# Patient Record
Sex: MALE | Race: BLACK OR AFRICAN AMERICAN | Employment: OTHER
[De-identification: names, ages, dates, MRNs, and addresses within clinical notes are randomized per-mention and may not be internally consistent; named-entity substitution may affect disease eponyms.]

---

## 2017-01-24 ENCOUNTER — TELEPHONE (OUTPATIENT)
Dept: BURN CARE | Facility: CLINIC | Age: 62
End: 2017-01-24

## 2017-01-31 ENCOUNTER — TELEPHONE (OUTPATIENT)
Dept: BURN CARE | Facility: CLINIC | Age: 62
End: 2017-01-31

## 2017-02-14 ENCOUNTER — HOSPITAL ENCOUNTER (OUTPATIENT)
Dept: CARDIAC CATH/INVASIVE PROCEDURES | Age: 62
Setting detail: OUTPATIENT SURGERY
Discharge: HOME | End: 2017-02-14
Payer: COMMERCIAL

## 2017-02-14 VITALS
SYSTOLIC BLOOD PRESSURE: 135 MMHG | TEMPERATURE: 98 F | RESPIRATION RATE: 14 BRPM | DIASTOLIC BLOOD PRESSURE: 68 MMHG | OXYGEN SATURATION: 96 % | WEIGHT: 172 LBS | BODY MASS INDEX: 25.48 KG/M2 | HEIGHT: 69 IN | HEART RATE: 54 BPM

## 2017-02-14 LAB
POC BUN: 7 MG/DL (ref 6–20)
POC CREATININE: 0.8 MG/DL (ref 0.6–1.4)
POC POTASSIUM: 4 MMOL/L (ref 3.5–5.1)

## 2017-02-14 PROCEDURE — 84520 ASSAY OF UREA NITROGEN: CPT

## 2017-02-14 PROCEDURE — 7100000010 HC PHASE II RECOVERY - FIRST 15 MIN

## 2017-02-14 PROCEDURE — C1769 GUIDE WIRE: HCPCS

## 2017-02-14 PROCEDURE — 6360000002 HC RX W HCPCS

## 2017-02-14 PROCEDURE — C1725 CATH, TRANSLUMIN NON-LASER: HCPCS

## 2017-02-14 PROCEDURE — 7100000011 HC PHASE II RECOVERY - ADDTL 15 MIN

## 2017-02-14 PROCEDURE — 75625 CONTRAST EXAM ABDOMINL AORTA: CPT | Performed by: SURGERY

## 2017-02-14 PROCEDURE — 82565 ASSAY OF CREATININE: CPT

## 2017-02-14 PROCEDURE — 75710 ARTERY X-RAYS ARM/LEG: CPT | Performed by: SURGERY

## 2017-02-14 PROCEDURE — 84132 ASSAY OF SERUM POTASSIUM: CPT

## 2017-02-14 PROCEDURE — 36247 INS CATH ABD/L-EXT ART 3RD: CPT | Performed by: SURGERY

## 2017-02-14 PROCEDURE — C1894 INTRO/SHEATH, NON-LASER: HCPCS

## 2017-02-14 PROCEDURE — C1887 CATHETER, GUIDING: HCPCS

## 2017-02-14 RX ORDER — SODIUM CHLORIDE 9 MG/ML
INJECTION, SOLUTION INTRAVENOUS CONTINUOUS
Status: DISCONTINUED | OUTPATIENT
Start: 2017-02-14 | End: 2017-02-15 | Stop reason: HOSPADM

## 2017-02-14 RX ADMIN — SODIUM CHLORIDE: 9 INJECTION, SOLUTION INTRAVENOUS at 07:46

## 2017-02-14 ASSESSMENT — PAIN SCALES - GENERAL: PAINLEVEL_OUTOF10: 2

## 2017-02-14 ASSESSMENT — PAIN DESCRIPTION - PAIN TYPE: TYPE: CHRONIC PAIN

## 2017-02-14 ASSESSMENT — PAIN DESCRIPTION - LOCATION: LOCATION: LEG;OTHER (COMMENT)

## 2017-02-14 ASSESSMENT — PAIN DESCRIPTION - FREQUENCY: FREQUENCY: INTERMITTENT

## 2017-02-14 ASSESSMENT — PAIN DESCRIPTION - DESCRIPTORS: DESCRIPTORS: BURNING

## 2017-05-24 ENCOUNTER — OFFICE VISIT (OUTPATIENT)
Dept: INTERNAL MEDICINE | Age: 62
End: 2017-05-24
Payer: COMMERCIAL

## 2017-05-24 VITALS
OXYGEN SATURATION: 100 % | DIASTOLIC BLOOD PRESSURE: 70 MMHG | HEART RATE: 53 BPM | HEIGHT: 68 IN | BODY MASS INDEX: 25.7 KG/M2 | SYSTOLIC BLOOD PRESSURE: 117 MMHG | WEIGHT: 169.6 LBS

## 2017-05-24 DIAGNOSIS — I10 ESSENTIAL HYPERTENSION: Chronic | ICD-10-CM

## 2017-05-24 DIAGNOSIS — I73.9 CLAUDICATION OF LEFT LOWER EXTREMITY (HCC): ICD-10-CM

## 2017-05-24 PROCEDURE — 99214 OFFICE O/P EST MOD 30 MIN: CPT | Performed by: INTERNAL MEDICINE

## 2017-05-24 RX ORDER — AMLODIPINE BESYLATE 10 MG/1
10 TABLET ORAL DAILY
Qty: 30 TABLET | Refills: 5 | Status: SHIPPED | OUTPATIENT
Start: 2017-05-24 | End: 2017-12-15 | Stop reason: SDUPTHER

## 2017-05-24 RX ORDER — ATORVASTATIN CALCIUM 20 MG/1
20 TABLET, FILM COATED ORAL DAILY
Qty: 30 TABLET | Refills: 5 | Status: SHIPPED | OUTPATIENT
Start: 2017-05-24 | End: 2017-10-05

## 2017-05-24 ASSESSMENT — PATIENT HEALTH QUESTIONNAIRE - PHQ9
1. LITTLE INTEREST OR PLEASURE IN DOING THINGS: 0
2. FEELING DOWN, DEPRESSED OR HOPELESS: 0
SUM OF ALL RESPONSES TO PHQ9 QUESTIONS 1 & 2: 0
SUM OF ALL RESPONSES TO PHQ QUESTIONS 1-9: 0

## 2017-10-04 DIAGNOSIS — E78.1 HYPERTRIGLYCERIDEMIA: ICD-10-CM

## 2017-10-04 NOTE — TELEPHONE ENCOUNTER
Health Maintenance   Topic Date Due    HIV screen  03/25/1971    Flu vaccine (1) 09/01/2017    Hepatitis C screen  11/09/2017 (Originally 1955)    DTaP/Tdap/Td vaccine (1 - Tdap) 01/18/2018 (Originally 3/25/1975)    Lipid screen  06/12/2020    Colon cancer screen colonoscopy  04/22/2025    Zostavax vaccine  Addressed    Pneumococcal med risk  Completed             (applicable per patient's age: Cancer Screenings, Depression Screening, Fall Risk Screening, Immunizations)    LDL Cholesterol (mg/dL)   Date Value   06/12/2015 57     AST (U/L)   Date Value   11/10/2016 91 (H)     ALT (U/L)   Date Value   11/10/2016 63 (H)     BUN (mg/dL)   Date Value   11/10/2016 8      (goal A1C is < 7)   (goal LDL is <100) need 30-50% reduction from baseline     BP Readings from Last 3 Encounters:   05/24/17 117/70   02/14/17 135/68   12/20/16 123/68    (goal /80)      All Future Testing planned in [facility]:  Lab Frequency Next Occurrence   Vasc Lower Ext Bilat Exercise Present Once 10/15/2017   Comprehensive Metabolic Panel Once 7/39/3951   Vasc Lower Ext Bilat Exercise Present Once 12/15/2016   VL Arterial PVR Lower w Exercise Once 12/19/2016       Next Visit Date:  No future appointments.          Patient Active Problem List:     Hx of tuberculosis     Alcohol induced acute pancreatitis     HTN (hypertension)     Alcohol abuse     Tobacco abuse     Hypertriglyceridemia     Macrocytic anemia     Cirrhosis, alcoholic (HCC)     PAD (peripheral artery disease) ([address]     Diverticulosis     Internal hemorrhoids     Hypercholesteremia     Amputation finger     Claudication of left lower extremity ([address]

## 2017-10-05 RX ORDER — ATORVASTATIN CALCIUM 20 MG/1
TABLET, FILM COATED ORAL
Qty: 30 TABLET | Refills: 0 | Status: SHIPPED | OUTPATIENT
Start: 2017-10-05 | End: 2018-01-30

## 2017-12-08 ENCOUNTER — OFFICE VISIT (OUTPATIENT)
Dept: VASCULAR SURGERY | Age: 62
End: 2017-12-08
Payer: COMMERCIAL

## 2017-12-08 VITALS
HEIGHT: 68 IN | BODY MASS INDEX: 25.69 KG/M2 | RESPIRATION RATE: 18 BRPM | HEART RATE: 65 BPM | WEIGHT: 169.53 LBS | DIASTOLIC BLOOD PRESSURE: 60 MMHG | SYSTOLIC BLOOD PRESSURE: 120 MMHG | OXYGEN SATURATION: 98 %

## 2017-12-08 DIAGNOSIS — I73.9 PAD (PERIPHERAL ARTERY DISEASE) (HCC): Primary | ICD-10-CM

## 2017-12-08 DIAGNOSIS — I73.9 CLAUDICATION OF LEFT LOWER EXTREMITY (HCC): ICD-10-CM

## 2017-12-08 PROCEDURE — G8427 DOCREV CUR MEDS BY ELIG CLIN: HCPCS | Performed by: SURGERY

## 2017-12-08 PROCEDURE — G8419 CALC BMI OUT NRM PARAM NOF/U: HCPCS | Performed by: SURGERY

## 2017-12-08 PROCEDURE — 99213 OFFICE O/P EST LOW 20 MIN: CPT | Performed by: SURGERY

## 2017-12-08 PROCEDURE — 3017F COLORECTAL CA SCREEN DOC REV: CPT | Performed by: SURGERY

## 2017-12-08 PROCEDURE — G8484 FLU IMMUNIZE NO ADMIN: HCPCS | Performed by: SURGERY

## 2017-12-08 PROCEDURE — 1036F TOBACCO NON-USER: CPT | Performed by: SURGERY

## 2017-12-08 RX ORDER — CILOSTAZOL 100 MG/1
100 TABLET ORAL 2 TIMES DAILY
Qty: 60 TABLET | Refills: 3 | Status: SHIPPED | OUTPATIENT
Start: 2017-12-08

## 2017-12-08 NOTE — PROGRESS NOTES
Cancer in his father and sister; Diabetes in his brother and father; Heart Disease in his father and sister; High Blood Pressure in his mother and sister. Review of Systems:   Constitutional:  negative for  fevers, chills, sweats, fatigue, and weight loss  HEENT:  negative for vision or hearing changes,   Respiratory:  negative for shortness of breath, cough, or congestion  Cardiovascular:  negative for  chest pain, palpitations  Gastrointestinal:  negative for nausea, vomiting, diarrhea, constipation, abdominal pain  Genitourinary:  negative for frequency, dysuria  Integument/Breast:  negative for rash, skin lesions  Musculoskeletal:  negative for muscle aches or joint pain  Neurological:  negative for headaches, dizziness, lightheadedness, numbness, pain and tingling extremities  Behavior/Psych:  negative for depression and anxiety    Allergies: Review of patient's allergies indicates no known allergies.     Current Meds:  Current Outpatient Prescriptions:     cilostazol (PLETAL) 100 MG tablet, Take 1 tablet by mouth 2 times daily, Disp: 60 tablet, Rfl: 3    atorvastatin (LIPITOR) 20 MG tablet, TAKE 1 TABLET BY MOUTH DAILY, Disp: 30 tablet, Rfl: 0    amLODIPine (NORVASC) 10 MG tablet, Take 1 tablet by mouth daily, Disp: 30 tablet, Rfl: 5    folic acid (FOLVITE) 1 MG tablet, TAKE 1 TABLET DAILY, Disp: 30 tablet, Rfl: 11    Multiple Vitamin (MULTIVITAMIN) tablet, TAKE 1 TABLET BY MOUTH DAILY, Disp: 30 tablet, Rfl: 11    aspirin 81 MG tablet, Take 81 mg by mouth daily LAST TAKEN 11/10/2016, Disp: , Rfl:     Vital Signs:  Vitals:    12/08/17 1411   BP: 120/60   Pulse: 65   Resp: 18   SpO2: 98%       Physical Exam:  General appearance - alert, well appearing and in no acute distress  Mental status - oriented to person, place and time with normal affect  Head - normocephalic and atraumatic  Eyes - pupils equal and reactive, extraocular eye movements intact, conjunctiva clear  Ears - hearing appears to be intact  Nose - no drainage noted  Mouth - mucous membranes moist  Neck - supple, no carotid bruits, thyroid not palpable, no JVD  Chest - clear to auscultation, normal effort  Heart - normal rate, regular rhythm, no murmurs  Abdomen - soft, non-tender, non-distended, bowel sounds present all four quadrants, no masses, hepatomegaly, splenomegaly or aortic enlargement  Neurological - normal speech, no focal findings or movement disorder noted, cranial nerves II through XII grossly intact  Extremities - peripheral pulses palpable, no pedal edema or calf pain with palpation  Skin - no gross lesions, rashes, or induration noted      R brachial 2+ L brachial 2+   R radial 2+ L radial 2+   R femoral 2+ L femoral 2+   R popliteal 2+ L popliteal doppler+   R posterior tibial 2+ L posterior tibial doppler+   R dorsalis pedis 2+ L dorsalis pedis doppler+     Labs:   Lab Results   Component Value Date    WBC 7.4 12/06/2014    HGB 12.9 12/06/2014    HCT 38.6 12/06/2014    .9 12/06/2014     12/06/2014     Lab Results   Component Value Date     11/10/2016    K 4.8 11/10/2016     11/10/2016    CO2 17 11/10/2016    BUN 8 11/10/2016    CREATININE 0.8 02/14/2017    CREATININE 0.75 11/10/2016    GLUCOSE 87 11/10/2016    CALCIUM 9.1 11/10/2016     Lab Results   Component Value Date    ALKPHOS 121 11/10/2016    ALT 63 11/10/2016    AST 91 11/10/2016    PROT 7.6 11/10/2016    BILITOT 1.36 11/10/2016    BILIDIR 0.42 06/12/2015    LABALBU 3.8 11/10/2016     No results found for: LACTA  Lab Results   Component Value Date    AMYLASE 177 06/03/2013     Lab Results   Component Value Date    LIPASE 39 06/12/2015     Lab Results   Component Value Date    INR 0.9 11/28/2016         Assessment:  1. 65 yr old male with claudication of the left leg    1. PAD (peripheral artery disease) (HCC)    2. Claudication of left lower extremity ([address]        Plan:   1. Continue an exercise regimen with Pletal 100mg PO BID  2.  Follow up

## 2017-12-15 NOTE — TELEPHONE ENCOUNTER
Next Visit Date:  Future Appointments  Date Time Provider Isac Ree   2/9/2018 1:10 PM Ramona Aguillon MD [address] Maintenance   Topic Date Due    Hepatitis C screen  1955    HIV screen  03/25/1971    Flu vaccine (1) 09/01/2017    DTaP/Tdap/Td vaccine (1 - Tdap) 01/18/2018 (Originally 3/25/1975)    Lipid screen  06/12/2020    Colon cancer screen colonoscopy  04/22/2025    Zostavax vaccine  Addressed    Pneumococcal med risk  Completed       No results found for: LABA1C          ( goal A1C is < 7)   No results found for: LABMICR  LDL Cholesterol (mg/dL)   Date Value   06/12/2015 57       (goal LDL is <100)   AST (U/L)   Date Value   11/10/2016 91 (H)     ALT (U/L)   Date Value   11/10/2016 63 (H)     BUN (mg/dL)   Date Value   11/10/2016 8     BP Readings from Last 3 Encounters:   12/08/17 120/60   05/24/17 117/70   02/14/17 135/68          (goal 120/80)    All Future Testing planned in [facility]  Lab Frequency Next Occurrence   Vasc Lower Ext Bilat Exercise Present Once 12/15/2016   VL Arterial PVR Lower w Exercise Once 12/19/2016               Patient Active Problem List:     Hx of tuberculosis     Alcohol induced acute pancreatitis     HTN (hypertension)     Alcohol abuse     Tobacco abuse     Hypertriglyceridemia     Macrocytic anemia     Cirrhosis, alcoholic (HCC)     PAD (peripheral artery disease) ([address]     Diverticulosis     Internal hemorrhoids     Hypercholesteremia     Amputation finger     Claudication of left lower extremity ([address]

## 2017-12-16 RX ORDER — AMLODIPINE BESYLATE 10 MG/1
TABLET ORAL
Qty: 30 TABLET | Refills: 3 | Status: SHIPPED | OUTPATIENT
Start: 2017-12-16 | End: 2018-07-03 | Stop reason: SDUPTHER

## 2017-12-26 DIAGNOSIS — F10.10 ALCOHOL ABUSE: Chronic | ICD-10-CM

## 2017-12-28 RX ORDER — MULTIVITAMIN WITH FOLIC ACID 400 MCG
TABLET ORAL
Qty: 30 TABLET | Refills: 0 | Status: SHIPPED | OUTPATIENT
Start: 2017-12-28 | End: 2018-01-30 | Stop reason: SDUPTHER

## 2017-12-29 ENCOUNTER — TELEPHONE (OUTPATIENT)
Dept: VASCULAR SURGERY | Age: 62
End: 2017-12-29

## 2017-12-29 NOTE — TELEPHONE ENCOUNTER
Patients daughter called stating that the Pletal that was sent over electronically did not reach the pharmacy. I verbally called the prescription in to [facility] today.

## 2018-01-30 DIAGNOSIS — F10.10 ALCOHOL ABUSE: Chronic | ICD-10-CM

## 2018-01-30 RX ORDER — MULTIVITAMIN WITH FOLIC ACID 400 MCG
TABLET ORAL
Qty: 30 TABLET | Refills: 0 | Status: SHIPPED | OUTPATIENT
Start: 2018-01-30 | End: 2018-03-12 | Stop reason: SDUPTHER

## 2018-01-30 RX ORDER — ATORVASTATIN CALCIUM 20 MG/1
20 TABLET, FILM COATED ORAL DAILY
Qty: 30 TABLET | Refills: 0 | Status: SHIPPED | OUTPATIENT
Start: 2018-01-30 | End: 2018-03-12

## 2018-03-12 DIAGNOSIS — F10.10 ALCOHOL ABUSE: Chronic | ICD-10-CM

## 2018-03-12 DIAGNOSIS — E78.1 HYPERTRIGLYCERIDEMIA: ICD-10-CM

## 2018-03-12 RX ORDER — ATORVASTATIN CALCIUM 20 MG/1
TABLET, FILM COATED ORAL
Qty: 30 TABLET | Refills: 0 | Status: SHIPPED | OUTPATIENT
Start: 2018-03-12 | End: 2018-07-03 | Stop reason: SDUPTHER

## 2018-03-12 RX ORDER — FOLIC ACID 1 MG/1
TABLET ORAL
Qty: 30 TABLET | Refills: 0 | Status: SHIPPED | OUTPATIENT
Start: 2018-03-12 | End: 2018-04-24 | Stop reason: SDUPTHER

## 2018-03-12 RX ORDER — MULTIVITAMIN WITH FOLIC ACID 400 MCG
TABLET ORAL
Qty: 30 TABLET | Refills: 0 | Status: SHIPPED | OUTPATIENT
Start: 2018-03-12 | End: 2018-04-24 | Stop reason: SDUPTHER

## 2018-03-12 NOTE — TELEPHONE ENCOUNTER
Health Maintenance   Topic Date Due    Hepatitis C screen  1955    HIV screen  03/25/1971    DTaP/Tdap/Td vaccine (1 - Tdap) 03/25/1975    Shingles Vaccine (1 of 2 - 2 Dose Series) 03/25/2006    Flu vaccine (1) 09/01/2017    Lipid screen  06/12/2020    Colon cancer screen colonoscopy  04/22/2025    Pneumococcal med risk  Completed             (applicable per patient's age: Cancer Screenings, Depression Screening, Fall Risk Screening, Immunizations)    LDL Cholesterol (mg/dL)   Date Value   06/12/2015 57     AST (U/L)   Date Value   11/10/2016 91 (H)     ALT (U/L)   Date Value   11/10/2016 63 (H)     BUN (mg/dL)   Date Value   11/10/2016 8      (goal A1C is < 7)   (goal LDL is <100) need 30-50% reduction from baseline     BP Readings from Last 3 Encounters:   12/08/17 120/60   05/24/17 117/70   02/14/17 135/68    (goal /80)      All Future Testing planned in [facility]:  Lab Frequency Next Occurrence       Next Visit Date:  No future appointments.          Patient Active Problem List:     Hx of tuberculosis     Alcohol induced acute pancreatitis     HTN (hypertension)     Alcohol abuse     Tobacco abuse     Hypertriglyceridemia     Macrocytic anemia     Cirrhosis, alcoholic (HCC)     PAD (peripheral artery disease) ([address]     Diverticulosis     Internal hemorrhoids     Hypercholesteremia     Amputation finger     Claudication of left lower extremity ([address]

## 2018-04-24 DIAGNOSIS — F10.10 ALCOHOL ABUSE: Chronic | ICD-10-CM

## 2018-04-24 RX ORDER — MULTIVITAMIN WITH FOLIC ACID 400 MCG
TABLET ORAL
Qty: 30 TABLET | Refills: 3 | Status: SHIPPED | OUTPATIENT
Start: 2018-04-24 | End: 2019-01-14 | Stop reason: SDUPTHER

## 2018-04-24 RX ORDER — FOLIC ACID 1 MG/1
TABLET ORAL
Qty: 30 TABLET | Refills: 3 | Status: SHIPPED | OUTPATIENT
Start: 2018-04-24 | End: 2018-09-18 | Stop reason: SDUPTHER

## 2018-07-03 DIAGNOSIS — E78.1 HYPERTRIGLYCERIDEMIA: ICD-10-CM

## 2018-07-05 RX ORDER — AMLODIPINE BESYLATE 10 MG/1
TABLET ORAL
Qty: 30 TABLET | Refills: 3 | Status: SHIPPED | OUTPATIENT
Start: 2018-07-05 | End: 2019-05-08 | Stop reason: SDUPTHER

## 2018-07-05 RX ORDER — ATORVASTATIN CALCIUM 20 MG/1
TABLET, FILM COATED ORAL
Qty: 30 TABLET | Refills: 3 | Status: SHIPPED | OUTPATIENT
Start: 2018-07-05 | End: 2018-10-11 | Stop reason: SDUPTHER

## 2018-09-18 DIAGNOSIS — F10.10 ALCOHOL ABUSE: Chronic | ICD-10-CM

## 2018-09-18 RX ORDER — FOLIC ACID 1 MG/1
TABLET ORAL
Qty: 30 TABLET | Refills: 0 | Status: SHIPPED | OUTPATIENT
Start: 2018-09-18 | End: 2019-03-18 | Stop reason: SDUPTHER

## 2018-10-01 ENCOUNTER — APPOINTMENT (OUTPATIENT)
Dept: CT IMAGING | Age: 63
End: 2018-10-01
Payer: COMMERCIAL

## 2018-10-01 ENCOUNTER — APPOINTMENT (OUTPATIENT)
Dept: GENERAL RADIOLOGY | Age: 63
End: 2018-10-01
Payer: COMMERCIAL

## 2018-10-01 ENCOUNTER — HOSPITAL ENCOUNTER (EMERGENCY)
Age: 63
Discharge: HOME | End: 2018-10-01
Attending: EMERGENCY MEDICINE
Payer: COMMERCIAL

## 2018-10-01 VITALS
WEIGHT: 172 LBS | HEART RATE: 71 BPM | BODY MASS INDEX: 25.48 KG/M2 | RESPIRATION RATE: 14 BRPM | OXYGEN SATURATION: 100 % | HEIGHT: 69 IN | DIASTOLIC BLOOD PRESSURE: 80 MMHG | SYSTOLIC BLOOD PRESSURE: 159 MMHG | TEMPERATURE: 98.2 F
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
## 2018-10-01 PROCEDURE — 72125 CT NECK SPINE W/O DYE: CPT

## 2018-10-01 PROCEDURE — 70450 CT HEAD/BRAIN W/O DYE: CPT

## 2018-10-01 PROCEDURE — G0383 LEV 4 HOSP TYPE B ED VISIT: HCPCS

## 2018-10-01 PROCEDURE — 6370000000 HC RX 637 (ALT 250 FOR IP): Performed by: STUDENT IN AN ORGANIZED HEALTH CARE EDUCATION/TRAINING PROGRAM

## 2018-10-01 PROCEDURE — 71045 X-RAY EXAM CHEST 1 VIEW: CPT

## 2018-10-01 RX ORDER — ACETAMINOPHEN 325 MG/1
325 TABLET ORAL EVERY 6 HOURS PRN
Qty: 120 TABLET | Refills: 3 | Status: SHIPPED | OUTPATIENT
Start: 2018-10-01 | End: 2019-06-24

## 2018-10-01 RX ORDER — IBUPROFEN 800 MG/1
800 TABLET ORAL ONCE
Status: COMPLETED | OUTPATIENT
Start: 2018-10-01 | End: 2018-10-01

## 2018-10-01 RX ORDER — IBUPROFEN 800 MG/1
800 TABLET ORAL EVERY 8 HOURS PRN
Qty: 30 TABLET | Refills: 0 | Status: SHIPPED | OUTPATIENT
Start: 2018-10-01 | End: 2019-04-11 | Stop reason: SDUPTHER

## 2018-10-01 RX ORDER — ACETAMINOPHEN 325 MG/1
650 TABLET ORAL ONCE
Status: COMPLETED | OUTPATIENT
Start: 2018-10-01 | End: 2018-10-01

## 2018-10-01 RX ADMIN — ACETAMINOPHEN 650 MG: 325 TABLET ORAL at 18:07

## 2018-10-01 RX ADMIN — IBUPROFEN 800 MG: 800 TABLET ORAL at 18:07

## 2018-10-01 ASSESSMENT — PAIN DESCRIPTION - PAIN TYPE: TYPE: ACUTE PAIN

## 2018-10-01 ASSESSMENT — PAIN SCALES - GENERAL
PAINLEVEL_OUTOF10: 9
PAINLEVEL_OUTOF10: 5
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
## 2018-10-01 NOTE — ED NOTES
Bed: 38  Expected date:   Expected time:   Means of arrival:   Comments:  Eliu Acosta RN  10/01/18 0915

## 2018-10-07 ASSESSMENT — ENCOUNTER SYMPTOMS
VOMITING: 0
SORE THROAT: 0
ABDOMINAL PAIN: 0
PHOTOPHOBIA: 0
COUGH: 0
CHEST TIGHTNESS: 0
SHORTNESS OF BREATH: 0
BACK PAIN: 0
NAUSEA: 0
TROUBLE SWALLOWING: 0
WHEEZING: 0

## 2018-10-07 NOTE — ED PROVIDER NOTES
History   Problem Relation Age of Onset    High Blood Pressure Mother     Diabetes Father     Cancer Father     Heart Disease Father     Cancer Sister     High Blood Pressure Sister     Heart Disease Sister     Diabetes Brother        Allergies:  Patient has no known allergies. Home Medications:  Prior to Admission medications    Medication Sig Start Date End Date Taking? Authorizing Provider   ibuprofen (ADVIL;MOTRIN) 800 MG tablet Take 1 tablet by mouth every 8 hours as needed for Pain 10/1/18  Yes Rob Glover MD   acetaminophen (TYLENOL) 325 MG tablet Take 1 tablet by mouth every 6 hours as needed for Pain 10/1/18  Yes Rob Glover MD   folic acid (FOLVITE) 1 MG tablet TAKE 1 TABLET DAILY 9/18/18   Bia Martinez MD   amLODIPine (NORVASC) 10 MG tablet TAKE 1 TABLET DAILY -MAY CAUSE DIZZINESS 7/5/18   Bia Martinez MD   atorvastatin (LIPITOR) 20 MG tablet TAKE 1 TABLET BY MOUTH DAILY 7/5/18   Bia Martinez MD   Multiple Vitamin (MULTIVITAMIN) tablet TAKE 1 TABLET DAILY 4/24/18   Bia Martinez MD   cilostazol (PLETAL) 100 MG tablet Take 1 tablet by mouth 2 times daily 12/8/17   Fara Torres MD   aspirin 81 MG tablet Take 81 mg by mouth daily LAST TAKEN 11/10/2016    Historical Provider, MD       REVIEW OF SYSTEMS    (2-9 systems for level 4, 10 or more for level 5)      Review of Systems   Constitutional: Negative for chills and fever. HENT: Negative for sore throat and trouble swallowing. Eyes: Negative for photophobia. Respiratory: Negative for cough, chest tightness, shortness of breath and wheezing. Cardiovascular: Negative for chest pain and palpitations. Gastrointestinal: Negative for abdominal pain, nausea and vomiting. Endocrine: Negative for polyuria. Genitourinary: Negative for dysuria and flank pain. Musculoskeletal: Negative for back pain and neck pain. Skin: Negative for rash and wound.    Neurological: Negative for syncope, weakness, light-headedness and headaches. Psychiatric/Behavioral: Negative for agitation and confusion. PHYSICAL EXAM   (up to 7 for level 4, 8 or more for level 5)      INITIAL VITALS:   BP (!) 159/80   Pulse 71   Temp 98.2 °F (36.8 °C) (Oral)   Resp 14   Ht 5' 8.5\" (1.74 m)   Wt 172 lb (78 kg)   SpO2 100%   BMI 25.77 kg/m²     Physical Exam   Constitutional: He is oriented to person, place, and time. He appears well-developed and well-nourished. HENT:   Head: Normocephalic and atraumatic. Nose: Nose normal.   Mouth/Throat: Oropharynx is clear and moist.   Eyes: Pupils are equal, round, and reactive to light. EOM are normal.   Neck: Normal range of motion. Neck supple. Cardiovascular: Normal rate, regular rhythm, normal heart sounds and intact distal pulses. Pulmonary/Chest: Breath sounds normal. No respiratory distress. He has no wheezes. He has no rales. Abdominal: Soft. Bowel sounds are normal. He exhibits no distension. There is no tenderness. Musculoskeletal: Normal range of motion. He exhibits no edema or deformity. Midline and paraspinal neck tenderness. Limited range of motion due to tenderness. Cervical collar in place. Neurological: He is alert and oriented to person, place, and time. He has normal reflexes. Skin: Skin is warm and dry. No rash noted. No erythema. Psychiatric: He has a normal mood and affect.  His behavior is normal.       DIFFERENTIAL  DIAGNOSIS     PLAN (LABS / IMAGING / EKG):  Orders Placed This Encounter   Procedures    CT Head WO Contrast    CT CERVICAL SPINE WO CONTRAST    XR CHEST PORTABLE       MEDICATIONS ORDERED:  Orders Placed This Encounter   Medications    ibuprofen (ADVIL;MOTRIN) tablet 800 mg    acetaminophen (TYLENOL) tablet 650 mg    ibuprofen (ADVIL;MOTRIN) 800 MG tablet     Sig: Take 1 tablet by mouth every 8 hours as needed for Pain     Dispense:  30 tablet     Refill:  0    acetaminophen (TYLENOL) 325 MG tablet     Sig: Take 1 tablet by mouth every 6 hours

## 2018-10-11 ENCOUNTER — OFFICE VISIT (OUTPATIENT)
Dept: INTERNAL MEDICINE | Age: 63
End: 2018-10-11
Payer: COMMERCIAL

## 2018-10-11 VITALS
SYSTOLIC BLOOD PRESSURE: 133 MMHG | DIASTOLIC BLOOD PRESSURE: 77 MMHG | WEIGHT: 166.4 LBS | HEIGHT: 69 IN | BODY MASS INDEX: 24.65 KG/M2 | HEART RATE: 52 BPM

## 2018-10-11 DIAGNOSIS — I10 ESSENTIAL HYPERTENSION: Chronic | ICD-10-CM

## 2018-10-11 DIAGNOSIS — Z23 NEED FOR INFLUENZA VACCINATION: Primary | ICD-10-CM

## 2018-10-11 DIAGNOSIS — E78.1 HYPERTRIGLYCERIDEMIA: ICD-10-CM

## 2018-10-11 DIAGNOSIS — S68.119S TRAUMATIC AMPUTATION OF FINGER WITHOUT COMPLICATION, SEQUELA (HCC): ICD-10-CM

## 2018-10-11 DIAGNOSIS — I73.9 PAD (PERIPHERAL ARTERY DISEASE) (HCC): ICD-10-CM

## 2018-10-11 DIAGNOSIS — Z13.31 POSITIVE DEPRESSION SCREENING: ICD-10-CM

## 2018-10-11 DIAGNOSIS — D53.9 MACROCYTIC ANEMIA: ICD-10-CM

## 2018-10-11 DIAGNOSIS — K70.9 ALCOHOLIC LIVER DAMAGE (HCC): ICD-10-CM

## 2018-10-11 PROCEDURE — 3017F COLORECTAL CA SCREEN DOC REV: CPT | Performed by: INTERNAL MEDICINE

## 2018-10-11 PROCEDURE — 90686 IIV4 VACC NO PRSV 0.5 ML IM: CPT | Performed by: INTERNAL MEDICINE

## 2018-10-11 PROCEDURE — G8431 POS CLIN DEPRES SCRN F/U DOC: HCPCS | Performed by: INTERNAL MEDICINE

## 2018-10-11 PROCEDURE — 1036F TOBACCO NON-USER: CPT | Performed by: INTERNAL MEDICINE

## 2018-10-11 PROCEDURE — 99214 OFFICE O/P EST MOD 30 MIN: CPT | Performed by: INTERNAL MEDICINE

## 2018-10-11 PROCEDURE — G8427 DOCREV CUR MEDS BY ELIG CLIN: HCPCS | Performed by: INTERNAL MEDICINE

## 2018-10-11 PROCEDURE — 99211 OFF/OP EST MAY X REQ PHY/QHP: CPT | Performed by: INTERNAL MEDICINE

## 2018-10-11 PROCEDURE — G8482 FLU IMMUNIZE ORDER/ADMIN: HCPCS | Performed by: INTERNAL MEDICINE

## 2018-10-11 PROCEDURE — G8420 CALC BMI NORM PARAMETERS: HCPCS | Performed by: INTERNAL MEDICINE

## 2018-10-11 PROCEDURE — 96160 PT-FOCUSED HLTH RISK ASSMT: CPT | Performed by: INTERNAL MEDICINE

## 2018-10-11 RX ORDER — ATORVASTATIN CALCIUM 20 MG/1
TABLET, FILM COATED ORAL
Qty: 30 TABLET | Refills: 3 | Status: SHIPPED | OUTPATIENT
Start: 2018-10-11 | End: 2019-07-18 | Stop reason: SDUPTHER

## 2018-10-11 ASSESSMENT — ENCOUNTER SYMPTOMS
RESPIRATORY NEGATIVE: 1
GASTROINTESTINAL NEGATIVE: 1
ALLERGIC/IMMUNOLOGIC NEGATIVE: 1
EYE DISCHARGE: 1

## 2018-10-11 ASSESSMENT — PATIENT HEALTH QUESTIONNAIRE - PHQ9
4. FEELING TIRED OR HAVING LITTLE ENERGY: 1
3. TROUBLE FALLING OR STAYING ASLEEP: 3
SUM OF ALL RESPONSES TO PHQ QUESTIONS 1-9: 12
SUM OF ALL RESPONSES TO PHQ9 QUESTIONS 1 & 2: 3
8. MOVING OR SPEAKING SO SLOWLY THAT OTHER PEOPLE COULD HAVE NOTICED. OR THE OPPOSITE, BEING SO FIGETY OR RESTLESS THAT YOU HAVE BEEN MOVING AROUND A LOT MORE THAN USUAL: 2
10. IF YOU CHECKED OFF ANY PROBLEMS, HOW DIFFICULT HAVE THESE PROBLEMS MADE IT FOR YOU TO DO YOUR WORK, TAKE CARE OF THINGS AT HOME, OR GET ALONG WITH OTHER PEOPLE: 1
5. POOR APPETITE OR OVEREATING: 1
2. FEELING DOWN, DEPRESSED OR HOPELESS: 2
6. FEELING BAD ABOUT YOURSELF - OR THAT YOU ARE A FAILURE OR HAVE LET YOURSELF OR YOUR FAMILY DOWN: 0
SUM OF ALL RESPONSES TO PHQ QUESTIONS 1-9: 12
9. THOUGHTS THAT YOU WOULD BE BETTER OFF DEAD, OR OF HURTING YOURSELF: 0
7. TROUBLE CONCENTRATING ON THINGS, SUCH AS READING THE NEWSPAPER OR WATCHING TELEVISION: 2
1. LITTLE INTEREST OR PLEASURE IN DOING THINGS: 1

## 2018-10-11 NOTE — PROGRESS NOTES
[facility]   Progress Note      Visit Information    Have you changed or started any medications since your last visit including any over-the-counter medicines, vitamins, or herbal medicines? yes - ASA   Are you having any side effects from any of your medications? -  no  Have you stopped taking any of your medications? Is so, why? -  no    Have you seen any other physician or provider since your last visit? Yes - Records Obtained  Have you had any other diagnostic tests since your last visit? No  Have you been seen in the emergency room and/or had an admission to a hospital since we last saw you? Yes - Records Obtained  Have you had your routine dental cleaning in the past 6 months? no    Have you activated your [employer] account? If not, what are your barriers? Yes     Patient Care Team:  Brijesh Linda MD as PCP - General (Internal Medicine)  Brijesh Linda MD as PCP - [facility] Attributed Provider    Medical History Review  Past Medical, Family, and Social History reviewed and does contribute to the patient presenting condition    Health Maintenance   Topic Date Due    Hepatitis C screen  1955    HIV screen  03/25/1971    DTaP/Tdap/Td vaccine (1 - Tdap) 03/25/1975    Shingles Vaccine (1 of 2 - 2 Dose Series) 03/25/2006    Flu vaccine (1) 09/01/2018    Lipid screen  06/12/2020    Colon cancer screen colonoscopy  04/22/2025    Pneumococcal med risk  Completed       Date of patient's visit: 10/11/2018  Patient's Name:  Dennise Rawls                   YOB: 1955        PCP:  Brijesh Linda MD    Dennise Rawls is a 59 y.o. male who presents for   Chief Complaint   Patient presents with    Hypertension    and follow up of chronic medical problems.   Patient Active Problem List   Diagnosis    Hx of tuberculosis    Alcohol induced acute pancreatitis    HTN (hypertension)    Alcohol abuse    Tobacco abuse    medications for this visit. Patient Care Team:  Dori Pate MD as PCP - General (Internal Medicine)  Dori Pate MD as PCP - [facility] Attributed Provider    PAST MEDICAL AND SURGICAL HISTORY:      Past Medical History:   Diagnosis Date    Alcoholism ([address]     Cirrhosis of liver ([address]     Claudication ([address]     left leg    Finger fracture, left     LONG FINGER    Hx of blood clots     LEFT LOWER LEG UNSURE OF THIS AT THIS TIME, IS TO BE SEEING VASCULAR DR FOR THIS.  Hyperlipidemia     Hypertension     Liver disease     PAD (peripheral artery disease) (HCC)     PVD (peripheral vascular disease) ([address]     Wears dentures     FULL UPPER AND LOWER    Wears glasses      Past Surgical History:   Procedure Laterality Date    FINGER AMPUTATION Left 11/28/2016    revision long finger    LIVER BIOPSY      OTHER SURGICAL HISTORY  02/14/2017    Abdomen with run off with Dr. Kwan Martinez - could not pass left left pop; # 7 FR manual pull right groin    UPPER GASTROINTESTINAL ENDOSCOPY      VASCULAR SURGERY      abdominal aortogram       SOCIAL HISTORY      Social History   Substance Use Topics    Smoking status: Former Smoker     Packs/day: 0.50     Types: Cigarettes     Quit date: 11/2015    Smokeless tobacco: Never Used    Alcohol use No      Comment: QUIT 03/2016     Onofre Smart  reports that he quit smoking about 2 years ago. His smoking use included Cigarettes. He smoked 0.50 packs per day. He has never used smokeless tobacco.    FAMILY HISTORY:      Family History   Problem Relation Age of Onset    High Blood Pressure Mother     Diabetes Father     Cancer Father     Heart Disease Father     Cancer Sister     High Blood Pressure Sister     Heart Disease Sister     Diabetes Brother        REVIEW OF SYSTEMS:    Review of Systems   Constitutional: Negative. HENT: Negative. Eyes: Positive for discharge. Respiratory: Negative. Cardiovascular: Negative. Gastrointestinal: Negative. Endocrine: Negative. Genitourinary: Negative. Musculoskeletal: Positive for neck pain. Skin: Negative. Allergic/Immunologic: Negative. Neurological: Positive for dizziness and headaches. Hematological: Negative. Psychiatric/Behavioral: Negative.         PHYSICAL EXAM:      Vitals:    10/11/18 1449   BP: 133/77   Pulse: 52     BP Readings from Last 3 Encounters:   10/11/18 133/77   10/01/18 (!) 159/80   12/08/17 120/60       Physical Examination: General appearance - alert, well appearing, and in no distress  Mental status - alert, oriented to person, place, and time  Eyes - pupils equal and reactive, extraocular eye movements intact  Chest - clear to auscultation, no wheezes, rales or rhonchi, symmetric air entry  Heart - normal rate, regular rhythm, normal S1, S2, no murmurs, rubs, clicks or gallops  Abdomen - soft, nontender, nondistended, no masses or organomegaly  Back exam - full range of motion, no tenderness, palpable spasm or pain on motion  Neurological - alert, oriented, normal speech, no focal findings or movement disorder noted  Musculoskeletal - no muscular tenderness noted  Extremities - peripheral pulses normal, no pedal edema, no clubbing or cyanosis    LABORATORY FINDINGS:    CBC:   Lab Results   Component Value Date    WBC 7.4 12/06/2014    HGB 12.9 12/06/2014     12/06/2014     BMP:    Lab Results   Component Value Date     11/10/2016    K 4.8 11/10/2016     11/10/2016    CO2 17 11/10/2016    BUN 8 11/10/2016    CREATININE 0.8 02/14/2017    CREATININE 0.75 11/10/2016    GLUCOSE 87 11/10/2016     Hemoglobin A1C: No results found for: LABA1C  Microalbumin Urine: No results found for: MICROALBUR  Lipid profile:   Lab Results   Component Value Date    CHOL 123 06/12/2015    TRIG 112 06/12/2015    HDL 44 06/12/2015     Thyroid functions:   Lab Results   Component Value Date    TSH 1.40 11/10/2016      Hepatic functions:   Lab Results   Component Value Date    ALT

## 2018-10-22 ENCOUNTER — APPOINTMENT (OUTPATIENT)
Dept: GENERAL RADIOLOGY | Age: 63
End: 2018-10-22
Payer: COMMERCIAL

## 2018-10-22 ENCOUNTER — APPOINTMENT (OUTPATIENT)
Dept: CT IMAGING | Age: 63
End: 2018-10-22
Payer: COMMERCIAL

## 2018-10-22 ENCOUNTER — HOSPITAL ENCOUNTER (EMERGENCY)
Age: 63
Discharge: HOME | End: 2018-10-22
Attending: EMERGENCY MEDICINE
Payer: COMMERCIAL

## 2018-10-22 VITALS
OXYGEN SATURATION: 96 % | DIASTOLIC BLOOD PRESSURE: 74 MMHG | HEART RATE: 80 BPM | TEMPERATURE: 99.3 F | SYSTOLIC BLOOD PRESSURE: 161 MMHG | WEIGHT: 168 LBS | RESPIRATION RATE: 22 BRPM | BODY MASS INDEX: 24.81 KG/M2

## 2018-10-22 DIAGNOSIS — J18.9 PNEUMONIA DUE TO INFECTIOUS ORGANISM, UNSPECIFIED LATERALITY, UNSPECIFIED PART OF LUNG: ICD-10-CM

## 2018-10-22 DIAGNOSIS — R51.9 ACUTE NONINTRACTABLE HEADACHE, UNSPECIFIED HEADACHE TYPE: Primary | ICD-10-CM

## 2018-10-22 LAB
ABSOLUTE EOS #: <0.03 K/UL (ref 0–0.44)
ABSOLUTE IMMATURE GRANULOCYTE: 0.09 K/UL (ref 0–0.3)
ABSOLUTE LYMPH #: 2.16 K/UL (ref 1.1–3.7)
ABSOLUTE MONO #: 0.66 K/UL (ref 0.1–1.2)
ANION GAP SERPL CALCULATED.3IONS-SCNC: 14 MMOL/L (ref 9–17)
BASOPHILS # BLD: 0 % (ref 0–2)
BASOPHILS ABSOLUTE: 0.03 K/UL (ref 0–0.2)
BUN BLDV-MCNC: 7 MG/DL (ref 8–23)
BUN/CREAT BLD: ABNORMAL (ref 9–20)
CALCIUM SERPL-MCNC: 8.8 MG/DL (ref 8.6–10.4)
CHLORIDE BLD-SCNC: 105 MMOL/L (ref 98–107)
CO2: 20 MMOL/L (ref 20–31)
CREAT SERPL-MCNC: 0.86 MG/DL (ref 0.7–1.2)
DIFFERENTIAL TYPE: ABNORMAL
EKG ATRIAL RATE: 89 BPM
EKG P AXIS: 72 DEGREES
EKG P-R INTERVAL: 128 MS
EKG Q-T INTERVAL: 462 MS
EKG QRS DURATION: 80 MS
EKG QTC CALCULATION (BAZETT): 562 MS
EKG R AXIS: -44 DEGREES
EKG T AXIS: 34 DEGREES
EKG VENTRICULAR RATE: 89 BPM
EOSINOPHILS RELATIVE PERCENT: 0 % (ref 1–4)
GFR AFRICAN AMERICAN: >60 ML/MIN
GFR NON-AFRICAN AMERICAN: >60 ML/MIN
GFR SERPL CREATININE-BSD FRML MDRD: ABNORMAL ML/MIN/{1.73_M2}
GFR SERPL CREATININE-BSD FRML MDRD: ABNORMAL ML/MIN/{1.73_M2}
GLUCOSE BLD-MCNC: 141 MG/DL (ref 70–99)
HCT VFR BLD CALC: 37.7 % (ref 40.7–50.3)
HEMOGLOBIN: 12.7 G/DL (ref 13–17)
IMMATURE GRANULOCYTES: 1 %
LYMPHOCYTES # BLD: 18 % (ref 24–43)
MCH RBC QN AUTO: 36.5 PG (ref 25.2–33.5)
MCHC RBC AUTO-ENTMCNC: 33.7 G/DL (ref 28.4–34.8)
MCV RBC AUTO: 108.3 FL (ref 82.6–102.9)
MONOCYTES # BLD: 6 % (ref 3–12)
NRBC AUTOMATED: 0 PER 100 WBC
PDW BLD-RTO: 13.4 % (ref 11.8–14.4)
PLATELET # BLD: 170 K/UL (ref 138–453)
PLATELET ESTIMATE: ABNORMAL
PMV BLD AUTO: 10.5 FL (ref 8.1–13.5)
POC TROPONIN I: 0.03 NG/ML (ref 0–0.1)
POC TROPONIN INTERP: NORMAL
POTASSIUM SERPL-SCNC: 3.4 MMOL/L (ref 3.7–5.3)
RBC # BLD: 3.48 M/UL (ref 4.21–5.77)
RBC # BLD: ABNORMAL 10*6/UL
SEG NEUTROPHILS: 75 % (ref 36–65)
SEGMENTED NEUTROPHILS ABSOLUTE COUNT: 8.91 K/UL (ref 1.5–8.1)
SODIUM BLD-SCNC: 139 MMOL/L (ref 135–144)
WBC # BLD: 11.9 K/UL (ref 3.5–11.3)
WBC # BLD: ABNORMAL 10*3/UL

## 2018-10-22 PROCEDURE — 84484 ASSAY OF TROPONIN QUANT: CPT

## 2018-10-22 PROCEDURE — 70450 CT HEAD/BRAIN W/O DYE: CPT

## 2018-10-22 PROCEDURE — 2580000003 HC RX 258: Performed by: EMERGENCY MEDICINE

## 2018-10-22 PROCEDURE — 96374 THER/PROPH/DIAG INJ IV PUSH: CPT

## 2018-10-22 PROCEDURE — 6360000002 HC RX W HCPCS: Performed by: EMERGENCY MEDICINE

## 2018-10-22 PROCEDURE — 6370000000 HC RX 637 (ALT 250 FOR IP): Performed by: EMERGENCY MEDICINE

## 2018-10-22 PROCEDURE — 80048 BASIC METABOLIC PNL TOTAL CA: CPT

## 2018-10-22 PROCEDURE — 71046 X-RAY EXAM CHEST 2 VIEWS: CPT

## 2018-10-22 PROCEDURE — 93005 ELECTROCARDIOGRAM TRACING: CPT

## 2018-10-22 PROCEDURE — 99284 EMERGENCY DEPT VISIT MOD MDM: CPT

## 2018-10-22 PROCEDURE — 85025 COMPLETE CBC W/AUTO DIFF WBC: CPT

## 2018-10-22 RX ORDER — AZITHROMYCIN 250 MG/1
500 TABLET, FILM COATED ORAL ONCE
Status: COMPLETED | OUTPATIENT
Start: 2018-10-22 | End: 2018-10-22

## 2018-10-22 RX ORDER — IBUPROFEN 800 MG/1
800 TABLET ORAL EVERY 8 HOURS PRN
Qty: 30 TABLET | Refills: 0 | Status: SHIPPED | OUTPATIENT
Start: 2018-10-22 | End: 2020-04-02

## 2018-10-22 RX ORDER — 0.9 % SODIUM CHLORIDE 0.9 %
1000 INTRAVENOUS SOLUTION INTRAVENOUS ONCE
Status: COMPLETED | OUTPATIENT
Start: 2018-10-22 | End: 2018-10-22

## 2018-10-22 RX ORDER — AZITHROMYCIN 250 MG/1
250 TABLET, FILM COATED ORAL DAILY
Qty: 4 TABLET | Refills: 0 | Status: SHIPPED | OUTPATIENT
Start: 2018-10-22 | End: 2018-11-01

## 2018-10-22 RX ORDER — DIPHENHYDRAMINE HCL 25 MG
25 TABLET ORAL ONCE
Status: COMPLETED | OUTPATIENT
Start: 2018-10-22 | End: 2018-10-22

## 2018-10-22 RX ADMIN — DIPHENHYDRAMINE HCL 25 MG: 25 TABLET ORAL at 13:29

## 2018-10-22 RX ADMIN — PROCHLORPERAZINE EDISYLATE 10 MG: 5 INJECTION INTRAMUSCULAR; INTRAVENOUS at 13:29

## 2018-10-22 RX ADMIN — AZITHROMYCIN 500 MG: 250 TABLET, FILM COATED ORAL at 14:56

## 2018-10-22 RX ADMIN — SODIUM CHLORIDE 1000 ML: 9 INJECTION, SOLUTION INTRAVENOUS at 13:29

## 2018-10-22 NOTE — ED PROVIDER NOTES
[facility] ED  Emergency Department Encounter  Emergency Medicine Resident     Pt Name: Henry Farah  MRN: [id number]  [place] 1955  Date of evaluation: 10/22/18  PCP:  Karlie Peter MD    [address] [place] [address]       Chief Complaint   Patient presents with    Headache     onset 5 days ago without relief after tylenol or motrin     Shortness of Breath     complaining of shortness of breath onset 3 days ago with history of DVT        HISTORY OF PRESENT ILLNESS  (Location/Symptom, Timing/Onset, Context/Setting, Quality, Duration, Modifying Factors, Severity.)      Henry Farah is a 59 y.o. male who presents with Multiple complaints. Patient states she's had a headache for the past 5 days. Patient states he's attempting Tylenol Motrin with no improvement. Patient states pain starts at the top of his head and extends posteriorly. Patient states does not have a history of headaches. Patient states gradual onset of symptoms. Patient states symptoms associated with photophobia. Patient also complains of productive cough and shortness of breath over the past 3 days. Patient is smoker. PAST MEDICAL / SURGICAL / SOCIAL / FAMILY HISTORY      has a past medical history of Alcoholism ([facility] Cirrhosis of liver ([facility] Claudication [facility]); Finger fracture, left; Hx of blood clots; Hyperlipidemia; Hypertension; Liver disease; PAD (peripheral artery disease) ([facility] PVD (peripheral vascular disease) ([facility] Wears dentures; and Wears glasses. has a past surgical history that includes Upper gastrointestinal endoscopy; liver biopsy; vascular surgery; Finger amputation (Left, 11/28/2016); and other surgical history (02/14/2017). Social History     Social History    Marital status:      Spouse name: N/A    Number of children: N/A    Years of education: N/A     Occupational History    Not on file.      Social History Main Topics    Smoking status: Former Smoker     Packs/day: 0.50 tablet 500 mg    azithromycin (ZITHROMAX) 250 MG tablet     Sig: Take 1 tablet by mouth daily for 10 days     Dispense:  4 tablet     Refill:  0    ibuprofen (ADVIL;MOTRIN) 800 MG tablet     Sig: Take 1 tablet by mouth every 8 hours as needed for Pain     Dispense:  30 tablet     Refill:  0       PROCEDURES:  None    CONSULTS:  None    CRITICAL CARE:  None    FINAL IMPRESSION      1. Acute nonintractable headache, unspecified headache type    2. Pneumonia due to infectious organism, unspecified laterality, unspecified part of lung          DISPOSITION / PLAN     DISPOSITION Decision To Discharge 10/22/2018 02:37:00 PM      PATIENT REFERRED TO:  Abel Villafana MD  [address] [place] [address] [address]  [phone]    Schedule an appointment as soon as possible for a visit         DISCHARGE MEDICATIONS:  Discharge Medication List as of 10/22/2018  2:40 PM      START taking these medications    Details   azithromycin (ZITHROMAX) 250 MG tablet Take 1 tablet by mouth daily for 10 days, Disp-4 tablet, R-0Print      !! ibuprofen (ADVIL;MOTRIN) 800 MG tablet Take 1 tablet by mouth every 8 hours as needed for Pain, Disp-30 tablet, R-0Print       !! - Potential duplicate medications found. Please discuss with provider. Reji Rae, DO  Emergency Medicine Resident    Pre-hypertension/Hypertension: You have been informed that you may have pre-hypertension or Hypertension based on a blood pressure reading in the Emergency Department. I recommend you call the primary care provider listed on your discharge instructions or a physician of your choice this week to arrange follow up for further evaluation of possible pre-hypertension or Hypertension. (Please note that portions of this note were completed with a voice recognition program.  Efforts were made to edit the dictations but occasionally words are mis-transcribed.  Whenever words are used in this note in any gender, they shall be construed as though they were

## 2019-01-14 DIAGNOSIS — F10.10 ALCOHOL ABUSE: Chronic | ICD-10-CM

## 2019-01-14 RX ORDER — DIPHENOXYLATE HYDROCHLORIDE AND ATROPINE SULFATE 2.5; .025 MG/1; MG/1
TABLET ORAL
Qty: 30 TABLET | Refills: 0 | Status: SHIPPED | OUTPATIENT
Start: 2019-01-14 | End: 2019-03-18 | Stop reason: SDUPTHER

## 2019-01-28 ENCOUNTER — TELEPHONE (OUTPATIENT)
Dept: INTERNAL MEDICINE | Age: 64
End: 2019-01-28

## 2019-03-18 DIAGNOSIS — F10.10 ALCOHOL ABUSE: Chronic | ICD-10-CM

## 2019-03-19 RX ORDER — DIPHENOXYLATE HYDROCHLORIDE AND ATROPINE SULFATE 2.5; .025 MG/1; MG/1
TABLET ORAL
Qty: 30 TABLET | Refills: 0 | Status: SHIPPED | OUTPATIENT
Start: 2019-03-19 | End: 2019-05-13 | Stop reason: SDUPTHER

## 2019-03-19 RX ORDER — FOLIC ACID 1 MG/1
TABLET ORAL
Qty: 30 TABLET | Refills: 0 | Status: SHIPPED | OUTPATIENT
Start: 2019-03-19 | End: 2019-05-08 | Stop reason: SDUPTHER

## 2019-04-03 ENCOUNTER — HOSPITAL ENCOUNTER (OUTPATIENT)
Age: 64
Discharge: HOME | End: 2019-04-03
Payer: COMMERCIAL

## 2019-04-03 DIAGNOSIS — E78.1 HYPERTRIGLYCERIDEMIA: ICD-10-CM

## 2019-04-03 DIAGNOSIS — I10 ESSENTIAL HYPERTENSION: Chronic | ICD-10-CM

## 2019-04-03 DIAGNOSIS — D53.9 MACROCYTIC ANEMIA: ICD-10-CM

## 2019-04-03 DIAGNOSIS — I73.9 PAD (PERIPHERAL ARTERY DISEASE) (HCC): ICD-10-CM

## 2019-04-03 LAB
ABSOLUTE EOS #: 0.23 K/UL (ref 0–0.44)
ABSOLUTE IMMATURE GRANULOCYTE: <0.03 K/UL (ref 0–0.3)
ABSOLUTE LYMPH #: 2.67 K/UL (ref 1.1–3.7)
ABSOLUTE MONO #: 0.45 K/UL (ref 0.1–1.2)
ALBUMIN SERPL-MCNC: 4 G/DL (ref 3.5–5.2)
ALBUMIN/GLOBULIN RATIO: 1.1 (ref 1–2.5)
ALP BLD-CCNC: 126 U/L (ref 40–129)
ALT SERPL-CCNC: 41 U/L (ref 5–41)
ANION GAP SERPL CALCULATED.3IONS-SCNC: 11 MMOL/L (ref 9–17)
AST SERPL-CCNC: 58 U/L
BASOPHILS # BLD: 0 % (ref 0–2)
BASOPHILS ABSOLUTE: <0.03 K/UL (ref 0–0.2)
BILIRUB SERPL-MCNC: 1.11 MG/DL (ref 0.3–1.2)
BUN BLDV-MCNC: 10 MG/DL (ref 8–23)
BUN/CREAT BLD: ABNORMAL (ref 9–20)
CALCIUM SERPL-MCNC: 9.3 MG/DL (ref 8.6–10.4)
CHLORIDE BLD-SCNC: 106 MMOL/L (ref 98–107)
CHOLESTEROL/HDL RATIO: 2.8
CHOLESTEROL: 123 MG/DL
CO2: 25 MMOL/L (ref 20–31)
CREAT SERPL-MCNC: 0.84 MG/DL (ref 0.7–1.2)
DIFFERENTIAL TYPE: ABNORMAL
EOSINOPHILS RELATIVE PERCENT: 4 % (ref 1–4)
GFR AFRICAN AMERICAN: >60 ML/MIN
GFR NON-AFRICAN AMERICAN: >60 ML/MIN
GFR SERPL CREATININE-BSD FRML MDRD: ABNORMAL ML/MIN/{1.73_M2}
GFR SERPL CREATININE-BSD FRML MDRD: ABNORMAL ML/MIN/{1.73_M2}
GLUCOSE BLD-MCNC: 98 MG/DL (ref 70–99)
HCT VFR BLD CALC: 40.4 % (ref 40.7–50.3)
HDLC SERPL-MCNC: 44 MG/DL
HEMOGLOBIN: 13.6 G/DL (ref 13–17)
HEPATITIS C ANTIBODY: REACTIVE
HIV AG/AB: NONREACTIVE
IMMATURE GRANULOCYTES: 0 %
LDL CHOLESTEROL: 52 MG/DL (ref 0–130)
LYMPHOCYTES # BLD: 42 % (ref 24–43)
MCH RBC QN AUTO: 37 PG (ref 25.2–33.5)
MCHC RBC AUTO-ENTMCNC: 33.7 G/DL (ref 28.4–34.8)
MCV RBC AUTO: 109.8 FL (ref 82.6–102.9)
MONOCYTES # BLD: 7 % (ref 3–12)
NRBC AUTOMATED: 0 PER 100 WBC
PDW BLD-RTO: 12.6 % (ref 11.8–14.4)
PLATELET # BLD: 162 K/UL (ref 138–453)
PLATELET ESTIMATE: ABNORMAL
PMV BLD AUTO: 10.7 FL (ref 8.1–13.5)
POTASSIUM SERPL-SCNC: 4.6 MMOL/L (ref 3.7–5.3)
RBC # BLD: 3.68 M/UL (ref 4.21–5.77)
RBC # BLD: ABNORMAL 10*6/UL
SEG NEUTROPHILS: 47 % (ref 36–65)
SEGMENTED NEUTROPHILS ABSOLUTE COUNT: 3.01 K/UL (ref 1.5–8.1)
SODIUM BLD-SCNC: 142 MMOL/L (ref 135–144)
TOTAL PROTEIN: 7.8 G/DL (ref 6.4–8.3)
TRIGL SERPL-MCNC: 136 MG/DL
VLDLC SERPL CALC-MCNC: NORMAL MG/DL (ref 1–30)
WBC # BLD: 6.4 K/UL (ref 3.5–11.3)
WBC # BLD: ABNORMAL 10*3/UL

## 2019-04-03 PROCEDURE — 36415 COLL VENOUS BLD VENIPUNCTURE: CPT

## 2019-04-03 PROCEDURE — 80061 LIPID PANEL: CPT

## 2019-04-03 PROCEDURE — 86803 HEPATITIS C AB TEST: CPT

## 2019-04-03 PROCEDURE — 87389 HIV-1 AG W/HIV-1&-2 AB AG IA: CPT

## 2019-04-03 PROCEDURE — 80053 COMPREHEN METABOLIC PANEL: CPT

## 2019-04-03 PROCEDURE — 85025 COMPLETE CBC W/AUTO DIFF WBC: CPT

## 2019-04-11 ENCOUNTER — OFFICE VISIT (OUTPATIENT)
Dept: INTERNAL MEDICINE | Age: 64
End: 2019-04-11
Payer: COMMERCIAL

## 2019-04-11 VITALS
SYSTOLIC BLOOD PRESSURE: 122 MMHG | WEIGHT: 167.2 LBS | DIASTOLIC BLOOD PRESSURE: 68 MMHG | HEART RATE: 73 BPM | HEIGHT: 68 IN | BODY MASS INDEX: 25.34 KG/M2

## 2019-04-11 DIAGNOSIS — B19.20 HEPATITIS C VIRUS INFECTION WITHOUT HEPATIC COMA, UNSPECIFIED CHRONICITY: ICD-10-CM

## 2019-04-11 DIAGNOSIS — S68.119S AMPUTATION OF FINGER, SEQUELA (HCC): ICD-10-CM

## 2019-04-11 DIAGNOSIS — F10.10 ALCOHOL ABUSE: ICD-10-CM

## 2019-04-11 DIAGNOSIS — D53.9 MACROCYTIC ANEMIA: ICD-10-CM

## 2019-04-11 DIAGNOSIS — Z23 NEED FOR TDAP VACCINATION: Primary | ICD-10-CM

## 2019-04-11 DIAGNOSIS — K74.60 CIRRHOSIS OF LIVER WITHOUT ASCITES, UNSPECIFIED HEPATIC CIRRHOSIS TYPE (HCC): ICD-10-CM

## 2019-04-11 DIAGNOSIS — R21 RASH: ICD-10-CM

## 2019-04-11 PROCEDURE — 3017F COLORECTAL CA SCREEN DOC REV: CPT | Performed by: INTERNAL MEDICINE

## 2019-04-11 PROCEDURE — 99211 OFF/OP EST MAY X REQ PHY/QHP: CPT | Performed by: INTERNAL MEDICINE

## 2019-04-11 PROCEDURE — 99214 OFFICE O/P EST MOD 30 MIN: CPT | Performed by: INTERNAL MEDICINE

## 2019-04-11 PROCEDURE — 1036F TOBACCO NON-USER: CPT | Performed by: INTERNAL MEDICINE

## 2019-04-11 PROCEDURE — 90715 TDAP VACCINE 7 YRS/> IM: CPT | Performed by: INTERNAL MEDICINE

## 2019-04-11 PROCEDURE — G8419 CALC BMI OUT NRM PARAM NOF/U: HCPCS | Performed by: INTERNAL MEDICINE

## 2019-04-11 PROCEDURE — G8427 DOCREV CUR MEDS BY ELIG CLIN: HCPCS | Performed by: INTERNAL MEDICINE

## 2019-04-11 ASSESSMENT — PATIENT HEALTH QUESTIONNAIRE - PHQ9
1. LITTLE INTEREST OR PLEASURE IN DOING THINGS: 1
SUM OF ALL RESPONSES TO PHQ QUESTIONS 1-9: 2
2. FEELING DOWN, DEPRESSED OR HOPELESS: 1
SUM OF ALL RESPONSES TO PHQ QUESTIONS 1-9: 2
SUM OF ALL RESPONSES TO PHQ9 QUESTIONS 1 & 2: 2

## 2019-04-11 ASSESSMENT — ENCOUNTER SYMPTOMS
ALLERGIC/IMMUNOLOGIC NEGATIVE: 1
BACK PAIN: 1
SHORTNESS OF BREATH: 1
EYES NEGATIVE: 1
GASTROINTESTINAL NEGATIVE: 1

## 2019-04-11 NOTE — PATIENT INSTRUCTIONS
Printed and signed script for Shingrix given to pt. Script for lab given to pt, no fasting required. Pt will get labs done as soon as possible. Order for Liver Ultrasound faxed to [address] [place] [address] they will call pt for appt. Please call [phone] in not heard within 2 weeks. Order for Functional Capacity Eval given to pt with phone number if he has not heard from them in 2 weeks. Referral for Dermatology sent to Dr Makenna Landon  they will call pt for appt, copy of referral with number and address given to pt. Pt should call referral number if not heard from within a couple of weeks. Referral for Gastroenterology sent to [address] [place] [address]  they will call pt for appt, copy of referral with number and address given to pt. Pt should call referral number if not heard from within a couple of weeks. Patient to return to clinic 6 weeks (5/20/19). AVS reviewed and given to pt. It is very important for your care that you keep your appointment. If for some reason you are unable to keep your appointment it is equally important that you call our office at [phone] to cancel your appointment and reschedule. Failure to do so may result in your termination from our practice.   MB

## 2019-04-11 NOTE — PROGRESS NOTES
[address] [facility]   Progress Note      Visit Information    Have you changed or started any medications since your last visit including any over-the-counter medicines, vitamins, or herbal medicines? yes - ASA   Are you having any side effects from any of your medications? -  no  Have you stopped taking any of your medications? Is so, why? -  no    Have you seen any other physician or provider since your last visit? No  Have you had any other diagnostic tests since your last visit? Yes - Records Obtained  Have you been seen in the emergency room and/or had an admission to a hospital since we last saw you? Yes - Records Obtained  Have you had your routine dental cleaning in the past 6 months? no    Have you activated your [employer] account? If not, what are your barriers?  Yes     Patient Care Team:  Carlos Palmer MD as PCP - General (Internal Medicine)  Carlos Palmer MD as PCP - [facility] Attributed Provider    Medical History Review  Past Medical, Family, and Social History reviewed and does contribute to the patient presenting condition    Health Maintenance   Topic Date Due    DTaP/Tdap/Td vaccine (1 - Tdap) 03/25/1975    Shingles Vaccine (1 of 2) 03/25/2006    Hepatitis A vaccine (1 of 2 - Risk 2-dose series) 04/10/2020 (Originally 3/25/1956)    Hepatitis B Vaccine (1 of 3 - Risk 3-dose series) 04/10/2020 (Originally 3/25/1975)    Lipid screen  04/03/2024    Colon cancer screen colonoscopy  04/22/2025    Flu vaccine  Completed    Pneumococcal 0-64 years Vaccine  Completed    Hepatitis C screen  Completed    HIV screen  Completed       Date of patient's visit: 4/11/2019  Patient's Name:  Cruzito Ralph                   YOB: 1955        PCP:  Carlos Palmer MD    Cruzito Ralph is a 62 y.o. male who presents for   Chief Complaint   Patient presents with    6 Month Follow-Up    Hypertension    Results     Pt here to discuss labs    and follow up of chronic medical problems. Patient Active Problem List   Diagnosis    Hx of tuberculosis    Alcohol induced acute pancreatitis    HTN (hypertension)    Alcohol abuse    Tobacco abuse    Hypertriglyceridemia    Macrocytic anemia    [facility], [facility]    PAD (peripheral artery disease) ([facility]    Diverticulosis    Internal hemorrhoids    Hypercholesteremia    Amputation finger    Claudication of left lower extremity ([facility]       HISTORY OF PRESENT ILLNESS:    History was obtained from the patient. Here to follow up. Patient wants to review testing. Labs revealed slight elevation of AST. He is still actively drinking. Reports last drink was 3 days ago. He is also c/o rash that began about 5 months ago on arms and legs, extremely pruritic. Did not know he had Hep C. Patient's allergies, medications, past medical, surgical, social and family histories were reviewed and updated as appropriate. ALLERGIES    No Known Allergies      MEDICATIONS:      Current Outpatient Medications   Medication Sig Dispense Refill    Multiple Vitamin (MULTI-VITAMINS) TABS TAKE 1 TABLET DAILY 30 tablet 0    folic acid (FOLVITE) 1 MG tablet TAKE 1 TABLET DAILY 30 tablet 0    ibuprofen (ADVIL;MOTRIN) 800 MG tablet Take 1 tablet by mouth every 8 hours as needed for Pain 30 tablet 0    atorvastatin (LIPITOR) 20 MG tablet TAKE 1 TABLET BY MOUTH DAILY 30 tablet 3    ibuprofen (ADVIL;MOTRIN) 800 MG tablet Take 1 tablet by mouth every 8 hours as needed for Pain 30 tablet 0    acetaminophen (TYLENOL) 325 MG tablet Take 1 tablet by mouth every 6 hours as needed for Pain 120 tablet 3    amLODIPine (NORVASC) 10 MG tablet TAKE 1 TABLET DAILY -MAY CAUSE DIZZINESS 30 tablet 3    cilostazol (PLETAL) 100 MG tablet Take 1 tablet by mouth 2 times daily 60 tablet 3    aspirin 81 MG tablet Take 81 mg by mouth daily LAST TAKEN 11/10/2016       No current facility-administered medications for this visit.         Patient Care Team:  Nithin Miller Axel Wyatt MD as PCP - General (Internal Medicine)  Cole Grady MD as PCP - S Attributed Provider    PAST MEDICAL AND SURGICAL HISTORY:      Past Medical History:   Diagnosis Date    Alcoholism ([facility]     Cirrhosis of liver ([facility]     Claudication ([facility]     left leg    Finger fracture, left     LONG FINGER    Hx of blood clots     LEFT LOWER LEG UNSURE OF THIS AT THIS TIME, IS TO BE SEEING VASCULAR DR FOR THIS.  Hyperlipidemia     Hypertension     Liver disease     PAD (peripheral artery disease) (HCC)     PVD (peripheral vascular disease) ([facility]     Wears dentures     FULL UPPER AND LOWER    Wears glasses      Past Surgical History:   Procedure Laterality Date    FINGER AMPUTATION Left 11/28/2016    revision long finger    LIVER BIOPSY      OTHER SURGICAL HISTORY  02/14/2017    Abdomen with run off with Dr. Tri Mckeon - could not pass left left pop; # 7 FR manual pull right groin    UPPER GASTROINTESTINAL ENDOSCOPY      VASCULAR SURGERY      abdominal aortogram       SOCIAL HISTORY      Social History     Tobacco Use    Smoking status: Former Smoker     Packs/day: 0.50     Types: Cigarettes     Last attempt to quit: 11/2015     Years since quitting: 3.4    Smokeless tobacco: Never Used   Substance Use Topics    Alcohol use: No     Alcohol/week: 0.0 oz     Comment: QUIT 03/2016     Rebecca Griffin  reports that he quit smoking about 3 years ago. His smoking use included cigarettes. He smoked 0.50 packs per day. He has never used smokeless tobacco.    FAMILY HISTORY:      Family History   Problem Relation Age of Onset    High Blood Pressure Mother     Diabetes Father     Cancer Father     Heart Disease Father     Cancer Sister     High Blood Pressure Sister     Heart Disease Sister     Diabetes Brother        REVIEW OF SYSTEMS:    Review of Systems   Constitutional: Positive for appetite change. HENT: Negative. Eyes: Negative. Respiratory: Positive for shortness of breath.     Cardiovascular: Negative. Gastrointestinal: Negative. Endocrine: Negative. Genitourinary: Negative. Musculoskeletal: Positive for arthralgias, back pain and neck pain. Skin: Positive for rash. Allergic/Immunologic: Negative. Neurological: Positive for headaches. Hematological: Negative. Psychiatric/Behavioral: Negative.         PHYSICAL EXAM:      Vitals:    04/11/19 1357   BP: 122/68   Pulse: 73     BP Readings from Last 3 Encounters:   04/11/19 122/68   10/22/18 (!) 161/74   10/11/18 133/77       Physical Examination: General appearance - alert, well appearing, and in no distress  Mental status - normal mood, behavior, speech, dress, motor activity, and thought processes  Eyes - pupils equal and reactive, extraocular eye movements intact  Chest - clear to auscultation, no wheezes, rales or rhonchi, symmetric air entry  Heart - normal rate and regular rhythm  Abdomen - large abdomen, soft and non tender  Back exam - full range of motion, no tenderness, palpable spasm or pain on motion  Neurological - alert, oriented, normal speech, no focal findings or movement disorder noted  Musculoskeletal - no joint tenderness, deformity or swelling  Extremities - no pedal edema noted  Skin - excoriations with blistering noted    LABORATORY FINDINGS:    CBC:   Lab Results   Component Value Date    WBC 6.4 04/03/2019    HGB 13.6 04/03/2019     04/03/2019     BMP:    Lab Results   Component Value Date     04/03/2019    K 4.6 04/03/2019     04/03/2019    CO2 25 04/03/2019    BUN 10 04/03/2019    CREATININE 0.84 04/03/2019    GLUCOSE 98 04/03/2019     Hemoglobin A1C: No results found for: LABA1C  Microalbumin Urine: No results found for: MICROALBUR  Lipid profile:   Lab Results   Component Value Date    CHOL 123 04/03/2019    TRIG 136 04/03/2019    HDL 44 04/03/2019     Thyroid functions:   Lab Results   Component Value Date    TSH 1.40 11/10/2016      Hepatic functions:   Lab Results   Component Value plan.     This note is created with the assistance of a speech-recognition program. While intending to generate a document that actually reflects the content of the visit, the document can still have some mistakes which may not have been identified and corrected by editing.     Dr Jamia Shah MD, Soledad Lopes  Associate , Department of Internal Medicine  [facility]  Attending [address], [facility] [address]                   4/11/2019, 3:18 PM

## 2019-04-12 ENCOUNTER — HOSPITAL ENCOUNTER (OUTPATIENT)
Age: 64
Discharge: HOME | End: 2019-04-12
Payer: COMMERCIAL

## 2019-04-12 DIAGNOSIS — B19.20 HEPATITIS C VIRUS INFECTION WITHOUT HEPATIC COMA, UNSPECIFIED CHRONICITY: ICD-10-CM

## 2019-04-12 PROCEDURE — 36415 COLL VENOUS BLD VENIPUNCTURE: CPT

## 2019-04-12 PROCEDURE — 87902 NFCT AGT GNTYP ALYS HEP C: CPT

## 2019-04-12 PROCEDURE — 87522 HEPATITIS C REVRS TRNSCRPJ: CPT

## 2019-04-15 LAB
DIRECT EXAM: ABNORMAL
DIRECT EXAM: ABNORMAL
Lab: ABNORMAL
SPECIMEN DESCRIPTION: ABNORMAL

## 2019-04-18 LAB
HCV QUANTITATIVE: NORMAL
HEPATITIS C GENOTYPE: NORMAL

## 2019-04-26 ENCOUNTER — HOSPITAL ENCOUNTER (OUTPATIENT)
Dept: ULTRASOUND IMAGING | Age: 64
Discharge: HOME | End: 2019-04-28
Payer: COMMERCIAL

## 2019-04-26 DIAGNOSIS — B19.20 HEPATITIS C VIRUS INFECTION WITHOUT HEPATIC COMA, UNSPECIFIED CHRONICITY: ICD-10-CM

## 2019-04-26 DIAGNOSIS — K74.60 CIRRHOSIS OF LIVER WITHOUT ASCITES, UNSPECIFIED HEPATIC CIRRHOSIS TYPE (HCC): ICD-10-CM

## 2019-04-26 PROCEDURE — 76705 ECHO EXAM OF ABDOMEN: CPT

## 2019-05-08 DIAGNOSIS — F10.10 ALCOHOL ABUSE: Chronic | ICD-10-CM

## 2019-05-09 RX ORDER — FOLIC ACID 1 MG/1
TABLET ORAL
Qty: 30 TABLET | Refills: 0 | Status: SHIPPED | OUTPATIENT
Start: 2019-05-09 | End: 2019-07-22 | Stop reason: SDUPTHER

## 2019-05-09 RX ORDER — AMLODIPINE BESYLATE 10 MG/1
TABLET ORAL
Qty: 30 TABLET | Refills: 3 | Status: SHIPPED | OUTPATIENT
Start: 2019-05-09 | End: 2019-12-30

## 2019-05-13 DIAGNOSIS — F10.10 ALCOHOL ABUSE: Chronic | ICD-10-CM

## 2019-05-14 NOTE — TELEPHONE ENCOUNTER
Multi vitamin pending for refill     Health Maintenance   Topic Date Due    Shingles Vaccine (1 of 2) 03/25/2006    Hepatitis A vaccine (1 of 2 - Risk 2-dose series) 04/10/2020 (Originally 3/25/1956)    Hepatitis B Vaccine (1 of 3 - Risk 3-dose series) 04/10/2020 (Originally 3/25/1975)    Lipid screen  04/03/2024    Colon cancer screen colonoscopy  04/22/2025    DTaP/Tdap/Td vaccine (2 - Td) 04/11/2029    Flu vaccine  Completed    Pneumococcal 0-64 years Vaccine  Completed    Hepatitis C screen  Completed    HIV screen  Completed             (applicable per patient's age: Cancer Screenings, Depression Screening, Fall Risk Screening, Immunizations)    LDL Cholesterol (mg/dL)   Date Value   04/03/2019 52     AST (U/L)   Date Value   04/03/2019 58 (H)     ALT (U/L)   Date Value   04/03/2019 41     BUN (mg/dL)   Date Value   04/03/2019 10      (goal A1C is < 7)   (goal LDL is <100) need 30-50% reduction from baseline     BP Readings from Last 3 Encounters:   04/11/19 122/68   10/22/18 (!) 161/74   10/11/18 133/77    (goal /80)      All Future Testing planned in [facility]:  Lab Frequency Next Occurrence       Next Visit Date:  Future Appointments   Date Time Provider Isac Keenan   5/20/2019 12:40 PM Marcelo Fenton MD [facility] IM [facility]   6/4/2019  8:30 AM Renetta Chávez, PT [facility]            Patient Active Problem List:     Hx of tuberculosis     Alcohol induced acute pancreatitis     HTN (hypertension)     Alcohol abuse     Tobacco abuse     Hypertriglyceridemia     Macrocytic anemia     Cirrhosis, alcoholic (HCC)     PAD (peripheral artery disease) ([address]     Diverticulosis     Internal hemorrhoids     Hypercholesteremia     Amputation finger     Claudication of left lower extremity ([address]

## 2019-05-15 RX ORDER — DIPHENOXYLATE HYDROCHLORIDE AND ATROPINE SULFATE 2.5; .025 MG/1; MG/1
TABLET ORAL
Qty: 30 TABLET | Refills: 0 | Status: SHIPPED | OUTPATIENT
Start: 2019-05-15 | End: 2019-07-22 | Stop reason: SDUPTHER

## 2019-05-28 ENCOUNTER — TELEPHONE (OUTPATIENT)
Dept: INTERNAL MEDICINE | Age: 64
End: 2019-05-28

## 2019-05-28 DIAGNOSIS — B19.20 HEPATITIS C VIRUS INFECTION WITHOUT HEPATIC COMA, UNSPECIFIED CHRONICITY: Primary | ICD-10-CM

## 2019-06-07 ENCOUNTER — TELEPHONE (OUTPATIENT)
Dept: GASTROENTEROLOGY | Age: 64
End: 2019-06-07

## 2019-06-07 NOTE — TELEPHONE ENCOUNTER
Appointment is 9/4/19 at 10:30. [facility]'s location. Mailing NP packet to fill out and bring to appointment.

## 2019-06-24 ENCOUNTER — OFFICE VISIT (OUTPATIENT)
Dept: INTERNAL MEDICINE | Age: 64
End: 2019-06-24
Payer: COMMERCIAL

## 2019-06-24 VITALS
SYSTOLIC BLOOD PRESSURE: 120 MMHG | BODY MASS INDEX: 24.52 KG/M2 | WEIGHT: 161.8 LBS | HEIGHT: 68 IN | DIASTOLIC BLOOD PRESSURE: 70 MMHG | HEART RATE: 57 BPM

## 2019-06-24 DIAGNOSIS — I73.9 PAD (PERIPHERAL ARTERY DISEASE) (HCC): ICD-10-CM

## 2019-06-24 DIAGNOSIS — I10 ESSENTIAL HYPERTENSION: ICD-10-CM

## 2019-06-24 DIAGNOSIS — B19.20 HEPATITIS C VIRUS INFECTION WITHOUT HEPATIC COMA, UNSPECIFIED CHRONICITY: Primary | ICD-10-CM

## 2019-06-24 PROCEDURE — 4004F PT TOBACCO SCREEN RCVD TLK: CPT | Performed by: INTERNAL MEDICINE

## 2019-06-24 PROCEDURE — 99213 OFFICE O/P EST LOW 20 MIN: CPT | Performed by: INTERNAL MEDICINE

## 2019-06-24 PROCEDURE — G8427 DOCREV CUR MEDS BY ELIG CLIN: HCPCS | Performed by: INTERNAL MEDICINE

## 2019-06-24 PROCEDURE — 99211 OFF/OP EST MAY X REQ PHY/QHP: CPT | Performed by: INTERNAL MEDICINE

## 2019-06-24 PROCEDURE — 3017F COLORECTAL CA SCREEN DOC REV: CPT | Performed by: INTERNAL MEDICINE

## 2019-06-24 PROCEDURE — G8420 CALC BMI NORM PARAMETERS: HCPCS | Performed by: INTERNAL MEDICINE

## 2019-06-24 ASSESSMENT — ENCOUNTER SYMPTOMS
EYES NEGATIVE: 1
RESPIRATORY NEGATIVE: 1
ALLERGIC/IMMUNOLOGIC NEGATIVE: 1
GASTROINTESTINAL NEGATIVE: 1

## 2019-06-24 NOTE — PROGRESS NOTES
[address] [facility]   Progress Note      Visit Information    Have you changed or started any medications since your last visit including any over-the-counter medicines, vitamins, or herbal medicines? yes - ASA   Are you having any side effects from any of your medications? -  no  Have you stopped taking any of your medications? Is so, why? -  no    Have you seen any other physician or provider since your last visit? No  Have you had any other diagnostic tests since your last visit? Yes - Records Obtained  Have you been seen in the emergency room and/or had an admission to a hospital since we last saw you? No  Have you had your routine dental cleaning in the past 6 months? no    Have you activated your [employer] account? If not, what are your barriers? Yes     Patient Care Team:  Jordan Rodriguez MD as PCP - General (Internal Medicine)  Jordan Rodriguez MD as PCP - [facility] Provider    Medical History Review  Past Medical, Family, and Social History reviewed and does contribute to the patient presenting condition    Health Maintenance   Topic Date Due    Shingles Vaccine (1 of 2) 03/25/2006    Hepatitis A vaccine (1 of 2 - Risk 2-dose series) 04/10/2020 (Originally 3/25/1956)    Hepatitis B Vaccine (1 of 3 - Risk 3-dose series) 04/10/2020 (Originally 3/25/1975)    Lipid screen  04/03/2024    Colon cancer screen colonoscopy  04/22/2025    DTaP/Tdap/Td vaccine (2 - Td) 04/11/2029    Flu vaccine  Completed    Pneumococcal 0-64 years Vaccine  Completed    Hepatitis C screen  Completed    HIV screen  Completed       Date of patient's visit: 6/24/2019  Patient's Name:  Georgi Jiménez                   YOB: 1955        PCP:  Jordan Rodriguez MD    Georgi Jiménez is a 62 y.o. male who presents for   Chief Complaint   Patient presents with    Rash     2 month follow up    and follow up of chronic medical problems.   Patient Active Problem List   Diagnosis    Hx of tuberculosis    MEDICAL AND SURGICAL HISTORY:      Past Medical History:   Diagnosis Date    Alcoholism ([facility]     Cirrhosis of liver ([address]     Claudication ([address]     left leg    Finger fracture, left     LONG FINGER    Hx of blood clots     LEFT LOWER LEG UNSURE OF THIS AT THIS TIME, IS TO BE SEEING VASCULAR DR FOR THIS.  Hyperlipidemia     Hypertension     Liver disease     PAD (peripheral artery disease) (HCC)     PVD (peripheral vascular disease) ([address]     Wears dentures     FULL UPPER AND LOWER    Wears glasses      Past Surgical History:   Procedure Laterality Date    FINGER AMPUTATION Left 11/28/2016    revision long finger    LIVER BIOPSY      OTHER SURGICAL HISTORY  02/14/2017    Abdomen with run off with Dr. Katina Bermeo - could not pass left left pop; # 7 FR manual pull right groin    UPPER GASTROINTESTINAL ENDOSCOPY      VASCULAR SURGERY      abdominal aortogram       SOCIAL HISTORY      Social History     Tobacco Use    Smoking status: Former Smoker     Packs/day: 0.50     Types: Cigarettes     Last attempt to quit: 11/2015     Years since quitting: 3.6    Smokeless tobacco: Never Used   Substance Use Topics    Alcohol use: No     Alcohol/week: 0.0 oz     Comment: QUIT 03/2016     Kena Phoenix  reports that he quit smoking about 3 years ago. His smoking use included cigarettes. He smoked 0.50 packs per day. He has never used smokeless tobacco.    FAMILY HISTORY:      Family History   Problem Relation Age of Onset    High Blood Pressure Mother     Diabetes Father     Cancer Father     Heart Disease Father     Cancer Sister     High Blood Pressure Sister     Heart Disease Sister     Diabetes Brother        REVIEW OF SYSTEMS:    Review of Systems   Constitutional: Positive for appetite change. HENT: Negative. Eyes: Negative. Respiratory: Negative. Cardiovascular: Negative. Gastrointestinal: Negative. Endocrine: Negative. Musculoskeletal: Positive for arthralgias.    Skin: Positive for rash.   Allergic/Immunologic: Negative. Neurological: Negative. Hematological: Negative. Psychiatric/Behavioral: Negative.         PHYSICAL EXAM:      Vitals:    06/24/19 1449   BP: 120/70   Pulse: 57     BP Readings from Last 3 Encounters:   06/24/19 120/70   04/11/19 122/68   10/22/18 (!) 161/74       Physical Examination: General appearance - alert, well appearing, and in no distress  Mental status - alert, oriented to person, place, and time  Eyes - pupils equal and reactive, extraocular eye movements intact  Chest - clear to auscultation, no wheezes, rales or rhonchi, symmetric air entry  Heart - normal rate and regular rhythm  Abdomen - soft, nontender, nondistended, no masses or organomegaly  bowel sounds normal  Back exam - full range of motion, no tenderness, palpable spasm or pain on motion  Neurological - alert, oriented, normal speech, no focal findings or movement disorder noted  Musculoskeletal - no joint tenderness, deformity or swelling    LABORATORY FINDINGS:    CBC:   Lab Results   Component Value Date    WBC 6.4 04/03/2019    HGB 13.6 04/03/2019     04/03/2019     BMP:    Lab Results   Component Value Date     04/03/2019    K 4.6 04/03/2019     04/03/2019    CO2 25 04/03/2019    BUN 10 04/03/2019    CREATININE 0.84 04/03/2019    GLUCOSE 98 04/03/2019     Hemoglobin A1C: No results found for: LABA1C  Microalbumin Urine: No results found for: MICROALBUR  Lipid profile:   Lab Results   Component Value Date    CHOL 123 04/03/2019    TRIG 136 04/03/2019    HDL 44 04/03/2019     Thyroid functions:   Lab Results   Component Value Date    TSH 1.40 11/10/2016      Hepatic functions:   Lab Results   Component Value Date    ALT 41 04/03/2019    AST 58 04/03/2019    PROT 7.8 04/03/2019    BILITOT 1.11 04/03/2019    BILIDIR 0.42 06/12/2015    LABALBU 4.0 04/03/2019     Urine Analysis: No results found for: [id number] [address]:      Health Maintenance Due   Topic Date Due  Shingles Vaccine (1 of 2) 03/25/2006       ASSESSMENT AND PLAN:       Diagnosis Orders   1. Hepatitis C virus infection without hepatic coma, unspecified chronicity     2. PAD (peripheral artery disease) ([facility]     3. Essential hypertension           FOLLOW UP:   1. Mino Worthington received counseling on the following healthy behaviors: nutrition and exercise    2. Reviewed prior labs and health maintenance. 3.  Discussed use, benefit, and side effects of prescribed medications. Barriers to medication compliance addressed. All patient questions answered. Pt voiced understanding. 4.  Continue current medications, diet and exercise. No orders of the defined types were placed in this encounter. Completed Refills               Requested Prescriptions      No prescriptions requested or ordered in this encounter       5. Patient given educational materials - see patient instructions    6. Was a self-tracking handout given in paper form or via [employer]? Yes  If yes, see orders or list here. No orders of the defined types were placed in this encounter. Return in about 6 months (around 12/24/2019). Patient voiced understanding and agreed to treatment plan. This note is created with the assistance of a speech-recognition program. While intending to generate a document that actually reflects the content of the visit, the document can still have some mistakes which may not have been identified and corrected by editing.     Dr Sy Bray MD, Santos Brannon  Associate , Department of Internal Medicine  [address] [place] [address], [address] [place] [address]                   6/24/2019, 3:12 PM

## 2019-06-24 NOTE — PATIENT INSTRUCTIONS
Patient to return to clinic 6 months (12/26/19). AVS reviewed and given to pt. It is very important for your care that you keep your appointment. If for some reason you are unable to keep your appointment it is equally important that you call our office at [phone] to cancel your appointment and reschedule. Failure to do so may result in your termination from our practice.   MB

## 2019-07-18 DIAGNOSIS — E78.1 HYPERTRIGLYCERIDEMIA: ICD-10-CM

## 2019-07-18 RX ORDER — ATORVASTATIN CALCIUM 20 MG/1
TABLET, FILM COATED ORAL
Qty: 30 TABLET | Refills: 3 | Status: SHIPPED | OUTPATIENT
Start: 2019-07-18 | End: 2020-03-25

## 2019-07-18 NOTE — TELEPHONE ENCOUNTER
Pt request medi refill, atorvastatin 20mg 20 tab      Last visit: 6/24/19  Last Med refill: 5/13/19  Does patient have enough medication for 72 hours: yes  Next Visit Date:  Future Appointments   Date Time Provider Isac Ree   9/4/2019 10:30 AM Twan Florez MD [facility]   12/26/2019 12:40 PM Mitul Torres MD [facility] IM [address] Maintenance   Topic Date Due    Shingles Vaccine (1 of 2) 03/25/2006    Hepatitis A vaccine (1 of 2 - Risk 2-dose series) 04/10/2020 (Originally 3/25/1956)    Hepatitis B Vaccine (1 of 3 - Risk 3-dose series) 04/10/2020 (Originally 3/25/1975)    Flu vaccine (1) 09/01/2019    Lipid screen  04/03/2024    Colon cancer screen colonoscopy  04/22/2025    DTaP/Tdap/Td vaccine (2 - Td) 04/11/2029    Pneumococcal 0-64 years Vaccine  Completed    Hepatitis C screen  Completed    HIV screen  Completed       No results found for: LABA1C          ( goal A1C is < 7)   No results found for: LABMICR  LDL Cholesterol (mg/dL)   Date Value   04/03/2019 52   06/12/2015 57       (goal LDL is <100)   AST (U/L)   Date Value   04/03/2019 58 (H)     ALT (U/L)   Date Value   04/03/2019 41     BUN (mg/dL)   Date Value   04/03/2019 10     BP Readings from Last 3 Encounters:   06/24/19 120/70   04/11/19 122/68   10/22/18 (!) 161/74          (goal 120/80)    All Future Testing planned in [facility]  Lab Frequency Next Occurrence               Patient Active Problem List:     Hx of tuberculosis     Alcohol induced acute pancreatitis     HTN (hypertension)     Alcohol abuse     Tobacco abuse     Hypertriglyceridemia     Macrocytic anemia     Cirrhosis, alcoholic (HCC)     PAD (peripheral artery disease) ([facility]     Diverticulosis     Internal hemorrhoids     Hypercholesteremia     Amputation finger     Claudication of left lower extremity ([facility]

## 2019-07-22 DIAGNOSIS — F10.10 ALCOHOL ABUSE: Chronic | ICD-10-CM

## 2019-07-22 RX ORDER — FOLIC ACID 1 MG/1
TABLET ORAL
Qty: 30 TABLET | Refills: 0 | Status: SHIPPED | OUTPATIENT
Start: 2019-07-22 | End: 2019-08-27 | Stop reason: SDUPTHER

## 2019-07-22 RX ORDER — DIPHENOXYLATE HYDROCHLORIDE AND ATROPINE SULFATE 2.5; .025 MG/1; MG/1
TABLET ORAL
Qty: 30 TABLET | Refills: 0 | Status: SHIPPED | OUTPATIENT
Start: 2019-07-22 | End: 2019-08-27 | Stop reason: SDUPTHER

## 2019-08-27 DIAGNOSIS — F10.10 ALCOHOL ABUSE: Chronic | ICD-10-CM

## 2019-08-27 RX ORDER — DIPHENOXYLATE HYDROCHLORIDE AND ATROPINE SULFATE 2.5; .025 MG/1; MG/1
TABLET ORAL
Qty: 30 TABLET | Refills: 0 | Status: SHIPPED | OUTPATIENT
Start: 2019-08-27 | End: 2019-11-09 | Stop reason: SDUPTHER

## 2019-08-27 RX ORDER — FOLIC ACID 1 MG/1
TABLET ORAL
Qty: 30 TABLET | Refills: 5 | Status: SHIPPED | OUTPATIENT
Start: 2019-08-27 | End: 2020-08-27

## 2019-09-04 ENCOUNTER — OFFICE VISIT (OUTPATIENT)
Dept: GASTROENTEROLOGY | Age: 64
End: 2019-09-04
Payer: COMMERCIAL

## 2019-09-04 VITALS
HEART RATE: 65 BPM | SYSTOLIC BLOOD PRESSURE: 135 MMHG | WEIGHT: 157 LBS | BODY MASS INDEX: 23.87 KG/M2 | DIASTOLIC BLOOD PRESSURE: 85 MMHG

## 2019-09-04 DIAGNOSIS — K70.30 ALCOHOLIC CIRRHOSIS OF LIVER WITHOUT ASCITES (HCC): Primary | ICD-10-CM

## 2019-09-04 DIAGNOSIS — B17.10 ACUTE HEPATITIS C VIRUS INFECTION WITHOUT HEPATIC COMA: ICD-10-CM

## 2019-09-04 DIAGNOSIS — K27.9 H PYLORI ULCER: ICD-10-CM

## 2019-09-04 DIAGNOSIS — B96.81 H PYLORI ULCER: ICD-10-CM

## 2019-09-04 PROCEDURE — 99204 OFFICE O/P NEW MOD 45 MIN: CPT | Performed by: INTERNAL MEDICINE

## 2019-09-04 PROCEDURE — G8427 DOCREV CUR MEDS BY ELIG CLIN: HCPCS | Performed by: INTERNAL MEDICINE

## 2019-09-04 PROCEDURE — G8420 CALC BMI NORM PARAMETERS: HCPCS | Performed by: INTERNAL MEDICINE

## 2019-09-04 PROCEDURE — 4004F PT TOBACCO SCREEN RCVD TLK: CPT | Performed by: INTERNAL MEDICINE

## 2019-09-04 PROCEDURE — 3017F COLORECTAL CA SCREEN DOC REV: CPT | Performed by: INTERNAL MEDICINE

## 2019-09-04 NOTE — PROGRESS NOTES
(LIPITOR) 20 MG tablet, TAKE 1 TABLET BY MOUTH DAILY, Disp: 30 tablet, Rfl: 3    amLODIPine (NORVASC) 10 MG tablet, TAKE 1 TABLET DAILY -MAY CAUSE DIZZINESS, Disp: 30 tablet, Rfl: 3    ibuprofen (ADVIL;MOTRIN) 800 MG tablet, Take 1 tablet by mouth every 8 hours as needed for Pain, Disp: 30 tablet, Rfl: 0    cilostazol (PLETAL) 100 MG tablet, Take 1 tablet by mouth 2 times daily, Disp: 60 tablet, Rfl: 3    aspirin 81 MG tablet, Take 81 mg by mouth daily LAST TAKEN 11/10/2016, Disp: , Rfl:     ALLERGIES:   No Known Allergies    FAMILY HISTORY:       Problem Relation Age of Onset    High Blood Pressure Mother     Diabetes Father     Cancer Father     Heart Disease Father     Cancer Sister     High Blood Pressure Sister     Heart Disease Sister     Diabetes Brother      No GI pathology.     SOCIAL HISTORY:   Social History     Socioeconomic History    Marital status:      Spouse name: Not on file    Number of children: Not on file    Years of education: Not on file    Highest education level: Not on file   Occupational History    Not on file   Social Needs    Financial resource strain: Not on file    Food insecurity:     Worry: Not on file     Inability: Not on file    Transportation needs:     Medical: Not on file     Non-medical: Not on file   Tobacco Use    Smoking status: Current Some Day Smoker     Packs/day: 0.50     Types: Cigarettes     Last attempt to quit: 11/2015     Years since quitting: 3.8    Smokeless tobacco: Never Used   Substance and Sexual Activity    Alcohol use: No     Alcohol/week: 0.0 standard drinks     Comment: QUIT 03/2016    Drug use: Yes     Frequency: 7.0 times per week     Types: Marijuana    Sexual activity: Not on file   Lifestyle    Physical activity:     Days per week: Not on file     Minutes per session: Not on file    Stress: Not on file   Relationships    Social connections:     Talks on phone: Not on file     Gets together: Not on file     Attends

## 2019-10-24 ENCOUNTER — TELEPHONE (OUTPATIENT)
Dept: GASTROENTEROLOGY | Age: 64
End: 2019-10-24

## 2019-11-05 ENCOUNTER — TELEPHONE (OUTPATIENT)
Dept: GASTROENTEROLOGY | Age: 64
End: 2019-11-05

## 2019-11-05 RX ORDER — POLYETHYLENE GLYCOL 3350 17 G/17G
POWDER, FOR SOLUTION ORAL
Qty: 255 G | Refills: 0 | Status: SHIPPED | OUTPATIENT
Start: 2019-11-05 | End: 2019-12-05

## 2019-11-09 DIAGNOSIS — F10.10 ALCOHOL ABUSE: Chronic | ICD-10-CM

## 2019-11-11 RX ORDER — DIPHENOXYLATE HYDROCHLORIDE AND ATROPINE SULFATE 2.5; .025 MG/1; MG/1
TABLET ORAL
Qty: 30 TABLET | Refills: 0 | Status: SHIPPED | OUTPATIENT
Start: 2019-11-11 | End: 2020-01-15

## 2019-12-30 RX ORDER — AMLODIPINE BESYLATE 10 MG/1
TABLET ORAL
Qty: 30 TABLET | Refills: 3 | Status: SHIPPED | OUTPATIENT
Start: 2019-12-30 | End: 2020-01-15

## 2020-01-13 NOTE — TELEPHONE ENCOUNTER
12/05/2019   Protime-INR Once 12/05/2019   IgM Once 12/03/2019   IgG Once 12/03/2019   Iron Profile Once 12/03/2019   Ferritin Once 12/03/2019   Liver Fibrosis, Chronic Viral Hepatitis Once 09/04/2019   H.  Pylori Breath Test Once 09/04/2019         Patient Active Problem List:     Hx of tuberculosis     Alcohol induced acute pancreatitis     HTN (hypertension)     Alcohol abuse     Tobacco abuse     Hypertriglyceridemia     Macrocytic anemia     Cirrhosis, alcoholic (HCC)     PAD (peripheral artery disease) ([facility] [address]     Diverticulosis     Internal hemorrhoids     Hypercholesteremia     Amputation finger     Claudication of left lower extremity ([facility] [address]

## 2020-01-13 NOTE — TELEPHONE ENCOUNTER
Once 12/05/2019   Protime-INR Once 12/05/2019   IgM Once 12/03/2019   IgG Once 12/03/2019   Iron Profile Once 12/03/2019   Ferritin Once 12/03/2019   Liver Fibrosis, Chronic Viral Hepatitis Once 09/04/2019   H.  Pylori Breath Test Once 09/04/2019         Patient Active Problem List:     Hx of tuberculosis     Alcohol induced acute pancreatitis     HTN (hypertension)     Alcohol abuse     Tobacco abuse     Hypertriglyceridemia     Macrocytic anemia     Cirrhosis, alcoholic (HCC)     PAD (peripheral artery disease) ([facility] [address]     Diverticulosis     Internal hemorrhoids     Hypercholesteremia     Amputation finger     Claudication of left lower extremity ([facility] [address]

## 2020-01-15 RX ORDER — MULTIVITAMIN WITH FOLIC ACID 400 MCG
TABLET ORAL
Qty: 30 TABLET | Refills: 0 | Status: SHIPPED | OUTPATIENT
Start: 2020-01-15 | End: 2020-03-25

## 2020-01-15 RX ORDER — AMLODIPINE BESYLATE 10 MG/1
TABLET ORAL
Qty: 30 TABLET | Refills: 3 | Status: SHIPPED | OUTPATIENT
Start: 2020-01-15 | End: 2020-08-27

## 2020-01-20 ENCOUNTER — TELEPHONE (OUTPATIENT)
Dept: GASTROENTEROLOGY | Age: 65
End: 2020-01-20

## 2020-03-25 RX ORDER — ATORVASTATIN CALCIUM 20 MG/1
TABLET, FILM COATED ORAL
Qty: 30 TABLET | Refills: 3 | Status: SHIPPED | OUTPATIENT
Start: 2020-03-25 | End: 2020-08-27

## 2020-03-25 RX ORDER — MULTIVITAMIN WITH FOLIC ACID 400 MCG
TABLET ORAL
Qty: 30 TABLET | Refills: 0 | Status: SHIPPED | OUTPATIENT
Start: 2020-03-25 | End: 2020-05-28

## 2020-03-25 NOTE — TELEPHONE ENCOUNTER
Request for atorvastatin - medication pended. Please fill if appropriate.       Next Visit Date:  Future Appointments   Date Time Provider Isac Keenan   4/7/2020  2:00 PM Evi Ghosh MD  [address] Maintenance   Topic Date Due    Flu vaccine (1) 09/01/2019    Hepatitis A vaccine (2 of 2 - Risk 2-dose series) 11/14/2019    Lipid screen  04/03/2020    Hepatitis B vaccine (1 of 3 - Risk 3-dose series) 04/10/2020 (Originally 3/25/1974)    Colon cancer screen colonoscopy  04/22/2025    DTaP/Tdap/Td vaccine (2 - Td) 04/11/2029    Shingles Vaccine  Completed    Pneumococcal 0-64 years Vaccine  Completed    Hepatitis C screen  Completed    HIV screen  Completed    Hib vaccine  Aged Out    Meningococcal (ACWY) vaccine  Aged Out       No results found for: LABA1C          ( goal A1C is < 7)   No results found for: LABMICR  LDL Cholesterol (mg/dL)   Date Value   04/03/2019 52       (goal LDL is <100)   AST (U/L)   Date Value   04/03/2019 58 (H)     ALT (U/L)   Date Value   04/03/2019 41     BUN (mg/dL)   Date Value   04/03/2019 10     BP Readings from Last 3 Encounters:   09/04/19 135/85   06/24/19 120/70   04/11/19 122/68          (goal 120/80)    All Future Testing planned in [facility]  Lab Frequency Next Occurrence   CBC Auto Differential Once 09/04/2019   Comprehensive Metabolic Panel Once 75/18/3196   Ethanol Once 09/04/2019   Hepatitis A Antibody, Total Once 09/04/2019   Hepatitis B Surface Antibody Once 09/04/2019   Hepatitis C Antibody Once 09/04/2019   Hepatitis C RNA, quantitative, PCR Once 09/04/2019   Liver Fibrosis, Chronic Viral Hepatitis Once 09/04/2019   Urine Drug Screen Once 09/04/2019   HIV Screen Once 09/04/2019   Hepatitis A Antibody, Total Once 12/05/2019   Hepatitis B Core Antibody, Total Once 12/05/2019   Hepatitis B Surface Antibody Once 12/05/2019   Hepatitis B Surface Antigen Once 12/05/2019   LALITO Once 12/05/2019   Anti-smooth muscle antibody Once 12/05/2019

## 2020-03-26 ENCOUNTER — TELEPHONE (OUTPATIENT)
Dept: INTERNAL MEDICINE | Age: 65
End: 2020-03-26

## 2020-04-02 ENCOUNTER — OFFICE VISIT (OUTPATIENT)
Dept: INTERNAL MEDICINE | Age: 65
End: 2020-04-02
Payer: COMMERCIAL

## 2020-04-02 VITALS
HEIGHT: 68 IN | DIASTOLIC BLOOD PRESSURE: 83 MMHG | WEIGHT: 155.8 LBS | HEART RATE: 63 BPM | TEMPERATURE: 98.3 F | BODY MASS INDEX: 23.61 KG/M2 | SYSTOLIC BLOOD PRESSURE: 139 MMHG

## 2020-04-02 PROCEDURE — 99214 OFFICE O/P EST MOD 30 MIN: CPT | Performed by: INTERNAL MEDICINE

## 2020-04-02 PROCEDURE — G8427 DOCREV CUR MEDS BY ELIG CLIN: HCPCS | Performed by: INTERNAL MEDICINE

## 2020-04-02 PROCEDURE — 3017F COLORECTAL CA SCREEN DOC REV: CPT | Performed by: INTERNAL MEDICINE

## 2020-04-02 PROCEDURE — 99211 OFF/OP EST MAY X REQ PHY/QHP: CPT | Performed by: INTERNAL MEDICINE

## 2020-04-02 PROCEDURE — G8420 CALC BMI NORM PARAMETERS: HCPCS | Performed by: INTERNAL MEDICINE

## 2020-04-02 PROCEDURE — 4004F PT TOBACCO SCREEN RCVD TLK: CPT | Performed by: INTERNAL MEDICINE

## 2020-04-02 RX ORDER — BETAMETHASONE DIPROPIONATE 0.05 %
OINTMENT (GRAM) TOPICAL
Qty: 50 G | Refills: 5 | Status: SHIPPED | OUTPATIENT
Start: 2020-04-02 | End: 2020-04-14

## 2020-04-02 ASSESSMENT — PATIENT HEALTH QUESTIONNAIRE - PHQ9
SUM OF ALL RESPONSES TO PHQ QUESTIONS 1-9: 1
SUM OF ALL RESPONSES TO PHQ QUESTIONS 1-9: 1
2. FEELING DOWN, DEPRESSED OR HOPELESS: 1

## 2020-04-02 ASSESSMENT — ENCOUNTER SYMPTOMS
GASTROINTESTINAL NEGATIVE: 1
RESPIRATORY NEGATIVE: 1
EYES NEGATIVE: 1
ALLERGIC/IMMUNOLOGIC NEGATIVE: 1

## 2020-04-07 ENCOUNTER — VIRTUAL VISIT (OUTPATIENT)
Dept: INTERNAL MEDICINE | Age: 65
End: 2020-04-07
Payer: COMMERCIAL

## 2020-04-07 PROCEDURE — G2012 BRIEF CHECK IN BY MD/QHP: HCPCS | Performed by: INTERNAL MEDICINE

## 2020-04-07 RX ORDER — SERTRALINE HYDROCHLORIDE 25 MG/1
25 TABLET, FILM COATED ORAL DAILY
Qty: 30 TABLET | Refills: 3 | Status: SHIPPED | OUTPATIENT
Start: 2020-04-07 | End: 2020-08-27

## 2020-04-07 NOTE — PATIENT INSTRUCTIONS
Medications e-scribe to pharmacy of pt's choice. Patient to return to clinic 1 week (4/14/20) for video visit. AVS reviewed and given to pt. It is very important for your care that you keep your appointment. If for some reason you are unable to keep your appointment it is equally important that you call our office at [phone] to cancel your appointment and reschedule. Failure to do so may result in your termination from our practice.   MB

## 2020-04-08 ENCOUNTER — TELEPHONE (OUTPATIENT)
Dept: INTERNAL MEDICINE | Age: 65
End: 2020-04-08

## 2020-04-14 ENCOUNTER — VIRTUAL VISIT (OUTPATIENT)
Dept: INTERNAL MEDICINE | Age: 65
End: 2020-04-14
Payer: COMMERCIAL

## 2020-04-14 PROCEDURE — 99441 PR PHYS/QHP TELEPHONE EVALUATION 5-10 MIN: CPT | Performed by: INTERNAL MEDICINE

## 2020-04-14 RX ORDER — BETAMETHASONE DIPROPIONATE 0.05 %
0.05 OINTMENT (GRAM) TOPICAL DAILY
COMMUNITY
End: 2020-04-29

## 2020-04-29 ENCOUNTER — VIRTUAL VISIT (OUTPATIENT)
Dept: INTERNAL MEDICINE | Age: 65
End: 2020-04-29
Payer: COMMERCIAL

## 2020-04-29 VITALS — BODY MASS INDEX: 23.69 KG/M2 | HEIGHT: 68 IN

## 2020-04-29 PROCEDURE — 99442 PR PHYS/QHP TELEPHONE EVALUATION 11-20 MIN: CPT | Performed by: INTERNAL MEDICINE

## 2020-04-29 NOTE — PATIENT INSTRUCTIONS
We now have access to a LSW for any patients who in your opinion need some type of behavioral health counseling  The visits are done virtually. If you have a patient whom you feel has this need,  Please call Fidencio Jackson at [phone] directly. Patient to return to clinic 6 weeks (6/10/20) for a video visit. AVS reviewed and given to pt. It is very important for your care that you keep your appointment. If for some reason you are unable to keep your appointment it is equally important that you call our office at [phone] to cancel your appointment and reschedule. Failure to do so may result in your termination from our practice.   MB

## 2020-05-28 RX ORDER — MULTIVITAMIN WITH FOLIC ACID 400 MCG
TABLET ORAL
Qty: 30 TABLET | Refills: 0 | Status: SHIPPED | OUTPATIENT
Start: 2020-05-28 | End: 2020-06-29

## 2020-06-16 ENCOUNTER — OFFICE VISIT (OUTPATIENT)
Dept: DERMATOLOGY | Age: 65
End: 2020-06-16
Payer: COMMERCIAL

## 2020-06-16 VITALS
WEIGHT: 155.6 LBS | BODY MASS INDEX: 23.05 KG/M2 | HEART RATE: 79 BPM | HEIGHT: 69 IN | TEMPERATURE: 97.9 F | SYSTOLIC BLOOD PRESSURE: 130 MMHG | DIASTOLIC BLOOD PRESSURE: 76 MMHG | OXYGEN SATURATION: 96 %

## 2020-06-16 PROCEDURE — 99203 OFFICE O/P NEW LOW 30 MIN: CPT | Performed by: DERMATOLOGY

## 2020-06-16 PROCEDURE — 11104 PUNCH BX SKIN SINGLE LESION: CPT | Performed by: DERMATOLOGY

## 2020-06-16 PROCEDURE — G8420 CALC BMI NORM PARAMETERS: HCPCS | Performed by: DERMATOLOGY

## 2020-06-16 PROCEDURE — 4004F PT TOBACCO SCREEN RCVD TLK: CPT | Performed by: DERMATOLOGY

## 2020-06-16 PROCEDURE — G8427 DOCREV CUR MEDS BY ELIG CLIN: HCPCS | Performed by: DERMATOLOGY

## 2020-06-16 PROCEDURE — 3017F COLORECTAL CA SCREEN DOC REV: CPT | Performed by: DERMATOLOGY

## 2020-06-16 RX ORDER — CLOBETASOL PROPIONATE 0.5 MG/G
CREAM TOPICAL
Qty: 60 G | Refills: 1 | Status: SHIPPED | OUTPATIENT
Start: 2020-06-16 | End: 2022-08-23 | Stop reason: SDUPTHER

## 2020-06-16 RX ORDER — LIDOCAINE HYDROCHLORIDE AND EPINEPHRINE 10; 10 MG/ML; UG/ML
0.5 INJECTION, SOLUTION INFILTRATION; PERINEURAL ONCE
Status: COMPLETED | OUTPATIENT
Start: 2020-06-16 | End: 2020-06-16

## 2020-06-16 RX ADMIN — LIDOCAINE HYDROCHLORIDE AND EPINEPHRINE 0.5 ML: 10; 10 INJECTION, SOLUTION INFILTRATION; PERINEURAL at 15:59

## 2020-06-16 NOTE — PROGRESS NOTES
BIOPSY SKIN SINGLE LESION  - Clobetasol BID to active rash  - Follow with GI to treat liver problems  - Based on bx result either prednisone or nbUVB          Patient Instructions   1. Apply clobetasol to active rash twice daily    BIOPSY WOUND CARE    A biopsy is where a small piece of skin tissue is removed and examined by a pathologist.  When a biopsy is done, there is a small wound site that requires proper care to prevent infection and scarring. Some biopsies require sutures and their removal.    How to Care for Biopsy Wound    A.  Leave band-aid or dressing on for 24 hours. B. Wash two times a day with soap and water. C.  Let the wound air dry, then apply Vaseline ointment and cover with a Band-Aid       unless otherwise instructed by your provider. D. If there is slight discomfort, you may give acetaminophen or ibuprofen. Bleeding:  Every effort is made to stop bleeding prior to you leaving the dermatology clinic. Unfortunately, people will occasionally start to bleed after leaving the clinic. If you start to bleed hold firm pressure for 15 minutes to the area. If you are on blood thinners I recommend keeping a product called wound seal (can buy at any drug store) at home as this can be a useful adjunct to stop bleeding. If after holding pressure for 15 minutes and/or applying wound seal the bleeding does not stop please call the dermatology clinic. When To Call the Doctor    Call the Dermatology Clinic or your doctor if any of the following occur:    A. Redness and swelling  B. Tenderness and warm to touch  C.  Drainage from wound  D. Fever    Biopsy Results    Biopsy results are usually available in 1-2 weeks. We provide biopsy results in letters for begin results or we will call for any concerning results. If you have not heard from our staff please call the office within 2 weeks. Please call our office with any concerns at [phone].           Photo surveillance performed:

## 2020-06-17 ENCOUNTER — HOSPITAL ENCOUNTER (OUTPATIENT)
Age: 65
Setting detail: SPECIMEN
Discharge: HOME | End: 2020-06-17
Payer: COMMERCIAL

## 2020-06-19 LAB — DERMATOLOGY PATHOLOGY REPORT: NORMAL

## 2020-06-20 ENCOUNTER — TELEPHONE (OUTPATIENT)
Dept: DERMATOLOGY | Age: 65
End: 2020-06-20

## 2020-06-20 RX ORDER — PREDNISONE 10 MG/1
TABLET ORAL
Qty: 53 TABLET | Refills: 0
Start: 2020-06-20 | End: 2020-12-11 | Stop reason: HOSPADM

## 2020-06-23 NOTE — TELEPHONE ENCOUNTER
Refill request for Multivitamin. If appropriate please send medication(s) to patients pharmacy. Next appt: Patient is currently on wait list for provider.     Last appt: 4/29/2020    Health Maintenance   Topic Date Due    Hepatitis B vaccine (1 of 3 - Risk 3-dose series) 03/25/1974    Hepatitis A vaccine (2 of 2 - Risk 2-dose series) 11/14/2019    Lipid screen  04/03/2020    Flu vaccine (Season Ended) 09/01/2020    Colon cancer screen colonoscopy  04/22/2025    DTaP/Tdap/Td vaccine (2 - Td) 04/11/2029    Shingles Vaccine  Completed    Pneumococcal 0-64 years Vaccine  Completed    Hepatitis C screen  Completed    HIV screen  Completed    Hib vaccine  Aged Out    Meningococcal (ACWY) vaccine  Aged Out       No results found for: LABA1C          ( goal A1C is < 7)   No results found for: LABMICR  LDL Cholesterol (mg/dL)   Date Value   04/03/2019 52       (goal LDL is <100)   AST (U/L)   Date Value   04/03/2019 58 (H)     ALT (U/L)   Date Value   04/03/2019 41     BUN (mg/dL)   Date Value   04/03/2019 10     BP Readings from Last 3 Encounters:   06/16/20 130/76   04/02/20 139/83   09/04/19 135/85          (goal 120/80)          Patient Active Problem List:     Hx of tuberculosis     Alcohol induced acute pancreatitis     HTN (hypertension)     Alcohol abuse     Tobacco abuse     Hypertriglyceridemia     Macrocytic anemia     Cirrhosis, alcoholic (HCC)     PAD (peripheral artery disease) ([address]     Diverticulosis     Internal hemorrhoids     Hypercholesteremia     Amputation finger     Claudication of left lower extremity ([address]

## 2020-06-29 RX ORDER — DIPHENOXYLATE HYDROCHLORIDE AND ATROPINE SULFATE 2.5; .025 MG/1; MG/1
TABLET ORAL
Qty: 30 TABLET | Refills: 0 | Status: SHIPPED | OUTPATIENT
Start: 2020-06-29 | End: 2020-08-27

## 2020-08-26 NOTE — TELEPHONE ENCOUNTER
Request for folic acid, multi vit, sertraline, atorvastatin - meds pended. Please fill if appropriate. Next Visit Date:  No future appointments.     Health Maintenance   Topic Date Due    Hepatitis B vaccine (1 of 3 - Risk 3-dose series) [birth year]25/1975    Hepatitis A vaccine (2 of 2 - Risk 2-dose series) 11/14/2019    Lipid screen  04/03/2020    Flu vaccine (1) 09/01/2020    Colon cancer screen colonoscopy  04/22/2025    DTaP/Tdap/Td vaccine (2 - Td) 04/11/2029    Shingles Vaccine  Completed    Pneumococcal 0-64 years Vaccine  Completed    Hepatitis C screen  Completed    HIV screen  Completed    Hib vaccine  Aged Out    Meningococcal (ACWY) vaccine  Aged Out       No results found for: LABA1C          ( goal A1C is < 7)   No results found for: LABMICR  LDL Cholesterol (mg/dL)   Date Value   04/03/2019 52       (goal LDL is <100)   AST (U/L)   Date Value   04/03/2019 58 (H)     ALT (U/L)   Date Value   04/03/2019 41     BUN (mg/dL)   Date Value   04/03/2019 10     BP Readings from Last 3 Encounters:   06/16/20 130/76   04/02/20 139/83   09/04/19 135/85          (goal 120/80)    All Future Testing planned in [facility]  Lab Frequency Next Occurrence   CBC Auto Differential Once 09/04/2019   Comprehensive Metabolic Panel Once 91/45/8471   Ethanol Once 09/04/2019   Hepatitis A Antibody, Total Once 09/04/2019   Hepatitis B Surface Antibody Once 09/04/2019   Hepatitis C Antibody Once 09/04/2019   Hepatitis C RNA, quantitative, PCR Once 09/04/2019   Liver Fibrosis, Chronic Viral Hepatitis Once 09/04/2019   Urine Drug Screen Once 09/04/2019   HIV Screen Once 09/04/2019   Hepatitis A Antibody, Total Once 12/05/2019   Hepatitis B Core Antibody, Total Once 12/05/2019   Hepatitis B Surface Antibody Once 12/05/2019   Hepatitis B Surface Antigen Once 12/05/2019   LALITO Once 12/05/2019   Anti-smooth muscle antibody Once 12/05/2019   Antimitochondrial antibody Once 12/05/2019   Alpha-1-Antitrypsin w Phenotype Once 12/05/2019   Protime-INR Once 12/05/2019   IgM Once 12/03/2019   IgG Once 12/03/2019   Iron Profile Once 12/03/2019   Ferritin Once 12/03/2019   Liver Fibrosis, Chronic Viral Hepatitis Once 09/04/2019   H.  Pylori Breath Test Once 09/04/2019   Surgical Pathology Once 06/16/2020         Patient Active Problem List:     Hx of tuberculosis     Alcohol induced acute pancreatitis     HTN (hypertension)     Alcohol abuse     Tobacco abuse     Hypertriglyceridemia     Macrocytic anemia     Cirrhosis, alcoholic (HCC)     PAD (peripheral artery disease) ([address]     Diverticulosis     Internal hemorrhoids     Hypercholesteremia     Amputation finger     Claudication of left lower extremity ([address]

## 2020-08-27 RX ORDER — ATORVASTATIN CALCIUM 20 MG/1
TABLET, FILM COATED ORAL
Qty: 30 TABLET | Refills: 3 | Status: SHIPPED | OUTPATIENT
Start: 2020-08-27 | End: 2022-09-01

## 2020-08-27 RX ORDER — DIPHENOXYLATE HYDROCHLORIDE AND ATROPINE SULFATE 2.5; .025 MG/1; MG/1
TABLET ORAL
Qty: 30 TABLET | Refills: 0 | Status: SHIPPED | OUTPATIENT
Start: 2020-08-27 | End: 2020-10-14

## 2020-08-27 RX ORDER — AMLODIPINE BESYLATE 10 MG/1
TABLET ORAL
Qty: 30 TABLET | Refills: 3 | Status: SHIPPED | OUTPATIENT
Start: 2020-08-27 | End: 2022-08-23 | Stop reason: SDUPTHER

## 2020-08-27 RX ORDER — FOLIC ACID 1 MG/1
TABLET ORAL
Qty: 30 TABLET | Refills: 5 | Status: SHIPPED | OUTPATIENT
Start: 2020-08-27

## 2020-08-27 RX ORDER — SERTRALINE HYDROCHLORIDE 25 MG/1
25 TABLET, FILM COATED ORAL DAILY
Qty: 30 TABLET | Refills: 3 | Status: SHIPPED | OUTPATIENT
Start: 2020-08-27 | End: 2022-10-04

## 2020-10-14 RX ORDER — DIPHENOXYLATE HYDROCHLORIDE AND ATROPINE SULFATE 2.5; .025 MG/1; MG/1
TABLET ORAL
Qty: 30 TABLET | Refills: 0 | Status: SHIPPED | OUTPATIENT
Start: 2020-10-14 | End: 2020-11-04

## 2020-10-14 NOTE — TELEPHONE ENCOUNTER
Phone call to patient due for follow up in June - left voicemail. Please fill multi vit if appropriate.

## 2020-11-04 RX ORDER — DIPHENOXYLATE HYDROCHLORIDE AND ATROPINE SULFATE 2.5; .025 MG/1; MG/1
TABLET ORAL
Qty: 30 TABLET | Refills: 0 | Status: SHIPPED | OUTPATIENT
Start: 2020-11-04 | End: 2022-09-01

## 2020-11-04 NOTE — TELEPHONE ENCOUNTER
Request for multi vit - med pended. Please fill if appropriate. Next Visit Date:  No future appointments.     Health Maintenance   Topic Date Due    Hepatitis B vaccine (1 of 3 - Risk 3-dose series) [birth year]25/1975    Hepatitis A vaccine (2 of 2 - Risk 2-dose series) 11/14/2019    Lipid screen  04/03/2020    Flu vaccine (1) 09/01/2020    Colon cancer screen colonoscopy  04/22/2025    DTaP/Tdap/Td vaccine (2 - Td) 04/11/2029    Shingles Vaccine  Completed    Pneumococcal 0-64 years Vaccine  Completed    Hepatitis C screen  Completed    HIV screen  Completed    Hib vaccine  Aged Out    Meningococcal (ACWY) vaccine  Aged Out       No results found for: LABA1C          ( goal A1C is < 7)   No results found for: LABMICR  LDL Cholesterol (mg/dL)   Date Value   04/03/2019 52       (goal LDL is <100)   AST (U/L)   Date Value   04/03/2019 58 (H)     ALT (U/L)   Date Value   04/03/2019 41     BUN (mg/dL)   Date Value   04/03/2019 10     BP Readings from Last 3 Encounters:   06/16/20 130/76   04/02/20 139/83   09/04/19 135/85          (goal 120/80)    All Future Testing planned in [facility]  Lab Frequency Next Occurrence   Surgical Pathology Once 06/16/2020         Patient Active Problem List:     Hx of tuberculosis     Alcohol induced acute pancreatitis     HTN (hypertension)     Alcohol abuse     Tobacco abuse     Hypertriglyceridemia     Macrocytic anemia     Cirrhosis, alcoholic ([address]     Diverticulosis     Internal hemorrhoids     Hypercholesteremia     Amputation finger     Claudication of left lower extremity ([address]

## 2020-12-10 ENCOUNTER — HOSPITAL ENCOUNTER (EMERGENCY)
Age: 65
Discharge: HOME | End: 2020-12-11
Attending: EMERGENCY MEDICINE | Admitting: INTERNAL MEDICINE
Payer: COMMERCIAL

## 2020-12-10 ENCOUNTER — APPOINTMENT (OUTPATIENT)
Dept: CT IMAGING | Age: 65
End: 2020-12-10
Payer: COMMERCIAL

## 2020-12-10 ENCOUNTER — APPOINTMENT (OUTPATIENT)
Dept: GENERAL RADIOLOGY | Age: 65
End: 2020-12-10
Payer: COMMERCIAL

## 2020-12-10 PROBLEM — R09.02 HYPOXIA: Status: ACTIVE | Noted: 2020-12-10

## 2020-12-10 LAB
ABSOLUTE EOS #: 0.44 K/UL (ref 0–0.4)
ABSOLUTE IMMATURE GRANULOCYTE: 0 K/UL (ref 0–0.3)
ABSOLUTE LYMPH #: 5.72 K/UL (ref 1–4.8)
ABSOLUTE MONO #: 0.22 K/UL (ref 0.1–0.8)
ALBUMIN SERPL-MCNC: 3.8 G/DL (ref 3.5–5.2)
ALBUMIN/GLOBULIN RATIO: 1.1 (ref 1–2.5)
ALP BLD-CCNC: 109 U/L (ref 40–129)
ALT SERPL-CCNC: 46 U/L (ref 5–41)
AMMONIA: 39 UMOL/L (ref 16–60)
ANION GAP SERPL CALCULATED.3IONS-SCNC: 12 MMOL/L (ref 9–17)
AST SERPL-CCNC: 47 U/L
ATYPICAL LYMPHOCYTE ABSOLUTE COUNT: 0.33 K/UL
ATYPICAL LYMPHOCYTES: 3 %
BASOPHILS # BLD: 0 % (ref 0–2)
BASOPHILS ABSOLUTE: 0 K/UL (ref 0–0.2)
BILIRUB SERPL-MCNC: 0.59 MG/DL (ref 0.3–1.2)
BUN BLDV-MCNC: 10 MG/DL (ref 8–23)
BUN/CREAT BLD: ABNORMAL (ref 9–20)
C-REACTIVE PROTEIN: <0.3 MG/L (ref 0–5)
CALCIUM SERPL-MCNC: 9.2 MG/DL (ref 8.6–10.4)
CHLORIDE BLD-SCNC: 107 MMOL/L (ref 98–107)
CO2: 24 MMOL/L (ref 20–31)
CREAT SERPL-MCNC: 1.03 MG/DL (ref 0.7–1.2)
D-DIMER QUANTITATIVE: 0.44 MG/L FEU
DIFFERENTIAL TYPE: ABNORMAL
EOSINOPHILS RELATIVE PERCENT: 4 % (ref 1–4)
ETHANOL PERCENT: <0.01 %
ETHANOL: <10 MG/DL
FERRITIN: 498 UG/L (ref 30–400)
GFR AFRICAN AMERICAN: >60 ML/MIN
GFR NON-AFRICAN AMERICAN: >60 ML/MIN
GFR SERPL CREATININE-BSD FRML MDRD: ABNORMAL ML/MIN/{1.73_M2}
GFR SERPL CREATININE-BSD FRML MDRD: ABNORMAL ML/MIN/{1.73_M2}
GLUCOSE BLD-MCNC: 106 MG/DL (ref 70–99)
HCT VFR BLD CALC: 39.4 % (ref 40.7–50.3)
HEMOGLOBIN: 12.8 G/DL (ref 13–17)
IMMATURE GRANULOCYTES: 0 %
INR BLD: 1.2
LACTATE DEHYDROGENASE: 213 U/L (ref 135–225)
LACTIC ACID, SEPSIS WHOLE BLOOD: 1.1 MMOL/L (ref 0.5–1.9)
LACTIC ACID, SEPSIS: NORMAL MMOL/L (ref 0.5–1.9)
LYMPHOCYTES # BLD: 52 % (ref 24–44)
MCH RBC QN AUTO: 35.8 PG (ref 25.2–33.5)
MCHC RBC AUTO-ENTMCNC: 32.5 G/DL (ref 28.4–34.8)
MCV RBC AUTO: 110.1 FL (ref 82.6–102.9)
MONOCYTES # BLD: 2 % (ref 1–7)
MORPHOLOGY: ABNORMAL
NRBC AUTOMATED: 0 PER 100 WBC
PARTIAL THROMBOPLASTIN TIME: 27.7 SEC (ref 20.5–30.5)
PDW BLD-RTO: 12.6 % (ref 11.8–14.4)
PLATELET # BLD: 203 K/UL (ref 138–453)
PLATELET ESTIMATE: ABNORMAL
PMV BLD AUTO: 9.8 FL (ref 8.1–13.5)
POTASSIUM SERPL-SCNC: 3.8 MMOL/L (ref 3.7–5.3)
PROCALCITONIN: 0.08 NG/ML
PROTHROMBIN TIME: 12.1 SEC (ref 9–12)
RBC # BLD: 3.58 M/UL (ref 4.21–5.77)
RBC # BLD: ABNORMAL 10*6/UL
SARS-COV-2, RAPID: NOT DETECTED
SARS-COV-2: NORMAL
SARS-COV-2: NORMAL
SEG NEUTROPHILS: 39 % (ref 36–66)
SEGMENTED NEUTROPHILS ABSOLUTE COUNT: 4.29 K/UL (ref 1.8–7.7)
SODIUM BLD-SCNC: 143 MMOL/L (ref 135–144)
SOURCE: NORMAL
TOTAL PROTEIN: 7.4 G/DL (ref 6.4–8.3)
TROPONIN INTERP: NORMAL
TROPONIN INTERP: NORMAL
TROPONIN T: NORMAL NG/ML
TROPONIN T: NORMAL NG/ML
TROPONIN, HIGH SENSITIVITY: <6 NG/L (ref 0–22)
TROPONIN, HIGH SENSITIVITY: <6 NG/L (ref 0–22)
VITAMIN D 25-HYDROXY: 56.1 NG/ML (ref 30–100)
WBC # BLD: 11 K/UL (ref 3.5–11.3)
WBC # BLD: ABNORMAL 10*3/UL

## 2020-12-10 PROCEDURE — U0002 COVID-19 LAB TEST NON-CDC: HCPCS

## 2020-12-10 PROCEDURE — 93005 ELECTROCARDIOGRAM TRACING: CPT

## 2020-12-10 PROCEDURE — 86140 C-REACTIVE PROTEIN: CPT

## 2020-12-10 PROCEDURE — 84484 ASSAY OF TROPONIN QUANT: CPT

## 2020-12-10 PROCEDURE — 96361 HYDRATE IV INFUSION ADD-ON: CPT

## 2020-12-10 PROCEDURE — 85379 FIBRIN DEGRADATION QUANT: CPT

## 2020-12-10 PROCEDURE — 84145 PROCALCITONIN (PCT): CPT

## 2020-12-10 PROCEDURE — 6370000000 HC RX 637 (ALT 250 FOR IP): Performed by: STUDENT IN AN ORGANIZED HEALTH CARE EDUCATION/TRAINING PROGRAM

## 2020-12-10 PROCEDURE — 96372 THER/PROPH/DIAG INJ SC/IM: CPT

## 2020-12-10 PROCEDURE — 80053 COMPREHEN METABOLIC PANEL: CPT

## 2020-12-10 PROCEDURE — 99285 EMERGENCY DEPT VISIT HI MDM: CPT

## 2020-12-10 PROCEDURE — 83615 LACTATE (LD) (LDH) ENZYME: CPT

## 2020-12-10 PROCEDURE — 82728 ASSAY OF FERRITIN: CPT

## 2020-12-10 PROCEDURE — 71045 X-RAY EXAM CHEST 1 VIEW: CPT

## 2020-12-10 PROCEDURE — 82140 ASSAY OF AMMONIA: CPT

## 2020-12-10 PROCEDURE — 85610 PROTHROMBIN TIME: CPT

## 2020-12-10 PROCEDURE — 85730 THROMBOPLASTIN TIME PARTIAL: CPT

## 2020-12-10 PROCEDURE — 82306 VITAMIN D 25 HYDROXY: CPT

## 2020-12-10 PROCEDURE — G0480 DRUG TEST DEF 1-7 CLASSES: HCPCS

## 2020-12-10 PROCEDURE — 96360 HYDRATION IV INFUSION INIT: CPT

## 2020-12-10 PROCEDURE — 70450 CT HEAD/BRAIN W/O DYE: CPT

## 2020-12-10 PROCEDURE — 2580000003 HC RX 258: Performed by: STUDENT IN AN ORGANIZED HEALTH CARE EDUCATION/TRAINING PROGRAM

## 2020-12-10 PROCEDURE — 83605 ASSAY OF LACTIC ACID: CPT

## 2020-12-10 PROCEDURE — 85025 COMPLETE CBC W/AUTO DIFF WBC: CPT

## 2020-12-10 RX ORDER — ASPIRIN 325 MG
325 TABLET ORAL ONCE
Status: COMPLETED | OUTPATIENT
Start: 2020-12-10 | End: 2020-12-10

## 2020-12-10 RX ORDER — SODIUM CHLORIDE 0.9 % (FLUSH) 0.9 %
10 SYRINGE (ML) INJECTION EVERY 12 HOURS SCHEDULED
Status: DISCONTINUED | OUTPATIENT
Start: 2020-12-10 | End: 2020-12-11 | Stop reason: HOSPADM

## 2020-12-10 RX ORDER — ASPIRIN 81 MG/1
324 TABLET, CHEWABLE ORAL ONCE
Status: DISCONTINUED | OUTPATIENT
Start: 2020-12-10 | End: 2020-12-10

## 2020-12-10 RX ORDER — SODIUM CHLORIDE 0.9 % (FLUSH) 0.9 %
10 SYRINGE (ML) INJECTION PRN
Status: DISCONTINUED | OUTPATIENT
Start: 2020-12-10 | End: 2020-12-11 | Stop reason: HOSPADM

## 2020-12-10 RX ADMIN — ASPIRIN 325 MG: 325 TABLET ORAL at 19:20

## 2020-12-10 RX ADMIN — SODIUM CHLORIDE, PRESERVATIVE FREE 10 ML: 5 INJECTION INTRAVENOUS at 21:36

## 2020-12-10 NOTE — ED PROVIDER NOTES
[facility] ED  Emergency Department Encounter  EmergencyMedicine Resident     Pt Name:Silas Oliver  MRN: [id number]  [place] 1955  Date of evaluation: 12/10/20  PCP:  Deann Mosley MD    [address] [place] [address]       Chief Complaint   Patient presents with    Excessive Sweating    Fatigue     near syncopal       HISTORY OF PRESENT ILLNESS  (Location/Symptom, Timing/Onset, Context/Setting, Quality, Duration, Modifying Factors, Severity.)      Arsenio Herrera is a 60 y.o. male who presents with fatigue, diaphoresis and AMS. Approximately 1 hour PTA, pt was smoking weed with friends. He was then sitting at the table became quite hot and flushed and started sweating. Patient was just feeling weak and so noted \"not acting right\". Every nurse who smoked the lead did not have any similar symptoms. Patient notes that he feels fine but he keeps mumbling and has a hard time speaking for himself as he has to be oriented multiple times. He denies any specific complaints like chest pain, shortness of breath but is hard to have him comprehensively state what is going on. Patient does note he has a history of high blood pressure and cholesterol but is unable to elicit any other history. PAST MEDICAL / SURGICAL / SOCIAL / FAMILY HISTORY      has a past medical history of Alcoholism ([address] Cirrhosis of liver ([address] Claudication ([address] Finger fracture, left, Hx of blood clots, Hyperlipidemia, Hypertension, Liver disease, PAD (peripheral artery disease) ([address] PVD (peripheral vascular disease) ([address] Wears dentures, and Wears glasses. has a past surgical history that includes Upper gastrointestinal endoscopy; liver biopsy; vascular surgery; Finger amputation (Left, 11/28/2016); and other surgical history (02/14/2017). Social History     Socioeconomic History    Marital status:       Spouse name: Not on file    Number of children: Not on file    Years of education: Not on file regular rhythm. Pulses: Normal pulses. Heart sounds: Normal heart sounds, S1 normal and S2 normal. No murmur. No friction rub. No gallop. Pulmonary:      Effort: Pulmonary effort is normal. No respiratory distress. Breath sounds: Normal breath sounds. Abdominal:      General: Abdomen is flat. There is no distension. Palpations: Abdomen is soft. Tenderness: There is no abdominal tenderness. There is no guarding or rebound. Musculoskeletal: Normal range of motion. General: No swelling or tenderness (Bilateral calves nontender palpation. ). Skin:     General: Skin is warm and dry. Capillary Refill: Capillary refill takes less than 2 seconds. Neurological:      Mental Status: He is alert and oriented to person, place, and time. Motor: No abnormal muscle tone. Comments: Alert to person and intermittently to place and time but inconsistent. Following commands. Cranial nerves II through XII intact on examination. 5/5 strength in all tremors. Sensation is equal tactile light touch and pain all extremities. Dysmetria with finger-to-nose test but normal heel-to-shin.          DIFFERENTIAL  DIAGNOSIS     PLAN (LABS / IMAGING / EKG):  Orders Placed This Encounter   Procedures    Sputum gram stain    Respiratory Culture    XR CHEST PORTABLE    CT HEAD WO CONTRAST    XR CHEST PORTABLE    Troponin    CBC auto differential    Comprehensive Metabolic Panel w/ Reflex to MG    Lactate, Sepsis    APTT    Protime-INR    Urinalysis Reflex to Culture    AMMONIA    D-Dimer, Quantitative    COVID-19    C-Reactive Protein    Lactate Dehydrogenase    Ferritin    Procalcitonin    Fibrinogen    Troponin    Vitamin D 25 Hydroxy    Ethanol    Basic Metabolic Panel w/ Reflex to MG    CBC    DIET GENERAL;    Notify physician    Misc activity order (specify)    Place intermittent pneumatic compression device    Telemetry Monitoring    Inpatient consult to 0.5 - 1.9 mmol/L    Lactic Acid, Sepsis, Whole Blood 1.1 0.5 - 1.9 mmol/L   APTT   Result Value Ref Range    PTT 27.7 20.5 - 30.5 sec   Protime-INR   Result Value Ref Range    Protime 12.1 (H) 9.0 - 12.0 sec    INR 1.2    AMMONIA   Result Value Ref Range    Ammonia 39 16 - 60 umol/L   D-Dimer, Quantitative   Result Value Ref Range    D-Dimer, Quant 0.44 mg/L FEU   COVID-19    Specimen: Other   Result Value Ref Range    SARS-CoV-2          SARS-CoV-2, Rapid Not Detected Not Detected    Source . NASOPHARYNGEAL SWAB     SARS-CoV-2         C-Reactive Protein   Result Value Ref Range    CRP <0.3 0.0 - 5.0 mg/L   Lactate Dehydrogenase   Result Value Ref Range     135 - 225 U/L   Ferritin   Result Value Ref Range    Ferritin 498 (H) 30 - 400 ug/L   Troponin   Result Value Ref Range    Troponin, High Sensitivity <6 0 - 22 ng/L    Troponin T NOT REPORTED <0.03 ng/mL    Troponin Interp NOT REPORTED    Vitamin D 25 Hydroxy   Result Value Ref Range    Vit D, 25-Hydroxy 56.1 30.0 - 100.0 ng/mL   Ethanol   Result Value Ref Range    Ethanol <10 <10 mg/dL    Ethanol percent <0.010 <0.010 %   Basic Metabolic Panel w/ Reflex to MG   Result Value Ref Range    Glucose 86 70 - 99 mg/dL    BUN 9 8 - 23 mg/dL    CREATININE 0.57 (L) 0.70 - 1.20 mg/dL    Bun/Cre Ratio NOT REPORTED 9 - 20    Calcium 7.3 (L) 8.6 - 10.4 mg/dL    Sodium 140 135 - 144 mmol/L    Potassium 4.3 3.7 - 5.3 mmol/L    Chloride 114 (H) 98 - 107 mmol/L    CO2 20 20 - 31 mmol/L    Anion Gap 6 (L) 9 - 17 mmol/L    GFR Non-African American >60 >60 mL/min    GFR African American >60 >60 mL/min    GFR Comment          GFR Staging NOT REPORTED    CBC   Result Value Ref Range    WBC 7.5 3.5 - 11.3 k/uL    RBC 3.19 (L) 4.21 - 5.77 m/uL    Hemoglobin 11.3 (L) 13.0 - 17.0 g/dL    Hematocrit 36.0 (L) 40.7 - 50.3 %    .9 (H) 82.6 - 102.9 fL    MCH 35.4 (H) 25.2 - 33.5 pg    MCHC 31.4 28.4 - 34.8 g/dL    RDW 13.2 11.8 - 14.4 %    Platelets 268 691 - 500 k/uL    MPV 10.3 8.1 - 13.5 fL    NRBC Automated 0.0 0.0 per 100 WBC       IMPRESSION: 60year-old presents for fatigue, diaphoresis and also mental status. Patient appears to be in mild distress and ill-appearing but not toxic. Patient's initial vitals are concerning for hypoxemia of 86 on room air with respiration rate of 15 is otherwise stable nonconcerning. Patient has clear lung sounds bilaterally. RRR with normal S1-S2 heart sounds with no murmurs rubs or gallops. Abdomen soft nontender with no guarding/rigidity/rebound tenderness. Cap refill is in 2. Patient is waxing and waning consciousness with alert mostly to his son and intermittently place but never time. Patient is beyond to multiple times throughout examination to obtain any history and to follow commands. Patient has dysmetria bilaterally with right greater than left but is otherwise has equal 5/5 strength in all extremities and sensation intact light touch and pain. Concern for above differential diagnosis. Will order troponin, EKG, CBC, CMP, PT/INR, APTT, ammonia, ethanol, urinalysis, chest x-ray, CT head, procalcitonin and oxygen. RADIOLOGY:  Ct Head Wo Contrast    Result Date: 12/10/2020  EXAMINATION: CT OF THE HEAD WITHOUT CONTRAST  12/10/2020 10:25 pm TECHNIQUE: CT of the head was performed without the administration of intravenous contrast. Dose modulation, iterative reconstruction, and/or weight based adjustment of the mA/kV was utilized to reduce the radiation dose to as low as reasonably achievable. COMPARISON: 10/22/2018 HISTORY: ORDERING SYSTEM PROVIDED HISTORY: ataxia b/l R worse than left TECHNOLOGIST PROVIDED HISTORY: ataxia b/l R worse than left Reason for Exam: ataxia,bilat,rt worse than left Acuity: Unknown Type of Exam: Unknown FINDINGS: BRAIN/VENTRICLES: There is no acute intracranial hemorrhage, mass effect or midline shift. No abnormal extra-axial fluid collection.   The gray-white differentiation is maintained without evidence of an acute infarct. There is no evidence of hydrocephalus. Periventricular and deep subcortical white matter hypoattenuation, consistent with microangiopathic change. Remote lacunar infarct within the chika is unchanged. ORBITS: The visualized portion of the orbits demonstrate no acute abnormality. SINUSES: The visualized paranasal sinuses and mastoid air cells demonstrate no acute abnormality. SOFT TISSUES/SKULL:  No acute abnormality of the visualized skull or soft tissues. No acute intracranial abnormality. Xr Chest Portable    Result Date: 12/11/2020  EXAMINATION: ONE XRAY VIEW OF THE CHEST 12/11/2020 6:42 am COMPARISON: Chest two views October 22, 2018 HISTORY: ORDERING SYSTEM PROVIDED HISTORY: AECOPD TECHNOLOGIST PROVIDED HISTORY: AECOPD FINDINGS: The heart is normal in size and configuration. The mediastinal contours are within normal limits. The lungs are well aerated. The pleural surfaces are normal and no evidence of a pleural effusion is seen. Bones and soft tissues are unremarkable. Unremarkable single AP upright portable view of the chest.     Xr Chest Portable    Result Date: 12/10/2020  EXAMINATION: ONE XRAY VIEW OF THE CHEST 12/10/2020 7:12 pm COMPARISON: October 22, 2018 HISTORY: ORDERING SYSTEM PROVIDED HISTORY: shortness of breath TECHNOLOGIST PROVIDED HISTORY: shortness of breath FINDINGS: Cardiac silhouette is mildly enlarged. Lungs are clear. No acute bony abnormality.      No acute findings       EKG  EKG Interpretation    Interpreted by emergency department physician    Rhythm: normal sinus   Rate: normal  Axis: normal  Ectopy: none  Conduction: normal  ST Segments: normal  T Waves: no acute changes  Q Waves: none    Clinical Impression: no acute changes    Harris Felling      All EKG's are interpreted by the Emergency Department Physician who either signs or Co-signs this chart in the absence of a cardiologist.    EMERGENCY DEPARTMENT COURSE:  ED Course as of Dec 11 0852   [facility] Dec 10, 2020   2018 Patient reevaluated bedside. Patient is now alert no to person place, time, and knows the date of his son's birthday. Patient notes at this time he would not come to the ER if it was up to him. Patient denies any complaint including headache, chest pain, shortness breath, numbness or tingling, weakness, chest abdominal pain, nausea/vomiting, or palpitations. [CS]   2032 D-Dimer, Quant: 0.44 [CS]   2032 CMP reveals mild transaminitis, likely due to history of cirrhosis and hep C. It is otherwise non-concerning.    [CS]   2112 Care assumed from dr. Hina Martinez    [BG]   2114 Sating 86 % on room air new oxygen requirement will add on covid testing    [BG]   2220 Trop unchanged    [BG]   2325 Admitted to [facility]    [BG]      ED Course User Index  [BG] Nilda Turcios DO  [CS] Wil Oconnor DO         PROCEDURES:  none    CONSULTS:  IP CONSULT TO HOSPITALIST    CRITICAL CARE:  Please see attending note    FINAL IMPRESSION      1. Fatigue, unspecified type    2. General weakness    3. Hypoxia    4. Somnolence    5. Hypoxemia          DISPOSITION / PLAN     DISPOSITION Admitted 12/10/2020 11:27:09 PM      PATIENT REFERRED TO:  No follow-up provider specified.     DISCHARGE MEDICATIONS:  New Prescriptions    No medications on file       Wil Oconnor DO  Emergency Medicine Resident    (Please note that portions of thisnote were completed with a voice recognition program.  Efforts were made to edit the dictations but occasionally words are mis-transcribed.)       Wil Oconnor DO  Resident  12/11/20 5994

## 2020-12-11 ENCOUNTER — APPOINTMENT (OUTPATIENT)
Dept: GENERAL RADIOLOGY | Age: 65
End: 2020-12-11
Payer: COMMERCIAL

## 2020-12-11 VITALS
WEIGHT: 160 LBS | TEMPERATURE: 97.2 F | SYSTOLIC BLOOD PRESSURE: 129 MMHG | HEART RATE: 60 BPM | RESPIRATION RATE: 15 BRPM | BODY MASS INDEX: 23.97 KG/M2 | DIASTOLIC BLOOD PRESSURE: 73 MMHG | OXYGEN SATURATION: 100 %

## 2020-12-11 PROBLEM — R09.02 HYPOXIA: Status: RESOLVED | Noted: 2020-12-10 | Resolved: 2020-12-11

## 2020-12-11 LAB
ANION GAP SERPL CALCULATED.3IONS-SCNC: 6 MMOL/L (ref 9–17)
BUN BLDV-MCNC: 9 MG/DL (ref 8–23)
BUN/CREAT BLD: ABNORMAL (ref 9–20)
CALCIUM SERPL-MCNC: 7.3 MG/DL (ref 8.6–10.4)
CHLORIDE BLD-SCNC: 114 MMOL/L (ref 98–107)
CO2: 20 MMOL/L (ref 20–31)
CREAT SERPL-MCNC: 0.57 MG/DL (ref 0.7–1.2)
GFR AFRICAN AMERICAN: >60 ML/MIN
GFR NON-AFRICAN AMERICAN: >60 ML/MIN
GFR SERPL CREATININE-BSD FRML MDRD: ABNORMAL ML/MIN/{1.73_M2}
GFR SERPL CREATININE-BSD FRML MDRD: ABNORMAL ML/MIN/{1.73_M2}
GLUCOSE BLD-MCNC: 86 MG/DL (ref 70–99)
HCT VFR BLD CALC: 36 % (ref 40.7–50.3)
HEMOGLOBIN: 11.3 G/DL (ref 13–17)
MCH RBC QN AUTO: 35.4 PG (ref 25.2–33.5)
MCHC RBC AUTO-ENTMCNC: 31.4 G/DL (ref 28.4–34.8)
MCV RBC AUTO: 112.9 FL (ref 82.6–102.9)
NRBC AUTOMATED: 0 PER 100 WBC
PDW BLD-RTO: 13.2 % (ref 11.8–14.4)
PLATELET # BLD: 145 K/UL (ref 138–453)
PMV BLD AUTO: 10.3 FL (ref 8.1–13.5)
POTASSIUM SERPL-SCNC: 4.3 MMOL/L (ref 3.7–5.3)
RBC # BLD: 3.19 M/UL (ref 4.21–5.77)
SODIUM BLD-SCNC: 140 MMOL/L (ref 135–144)
WBC # BLD: 7.5 K/UL (ref 3.5–11.3)

## 2020-12-11 PROCEDURE — 6370000000 HC RX 637 (ALT 250 FOR IP): Performed by: NURSE PRACTITIONER

## 2020-12-11 PROCEDURE — 80048 BASIC METABOLIC PNL TOTAL CA: CPT

## 2020-12-11 PROCEDURE — 99223 1ST HOSP IP/OBS HIGH 75: CPT | Performed by: INTERNAL MEDICINE

## 2020-12-11 PROCEDURE — 85027 COMPLETE CBC AUTOMATED: CPT

## 2020-12-11 PROCEDURE — 6360000002 HC RX W HCPCS: Performed by: NURSE PRACTITIONER

## 2020-12-11 PROCEDURE — 94640 AIRWAY INHALATION TREATMENT: CPT

## 2020-12-11 PROCEDURE — 94664 DEMO&/EVAL PT USE INHALER: CPT

## 2020-12-11 PROCEDURE — 2580000003 HC RX 258: Performed by: NURSE PRACTITIONER

## 2020-12-11 PROCEDURE — 71045 X-RAY EXAM CHEST 1 VIEW: CPT

## 2020-12-11 PROCEDURE — 94761 N-INVAS EAR/PLS OXIMETRY MLT: CPT

## 2020-12-11 RX ORDER — ALBUTEROL SULFATE 2.5 MG/3ML
2.5 SOLUTION RESPIRATORY (INHALATION)
Status: DISCONTINUED | OUTPATIENT
Start: 2020-12-11 | End: 2020-12-11 | Stop reason: HOSPADM

## 2020-12-11 RX ORDER — SODIUM CHLORIDE 9 MG/ML
INJECTION, SOLUTION INTRAVENOUS CONTINUOUS
Status: DISCONTINUED | OUTPATIENT
Start: 2020-12-11 | End: 2020-12-11 | Stop reason: HOSPADM

## 2020-12-11 RX ORDER — ACETAMINOPHEN 325 MG/1
650 TABLET ORAL EVERY 6 HOURS PRN
Status: DISCONTINUED | OUTPATIENT
Start: 2020-12-11 | End: 2020-12-11 | Stop reason: HOSPADM

## 2020-12-11 RX ORDER — SERTRALINE HYDROCHLORIDE 25 MG/1
25 TABLET, FILM COATED ORAL DAILY
Status: DISCONTINUED | OUTPATIENT
Start: 2020-12-11 | End: 2020-12-11 | Stop reason: HOSPADM

## 2020-12-11 RX ORDER — FAMOTIDINE 20 MG/1
20 TABLET, FILM COATED ORAL 2 TIMES DAILY
Status: DISCONTINUED | OUTPATIENT
Start: 2020-12-11 | End: 2020-12-11 | Stop reason: HOSPADM

## 2020-12-11 RX ORDER — SODIUM CHLORIDE 0.9 % (FLUSH) 0.9 %
10 SYRINGE (ML) INJECTION PRN
Status: DISCONTINUED | OUTPATIENT
Start: 2020-12-11 | End: 2020-12-11 | Stop reason: HOSPADM

## 2020-12-11 RX ORDER — ACETAMINOPHEN 650 MG/1
650 SUPPOSITORY RECTAL EVERY 6 HOURS PRN
Status: DISCONTINUED | OUTPATIENT
Start: 2020-12-11 | End: 2020-12-11 | Stop reason: HOSPADM

## 2020-12-11 RX ORDER — CILOSTAZOL 100 MG/1
100 TABLET ORAL 2 TIMES DAILY
Status: DISCONTINUED | OUTPATIENT
Start: 2020-12-11 | End: 2020-12-11 | Stop reason: HOSPADM

## 2020-12-11 RX ORDER — IPRATROPIUM BROMIDE AND ALBUTEROL SULFATE 2.5; .5 MG/3ML; MG/3ML
1 SOLUTION RESPIRATORY (INHALATION)
Status: DISCONTINUED | OUTPATIENT
Start: 2020-12-11 | End: 2020-12-11 | Stop reason: HOSPADM

## 2020-12-11 RX ORDER — ONDANSETRON 2 MG/ML
4 INJECTION INTRAMUSCULAR; INTRAVENOUS EVERY 6 HOURS PRN
Status: DISCONTINUED | OUTPATIENT
Start: 2020-12-11 | End: 2020-12-11 | Stop reason: HOSPADM

## 2020-12-11 RX ORDER — FOLIC ACID 1 MG/1
1 TABLET ORAL DAILY
Status: DISCONTINUED | OUTPATIENT
Start: 2020-12-11 | End: 2020-12-11 | Stop reason: HOSPADM

## 2020-12-11 RX ORDER — NICOTINE 21 MG/24HR
1 PATCH, TRANSDERMAL 24 HOURS TRANSDERMAL DAILY PRN
Status: DISCONTINUED | OUTPATIENT
Start: 2020-12-11 | End: 2020-12-11 | Stop reason: HOSPADM

## 2020-12-11 RX ORDER — ALBUTEROL SULFATE 90 UG/1
2 AEROSOL, METERED RESPIRATORY (INHALATION) 3 TIMES DAILY PRN
Qty: 1 INHALER | Refills: 0 | Status: SHIPPED | OUTPATIENT
Start: 2020-12-11 | End: 2022-08-23 | Stop reason: SDUPTHER

## 2020-12-11 RX ORDER — ASPIRIN 81 MG/1
81 TABLET ORAL DAILY
Status: DISCONTINUED | OUTPATIENT
Start: 2020-12-11 | End: 2020-12-11 | Stop reason: HOSPADM

## 2020-12-11 RX ORDER — AMLODIPINE BESYLATE 10 MG/1
10 TABLET ORAL NIGHTLY
Status: DISCONTINUED | OUTPATIENT
Start: 2020-12-11 | End: 2020-12-11 | Stop reason: HOSPADM

## 2020-12-11 RX ORDER — SODIUM CHLORIDE 0.9 % (FLUSH) 0.9 %
10 SYRINGE (ML) INJECTION EVERY 12 HOURS SCHEDULED
Status: DISCONTINUED | OUTPATIENT
Start: 2020-12-11 | End: 2020-12-11 | Stop reason: HOSPADM

## 2020-12-11 RX ORDER — PREDNISONE 20 MG/1
40 TABLET ORAL DAILY
Status: DISCONTINUED | OUTPATIENT
Start: 2020-12-11 | End: 2020-12-11 | Stop reason: HOSPADM

## 2020-12-11 RX ORDER — PROMETHAZINE HYDROCHLORIDE 12.5 MG/1
12.5 TABLET ORAL EVERY 6 HOURS PRN
Status: DISCONTINUED | OUTPATIENT
Start: 2020-12-11 | End: 2020-12-11 | Stop reason: HOSPADM

## 2020-12-11 RX ORDER — ATORVASTATIN CALCIUM 20 MG/1
20 TABLET, FILM COATED ORAL DAILY
Status: DISCONTINUED | OUTPATIENT
Start: 2020-12-11 | End: 2020-12-11 | Stop reason: HOSPADM

## 2020-12-11 RX ORDER — POLYETHYLENE GLYCOL 3350 17 G/17G
17 POWDER, FOR SOLUTION ORAL DAILY PRN
Status: DISCONTINUED | OUTPATIENT
Start: 2020-12-11 | End: 2020-12-11 | Stop reason: HOSPADM

## 2020-12-11 RX ADMIN — CILOSTAZOL 100 MG: 100 TABLET ORAL at 09:43

## 2020-12-11 RX ADMIN — IPRATROPIUM BROMIDE AND ALBUTEROL SULFATE 1 AMPULE: .5; 3 SOLUTION RESPIRATORY (INHALATION) at 09:00

## 2020-12-11 RX ADMIN — ATORVASTATIN CALCIUM 20 MG: 20 TABLET, FILM COATED ORAL at 09:43

## 2020-12-11 RX ADMIN — IPRATROPIUM BROMIDE AND ALBUTEROL SULFATE 1 AMPULE: .5; 3 SOLUTION RESPIRATORY (INHALATION) at 11:54

## 2020-12-11 RX ADMIN — SERTRALINE 25 MG: 25 TABLET, FILM COATED ORAL at 09:43

## 2020-12-11 RX ADMIN — FAMOTIDINE 20 MG: 20 TABLET, FILM COATED ORAL at 09:43

## 2020-12-11 RX ADMIN — FOLIC ACID 1 MG: 1 TABLET ORAL at 09:45

## 2020-12-11 RX ADMIN — SODIUM CHLORIDE: 9 INJECTION, SOLUTION INTRAVENOUS at 03:49

## 2020-12-11 RX ADMIN — Medication 10 ML: at 09:43

## 2020-12-11 RX ADMIN — PREDNISONE 40 MG: 20 TABLET ORAL at 09:42

## 2020-12-11 RX ADMIN — Medication 81 MG: at 09:43

## 2020-12-11 RX ADMIN — ENOXAPARIN SODIUM 40 MG: 40 INJECTION SUBCUTANEOUS at 09:43

## 2020-12-11 NOTE — PROGRESS NOTES
CLINICAL PHARMACY NOTE: MEDS TO [address] Select Patient?: Yes  Total # of Prescriptions Filled: 1   The following medications were delivered to the patient:  · Albuterol  HFA inhaler  Total # of Interventions Completed: 1  Time Spent (min): 45    Additional Documentation:Medications delivered to the patient in  er room 45.

## 2020-12-11 NOTE — ED NOTES
Report taken from [facility]. Pt asleep on stretcher. NAD noted. RR even and nonlabored. Call light within reach. Will continue to monitor.        Mildred Cain RN  12/11/20 9953

## 2020-12-11 NOTE — ED NOTES
Pt arrived to ED with c/o excessive sweating & fatigue, pt reports he smoked marijuana approx 30 min prior to symptom onset, pt states he smoked this marijuana before and denies any similar symptoms, pt lethargic on arrival & drenched in sweat, pt is A&Ox4, RR even/unlabored, pt placed on 2L NC on arrival d/t O2 saturation 88%, NAD noted, EKG completed, IV initiated, pt attached to full cardiac monitor, will cont to monitor     Angelina Claire RN  12/10/20 1976

## 2020-12-11 NOTE — PROGRESS NOTES
Physical Therapy  DATE: 2020    NAME: Gutierrez Carlos  MRN: [id number]   : 1955    Patient not seen this date for Physical Therapy due to:  [] Blood transfusion in progress  [] Hemodialysis  [] Patient Declined  [] Spine Precautions   [] Strict Bedrest  [] Surgery/ Procedure  [] Testing      [x] Other: Pt reports baseline mobility, denies any safety mobility concerns and requests to defer PT. [x] PT is being discontinued at this time. Patient baseline. No further needs. [] PT is being discontinued at this time due to declining physical/ medical status. Therapy is not appropriate at this time. Physical therapy to sign off.   Carlos Chung, PT

## 2020-12-11 NOTE — ED NOTES
The following labs labeled with pt sticker and tubed:     [] Lavender   [] on ice   [x] Blue   [x] Green/yellow  [] Green/black [] on ice  [] Pink  [] Red  [] Yellow     [] COVID-19 swab    [] Rapid     [] Urine Sample  [] Pelvic Cultures    [] Blood Cultures          Angelina Claire RN  12/10/20 2187

## 2020-12-11 NOTE — DISCHARGE SUMMARY
[facility]  Office: [address], DO, Yasmeen Simple, DO, Leonel Hedge, DO, Brigette Mccurdy Blood, DO, Vanessa Taveras MD, Alicia Ardon MD, Keaton Fernandez MD, Angely Moffett MD, Jessica Paul MD, Debbie Dash MD, Eddie Alvarez MD, Judy Sadler MD, Anselmo Julio MD, Debora Su DO, Jesica Alejandra MD, Marcelino Rausch MD, Suri Castillo DO, Frank Zuniga MD,  Amina Aldrich, DO, Denise France MD, Joel Bradshaw MD, Cynthia Rosado, [facility], [facility], [place]), CNP, Celestino Rg, CNP, Jose Angel Hamm, CNS, Viviana Oseguera, CNP, Giorgio Claudio, CNP, Phil Bonilla, CNP, Tye Johnston, CNP, Didi Nowak, CNP, Can Lopez PA-C, [place], [facility], Carolina Walters, CNP, Tammy Garcia, CNP, Aristides Ramirez, CNP, Pastor Green, [facility], Clifton Christine, [facility]    Discharge Summary     Patient ID: Florinda Raymond  :  1955   MRN: [id number]     ACCOUNT:  [de-identified]   Patient's PCP: Nuvia Gradner MD  Admit Date: 12/10/2020   Discharge Date: 2020    Length of Stay: 1  Code Status:  Prior  Admitting Physician: Judy Sadler MD  Discharge Physician: Emelia Kanner, MD     Active Discharge Diagnoses:     Hospital Problem Lists:  Principal Problem (Resolved): Hypoxia  Active Problems:    HTN (hypertension)    PAD (peripheral artery disease) (HCC)    Cirrhosis, alcoholic ([facility]  Transient altered mental status resolved        Discharged Condition: stable    Hospital Stay:     Hospital Course: This is 60years old gentleman who came into the hospital because of diaphoresis and confusion. I took the history from patient and his son. He was at home and they had the stove on and also had the heat to 80 degrees. Patient said that he felt sweaty. He was also smoking marijuana at that time.   Patient was advised to go to the hospital.  In the emergency department he was initially found to be confused with mild hypoxia with oxygen saturations at 89%. He was placed on oxygen. Few hours later the patient was alert awake and oriented and he was saturating high 90s on room air. We even had him walk in the julian when his saturations remained 96%. Patient was monitored. He did not have any more confusion. His saturations were normal.  His D-dimer was negative. He did not have any chest pain. His ammonia was normal.  Procalcitonin was negative and Covid test was negative as well. Patient was back to his baseline and wanted to go home. He was discharged home in stable condition. Family was advised if symptoms return to come back to the hospital.    Significant therapeutic interventions: As above    Significant Diagnostic Studies:   Labs / Micro:  CBC:   Lab Results   Component Value Date    WBC 7.5 12/11/2020    RBC 3.19 12/11/2020    HGB 11.3 12/11/2020    HCT 36.0 12/11/2020    .9 12/11/2020    MCH 35.4 12/11/2020    MCHC 31.4 12/11/2020    RDW 13.2 12/11/2020     12/11/2020     BMP:    Lab Results   Component Value Date    GLUCOSE 86 12/11/2020     12/11/2020    K 4.3 12/11/2020     12/11/2020    CO2 20 12/11/2020    ANIONGAP 6 12/11/2020    BUN 9 12/11/2020    CREATININE 0.57 12/11/2020    BUNCRER NOT REPORTED 12/11/2020    CALCIUM 7.3 12/11/2020    LABGLOM >60 12/11/2020    GFRAA >60 12/11/2020    GFR      12/11/2020    GFR NOT REPORTED 12/11/2020        Radiology:  Ct Head Wo Contrast    Result Date: 12/10/2020  No acute intracranial abnormality. Xr Chest Portable    Result Date: 12/11/2020  Unremarkable single AP upright portable view of the chest.     Xr Chest Portable    Result Date: 12/10/2020  No acute findings       Consultations:    Consults:     Final Specialist Recommendations/Findings:   IP CONSULT TO HOSPITALIST      The patient was seen and examined on day of discharge and this discharge summary is in conjunction with any daily progress note from day of discharge.     Discharge plan:     Disposition: Home    Physician Follow Up:     Corrina Solorzano MD  [address] [facility] [address]  [phone]    In 1 week         Requiring Further Evaluation/Follow Up POST HOSPITALIZATION/Incidental Findings: PCP to follow-up for COPD    Diet: cardiac diet    Activity: As tolerated    Instructions to Patient: Advised to quit smoking and substance abuse    Discharge Medications:      Medication List      START taking these medications    albuterol sulfate  (90 Base) MCG/ACT inhaler  Commonly known as:  Ventolin HFA  Inhale 2 puffs into the lungs 3 times daily as needed for Wheezing or Shortness of Breath        CONTINUE taking these medications    amLODIPine 10 MG tablet  Commonly known as:  NORVASC  TAKE 1 TABLET DAILY -MAY CAUSE DIZZINESS     aspirin 81 MG tablet     atorvastatin 20 MG tablet  Commonly known as:  LIPITOR  TAKE 1 TABLET BY MOUTH DAILY     cilostazol 100 MG tablet  Commonly known as:  Pletal  Take 1 tablet by mouth 2 times daily     clobetasol 0.05 % cream  Commonly known as:  TEMOVATE  Apply topically 2 times daily to active rash     folic acid 1 MG tablet  Commonly known as:  FOLVITE  TAKE 1 TABLET BY MOUTH DAILY     Multi-Vitamins Tabs  TAKE 1 TABLET BY MOUTH DAILY     sertraline 25 MG tablet  Commonly known as:  ZOLOFT  TAKE 1 TABLET BY MOUTH DAILY        STOP taking these medications    predniSONE 10 MG tablet  Commonly known as:  Ida Munch           Where to Get Your Medications      These medications were sent to [facility] [address] [facility], [address] [place] [address]  [address] [facility], [address]  [address]    Phone:  [phone]   · albuterol sulfate  (90 Base) MCG/ACT inhaler         No discharge procedures on file. Time Spent on discharge is  20 mins in patient examination, evaluation, counseling as well as medication reconciliation, prescriptions for required medications, discharge plan and follow up.     Electronically signed by   Solange Lugo MD  12/11/2020  1:08 PM      Thank you Dr. Osvaldo Lyon MD for the opportunity to be involved in this patient's care.

## 2020-12-11 NOTE — ED PROVIDER NOTES
[address] [facility]     Emergency Department     Faculty Attestation        I performed a history and physical examination of the patient and discussed management with the resident. I reviewed the residents note and agree with the documented findings and plan of care. Any areas of disagreement are noted on the chart. I was personally present for the key portions of any procedures. I have documented in the chart those procedures where I was not present during the key portions. I have reviewed the emergency nurses triage note. I agree with the chief complaint, past medical history, past surgical history, allergies, medications, social and family history as documented unless otherwise noted below. For mid-level providers such as nurse practitioners as well as physicians assistants:    I have personally seen and evaluated the patient. I find the patient's history and physical exam are consistent with NP/PA documentation. I agree with the care provided, treatment rendered, disposition, & follow-up plan. Additional findings are as noted. Vital Signs: /69   Pulse 76   Temp 98.4 °F (36.9 °C) (Temporal)   Resp 15   SpO2 (!) 87%   PCP:  Janis Esposito MD    Pertinent Comments:     Presents with episode of confusion and excessive sweating that happened prior to arrival he smoked marijuana 3 minutes prior to arrival became confused and kept on trying to turn up thermostat. Arrival emergency department he is satting in the high 80s on room air. He denies fevers, chills, chest pain, cough or shortness of breath appears generally confused but has no focal deficits.   Obtain broad laboratory work-up Covid swab D-dimer chest x-ray, reassessment    Critical Care  None          Beverley Kong MD  Attending Emergency Medicine Physician              Eliana Mckeon MD  12/10/20 4820

## 2020-12-11 NOTE — ED NOTES
Pt asleep on stretcher. NAD noted. RR even and nonlabored. Call light within reach. Awaiting room assignment. Will continue to monitor.        Alyssa Hicks RN  12/11/20 0526

## 2020-12-11 NOTE — PLAN OF CARE
Problem: SAFETY  Goal: Free from accidental physical injury  Outcome: Ongoing     Problem: DAILY CARE  Goal: Daily care needs are met  Outcome: Ongoing     Problem: PAIN  Goal: Patient's pain/discomfort is manageable  Outcome: Ongoing     Problem: SKIN INTEGRITY  Goal: Skin integrity is maintained or improved  Outcome: Ongoing     Problem: KNOWLEDGE DEFICIT  Goal: Patient/S.O. demonstrates understanding of disease process, treatment plan, medications, and discharge instructions.   Outcome: Ongoing     Problem: DISCHARGE BARRIERS  Goal: Patient's continuum of care needs are met  Outcome: Ongoing     Problem: Cardiac:  Goal: Ability to maintain an adequate cardiac output will improve  Description: Ability to maintain an adequate cardiac output will improve  Outcome: Ongoing  Goal: Hemodynamic stability will improve  Description: Hemodynamic stability will improve  Outcome: Ongoing  Goal: Risk factors for ineffective tissue perfusion will decrease  Description: Risk factors for ineffective tissue perfusion will decrease  Outcome: Ongoing     Problem: Fluid Volume:  Goal: Will show no signs or symptoms of fluid imbalance  Description: Will show no signs or symptoms of fluid imbalance  Outcome: Ongoing

## 2020-12-11 NOTE — H&P
[facility]  Office: [address], DO, Erendira Jimenez, DO, Francoise Allred, DO, Anish Landry Blood, DO, Faiza Brumfield MD, Myah Romero MD, Queen Angelo MD, Mariangel Heredia MD, Bharathi Guillory MD, Augusta Ramos MD, Maura Belle MD, Edil Lira MD, Anselmo Yo MD, Becka Deal DO, Prudence Alva MD, Merritt Salgado MD, Christiano Bass DO, Mariano Felder MD,  Poli Montiel DO, Nicci Holt MD, Steffi Foster MD, Richmond Bolivar, [facility], [facility] Junior, CNP, Florinda Muir, CNP, Javi Morrow, CNS, Sharri Ocampo, CNP, Stephy Weiss, CNP, Sofi Echevarria, CNP, Savannah Lugo, CNP, Unruly Lau, CNP, Eileen Holland PA-C, Gadiel Meadows, [facility], Tomas Mo, CNP, Heidi Day, CNP, Brenda Mchugh, [facility], Lamont Krueger, CNP, Sol Arredondo, [address]    HISTORY AND PHYSICAL EXAMINATION            Date:   12/11/2020  Patient name:  Belinda Love  Date of admission:  12/10/2020  6:34 PM  MRN:   [id number]  Account:  [de-identified]  YOB: 1955  PCP:    Syd Valenzuela MD  Room:   /  Code Status:    Prior    Chief Complaint:     Chief Complaint   Patient presents with    Excessive Sweating    Fatigue     near syncopal       History Obtained From:     patient, family member - Son, electronic medical record    History of Present Illness: This is 60years old gentleman who came into the hospital because of diaphoresis and confusion. I took the history from patient and his son. He was at home and they had the stove on and also had the heat to 80 degrees. Patient said that he felt sweaty. He was also smoking marijuana at that time. Patient was advised to go to the hospital.  In the emergency department he was initially found to be confused with mild hypoxia with oxygen saturations at 89%. He was placed on oxygen.   Few hours later the patient was alert awake and oriented and he was saturating high 90s on room air. Currently the patient is alert awake oriented to place person and time. He denies any focal deficits. He denies any complaints. His son is with him and he says patient is back to his baseline. Patient denies any chest pain or any other complaints. Patient's procalcitonin and D-dimer have been negative as well. He is saturating well on room air in the upper 90s without any complaints. Past Medical History:     Past Medical History:   Diagnosis Date    [facility]ism [facility]     Cirrhosis of liver ([facility]     Claudication ([facility]     left leg    Finger fracture, left     LONG FINGER    Hx of blood clots     LEFT LOWER LEG UNSURE OF THIS AT THIS TIME, IS TO BE SEEING VASCULAR DR FOR THIS.  Hyperlipidemia     Hypertension     Liver disease     PAD (peripheral artery disease) ([facility]     PVD (peripheral vascular disease) ([facility]     Wears dentures     FULL UPPER AND LOWER    Wears glasses         Past Surgical History:     Past Surgical History:   Procedure Laterality Date    FINGER AMPUTATION Left 11/28/2016    revision long finger    LIVER BIOPSY      OTHER SURGICAL HISTORY  02/14/2017    Abdomen with run off with Dr. Zaid López - could not pass left left pop; # 7 FR manual pull right groin    UPPER GASTROINTESTINAL ENDOSCOPY      VASCULAR SURGERY      abdominal aortogram        Medications Prior to Admission:     Prior to Admission medications    Medication Sig Start Date End Date Taking?  Authorizing Provider   albuterol sulfate HFA (VENTOLIN HFA) 108 (90 Base) MCG/ACT inhaler Inhale 2 puffs into the lungs 3 times daily as needed for Wheezing or Shortness of Breath 12/11/20  Yes Adama Willett MD   Multiple Vitamin (MULTI-VITAMINS) TABS TAKE 1 TABLET BY MOUTH DAILY 11/4/20   Deann Mosley MD   atorvastatin (LIPITOR) 20 MG tablet TAKE 1 TABLET BY MOUTH DAILY 8/27/20   Deann Mosley MD   sertraline (ZOLOFT) 25 MG tablet TAKE 1 TABLET BY MOUTH DAILY 8/27/20   Deann Mosley MD   folic acid (Erendira Little) 1 MG tablet TAKE 1 TABLET BY MOUTH DAILY 8/27/20   Caprice Duarte MD   amLODIPine (NORVASC) 10 MG tablet TAKE 1 TABLET DAILY -MAY CAUSE DIZZINESS 8/27/20   Caprice Duarte MD   clobetasol (TEMOVATE) 0.05 % cream Apply topically 2 times daily to active rash 6/16/20   Khris Gutiérrez MD   cilostazol (PLETAL) 100 MG tablet Take 1 tablet by mouth 2 times daily 12/8/17   Terrell Cardenas MD   aspirin 81 MG tablet Take 81 mg by mouth daily LAST TAKEN 11/10/2016    Historical Provider, MD        Allergies:     Patient has no known allergies. Social History:     Tobacco:    reports that he has been smoking cigarettes. He has been smoking about 0.50 packs per day. He has never used smokeless tobacco.  Alcohol:      reports no history of alcohol use. Drug Use:  reports current drug use. Frequency: 7.00 times per week. Drug: Marijuana. Family History:     Family History   Problem Relation Age of Onset    High Blood Pressure Mother     Diabetes Father     Cancer Father     Heart Disease Father     Cancer Sister     High Blood Pressure Sister     Heart Disease Sister     Diabetes Brother        Review of Systems:     Positive and Negative as described in HPI.     CONSTITUTIONAL:  negative for fevers, chills, sweats, fatigue, weight loss  HEENT:  negative for vision, hearing changes, runny nose, throat pain  RESPIRATORY:  negative for shortness of breath, cough, congestion, wheezing  CARDIOVASCULAR:  negative for chest pain, palpitations  GASTROINTESTINAL:  negative for nausea, vomiting, diarrhea, constipation, change in bowel habits, abdominal pain   GENITOURINARY:  negative for difficulty of urination, burning with urination, frequency   INTEGUMENT:  negative for rash, skin lesions, easy bruising   HEMATOLOGIC/LYMPHATIC:  negative for swelling/edema   ALLERGIC/IMMUNOLOGIC:  negative for urticaria , itching  ENDOCRINE:  negative increase in drinking, increase in urination, hot or cold intolerance  MUSCULOSKELETAL: negative joint pains, muscle aches, swelling of joints  NEUROLOGICAL:  negative for headaches, dizziness, lightheadedness, numbness, pain, tingling extremities  BEHAVIOR/PSYCH:  negative for depression, anxiety    Physical Exam:   BP (!) 145/71   Pulse 58   Temp 97.2 °F (36.2 °C) (Axillary)   Resp 14   Wt 160 lb (72.6 kg)   SpO2 94%   BMI 23.97 kg/m²   Temp (24hrs), Av.8 °F (36.6 °C), Min:97.2 °F (36.2 °C), Max:98.4 °F (36.9 °C)    No results for input(s): POCGLU in the last 72 hours.     Intake/Output Summary (Last 24 hours) at 2020 1301  Last data filed at 2020 0945  Gross per 24 hour   Intake 240 ml   Output --   Net 240 ml       General Appearance: alert, well appearing, and in no acute distress  Mental status: oriented to person, place, and time  Head: normocephalic, atraumatic  Eye: no icterus, redness, pupils equal and reactive, extraocular eye movements intact, conjunctiva clear  Ear: normal external ear, no discharge, hearing intact  Nose: no drainage noted  Mouth: mucous membranes moist  Neck: supple, no carotid bruits, thyroid not palpable  Lungs: Bilateral equal air entry, clear to ausculation, no wheezing, rales or rhonchi, normal effort  Cardiovascular: normal rate, regular rhythm, no murmur, gallop, rub  Abdomen: Soft, nontender, nondistended, normal bowel sounds, no hepatomegaly or splenomegaly  Neurologic: There are no new focal motor or sensory deficits, normal muscle tone and bulk, no abnormal sensation, normal speech, cranial nerves II through XII grossly intact  Skin: No gross lesions, rashes, bruising or bleeding on exposed skin area  Extremities: peripheral pulses palpable, no pedal edema or calf pain with palpation  Psych: normal affect    Investigations:      Laboratory Testing:  Recent Results (from the past 24 hour(s))   Troponin    Collection Time: 12/10/20  7:09 PM   Result Value Ref Range    Troponin, High Sensitivity <6 0 - 22 ng/L    Troponin T NOT REPORTED <0.03 ng/mL    Troponin Interp NOT REPORTED    Procalcitonin    Collection Time: 12/10/20  7:09 PM   Result Value Ref Range    Procalcitonin 0.08 <0.09 ng/mL   CBC auto differential    Collection Time: 12/10/20  7:09 PM   Result Value Ref Range    WBC 11.0 3.5 - 11.3 k/uL    RBC 3.58 (L) 4.21 - 5.77 m/uL    Hemoglobin 12.8 (L) 13.0 - 17.0 g/dL    Hematocrit 39.4 (L) 40.7 - 50.3 %    .1 (H) 82.6 - 102.9 fL    MCH 35.8 (H) 25.2 - 33.5 pg    MCHC 32.5 28.4 - 34.8 g/dL    RDW 12.6 11.8 - 14.4 %    Platelets 420 347 - 507 k/uL    MPV 9.8 8.1 - 13.5 fL    NRBC Automated 0.0 0.0 per 100 WBC    Differential Type NOT REPORTED     WBC Morphology NOT REPORTED     RBC Morphology NOT REPORTED     Platelet Estimate NOT REPORTED     Immature Granulocytes 0 0 %    Seg Neutrophils 39 36 - 66 %    Lymphocytes 52 (H) 24 - 44 %    Atypical Lymphocytes 3 %    Monocytes 2 1 - 7 %    Eosinophils % 4 1 - 4 %    Basophils 0 0 - 2 %    Absolute Immature Granulocyte 0.00 0.00 - 0.30 k/uL    Segs Absolute 4.29 1.8 - 7.7 k/uL    Absolute Lymph # 5.72 (H) 1.0 - 4.8 k/uL    Atypical Lymphocytes Absolute 0.33 k/uL    Absolute Mono # 0.22 0.1 - 0.8 k/uL    Absolute Eos # 0.44 (H) 0.0 - 0.4 k/uL    Basophils Absolute 0.00 0.0 - 0.2 k/uL    Morphology MACROCYTOSIS PRESENT    Comprehensive Metabolic Panel w/ Reflex to MG    Collection Time: 12/10/20  7:09 PM   Result Value Ref Range    Glucose 106 (H) 70 - 99 mg/dL    BUN 10 8 - 23 mg/dL    CREATININE 1.03 0.70 - 1.20 mg/dL    Bun/Cre Ratio NOT REPORTED 9 - 20    Calcium 9.2 8.6 - 10.4 mg/dL    Sodium 143 135 - 144 mmol/L    Potassium 3.8 3.7 - 5.3 mmol/L    Chloride 107 98 - 107 mmol/L    CO2 24 20 - 31 mmol/L    Anion Gap 12 9 - 17 mmol/L    Alkaline Phosphatase 109 40 - 129 U/L    ALT 46 (H) 5 - 41 U/L    AST 47 (H) <40 U/L    Total Bilirubin 0.59 0.3 - 1.2 mg/dL    Total Protein 7.4 6.4 - 8.3 g/dL    Alb 3.8 3.5 - 5.2 g/dL    Albumin/Globulin Ratio 1.1 1.0 - 2.5    GFR Non- >60 >60 mL/min    GFR African American >60 >60 mL/min    GFR Comment          GFR Staging NOT REPORTED    Lactate, Sepsis    Collection Time: 12/10/20  7:09 PM   Result Value Ref Range    Lactic Acid, Sepsis NOT REPORTED 0.5 - 1.9 mmol/L    Lactic Acid, Sepsis, Whole Blood 1.1 0.5 - 1.9 mmol/L   APTT    Collection Time: 12/10/20  7:09 PM   Result Value Ref Range    PTT 27.7 20.5 - 30.5 sec   Protime-INR    Collection Time: 12/10/20  7:09 PM   Result Value Ref Range    Protime 12.1 (H) 9.0 - 12.0 sec    INR 1.2    AMMONIA    Collection Time: 12/10/20  7:09 PM   Result Value Ref Range    Ammonia 39 16 - 60 umol/L   D-Dimer, Quantitative    Collection Time: 12/10/20  7:09 PM   Result Value Ref Range    D-Dimer, Quant 0.44 mg/L FEU   C-Reactive Protein    Collection Time: 12/10/20  9:34 PM   Result Value Ref Range    CRP <0.3 0.0 - 5.0 mg/L   Lactate Dehydrogenase    Collection Time: 12/10/20  9:34 PM   Result Value Ref Range     135 - 225 U/L   Ferritin    Collection Time: 12/10/20  9:34 PM   Result Value Ref Range    Ferritin 498 (H) 30 - 400 ug/L   Troponin    Collection Time: 12/10/20  9:34 PM   Result Value Ref Range    Troponin, High Sensitivity <6 0 - 22 ng/L    Troponin T NOT REPORTED <0.03 ng/mL    Troponin Interp NOT REPORTED    Vitamin D 25 Hydroxy    Collection Time: 12/10/20  9:34 PM   Result Value Ref Range    Vit D, 25-Hydroxy 56.1 30.0 - 100.0 ng/mL   Ethanol    Collection Time: 12/10/20  9:34 PM   Result Value Ref Range    Ethanol <10 <10 mg/dL    Ethanol percent <0.010 <0.010 %   COVID-19    Collection Time: 12/10/20 10:22 PM    Specimen: Other   Result Value Ref Range    SARS-CoV-2          SARS-CoV-2, Rapid Not Detected Not Detected    Source . NASOPHARYNGEAL SWAB     SARS-CoV-2         Basic Metabolic Panel w/ Reflex to MG    Collection Time: 12/11/20  6:16 AM   Result Value Ref Range    Glucose 86 70 - 99 mg/dL    BUN 9 8 - 23 mg/dL    CREATININE 0.57 (L) 0.70 - 1.20 mg/dL    Bun/Cre Ratio NOT REPORTED 9 - 20    Calcium 7.3 (L) 8.6 - 10.4 mg/dL    Sodium 140 135 - 144 mmol/L    Potassium 4.3 3.7 - 5.3 mmol/L    Chloride 114 (H) 98 - 107 mmol/L    CO2 20 20 - 31 mmol/L    Anion Gap 6 (L) 9 - 17 mmol/L    GFR Non-African American >60 >60 mL/min    GFR African American >60 >60 mL/min    GFR Comment          GFR Staging NOT REPORTED    CBC    Collection Time: 12/11/20  6:16 AM   Result Value Ref Range    WBC 7.5 3.5 - 11.3 k/uL    RBC 3.19 (L) 4.21 - 5.77 m/uL    Hemoglobin 11.3 (L) 13.0 - 17.0 g/dL    Hematocrit 36.0 (L) 40.7 - 50.3 %    .9 (H) 82.6 - 102.9 fL    MCH 35.4 (H) 25.2 - 33.5 pg    MCHC 31.4 28.4 - 34.8 g/dL    RDW 13.2 11.8 - 14.4 %    Platelets 667 839 - 229 k/uL    MPV 10.3 8.1 - 13.5 fL    NRBC Automated 0.0 0.0 per 100 WBC       Imaging/Diagnostics:  Ct Head Wo Contrast    Result Date: 12/10/2020  No acute intracranial abnormality. Xr Chest Portable    Result Date: 12/11/2020  Unremarkable single AP upright portable view of the chest.     Xr Chest Portable    Result Date: 12/10/2020  No acute findings       Assessment :    1. Acute hypoxia most likely from confusion and marijuana currently stable  2. Transient altered mental status resolved  3. History of alcoholic cirrhosis  4. Essential hypertension  5. Peripheral arterial disease      Plan:     Patient status observation in the Med/Surge    1. Patient is currently hemodynamically stable. He is alert awake and oriented without any focal deficits. 2. Patient was strongly advised to quit smoking. 3. Discussed with the patient's son and his mentation is back to his baseline. He has been ambulating without any difficulty, discussed with RN  4. We will check his oxygen walking around. If his oxygen is stable patient will be discharged home. I discussed with his son and he said that the patient's children will be with him all the time to monitor him. I advised him to monitor him.   5. Advised to return if symptoms return or if there is any other concern  6. Continue blood pressure medications Norvasc for hypertension  7. Continue home medications for peripheral artery disease including aspirin and Pletal  8.  Strongly advised to quit smoking    Consultations:   Luis Ser HOSPITALIST        Kiran Angeles MD  12/11/2020  1:01 PM    Copy sent to Dr. Robert Dobbs MD

## 2020-12-11 NOTE — ED PROVIDER NOTES
[facility]  Emergency Department  Emergency Medicine Resident Sign-out     Care of Elvie Faith was assumed from Dr. Odell Lilly and is being seen for Excessive Sweating and Fatigue (near syncopal)   . The patient's initial evaluation and plan have been discussed with the prior provider who initially evaluated the patient. EMERGENCY DEPARTMENT COURSE / MEDICAL DECISION MAKING:       MEDICATIONS GIVEN:  Orders Placed This Encounter   Medications    DISCONTD: aspirin chewable tablet 324 mg    aspirin tablet 325 mg    sodium chloride flush 0.9 % injection 10 mL    sodium chloride flush 0.9 % injection 10 mL       LABS / RADIOLOGY:     Labs Reviewed   CBC WITH AUTO DIFFERENTIAL - Abnormal; Notable for the following components:       Result Value    RBC 3.58 (*)     Hemoglobin 12.8 (*)     Hematocrit 39.4 (*)     .1 (*)     MCH 35.8 (*)     Lymphocytes 52 (*)     Absolute Lymph # 5.72 (*)     Absolute Eos # 0.44 (*)     All other components within normal limits   COMPREHENSIVE METABOLIC PANEL W/ REFLEX TO MG FOR LOW K - Abnormal; Notable for the following components:    Glucose 106 (*)     ALT 46 (*)     AST 47 (*)     All other components within normal limits   PROTIME-INR - Abnormal; Notable for the following components:    Protime 12.1 (*)     All other components within normal limits   TROPONIN   PROCALCITONIN   LACTATE, SEPSIS   APTT   AMMONIA   D-DIMER, QUANTITATIVE   TROPONIN   URINE RT REFLEX TO CULTURE       XR CHEST PORTABLE   Final Result   No acute findings         CT HEAD WO CONTRAST    (Results Pending)       RECENT VITALS:     Temp: 98.4 °F (36.9 °C),  Pulse: 76, Resp: 15, BP: 127/69, SpO2: (!) 87 %    This patient is a 60 y.o. Male with altered mental status and hypoxia after smoking marijuana.   New oxygen requirement anticipate admission follow-up troponin CT UA Covid testing      OUTSTANDING TASKS / RECOMMENDATIONS:      1. followup labs as above  2. admission     FINAL IMPRESSION:     1. Fatigue, unspecified type    2. General weakness    3. Hypoxia    4. Somnolence    5. Hypoxemia        DISPOSITION:       DISPOSITION:  []  Discharge   []  Transfer -    [x]  Admission -  intermed   []  Against Medical Advice   []  Eloped   FOLLOW-UP: No follow-up provider specified.    DISCHARGE MEDICATIONS: New Prescriptions    No medications on file          Lavern Flores DO  Emergency Medicine Resident  [facility]       Lavern Flores, [address] [place] [address]  Resident  12/10/20 0351

## 2020-12-14 LAB
EKG ATRIAL RATE: 63 BPM
EKG P AXIS: -14 DEGREES
EKG P-R INTERVAL: 146 MS
EKG Q-T INTERVAL: 458 MS
EKG QRS DURATION: 84 MS
EKG QTC CALCULATION (BAZETT): 468 MS
EKG R AXIS: 87 DEGREES
EKG T AXIS: 13 DEGREES
EKG VENTRICULAR RATE: 63 BPM

## 2021-11-22 ENCOUNTER — HOSPITAL ENCOUNTER (EMERGENCY)
Age: 66
Discharge: HOME | End: 2021-11-22
Attending: EMERGENCY MEDICINE
Payer: COMMERCIAL

## 2021-11-22 VITALS
BODY MASS INDEX: 23.7 KG/M2 | TEMPERATURE: 97 F | HEIGHT: 69 IN | SYSTOLIC BLOOD PRESSURE: 150 MMHG | HEART RATE: 56 BPM | OXYGEN SATURATION: 99 % | RESPIRATION RATE: 18 BRPM | DIASTOLIC BLOOD PRESSURE: 72 MMHG | WEIGHT: 160 LBS

## 2021-11-22 DIAGNOSIS — R10.9 FLANK PAIN: Primary | ICD-10-CM

## 2021-11-22 PROCEDURE — 99284 EMERGENCY DEPT VISIT MOD MDM: CPT

## 2021-11-22 RX ORDER — IBUPROFEN 800 MG/1
800 TABLET ORAL EVERY 6 HOURS PRN
Qty: 21 TABLET | Refills: 0 | Status: SHIPPED | OUTPATIENT
Start: 2021-11-22 | End: 2022-10-04

## 2021-11-22 RX ORDER — CYCLOBENZAPRINE HCL 10 MG
10 TABLET ORAL NIGHTLY PRN
Qty: 10 TABLET | Refills: 0 | Status: SHIPPED | OUTPATIENT
Start: 2021-11-22 | End: 2021-12-02

## 2021-11-22 ASSESSMENT — PAIN DESCRIPTION - DESCRIPTORS: DESCRIPTORS: ACHING;STABBING

## 2021-11-22 ASSESSMENT — PAIN DESCRIPTION - PAIN TYPE: TYPE: CHRONIC PAIN

## 2021-11-22 ASSESSMENT — PAIN SCALES - GENERAL: PAINLEVEL_OUTOF10: 9

## 2021-11-22 ASSESSMENT — PAIN DESCRIPTION - ORIENTATION: ORIENTATION: LEFT

## 2021-11-22 ASSESSMENT — PAIN DESCRIPTION - LOCATION: LOCATION: FLANK

## 2021-11-22 ASSESSMENT — PAIN DESCRIPTION - DIRECTION: RADIATING_TOWARDS: RT FLANK

## 2021-11-22 ASSESSMENT — PAIN DESCRIPTION - FREQUENCY: FREQUENCY: CONTINUOUS

## 2021-11-22 NOTE — ED PROVIDER NOTES
[facility]  ED  Emergency Department Encounter  EmergencyMedicine Resident     Pt Name:Silas Soto  MRN: [id number]  [place] 1955  Date of evaluation: 21  PCP:  Hasmukh Ralph MD    [address] [place] [address]       Chief Complaint   Patient presents with    Flank Pain     Lt flank       HISTORY OF PRESENT ILLNESS  (Location/Symptom, Timing/Onset, Context/Setting, Quality, Duration, Modifying Factors, Severity.)      Maylin Lopez is a 73 y.o. male who presents with left flank pain that has been present for several days. Worse with movement pattern better with rest.  He is not taking any medications for this. He denies urinary symptoms, no fevers no chills, no nausea or vomiting. No recent illness he states the pain is 8 out of 10. There is no radiation to the pain    PAST MEDICAL / SURGICAL / SOCIAL / FAMILY HISTORY      has a past medical history of Alcoholism ([address] Cirrhosis of liver ([address] Claudication ([address] Finger fracture, left, Hx of blood clots, Hyperlipidemia, Hypertension, Liver disease, PAD (peripheral artery disease) ([address] PVD (peripheral vascular disease) ([address] Wears dentures, and Wears glasses. Denies further past medical hx     has a past surgical history that includes Upper gastrointestinal endoscopy; liver biopsy; vascular surgery; Finger amputation (Left, 2016); and other surgical history (2017). Denies further past surgical hx    Social History     Socioeconomic History    Marital status:       Spouse name: Not on file    Number of children: Not on file    Years of education: Not on file    Highest education level: Not on file   Occupational History    Not on file   Tobacco Use    Smoking status: Current Some Day Smoker     Packs/day: 0.50     Types: Cigarettes     Last attempt to quit: 2015     Years since quittin.0    Smokeless tobacco: Never Used   Substance and Sexual Activity    Alcohol use: No     Alcohol/week: 0.0 standard drinks     Comment: QUIT 03/2016    Drug use: Yes     Frequency: 7.0 times per week     Types: Marijuana Josefa Naoh)    Sexual activity: Not on file   Other Topics Concern    Not on file   Social History Narrative    Not on file     Social Determinants of Health     Financial Resource Strain:     Difficulty of Paying Living Expenses: Not on file   Food Insecurity:     Worried About Running Out of Food in the Last Year: Not on file    Amanda of Food in the Last Year: Not on file   Transportation Needs:     Lack of Transportation (Medical): Not on file    Lack of Transportation (Non-Medical): Not on file   Physical Activity:     Days of Exercise per Week: Not on file    Minutes of Exercise per Session: Not on file   Stress:     Feeling of Stress : Not on file   Social Connections:     Frequency of Communication with Friends and Family: Not on file    Frequency of Social Gatherings with Friends and Family: Not on file    Attends Buddhist Services: Not on file    Active Member of Clubs or Organizations: Not on file    Attends Club or Organization Meetings: Not on file    Marital Status: Not on file   Intimate Partner Violence:     Fear of Current or Ex-Partner: Not on file    Emotionally Abused: Not on file    Physically Abused: Not on file    Sexually Abused: Not on file   Housing Stability:     Unable to Pay for Housing in the Last Year: Not on file    Number of [place] in the Last Year: Not on file    Unstable Housing in the Last Year: Not on file       Family History   Problem Relation Age of Onset    High Blood Pressure Mother     Diabetes Father     Cancer Father     Heart Disease Father     Cancer Sister     High Blood Pressure Sister     Heart Disease Sister     Diabetes Brother        Allergies:  Patient has no known allergies. Home Medications:  Prior to Admission medications    Medication Sig Start Date End Date Taking?  Authorizing Provider   albuterol sulfate HFA (VENTOLIN HFA) 108 (90 Base) MCG/ACT inhaler Inhale 2 puffs into the lungs 3 times daily as needed for Wheezing or Shortness of Breath 12/11/20   Sydni Rodriguez MD   Multiple Vitamin (MULTI-VITAMINS) TABS TAKE 1 TABLET BY MOUTH DAILY 11/4/20   Young Nguyen MD   atorvastatin (LIPITOR) 20 MG tablet TAKE 1 TABLET BY MOUTH DAILY 8/27/20   Young Nguyen MD   sertraline (ZOLOFT) 25 MG tablet TAKE 1 TABLET BY MOUTH DAILY 8/27/20   Young Nguyen MD   folic acid (FOLVITE) 1 MG tablet TAKE 1 TABLET BY MOUTH DAILY 8/27/20   Young Nguyen MD   amLODIPine (NORVASC) 10 MG tablet TAKE 1 TABLET DAILY -MAY CAUSE DIZZINESS 8/27/20   Young Nguyen MD   clobetasol (TEMOVATE) 0.05 % cream Apply topically 2 times daily to active rash 6/16/20   Nitin Young MD   cilostazol (PLETAL) 100 MG tablet Take 1 tablet by mouth 2 times daily 12/8/17   Adolph Bonilla MD   aspirin 81 MG tablet Take 81 mg by mouth daily LAST TAKEN 11/10/2016    Historical Provider, MD       REVIEW OF SYSTEMS    (2-9 systems for level 4, 10 or more for level 5)      Review of Systems    Review of Systems   Constitutional: Negative for chills and fever. HENT: Negative for sore throat. Eyes: Negative for pain. Respiratory: Negative for cough. Cardiovascular: Negative for chest pain and palpitations. Gastrointestinal: Negative for abdominal pain, nausea and vomiting. Genitourinary: Negative for dysuria. Musculoskeletal: Positive for left-sided flank pain  Skin: Negative for wound. Neurological: Negative for headaches. PHYSICAL EXAM   (up to 7 for level 4, 8 or more for level 5)      INITIAL VITALS:   BP (!) 150/72   Pulse 56   Temp 97 °F (36.1 °C) (Oral)   Resp 18   Ht 5' 8.5\" (1.74 m)   Wt 160 lb (72.6 kg)   SpO2 99%   BMI 23.97 kg/m²     Physical Exam   Gen. Appearance: patient appears well, nondistressed. Head: head atraumatic, normocephalic. Eyes: Extraocular movements intact.   No scleral icterus  Mouth: Oropharynx clear and moist.  No oral lesions  Neck: Supple. No lymphadenopathy. Pulmonary: Lungs clear to auscultation bilaterally. No wheezing, rales or rhonchi   Cardiovascular: Regular rate and rhythm, no murmurs   Abdomen: Soft, nontender, no guarding or rebound, normal bowel sounds  Neurology: GCS 15. Oriented to person place and time. moving all extremities   Skin: Warm, dry, well perfused  MSK: There is no tenderness to palpation of the thoracic or lumbar midline. Mild tenderness at the level of T12 on the left paraspinal musculature. Straight leg raise negative. Strength 5/5 in bilateral upper and lower extremities. Sensation intact throughout. Good capillary refill throughout. Patient ambulates without discomfort. No CVA tenderness      DIFFERENTIAL  DIAGNOSIS     PLAN (LABS / IMAGING / EKG):  No orders of the defined types were placed in this encounter. MEDICATIONS ORDERED:  No orders of the defined types were placed in this encounter. DIAGNOSTIC RESULTS / EMERGENCY DEPARTMENT COURSE / MDM     LABS:  No results found for this visit on 11/22/21. RADIOLOGY:  None    EKG  None    All EKG's are interpreted by the Emergency Department Physician who either signs or Co-signs this chart in the absence of a cardiologist.    [address] [place] [address] MDM:  73 y.o. male who presents with left paraspinal musculature tenderness at the level of T12. Patient is neurologically intact with no acute distress. Ambulates without discomfort. Suspect musculoskeletal in nature. No urinary symptoms or CVA tenderness. Will attempt a trial of NSAIDs and Flexeril, with return precautions               PROCEDURES:  None    CONSULTS:  None    CRITICAL CARE:  None    FINAL IMPRESSION      1. Flank pain              DISPOSITION / PLAN     DISPOSITION Decision To Discharge 11/22/2021 12:03:38 PM      PATIENT REFERRED TO:  No follow-up provider specified.     DISCHARGE MEDICATIONS:  New Prescriptions    No medications on file       Jarek Shaffer DO  Emergency Medicine Resident    (Please note that portions of thisnote were completed with a voice recognition program.  Efforts were made to edit the dictations but occasionally words are mis-transcribed.)        Jarek Shaffer DO  Resident  11/22/21 [id number]

## 2021-11-22 NOTE — ED PROVIDER NOTES
[facility]     Emergency Department     Faculty Attestation    I performed a history and physical examination of the patient and discussed management with the resident. I reviewed the residents note and agree with the documented findings and plan of care. Any areas of disagreement are noted on the chart. I was personally present for the key portions of any procedures. I have documented in the chart those procedures where I was not present during the key portions. I have reviewed the emergency nurses triage note. I agree with the chief complaint, past medical history, past surgical history, allergies, medications, social and family history as documented unless otherwise noted below. For Physician Assistant/ Nurse Practitioner cases/documentation I have personally evaluated this patient and have completed at least one if not all key elements of the E/M (history, physical exam, and MDM). Additional findings are as noted. This patient was evaluated in the Emergency Department for symptoms described in the history of present illness. He/she was evaluated in the context of the global COVID-19 pandemic, which necessitated consideration that the patient might be at risk for infection with the SARS-CoV-2 virus that causes COVID-19. Institutional protocols and algorithms that pertain to the evaluation of patients at risk for COVID-19 are in a state of rapid change based on information released by regulatory bodies including the CDC and federal and state organizations. These policies and algorithms were followed during the patient's care in the ED. Intermittent back pain for last 2 months sometimes right-sided sometimes left-sided. No injury no trauma. Patient denies fevers, anticoagulant use, recent spinal injections or procedures, bowel or bladder changes, or IV drug abuse. Abdomen soft nontender no pulsatile mass, equal femoral pedal pulses.   Does have reproducible left lumbar tenderness with inducible spasm with palpation no midline tenderness step-offs deformities. Normal gait.   Will discharge home        Critical Care     none    Ludy Kenney MD, [facility]  Attending Emergency  Physician             Ludy Kenney MD  11/22/21 1361

## 2022-08-23 ENCOUNTER — OFFICE VISIT (OUTPATIENT)
Dept: INTERNAL MEDICINE | Age: 67
End: 2022-08-23
Payer: COMMERCIAL

## 2022-08-23 VITALS
HEIGHT: 69 IN | SYSTOLIC BLOOD PRESSURE: 145 MMHG | OXYGEN SATURATION: 97 % | DIASTOLIC BLOOD PRESSURE: 82 MMHG | BODY MASS INDEX: 23.96 KG/M2 | WEIGHT: 161.8 LBS | HEART RATE: 60 BPM

## 2022-08-23 DIAGNOSIS — I10 PRIMARY HYPERTENSION: Primary | ICD-10-CM

## 2022-08-23 DIAGNOSIS — J44.9 CHRONIC OBSTRUCTIVE PULMONARY DISEASE, UNSPECIFIED COPD TYPE (HCC): ICD-10-CM

## 2022-08-23 DIAGNOSIS — F10.10 ALCOHOL ABUSE: Chronic | ICD-10-CM

## 2022-08-23 DIAGNOSIS — K21.9 GASTROESOPHAGEAL REFLUX DISEASE WITHOUT ESOPHAGITIS: ICD-10-CM

## 2022-08-23 DIAGNOSIS — E78.1 HYPERTRIGLYCERIDEMIA: ICD-10-CM

## 2022-08-23 DIAGNOSIS — K70.30 ALCOHOLIC CIRRHOSIS OF LIVER WITHOUT ASCITES (HCC): ICD-10-CM

## 2022-08-23 DIAGNOSIS — D53.9 MACROCYTIC ANEMIA: ICD-10-CM

## 2022-08-23 PROCEDURE — G8427 DOCREV CUR MEDS BY ELIG CLIN: HCPCS | Performed by: INTERNAL MEDICINE

## 2022-08-23 PROCEDURE — 1123F ACP DISCUSS/DSCN MKR DOCD: CPT | Performed by: INTERNAL MEDICINE

## 2022-08-23 PROCEDURE — 99211 OFF/OP EST MAY X REQ PHY/QHP: CPT

## 2022-08-23 PROCEDURE — G8420 CALC BMI NORM PARAMETERS: HCPCS | Performed by: INTERNAL MEDICINE

## 2022-08-23 PROCEDURE — 3017F COLORECTAL CA SCREEN DOC REV: CPT | Performed by: INTERNAL MEDICINE

## 2022-08-23 PROCEDURE — 99214 OFFICE O/P EST MOD 30 MIN: CPT | Performed by: INTERNAL MEDICINE

## 2022-08-23 PROCEDURE — 1036F TOBACCO NON-USER: CPT | Performed by: INTERNAL MEDICINE

## 2022-08-23 PROCEDURE — 3023F SPIROM DOC REV: CPT | Performed by: INTERNAL MEDICINE

## 2022-08-23 RX ORDER — DIPHENOXYLATE HYDROCHLORIDE AND ATROPINE SULFATE 2.5; .025 MG/1; MG/1
TABLET ORAL
Qty: 30 TABLET | Refills: 0 | Status: CANCELLED | OUTPATIENT
Start: 2022-08-23

## 2022-08-23 RX ORDER — FOLIC ACID 1 MG/1
TABLET ORAL
Qty: 30 TABLET | Refills: 5 | Status: CANCELLED | OUTPATIENT
Start: 2022-08-23

## 2022-08-23 RX ORDER — IBUPROFEN 800 MG/1
800 TABLET ORAL EVERY 6 HOURS PRN
Qty: 21 TABLET | Refills: 0 | Status: CANCELLED | OUTPATIENT
Start: 2022-08-23

## 2022-08-23 RX ORDER — CILOSTAZOL 100 MG/1
100 TABLET ORAL 2 TIMES DAILY
Qty: 60 TABLET | Refills: 3 | Status: CANCELLED | OUTPATIENT
Start: 2022-08-23

## 2022-08-23 RX ORDER — PANTOPRAZOLE SODIUM 20 MG/1
20 TABLET, DELAYED RELEASE ORAL DAILY
Qty: 30 TABLET | Refills: 3 | Status: SHIPPED | OUTPATIENT
Start: 2022-08-23

## 2022-08-23 RX ORDER — CLOBETASOL PROPIONATE 0.5 MG/G
CREAM TOPICAL
Qty: 60 G | Refills: 1 | Status: CANCELLED | OUTPATIENT
Start: 2022-08-23

## 2022-08-23 RX ORDER — TIOTROPIUM BROMIDE 18 UG/1
18 CAPSULE ORAL; RESPIRATORY (INHALATION) DAILY
Qty: 90 CAPSULE | Refills: 1 | Status: SHIPPED | OUTPATIENT
Start: 2022-08-23

## 2022-08-23 RX ORDER — CLOBETASOL PROPIONATE 0.5 MG/G
CREAM TOPICAL
Qty: 60 G | Refills: 1 | Status: SHIPPED | OUTPATIENT
Start: 2022-08-23

## 2022-08-23 RX ORDER — AMLODIPINE BESYLATE 10 MG/1
TABLET ORAL
Qty: 30 TABLET | Refills: 3 | Status: SHIPPED | OUTPATIENT
Start: 2022-08-23

## 2022-08-23 RX ORDER — ATORVASTATIN CALCIUM 20 MG/1
TABLET, FILM COATED ORAL
Qty: 30 TABLET | Refills: 3 | Status: CANCELLED | OUTPATIENT
Start: 2022-08-23

## 2022-08-23 RX ORDER — ALBUTEROL SULFATE 90 UG/1
2 AEROSOL, METERED RESPIRATORY (INHALATION) 3 TIMES DAILY PRN
Qty: 1 EACH | Refills: 0 | Status: SHIPPED | OUTPATIENT
Start: 2022-08-23

## 2022-08-23 ASSESSMENT — ENCOUNTER SYMPTOMS
ALLERGIC/IMMUNOLOGIC NEGATIVE: 1
SHORTNESS OF BREATH: 1
EYES NEGATIVE: 1
GASTROINTESTINAL NEGATIVE: 1

## 2022-08-23 NOTE — PROGRESS NOTES
Days     Packs/day: 0.50     Types: Cigarettes     Last attempt to quit: 2015     Years since quittin.8    Smokeless tobacco: Never   Substance Use Topics    Alcohol use: No     Alcohol/week: 0.0 standard drinks     Comment: Helen Cortés 2016     Charisse Giordano  reports that he has been smoking cigarettes. He has been smoking an average of .5 packs per day. He has never used smokeless tobacco.    FAMILY HISTORY:      Family History   Problem Relation Age of Onset    High Blood Pressure Mother     Diabetes Father     Cancer Father     Heart Disease Father     Cancer Sister     High Blood Pressure Sister     Heart Disease Sister     Diabetes Brother        REVIEW OF SYSTEMS:    Review of Systems   Constitutional: Negative. HENT: Negative. Eyes: Negative. Respiratory:  Positive for shortness of breath. Cardiovascular:  Positive for chest pain. Gastrointestinal: Negative. Endocrine: Negative. Genitourinary: Negative. Musculoskeletal:  Positive for myalgias. Skin:  Positive for rash. Allergic/Immunologic: Negative. Neurological:  Positive for weakness and light-headedness. Hematological: Negative. Psychiatric/Behavioral: Negative.          PHYSICAL EXAM:      Vitals:    22 1421   BP: (!) 159/113   Pulse: 60   SpO2: 97%     BP Readings from Last 3 Encounters:   22 (!) 159/113   21 (!) 150/72   20 129/73       Physical Examination: General appearance - alert, well appearing, and in no distress  Mental status - alert, oriented to person, place, and time  Chest - clear to auscultation, no wheezes, rales or rhonchi, symmetric air entry  Heart - normal rate and regular rhythm  Abdomen - bowel sounds normal  Back exam - full range of motion, no tenderness, palpable spasm or pain on motion  Neurological - alert, oriented, normal speech, no focal findings or movement disorder noted  Musculoskeletal - no joint tenderness, deformity or swelling  Extremities - no pedal edema noted    LABORATORY FINDINGS:    CBC:   Lab Results   Component Value Date/Time    WBC 7.5 12/11/2020 06:16 AM    HGB 11.3 12/11/2020 06:16 AM     12/11/2020 06:16 AM     BMP:    Lab Results   Component Value Date/Time     12/11/2020 06:16 AM    K 4.3 12/11/2020 06:16 AM     12/11/2020 06:16 AM    CO2 20 12/11/2020 06:16 AM    BUN 9 12/11/2020 06:16 AM    CREATININE 0.57 12/11/2020 06:16 AM    GLUCOSE 86 12/11/2020 06:16 AM     Hemoglobin A1C: No results found for: LABA1C  Microalbumin Urine: No results found for: MICROALBUR  Lipid profile:   Lab Results   Component Value Date/Time    CHOL 123 04/03/2019 10:15 AM    TRIG 136 04/03/2019 10:15 AM    HDL 44 04/03/2019 10:15 AM     Thyroid functions:   Lab Results   Component Value Date/Time    TSH 1.40 11/10/2016 02:10 PM      Hepatic functions:   Lab Results   Component Value Date/Time    ALT 46 12/10/2020 07:09 PM    AST 47 12/10/2020 07:09 PM    PROT 7.4 12/10/2020 07:09 PM    BILITOT 0.59 12/10/2020 07:09 PM    BILIDIR 0.42 06/12/2015 10:45 AM    LABALBU 3.8 12/10/2020 07:09 PM     Urine Analysis: No results found for: [address]26[address] Counselor:      Health Maintenance Due   Topic Date Due    COVID-19 Vaccine (1) Never done    Depression Screen  Never done    Hepatitis B vaccine (1 of 3 - Risk 3-dose series) Never done    Pneumococcal 65+ years Vaccine (2 - PCV) 12/15/2017    Hepatitis A vaccine (2 of 2 - Risk 2-dose series) 11/14/2019    Lipids  04/03/2020    AAA screen  03/25/2021       ASSESSMENT AND PLAN:       Diagnosis Orders   1. Primary hypertension  amLODIPine (NORVASC) 10 MG tablet    Comprehensive Metabolic Panel      2. Alcohol abuse  CBC with Auto Differential    TSH with Reflex    Vitamin D 25 Hydroxy      3. Hypertriglyceridemia  Lipid, Fasting      4. Macrocytic anemia  CBC with Auto Differential    Comprehensive Metabolic Panel    Vitamin B12 & Folate      5.  Chronic obstructive pulmonary disease, unspecified COPD type ([facility] albuterol sulfate HFA (VENTOLIN HFA) 108 (90 Base) MCG/ACT inhaler    tiotropium (SPIRIVA HANDIHALER) 18 MCG inhalation capsule    Full PFT Study With Bronchodilator      6. Gastroesophageal reflux disease without esophagitis  pantoprazole (PROTONIX) 20 MG tablet      7. Alcoholic cirrhosis of liver without ascites ([facility]  US LIVER      Will work to update labs and resume inhalers and antihypertensive. Would add PPI  Rtc in 2 weeks        FOLLOW UP:   1. Charisse Giordano received counseling on the following healthy behaviors: nutrition and exercise    2. Reviewed prior labs and health maintenance. 3.  Discussed use, benefit, and side effects of prescribed medications. Barriers to medication compliance addressed. All patient questions answered. Pt voiced understanding. 4.  Continue current medications, diet and exercise.               Orders Placed This Encounter   Medications    amLODIPine (NORVASC) 10 MG tablet     Sig: TAKE 1 TABLET DAILY -MAY CAUSE DIZZINESS     Dispense:  30 tablet     Refill:  3    albuterol sulfate HFA (VENTOLIN HFA) 108 (90 Base) MCG/ACT inhaler     Sig: Inhale 2 puffs into the lungs 3 times daily as needed for Wheezing or Shortness of Breath     Dispense:  1 each     Refill:  0    tiotropium (Abdirahman Polka) 18 MCG inhalation capsule     Sig: Inhale 1 capsule into the lungs daily     Dispense:  90 capsule     Refill:  1    pantoprazole (PROTONIX) 20 MG tablet     Sig: Take 1 tablet by mouth daily     Dispense:  30 tablet     Refill:  3    clobetasol (TEMOVATE) 0.05 % cream     Sig: Apply topically 2 times daily to active rash     Dispense:  60 g     Refill:  1          Completed Refills               Requested Prescriptions     Signed Prescriptions Disp Refills    amLODIPine (NORVASC) 10 MG tablet 30 tablet 3     Sig: TAKE 1 TABLET DAILY -MAY CAUSE DIZZINESS    albuterol sulfate HFA (VENTOLIN HFA) 108 (90 Base) MCG/ACT inhaler 1 each 0     Sig: Inhale 2 puffs into the lungs 3 times daily as needed for Wheezing or Shortness of Breath    tiotropium (SPIRIVA HANDIHALER) 18 MCG inhalation capsule 90 capsule 1     Sig: Inhale 1 capsule into the lungs daily    pantoprazole (PROTONIX) 20 MG tablet 30 tablet 3     Sig: Take 1 tablet by mouth daily    clobetasol (TEMOVATE) 0.05 % cream 60 g 1     Sig: Apply topically 2 times daily to active rash       5. Patient given educational materials - see patient instructions    6. Was a self-tracking handout given in paper form or via [employer]? Yes  If yes, see orders or list here. Orders Placed This Encounter   Procedures    US LIVER     This procedure can be scheduled via [employer]. Access your [employer] account by visiting [email or web address]. Standing Status:   Future     Standing Expiration Date:   8/23/2023    Lipid, Fasting     Standing Status:   Future     Standing Expiration Date:   8/23/2023    CBC with Auto Differential     Please get this labwork done before your next visit. Standing Status:   Future     Standing Expiration Date:   8/23/2023    Comprehensive Metabolic Panel     Please get this labwork done before your next visit. Standing Status:   Future     Standing Expiration Date:   8/22/2023    TSH with Reflex     Please get this labwork done before your next visit. Standing Status:   Future     Standing Expiration Date:   8/23/2023    Vitamin B12 & Folate     Please get this labwork done before your next visit. Standing Status:   Future     Standing Expiration Date:   8/23/2023    Vitamin D 25 Hydroxy     Standing Status:   Future     Standing Expiration Date:   8/23/2023    Full PFT Study With Bronchodilator     If an ABG is needed along with this PFT procedure, please place the appropriate lab order     Standing Status:   Future     Standing Expiration Date:   8/23/2023       Return in about 2 weeks (around 9/6/2022). Patient voiced understanding and agreed to treatment plan.      This note is created with the assistance of a speech-recognition program. While intending to generate a document that actually reflects the content of the visit, the document can still have some mistakes which may not have been identified and corrected by editing.       Dr Braden Holt MD, [address]  Associate , Department of Internal Medicine  Resident Ambulatory Site Medical Director  [facility] Internal Medicine  [address] [place] [address],[address]  Internal Medicine Clerkship - Rachel Ayala                   8/23/2022, 3:11 PM

## 2022-08-31 DIAGNOSIS — E78.1 HYPERTRIGLYCERIDEMIA: ICD-10-CM

## 2022-08-31 DIAGNOSIS — F10.10 ALCOHOL ABUSE: Chronic | ICD-10-CM

## 2022-08-31 NOTE — TELEPHONE ENCOUNTER
Request for Multi Vitamins and Lipitor.     Next Visit Date:  Future Appointments   Date Time Provider Isac Ere   9/6/2022  1:20 PM Dorothea Walker MD [facility] IM [facility]   9/7/2022  8:30 AM STV US GE XD CLEAR STVZ US STV Radiolog   9/7/2022  9:00 AM STV PFT RM 1 STVZ PFT [place] Maintenance   Topic Date Due    COVID-19 Vaccine (1) Never done    Depression Screen  Never done    Hepatitis B vaccine (1 of 3 - Risk 3-dose series) Never done    Pneumococcal 65+ years Vaccine (2 - PCV) 12/15/2017    Hepatitis A vaccine (2 of 2 - Risk 2-dose series) 11/14/2019    Lipids  04/03/2020    AAA screen  03/25/2021    Flu vaccine (1) 09/01/2022    Colorectal Cancer Screen  04/22/2025    DTaP/Tdap/Td vaccine (2 - Td or Tdap) 04/11/2029    Shingles vaccine  Completed    Hepatitis C screen  Completed    Hib vaccine  Aged Out    Meningococcal (ACWY) vaccine  Aged Out       No results found for: LABA1C          ( goal A1C is < 7)   No results found for: LABMICR  LDL Cholesterol (mg/dL)   Date Value   04/03/2019 52       (goal LDL is <100)   AST (U/L)   Date Value   12/10/2020 47 (H)     ALT (U/L)   Date Value   12/10/2020 46 (H)     BUN (mg/dL)   Date Value   12/11/2020 9     BP Readings from Last 3 Encounters:   08/23/22 (!) 145/82   11/22/21 (!) 150/72   12/11/20 129/73          (goal 120/80)    All Future Testing planned in [facility]  Lab Frequency Next Occurrence   Lipid, Fasting Once 08/23/2022   CBC with Auto Differential Once 08/23/2022   Comprehensive Metabolic Panel Once 39/39/2384   TSH with Reflex Once 08/23/2022   Vitamin B12 & Folate Once 08/23/2022   Vitamin D 25 Hydroxy Once 08/23/2022   Full PFT Study With Bronchodilator Once 08/23/2022   US LIVER Once 08/23/2022         Patient Active Problem List:     Hx of tuberculosis     Alcohol induced acute pancreatitis     HTN (hypertension)     Alcohol abuse     Tobacco abuse     Hypertriglyceridemia     Macrocytic anemia     Cirrhosis, alcoholic (HCC)     PAD (peripheral artery disease) ([facility]     Diverticulosis     Internal hemorrhoids     Hypercholesteremia     Amputation finger     Claudication of left lower extremity ([facility]

## 2022-09-01 RX ORDER — DIPHENOXYLATE HYDROCHLORIDE AND ATROPINE SULFATE 2.5; .025 MG/1; MG/1
TABLET ORAL
Qty: 30 TABLET | Refills: 0 | Status: SHIPPED | OUTPATIENT
Start: 2022-09-01

## 2022-09-01 RX ORDER — ATORVASTATIN CALCIUM 20 MG/1
TABLET, FILM COATED ORAL
Qty: 30 TABLET | Refills: 3 | Status: SHIPPED | OUTPATIENT
Start: 2022-09-01

## 2022-09-07 ENCOUNTER — HOSPITAL ENCOUNTER (OUTPATIENT)
Dept: ULTRASOUND IMAGING | Age: 67
Discharge: HOME | End: 2022-09-09
Payer: COMMERCIAL

## 2022-09-07 ENCOUNTER — HOSPITAL ENCOUNTER (OUTPATIENT)
Dept: PULMONOLOGY | Age: 67
Discharge: HOME | End: 2022-09-07

## 2022-09-07 ENCOUNTER — HOSPITAL ENCOUNTER (OUTPATIENT)
Age: 67
Discharge: HOME | End: 2022-09-07
Payer: COMMERCIAL

## 2022-09-07 DIAGNOSIS — D53.9 MACROCYTIC ANEMIA: ICD-10-CM

## 2022-09-07 DIAGNOSIS — E78.1 HYPERTRIGLYCERIDEMIA: ICD-10-CM

## 2022-09-07 DIAGNOSIS — J44.9 CHRONIC OBSTRUCTIVE PULMONARY DISEASE, UNSPECIFIED COPD TYPE (HCC): ICD-10-CM

## 2022-09-07 DIAGNOSIS — K70.30 ALCOHOLIC CIRRHOSIS OF LIVER WITHOUT ASCITES (HCC): ICD-10-CM

## 2022-09-07 DIAGNOSIS — I10 PRIMARY HYPERTENSION: ICD-10-CM

## 2022-09-07 DIAGNOSIS — F10.10 ALCOHOL ABUSE: Chronic | ICD-10-CM

## 2022-09-07 LAB
ABSOLUTE EOS #: 0.22 K/UL (ref 0–0.44)
ABSOLUTE IMMATURE GRANULOCYTE: <0.03 K/UL (ref 0–0.3)
ABSOLUTE LYMPH #: 2.32 K/UL (ref 1.1–3.7)
ABSOLUTE MONO #: 0.41 K/UL (ref 0.1–1.2)
ALBUMIN SERPL-MCNC: 3.7 G/DL (ref 3.5–5.2)
ALBUMIN/GLOBULIN RATIO: 1 (ref 1–2.5)
ALP BLD-CCNC: 113 U/L (ref 40–129)
ALT SERPL-CCNC: 29 U/L (ref 5–41)
ANION GAP SERPL CALCULATED.3IONS-SCNC: 12 MMOL/L (ref 9–17)
AST SERPL-CCNC: 43 U/L
BASOPHILS # BLD: 1 % (ref 0–2)
BASOPHILS ABSOLUTE: 0.03 K/UL (ref 0–0.2)
BILIRUB SERPL-MCNC: 0.9 MG/DL (ref 0.3–1.2)
BUN BLDV-MCNC: 8 MG/DL (ref 8–23)
CALCIUM SERPL-MCNC: 8.6 MG/DL (ref 8.6–10.4)
CHLORIDE BLD-SCNC: 104 MMOL/L (ref 98–107)
CHOLESTEROL, FASTING: 141 MG/DL
CHOLESTEROL/HDL RATIO: 2.8
CO2: 24 MMOL/L (ref 20–31)
CREAT SERPL-MCNC: 0.8 MG/DL (ref 0.7–1.2)
EOSINOPHILS RELATIVE PERCENT: 4 % (ref 1–4)
FOLATE: 8 NG/ML
GFR AFRICAN AMERICAN: >60 ML/MIN
GFR NON-AFRICAN AMERICAN: >60 ML/MIN
GFR SERPL CREATININE-BSD FRML MDRD: ABNORMAL ML/MIN/{1.73_M2}
GLUCOSE BLD-MCNC: 100 MG/DL (ref 70–99)
HCT VFR BLD CALC: 38.8 % (ref 40.7–50.3)
HDLC SERPL-MCNC: 51 MG/DL
HEMOGLOBIN: 13.4 G/DL (ref 13–17)
IMMATURE GRANULOCYTES: 0 %
LDL CHOLESTEROL: 68 MG/DL (ref 0–130)
LYMPHOCYTES # BLD: 43 % (ref 24–43)
MCH RBC QN AUTO: 37.2 PG (ref 25.2–33.5)
MCHC RBC AUTO-ENTMCNC: 34.5 G/DL (ref 28.4–34.8)
MCV RBC AUTO: 107.8 FL (ref 82.6–102.9)
MONOCYTES # BLD: 8 % (ref 3–12)
NRBC AUTOMATED: 0 PER 100 WBC
PDW BLD-RTO: 12.9 % (ref 11.8–14.4)
PLATELET # BLD: ABNORMAL K/UL (ref 138–453)
PLATELET, FLUORESCENCE: 152 K/UL (ref 138–453)
PLATELET, IMMATURE FRACTION: 4.7 % (ref 1.1–10.3)
POTASSIUM SERPL-SCNC: 4.4 MMOL/L (ref 3.7–5.3)
RBC # BLD: 3.6 M/UL (ref 4.21–5.77)
RBC # BLD: ABNORMAL 10*6/UL
SEG NEUTROPHILS: 44 % (ref 36–65)
SEGMENTED NEUTROPHILS ABSOLUTE COUNT: 2.39 K/UL (ref 1.5–8.1)
SODIUM BLD-SCNC: 140 MMOL/L (ref 135–144)
TOTAL PROTEIN: 7.3 G/DL (ref 6.4–8.3)
TRIGLYCERIDE, FASTING: 112 MG/DL
TSH SERPL DL<=0.05 MIU/L-ACNC: 3.66 UIU/ML (ref 0.3–5)
VITAMIN B-12: 464 PG/ML (ref 232–1245)
VITAMIN D 25-HYDROXY: 21.4 NG/ML
WBC # BLD: 5.4 K/UL (ref 3.5–11.3)

## 2022-09-07 PROCEDURE — 82607 VITAMIN B-12: CPT

## 2022-09-07 PROCEDURE — 82746 ASSAY OF FOLIC ACID SERUM: CPT

## 2022-09-07 PROCEDURE — 80061 LIPID PANEL: CPT

## 2022-09-07 PROCEDURE — 36415 COLL VENOUS BLD VENIPUNCTURE: CPT

## 2022-09-07 PROCEDURE — 80053 COMPREHEN METABOLIC PANEL: CPT

## 2022-09-07 PROCEDURE — 76705 ECHO EXAM OF ABDOMEN: CPT

## 2022-09-07 PROCEDURE — 82306 VITAMIN D 25 HYDROXY: CPT

## 2022-09-07 PROCEDURE — 85055 RETICULATED PLATELET ASSAY: CPT

## 2022-09-07 PROCEDURE — 85025 COMPLETE CBC W/AUTO DIFF WBC: CPT

## 2022-09-07 PROCEDURE — 84443 ASSAY THYROID STIM HORMONE: CPT

## 2022-09-08 ENCOUNTER — HOSPITAL ENCOUNTER (OUTPATIENT)
Dept: PULMONOLOGY | Age: 67
Discharge: HOME | End: 2022-09-08
Payer: COMMERCIAL

## 2022-09-08 PROCEDURE — 94010 BREATHING CAPACITY TEST: CPT

## 2022-09-08 PROCEDURE — 94729 DIFFUSING CAPACITY: CPT

## 2022-09-08 PROCEDURE — 94726 PLETHYSMOGRAPHY LUNG VOLUMES: CPT

## 2022-09-10 NOTE — PROCEDURES
[address]                  [facility]                               PULMONARY FUNCTION    PATIENT NAME: Yamilet Prieto                :        1955  MED REC NO:   [id number]                             ROOM:  ACCOUNT NO:   [de-identified]                           ADMIT DATE: 2022  PROVIDER:     Bethel Horowitz    DATE OF PROCEDURE:  2022    The patient's spirometry is normal flow-volume loop. FEV1 101%  predicted. FVC 90% predicted. FEV1/FVC ratio 87. Total lung capacity 79% predicted, RV 86% predicted and diffusion  capacity uncorrected 60% predicted. Corrected for alveolar volume 78%  predicted with normal airway resistance. IMPRESSION:  Normal spirometry. Mild decrease in total lung capacity  and diffusion capacity raising a concern of parenchymal disease like  interstitial lung disease or emphysema. Clinical correlation advised.         Gia Rodarte    D: 2022 16:23:32       T: 2022 18:59:21     [email or web address]  Job#: [id number]     Doc#: [id number]    CC:

## 2022-10-04 ENCOUNTER — TELEMEDICINE (OUTPATIENT)
Dept: INTERNAL MEDICINE | Age: 67
End: 2022-10-04
Payer: COMMERCIAL

## 2022-10-04 DIAGNOSIS — I10 PRIMARY HYPERTENSION: ICD-10-CM

## 2022-10-04 DIAGNOSIS — K70.30 ALCOHOLIC CIRRHOSIS OF LIVER WITHOUT ASCITES (HCC): ICD-10-CM

## 2022-10-04 DIAGNOSIS — K21.9 GASTROESOPHAGEAL REFLUX DISEASE WITHOUT ESOPHAGITIS: Primary | ICD-10-CM

## 2022-10-04 PROCEDURE — 99442 PR PHYS/QHP TELEPHONE EVALUATION 11-20 MIN: CPT | Performed by: INTERNAL MEDICINE

## 2022-10-04 SDOH — ECONOMIC STABILITY: FOOD INSECURITY: WITHIN THE PAST 12 MONTHS, YOU WORRIED THAT YOUR FOOD WOULD RUN OUT BEFORE YOU GOT MONEY TO BUY MORE.: NEVER TRUE

## 2022-10-04 SDOH — ECONOMIC STABILITY: FOOD INSECURITY: WITHIN THE PAST 12 MONTHS, THE FOOD YOU BOUGHT JUST DIDN'T LAST AND YOU DIDN'T HAVE MONEY TO GET MORE.: NEVER TRUE

## 2022-10-04 ASSESSMENT — PATIENT HEALTH QUESTIONNAIRE - PHQ9
SUM OF ALL RESPONSES TO PHQ QUESTIONS 1-9: 2
2. FEELING DOWN, DEPRESSED OR HOPELESS: 1
SUM OF ALL RESPONSES TO PHQ QUESTIONS 1-9: 2
SUM OF ALL RESPONSES TO PHQ9 QUESTIONS 1 & 2: 2
1. LITTLE INTEREST OR PLEASURE IN DOING THINGS: 1
SUM OF ALL RESPONSES TO PHQ QUESTIONS 1-9: 2
SUM OF ALL RESPONSES TO PHQ QUESTIONS 1-9: 2

## 2022-10-04 ASSESSMENT — SOCIAL DETERMINANTS OF HEALTH (SDOH): HOW HARD IS IT FOR YOU TO PAY FOR THE VERY BASICS LIKE FOOD, HOUSING, MEDICAL CARE, AND HEATING?: SOMEWHAT HARD

## 2022-10-04 NOTE — PROGRESS NOTES
Shani Torrez is a 78 y.o. male evaluated via telephone on 10/4/2022 for Results (Lab from 9/7/22) and Cirrhosis (Pt states it feel like he having cramp on both side )  . Documentation:  He was to follow up to review labs and check BP. He does not have home BP kit. He reports he feels well overall. Labs reviewed and he reports being clean of alcohol for 5 years now. Diagnosis Orders   1. Gastroesophageal reflux disease without esophagitis        2. Alcoholic cirrhosis of liver without ascites ([facility]        3. Primary hypertension        Will have patient come to clinic for BP check in 3 months      Total Time: minutes: 11-20 minutes    Shani Torrez was evaluated through a synchronous (real-time) audio encounter. Patient identification was verified at the start of the visit. He (or guardian if applicable) is aware that this is a billable service, which includes applicable co-pays. This visit was conducted with the patient's (and/or legal guardian's) verbal consent. He has not had a related appointment within my department in the past 7 days or scheduled within the next 24 hours. The patient was located at Home: [address] [place] [address]. The provider was located at [place] [id number] (Appt Dept): [address],  [address] [facility].     Note: not billable if this call serves to triage the patient into an appointment for the relevant concern    Vianey Meadows MD

## 2022-12-12 DIAGNOSIS — K21.9 GASTROESOPHAGEAL REFLUX DISEASE WITHOUT ESOPHAGITIS: ICD-10-CM

## 2022-12-16 NOTE — TELEPHONE ENCOUNTER
Protonix refill  Last seen 10/04/22    Health Maintenance   Topic Date Due    Hepatitis B vaccine (1 of 3 - Risk 3-dose series) Never done    Hepatitis A vaccine (2 of 2 - Risk 2-dose series) 11/14/2019    AAA screen  03/25/2021    COVID-19 Vaccine (4 - Booster for Moderna series) 02/15/2022    Flu vaccine (1) 08/01/2022    Annual Wellness Visit (AWV)  Never done    Lipids  09/07/2023    Depression Screen  10/04/2023    Colorectal Cancer Screen  04/22/2025    DTaP/Tdap/Td vaccine (2 - Td or Tdap) 04/11/2029    Shingles vaccine  Completed    Pneumococcal 65+ years Vaccine  Completed    Hepatitis C screen  Completed    Hib vaccine  Aged Out    Meningococcal (ACWY) vaccine  Aged Out             (applicable per patient's age: Cancer Screenings, Depression Screening, Fall Risk Screening, Immunizations)    LDL Cholesterol (mg/dL)   Date Value   09/07/2022 68     AST (U/L)   Date Value   09/07/2022 43 (H)     ALT (U/L)   Date Value   09/07/2022 29     BUN (mg/dL)   Date Value   09/07/2022 8      (goal A1C is < 7)   (goal LDL is <100) need 30-50% reduction from baseline     BP Readings from Last 3 Encounters:   08/23/22 (!) 145/82   11/22/21 (!) 150/72   12/11/20 129/73    (goal /80)      All Future Testing planned in [facility]:  Lab Frequency Next Occurrence       Next Visit Date:  No future appointments.          Patient Active Problem List:     Hx of tuberculosis     Alcohol induced acute pancreatitis     HTN (hypertension)     Alcohol abuse     Tobacco abuse     Hypertriglyceridemia     Macrocytic anemia     Cirrhosis, alcoholic (HCC)     PAD (peripheral artery disease) ([address]     Diverticulosis     Internal hemorrhoids     Hypercholesteremia     Amputation finger     Claudication of left lower extremity ([address]

## 2022-12-21 RX ORDER — PANTOPRAZOLE SODIUM 20 MG/1
20 TABLET, DELAYED RELEASE ORAL DAILY
Qty: 30 TABLET | Refills: 3 | Status: SHIPPED | OUTPATIENT
Start: 2022-12-21

## 2023-02-07 ENCOUNTER — OFFICE VISIT (OUTPATIENT)
Dept: INTERNAL MEDICINE | Age: 68
End: 2023-02-07
Payer: MEDICARE

## 2023-02-07 VITALS
SYSTOLIC BLOOD PRESSURE: 165 MMHG | HEIGHT: 69 IN | WEIGHT: 163 LBS | OXYGEN SATURATION: 98 % | TEMPERATURE: 98.8 F | HEART RATE: 49 BPM | BODY MASS INDEX: 24.14 KG/M2 | DIASTOLIC BLOOD PRESSURE: 70 MMHG

## 2023-02-07 DIAGNOSIS — K70.30 ALCOHOLIC CIRRHOSIS OF LIVER WITHOUT ASCITES (HCC): ICD-10-CM

## 2023-02-07 DIAGNOSIS — L28.2 PRURIGO: ICD-10-CM

## 2023-02-07 DIAGNOSIS — Z23 NEED FOR INFLUENZA VACCINATION: ICD-10-CM

## 2023-02-07 DIAGNOSIS — Z13.6 ENCOUNTER FOR ABDOMINAL AORTIC ANEURYSM SCREENING: ICD-10-CM

## 2023-02-07 DIAGNOSIS — I10 PRIMARY HYPERTENSION: Primary | ICD-10-CM

## 2023-02-07 DIAGNOSIS — E78.1 HYPERTRIGLYCERIDEMIA: ICD-10-CM

## 2023-02-07 DIAGNOSIS — I73.9 PAD (PERIPHERAL ARTERY DISEASE) (HCC): ICD-10-CM

## 2023-02-07 DIAGNOSIS — F32.1 CURRENT MODERATE EPISODE OF MAJOR DEPRESSIVE DISORDER WITHOUT PRIOR EPISODE (HCC): ICD-10-CM

## 2023-02-07 PROCEDURE — 3017F COLORECTAL CA SCREEN DOC REV: CPT | Performed by: INTERNAL MEDICINE

## 2023-02-07 PROCEDURE — G8482 FLU IMMUNIZE ORDER/ADMIN: HCPCS | Performed by: INTERNAL MEDICINE

## 2023-02-07 PROCEDURE — G8420 CALC BMI NORM PARAMETERS: HCPCS | Performed by: INTERNAL MEDICINE

## 2023-02-07 PROCEDURE — 1036F TOBACCO NON-USER: CPT | Performed by: INTERNAL MEDICINE

## 2023-02-07 PROCEDURE — 99214 OFFICE O/P EST MOD 30 MIN: CPT | Performed by: INTERNAL MEDICINE

## 2023-02-07 PROCEDURE — 1123F ACP DISCUSS/DSCN MKR DOCD: CPT | Performed by: INTERNAL MEDICINE

## 2023-02-07 PROCEDURE — 3077F SYST BP >= 140 MM HG: CPT | Performed by: INTERNAL MEDICINE

## 2023-02-07 PROCEDURE — G8427 DOCREV CUR MEDS BY ELIG CLIN: HCPCS | Performed by: INTERNAL MEDICINE

## 2023-02-07 PROCEDURE — 90686 IIV4 VACC NO PRSV 0.5 ML IM: CPT | Performed by: INTERNAL MEDICINE

## 2023-02-07 PROCEDURE — 3078F DIAST BP <80 MM HG: CPT | Performed by: INTERNAL MEDICINE

## 2023-02-07 RX ORDER — CLOBETASOL PROPIONATE 0.5 MG/G
CREAM TOPICAL
Qty: 60 G | Refills: 1 | Status: SHIPPED | OUTPATIENT
Start: 2023-02-07

## 2023-02-07 RX ORDER — CILOSTAZOL 100 MG/1
100 TABLET ORAL 2 TIMES DAILY
Qty: 60 TABLET | Refills: 3 | Status: SHIPPED | OUTPATIENT
Start: 2023-02-07

## 2023-02-07 RX ORDER — ATORVASTATIN CALCIUM 20 MG/1
20 TABLET, FILM COATED ORAL DAILY
Qty: 30 TABLET | Refills: 3 | Status: SHIPPED | OUTPATIENT
Start: 2023-02-07

## 2023-02-07 RX ORDER — AMLODIPINE BESYLATE 10 MG/1
10 TABLET ORAL DAILY
Qty: 30 TABLET | Refills: 3 | Status: SHIPPED | OUTPATIENT
Start: 2023-02-07

## 2023-02-07 SDOH — ECONOMIC STABILITY: HOUSING INSECURITY
IN THE LAST 12 MONTHS, WAS THERE A TIME WHEN YOU DID NOT HAVE A STEADY PLACE TO SLEEP OR SLEPT IN A SHELTER (INCLUDING NOW)?: NO

## 2023-02-07 SDOH — ECONOMIC STABILITY: INCOME INSECURITY: HOW HARD IS IT FOR YOU TO PAY FOR THE VERY BASICS LIKE FOOD, HOUSING, MEDICAL CARE, AND HEATING?: NOT HARD AT ALL

## 2023-02-07 SDOH — ECONOMIC STABILITY: FOOD INSECURITY: WITHIN THE PAST 12 MONTHS, THE FOOD YOU BOUGHT JUST DIDN'T LAST AND YOU DIDN'T HAVE MONEY TO GET MORE.: NEVER TRUE

## 2023-02-07 SDOH — ECONOMIC STABILITY: FOOD INSECURITY: WITHIN THE PAST 12 MONTHS, YOU WORRIED THAT YOUR FOOD WOULD RUN OUT BEFORE YOU GOT MONEY TO BUY MORE.: NEVER TRUE

## 2023-02-07 ASSESSMENT — ENCOUNTER SYMPTOMS
RESPIRATORY NEGATIVE: 1
GASTROINTESTINAL NEGATIVE: 1
ALLERGIC/IMMUNOLOGIC NEGATIVE: 1
EYES NEGATIVE: 1

## 2023-02-07 ASSESSMENT — PATIENT HEALTH QUESTIONNAIRE - PHQ9
9. THOUGHTS THAT YOU WOULD BE BETTER OFF DEAD, OR OF HURTING YOURSELF: 0
2. FEELING DOWN, DEPRESSED OR HOPELESS: 3
SUM OF ALL RESPONSES TO PHQ QUESTIONS 1-9: 20
SUM OF ALL RESPONSES TO PHQ QUESTIONS 1-9: 20
1. LITTLE INTEREST OR PLEASURE IN DOING THINGS: 2
SUM OF ALL RESPONSES TO PHQ QUESTIONS 1-9: 20
SUM OF ALL RESPONSES TO PHQ QUESTIONS 1-9: 20
6. FEELING BAD ABOUT YOURSELF - OR THAT YOU ARE A FAILURE OR HAVE LET YOURSELF OR YOUR FAMILY DOWN: 3
3. TROUBLE FALLING OR STAYING ASLEEP: 3
4. FEELING TIRED OR HAVING LITTLE ENERGY: 2
5. POOR APPETITE OR OVEREATING: 2
8. MOVING OR SPEAKING SO SLOWLY THAT OTHER PEOPLE COULD HAVE NOTICED. OR THE OPPOSITE, BEING SO FIGETY OR RESTLESS THAT YOU HAVE BEEN MOVING AROUND A LOT MORE THAN USUAL: 2
7. TROUBLE CONCENTRATING ON THINGS, SUCH AS READING THE NEWSPAPER OR WATCHING TELEVISION: 3
SUM OF ALL RESPONSES TO PHQ9 QUESTIONS 1 & 2: 5
10. IF YOU CHECKED OFF ANY PROBLEMS, HOW DIFFICULT HAVE THESE PROBLEMS MADE IT FOR YOU TO DO YOUR WORK, TAKE CARE OF THINGS AT HOME, OR GET ALONG WITH OTHER PEOPLE: 1

## 2023-02-07 ASSESSMENT — COLUMBIA-SUICIDE SEVERITY RATING SCALE - C-SSRS
1. WITHIN THE PAST MONTH, HAVE YOU WISHED YOU WERE DEAD OR WISHED YOU COULD GO TO SLEEP AND NOT WAKE UP?: NO
2. HAVE YOU ACTUALLY HAD ANY THOUGHTS OF KILLING YOURSELF?: NO
6. HAVE YOU EVER DONE ANYTHING, STARTED TO DO ANYTHING, OR PREPARED TO DO ANYTHING TO END YOUR LIFE?: NO

## 2023-02-07 NOTE — PROGRESS NOTES
[facility]   Progress Note        Date of patient's visit: 2/7/2023  Patient's Name:  Renan Tran                   YOB: 1955        PCP:  Jose Justin MD    Renan Tran is a 78 y.o. male who presents for   Chief Complaint   Patient presents with    Hypertension    Medication Problem     Patient states he have not been taking meds in almost a year but would like restart all meds needed. Health Maintenance     Pt declines vaccines except flu vaccine, aaa screen pended    and follow up of chronic medical problems. Patient Active Problem List   Diagnosis    Hx of tuberculosis    Alcohol induced acute pancreatitis    HTN (hypertension)    Alcohol abuse    Tobacco abuse    Hypertriglyceridemia    Macrocytic anemia    Cirrhosis, alcoholic (HCC)    PAD (peripheral artery disease) (HCC)    Diverticulosis    Internal hemorrhoids    Hypercholesteremia    Amputation finger    Claudication of left lower extremity ([facility]       HISTORY OF PRESENT ILLNESS:    History was obtained from the patient. 78year old man here to re est care after lost to care and being seen virtually by myself for his last visit. Patient has a past medical history of hepatitis C, alcohol abuse with cirrhosis of liver seen Dr. Liv Weber in 2019. Patient was a smoker who has quit and has peripheral artery disease for which she saw vascular surgery and was on Pletal, aspirin, statin. Patient began to develop a rash about 2 years ago which was suspicious for being secondary to his hepatitis and was referred to dermatology. A punch biopsy was done at that time but again patient was lost to follow-up. Patient has had bouts of depression related to chronic disease conditions as well as the death of his wife. He was placed on Zoloft. He does continue to struggle although he reports he has a strong support system with his ross. He is working to help support and care for his children.   He has not seek therapy and does report not taking his medication. Patient reports not taking any of his medications consistently for over a year. Patient's allergies, medications, past medical, surgical, social and family histories were reviewed and updated as appropriate. ALLERGIES    No Known Allergies      MEDICATIONS:      Current Outpatient Medications   Medication Sig Dispense Refill    pantoprazole (PROTONIX) 20 MG tablet TAKE 1 TABLET BY MOUTH DAILY (Patient not taking: Reported on 2/7/2023) 30 tablet 3    atorvastatin (LIPITOR) 20 MG tablet TAKE 1 TABLET BY MOUTH DAILY (Patient not taking: Reported on 2/7/2023) 30 tablet 3    Multiple Vitamin (MULTI-VITAMINS) TABS TAKE 1 TABLET BY MOUTH DAILY (Patient not taking: Reported on 2/7/2023) 30 tablet 0    amLODIPine (NORVASC) 10 MG tablet TAKE 1 TABLET DAILY -MAY CAUSE DIZZINESS (Patient not taking: Reported on 2/7/2023) 30 tablet 3    albuterol sulfate HFA (VENTOLIN HFA) 108 (90 Base) MCG/ACT inhaler Inhale 2 puffs into the lungs 3 times daily as needed for Wheezing or Shortness of Breath (Patient not taking: Reported on 2/7/2023) 1 each 0    tiotropium (SPIRIVA HANDIHALER) 18 MCG inhalation capsule Inhale 1 capsule into the lungs daily (Patient not taking: Reported on 2/7/2023) 90 capsule 1    clobetasol (TEMOVATE) 0.05 % cream Apply topically 2 times daily to active rash (Patient not taking: Reported on 5/1/3108) 60 g 1    folic acid (FOLVITE) 1 MG tablet TAKE 1 TABLET BY MOUTH DAILY (Patient not taking: Reported on 2/7/2023) 30 tablet 5    cilostazol (PLETAL) 100 MG tablet Take 1 tablet by mouth 2 times daily (Patient not taking: Reported on 2/7/2023) 60 tablet 3    aspirin 81 MG tablet Take 81 mg by mouth daily LAST TAKEN 11/10/2016 (Patient not taking: Reported on 2/7/2023)       No current facility-administered medications for this visit.        Patient Care Team:  Jaime Garcia MD as PCP - General (Internal Medicine)  Jaime Garcia MD as PCP - [facility] Provider    PAST MEDICAL AND SURGICAL HISTORY:      Past Medical History:   Diagnosis Date    Alcoholism ([facility]     Cirrhosis of liver ([facility] [address] [facility]     Claudication ([address]     left leg    Finger fracture, left     LONG FINGER    Hx of blood clots     LEFT LOWER LEG UNSURE OF THIS AT THIS TIME, IS TO BE SEEING VASCULAR DR FOR THIS. Hyperlipidemia     Hypertension     Liver disease     PAD (peripheral artery disease) (HCC)     PVD (peripheral vascular disease) ([address]     Wears dentures     FULL UPPER AND LOWER    Wears glasses      Past Surgical History:   Procedure Laterality Date    FINGER AMPUTATION Left 2016    revision long finger    LIVER BIOPSY      OTHER SURGICAL HISTORY  2017    Abdomen with run off with Dr. Burt Ryan - could not pass left left pop; # 7 FR manual pull right groin    UPPER GASTROINTESTINAL ENDOSCOPY      VASCULAR SURGERY      abdominal aortogram       SOCIAL HISTORY      Social History     Tobacco Use    Smoking status: Former     Packs/day: 0.50     Years: 35.00     Pack years: 17.50     Types: Cigarettes     Quit date: 2015     Years since quittin.2    Smokeless tobacco: Never   Substance Use Topics    Alcohol use: No     Alcohol/week: 0.0 standard drinks     Comment: QUIT 2016     Efrem Manzano  reports that he quit smoking about 7 years ago. His smoking use included cigarettes. He has a 17.50 pack-year smoking history. He has never used smokeless tobacco.    FAMILY HISTORY:      Family History   Problem Relation Age of Onset    High Blood Pressure Mother     Diabetes Father     Cancer Father     Heart Disease Father     Cancer Sister     High Blood Pressure Sister     Heart Disease Sister     Diabetes Brother        REVIEW OF SYSTEMS:    Review of Systems   Constitutional: Negative. HENT:  Positive for nosebleeds. Eyes: Negative. Respiratory: Negative. Cardiovascular: Negative. Gastrointestinal: Negative. Endocrine: Negative. Genitourinary: Negative. Musculoskeletal: Negative. Skin:  Positive for rash. Allergic/Immunologic: Negative. Neurological:  Positive for weakness and headaches. Hematological: Negative. Psychiatric/Behavioral:  Positive for agitation.         PHYSICAL EXAM:      Vitals:    02/07/23 0851   BP: (!) 165/70   Pulse:    Temp:    SpO2:      BP Readings from Last 3 Encounters:   02/07/23 (!) 165/70   08/23/22 (!) 145/82   11/22/21 (!) 150/72       Physical Examination: General appearance - alert, well appearing, and in no distress  Mental status - alert, oriented to person, place, and time  Chest - clear to auscultation, no wheezes, rales or rhonchi, symmetric air entry  Heart - normal rate and regular rhythm  Back exam - full range of motion, no tenderness, palpable spasm or pain on motion  Neurological - alert, oriented, normal speech, no focal findings or movement disorder noted  Skin -Lichenified papules on the hands, arms, legs     LABORATORY FINDINGS:    CBC:   Lab Results   Component Value Date/Time    WBC 5.4 09/07/2022 07:56 AM    HGB 13.4 09/07/2022 07:56 AM    PLT See Reflexed IPF Result 09/07/2022 07:56 AM     BMP:    Lab Results   Component Value Date/Time     09/07/2022 07:56 AM    K 4.4 09/07/2022 07:56 AM     09/07/2022 07:56 AM    CO2 24 09/07/2022 07:56 AM    BUN 8 09/07/2022 07:56 AM    CREATININE 0.80 09/07/2022 07:56 AM    GLUCOSE 100 09/07/2022 07:56 AM     Hemoglobin A1C: No results found for: LABA1C  Microalbumin Urine: No results found for: MICROALBUR  Lipid profile:   Lab Results   Component Value Date/Time    CHOL 123 04/03/2019 10:15 AM    TRIG 136 04/03/2019 10:15 AM    HDL 51 09/07/2022 07:56 AM     Thyroid functions:   Lab Results   Component Value Date/Time    TSH 3.66 09/07/2022 07:56 AM      Hepatic functions:   Lab Results   Component Value Date/Time    ALT 29 09/07/2022 07:56 AM    AST 43 09/07/2022 07:56 AM    PROT 7.3 09/07/2022 07:56 AM    BILITOT 0.9 09/07/2022 07:56 AM    BILIDIR 0.42 06/12/2015 10:45 AM    LABALBU 3.7 09/07/2022 07:56 AM     Urine Analysis: No results found for: [id number] [address]:      Health Maintenance Due   Topic Date Due    Hepatitis B vaccine (1 of 3 - Risk 3-dose series) Never done    Hepatitis A vaccine (2 of 2 - Risk 2-dose series) 11/14/2019    AAA screen  03/25/2021    COVID-19 Vaccine (4 - Booster for Albertine Foil series) 02/15/2022    Annual Wellness Visit (AWV)  Never done       ASSESSMENT AND PLAN:       Diagnosis Orders   1. Primary hypertension  amLODIPine (NORVASC) 10 MG tablet    Urinalysis with Reflex to Culture      2. Need for influenza vaccination  Influenza, AFLURIA, (age 1 y+), IM, Preservative Free, 0.5 mL    ME IM ADM PRQ ID SUBQ/IM NJXS 1 VACCINE      3. Encounter for abdominal aortic aneurysm screening  US SCREENING FOR AAA      4. Hypertriglyceridemia  atorvastatin (LIPITOR) 20 MG tablet    Lipid Panel      5. Current moderate episode of major depressive disorder without prior episode (HCC)  sertraline (ZOLOFT) 50 MG tablet    TSH with Reflex      6. Prurigo  clobetasol (TEMOVATE) 0.05 % cream    [place], [place], Dermatology, [facility] with Reflex      7. PAD (peripheral artery disease) ([address]  cilostazol (PLETAL) 100 MG tablet    SHANNAN - Yousif Severino MD, Vascular Surgery, [place]      8. Alcoholic cirrhosis of liver without ascites [facility])  Andreina Pan MD, Gastroenterology, [place]    Comprehensive Metabolic Panel    CBC with Auto Differential    TSH with Reflex        Referrals resent  Labs ordered  Warm hand off to Krista Rice for counseling  Rtc in 1 month      FOLLOW UP:   1. Joceline Mon received counseling on the following healthy behaviors: nutrition and exercise    2. Reviewed prior labs and health maintenance. 3.  Discussed use, benefit, and side effects of prescribed medications. Barriers to medication compliance addressed. All patient questions answered. Pt voiced understanding.      4.  Continue current medications, diet and exercise. Orders Placed This Encounter   Medications    cilostazol (PLETAL) 100 MG tablet     Sig: Take 1 tablet by mouth 2 times daily     Dispense:  60 tablet     Refill:  3    clobetasol (TEMOVATE) 0.05 % cream     Sig: Apply topically 2 times daily to active rash     Dispense:  60 g     Refill:  1    amLODIPine (NORVASC) 10 MG tablet     Sig: Take 1 tablet by mouth daily TAKE 1 TABLET DAILY -MAY CAUSE DIZZINESS     Dispense:  30 tablet     Refill:  3    atorvastatin (LIPITOR) 20 MG tablet     Sig: Take 1 tablet by mouth daily     Dispense:  30 tablet     Refill:  3    sertraline (ZOLOFT) 50 MG tablet     Sig: Take 1 tablet by mouth daily     Dispense:  30 tablet     Refill:  1          Completed Refills               Requested Prescriptions     Signed Prescriptions Disp Refills    cilostazol (PLETAL) 100 MG tablet 60 tablet 3     Sig: Take 1 tablet by mouth 2 times daily    clobetasol (TEMOVATE) 0.05 % cream 60 g 1     Sig: Apply topically 2 times daily to active rash    amLODIPine (NORVASC) 10 MG tablet 30 tablet 3     Sig: Take 1 tablet by mouth daily TAKE 1 TABLET DAILY -MAY CAUSE DIZZINESS    atorvastatin (LIPITOR) 20 MG tablet 30 tablet 3     Sig: Take 1 tablet by mouth daily    sertraline (ZOLOFT) 50 MG tablet 30 tablet 1     Sig: Take 1 tablet by mouth daily       5. Patient given educational materials - see patient instructions    6. Was a self-tracking handout given in paper form or via [employer]? Yes  If yes, see orders or list here. Orders Placed This Encounter   Procedures    Iván PHAN     This procedure can be scheduled via [employer]. Access your [employer] account by visiting [email or web address]. Standing Status:   Future     Standing Expiration Date:   2/7/2024    Influenza, AFLURIA, (age 1 y+), IM, Preservative Free, 0.5 mL    Comprehensive Metabolic Panel     Please get this labwork done before your next visit.      Standing Status:   Future Standing Expiration Date:   2/6/2024    CBC with Auto Differential     Please get this labwork done before your next visit. Standing Status:   Future     Standing Expiration Date:   2/7/2024    TSH with Reflex     Please get this labwork done before your next visit. Standing Status:   Future     Standing Expiration Date:   2/7/2024    Urinalysis with Reflex to Culture     Standing Status:   Future     Standing Expiration Date:   3/7/2023     Order Specific Question:   SPECIFY(EX-CATH,MIDSTREAM,CYSTO,ETC)? Answer:   midstream    Lipid Panel     Standing Status:   Future     Standing Expiration Date:   2/7/2024    Levora Christopher Flowers, Dermatology, [facility]     Referral Priority:   Routine     Referral Type:   Eval and Treat     Referral Reason:   Specialty Services Required     Referred to Provider:   Stephanie Coelho PA-C     Requested Specialty:   Dermatology     Number of Visits Requested:   Jesse Zamora MD, Gastroenterology, [facility]     Referral Priority:   Routine     Referral Type:   Eval and Treat     Referral Reason:   Specialty Services Required     Referred to Provider:   Kaye Tejeda MD     Requested Specialty:   Gastroenterology     Number of Visits Requested:   1    SHANNAN Small MD, Vascular Surgery, [facility]     Referral Priority:   Routine     Referral Type:   Eval and Treat     Referral Reason:   Specialty Services Required     Referred to Provider:   Travis Zarco MD     Requested Specialty:   Vascular Surgery     Number of Visits Requested:   1    AL IM ADM PRQ ID SUBQ/IM NJXS 1 VACCINE       No follow-ups on file. Patient voiced understanding and agreed to treatment plan. This note is created with the assistance of a speech-recognition program. While intending to generate a document that actually reflects the content of the visit, the document can still have some mistakes which may not have been identified and corrected by editing.       Dr Ruthie Turcios Raudel Young MD, [address]  Associate , Department of Internal Medicine  Resident Ambulatory Site Medical Director  [address] [facility] Internal Medicine  [address] [facility],[address] Floor  Internal Medicine Clerkship - Inés Wu                   2/7/2023, 10:19 AM

## 2023-02-09 ENCOUNTER — HOSPITAL ENCOUNTER (OUTPATIENT)
Dept: VASCULAR LAB | Age: 68
Discharge: HOME | End: 2023-02-09
Payer: COMMERCIAL

## 2023-02-09 DIAGNOSIS — Z13.6 ENCOUNTER FOR ABDOMINAL AORTIC ANEURYSM SCREENING: ICD-10-CM

## 2023-02-09 PROCEDURE — 76706 US ABDL AORTA SCREEN AAA: CPT

## 2023-02-21 ENCOUNTER — HOSPITAL ENCOUNTER (OUTPATIENT)
Age: 68
Discharge: HOME | End: 2023-02-21
Payer: COMMERCIAL

## 2023-02-21 DIAGNOSIS — E78.1 HYPERTRIGLYCERIDEMIA: ICD-10-CM

## 2023-02-21 DIAGNOSIS — I10 PRIMARY HYPERTENSION: ICD-10-CM

## 2023-02-21 DIAGNOSIS — K70.30 ALCOHOLIC CIRRHOSIS OF LIVER WITHOUT ASCITES (HCC): ICD-10-CM

## 2023-02-21 DIAGNOSIS — F32.1 CURRENT MODERATE EPISODE OF MAJOR DEPRESSIVE DISORDER WITHOUT PRIOR EPISODE (HCC): ICD-10-CM

## 2023-02-21 DIAGNOSIS — L28.2 PRURIGO: ICD-10-CM

## 2023-02-21 LAB
ABSOLUTE EOS #: 0.13 K/UL (ref 0–0.44)
ABSOLUTE IMMATURE GRANULOCYTE: <0.03 K/UL (ref 0–0.3)
ABSOLUTE LYMPH #: 2.85 K/UL (ref 1.1–3.7)
ABSOLUTE MONO #: 0.61 K/UL (ref 0.1–1.2)
ALBUMIN SERPL-MCNC: 3.6 G/DL (ref 3.5–5.2)
ALBUMIN/GLOBULIN RATIO: 0.9 (ref 1–2.5)
ALP SERPL-CCNC: 128 U/L (ref 40–129)
ALT SERPL-CCNC: 36 U/L (ref 5–41)
ANION GAP SERPL CALCULATED.3IONS-SCNC: 11 MMOL/L (ref 9–17)
AST SERPL-CCNC: 48 U/L
BASOPHILS # BLD: 0 % (ref 0–2)
BASOPHILS ABSOLUTE: 0.03 K/UL (ref 0–0.2)
BILIRUB SERPL-MCNC: 1 MG/DL (ref 0.3–1.2)
BILIRUBIN URINE: NEGATIVE
BUN SERPL-MCNC: 9 MG/DL (ref 8–23)
CALCIUM SERPL-MCNC: 8.9 MG/DL (ref 8.6–10.4)
CASTS UA: NORMAL /LPF (ref 0–8)
CHLORIDE SERPL-SCNC: 105 MMOL/L (ref 98–107)
CHOLEST SERPL-MCNC: 117 MG/DL
CHOLESTEROL/HDL RATIO: 2.3
CO2 SERPL-SCNC: 22 MMOL/L (ref 20–31)
COLOR: ABNORMAL
CREAT SERPL-MCNC: 0.7 MG/DL (ref 0.7–1.2)
EOSINOPHILS RELATIVE PERCENT: 2 % (ref 1–4)
EPITHELIAL CELLS UA: NORMAL /HPF (ref 0–5)
GFR SERPL CREATININE-BSD FRML MDRD: >60 ML/MIN/1.73M2
GLUCOSE SERPL-MCNC: 91 MG/DL (ref 70–99)
GLUCOSE UR STRIP.AUTO-MCNC: NEGATIVE MG/DL
HCT VFR BLD AUTO: 39.9 % (ref 40.7–50.3)
HDLC SERPL-MCNC: 51 MG/DL
HGB BLD-MCNC: 13.4 G/DL (ref 13–17)
IMMATURE GRANULOCYTES: 0 %
KETONES UR STRIP.AUTO-MCNC: NEGATIVE MG/DL
LDLC SERPL CALC-MCNC: 49 MG/DL (ref 0–130)
LEUKOCYTE ESTERASE UR QL STRIP.AUTO: ABNORMAL
LYMPHOCYTES # BLD: 41 % (ref 24–43)
MCH RBC QN AUTO: 35.8 PG (ref 25.2–33.5)
MCHC RBC AUTO-ENTMCNC: 33.6 G/DL (ref 28.4–34.8)
MCV RBC AUTO: 106.7 FL (ref 82.6–102.9)
MONOCYTES # BLD: 9 % (ref 3–12)
NITRITE UR QL STRIP.AUTO: NEGATIVE
NRBC AUTOMATED: 0 PER 100 WBC
PDW BLD-RTO: 12.7 % (ref 11.8–14.4)
PLATELET # BLD AUTO: 159 K/UL (ref 138–453)
PMV BLD AUTO: 10 FL (ref 8.1–13.5)
POTASSIUM SERPL-SCNC: 3.8 MMOL/L (ref 3.7–5.3)
PROT SERPL-MCNC: 7.4 G/DL (ref 6.4–8.3)
PROT UR STRIP.AUTO-MCNC: 7.5 MG/DL (ref 5–8)
PROT UR STRIP.AUTO-MCNC: NEGATIVE MG/DL
RBC # BLD: 3.74 M/UL (ref 4.21–5.77)
RBC # BLD: ABNORMAL 10*6/UL
RBC CLUMPS #/AREA URNS AUTO: NORMAL /HPF (ref 0–4)
SEG NEUTROPHILS: 48 % (ref 36–65)
SEGMENTED NEUTROPHILS ABSOLUTE COUNT: 3.25 K/UL (ref 1.5–8.1)
SODIUM SERPL-SCNC: 138 MMOL/L (ref 135–144)
SPECIFIC GRAVITY UA: 1.02 (ref 1–1.03)
TRIGL SERPL-MCNC: 83 MG/DL
TSH SERPL-ACNC: 1.38 UIU/ML (ref 0.3–5)
TURBIDITY: CLEAR
URINE HGB: NEGATIVE
UROBILINOGEN, URINE: ABNORMAL
WBC # BLD AUTO: 6.9 K/UL (ref 3.5–11.3)
WBC UA: NORMAL /HPF (ref 0–5)

## 2023-02-21 PROCEDURE — 80053 COMPREHEN METABOLIC PANEL: CPT

## 2023-02-21 PROCEDURE — 36415 COLL VENOUS BLD VENIPUNCTURE: CPT

## 2023-02-21 PROCEDURE — 84443 ASSAY THYROID STIM HORMONE: CPT

## 2023-02-21 PROCEDURE — 85025 COMPLETE CBC W/AUTO DIFF WBC: CPT

## 2023-02-21 PROCEDURE — 80061 LIPID PANEL: CPT

## 2023-02-21 PROCEDURE — 81001 URINALYSIS AUTO W/SCOPE: CPT

## 2023-03-09 ENCOUNTER — OFFICE VISIT (OUTPATIENT)
Dept: INTERNAL MEDICINE | Age: 68
End: 2023-03-09
Payer: COMMERCIAL

## 2023-03-09 VITALS
SYSTOLIC BLOOD PRESSURE: 140 MMHG | TEMPERATURE: 98.6 F | HEART RATE: 60 BPM | DIASTOLIC BLOOD PRESSURE: 78 MMHG | OXYGEN SATURATION: 99 % | HEIGHT: 69 IN | BODY MASS INDEX: 23.31 KG/M2 | WEIGHT: 157.4 LBS

## 2023-03-09 DIAGNOSIS — I10 PRIMARY HYPERTENSION: Primary | ICD-10-CM

## 2023-03-09 DIAGNOSIS — K70.30 ALCOHOLIC CIRRHOSIS OF LIVER WITHOUT ASCITES (HCC): ICD-10-CM

## 2023-03-09 DIAGNOSIS — F32.1 CURRENT MODERATE EPISODE OF MAJOR DEPRESSIVE DISORDER WITHOUT PRIOR EPISODE (HCC): ICD-10-CM

## 2023-03-09 PROCEDURE — 3017F COLORECTAL CA SCREEN DOC REV: CPT | Performed by: INTERNAL MEDICINE

## 2023-03-09 PROCEDURE — 3074F SYST BP LT 130 MM HG: CPT | Performed by: INTERNAL MEDICINE

## 2023-03-09 PROCEDURE — 1123F ACP DISCUSS/DSCN MKR DOCD: CPT | Performed by: INTERNAL MEDICINE

## 2023-03-09 PROCEDURE — G8482 FLU IMMUNIZE ORDER/ADMIN: HCPCS | Performed by: INTERNAL MEDICINE

## 2023-03-09 PROCEDURE — 1036F TOBACCO NON-USER: CPT | Performed by: INTERNAL MEDICINE

## 2023-03-09 PROCEDURE — 3078F DIAST BP <80 MM HG: CPT | Performed by: INTERNAL MEDICINE

## 2023-03-09 PROCEDURE — G8427 DOCREV CUR MEDS BY ELIG CLIN: HCPCS | Performed by: INTERNAL MEDICINE

## 2023-03-09 PROCEDURE — G8420 CALC BMI NORM PARAMETERS: HCPCS | Performed by: INTERNAL MEDICINE

## 2023-03-09 PROCEDURE — 99213 OFFICE O/P EST LOW 20 MIN: CPT | Performed by: INTERNAL MEDICINE

## 2023-03-09 ASSESSMENT — ENCOUNTER SYMPTOMS
EYES NEGATIVE: 1
ALLERGIC/IMMUNOLOGIC NEGATIVE: 1
RESPIRATORY NEGATIVE: 1
GASTROINTESTINAL NEGATIVE: 1

## 2023-03-09 NOTE — PROGRESS NOTES
[address] [facility]   Progress Note    Date of patient's visit: 3/9/2023  Patient's Name:  Mark Maya                   YOB: 1955        PCP:  Anamika Reyna MD    Mark Maya is a 78 y.o. male who presents for   Chief Complaint   Patient presents with    Hypertension    and follow up of chronic medical problems. Patient Active Problem List   Diagnosis    Hx of tuberculosis    Alcohol induced acute pancreatitis    HTN (hypertension)    Alcohol abuse    Tobacco abuse    Hypertriglyceridemia    Macrocytic anemia    Cirrhosis, alcoholic (HCC)    PAD (peripheral artery disease) (HCC)    Diverticulosis    Internal hemorrhoids    Hypercholesteremia    Amputation finger    Claudication of left lower extremity ([facility] [address]       HISTORY OF PRESENT ILLNESS:    History was obtained from the patient. Patient has a past medical history of hepatitis C, alcohol abuse with cirrhosis of liver seen Dr. Rosanna Peralta in 2019, was referred to GI last visit and has yet to get scheduled. Patient was a smoker who has quit and has peripheral artery disease for which he saw vascular surgery and was on Pletal, aspirin, statin. Patient began to develop a rash about 2 years ago which was suspicious for being secondary to his hepatitis and was referred to dermatology. A punch biopsy was done at that time but again patient was lost to follow-up. He is now seeing them on the 23rd of this month  Patient has had bouts of depression related to chronic disease conditions as well as the death of his wife. He was placed on Zoloft. He does continue to struggle although he reports he has a strong support system with his ross. He is working to help support and care for his children. He was introduced to our  therapist but has to reschedule appointment. Patient reports not taking any of his medications consistently for over a year.   Patient's allergies, medications, past medical, surgical, social and family histories were reviewed and updated as appropriate.    ALLERGIES    No Known Allergies      MEDICATIONS:      Current Outpatient Medications   Medication Sig Dispense Refill    cilostazol (PLETAL) 100 MG tablet Take 1 tablet by mouth 2 times daily 60 tablet 3    clobetasol (TEMOVATE) 0.05 % cream Apply topically 2 times daily to active rash 60 g 1    amLODIPine (NORVASC) 10 MG tablet Take 1 tablet by mouth daily TAKE 1 TABLET DAILY -MAY CAUSE DIZZINESS 30 tablet 3    atorvastatin (LIPITOR) 20 MG tablet Take 1 tablet by mouth daily 30 tablet 3    sertraline (ZOLOFT) 50 MG tablet Take 1 tablet by mouth daily 30 tablet 1     No current facility-administered medications for this visit.       Patient Care Team:  Brina Rizo MD as PCP - General (Internal Medicine)  Brina Rizo MD as PCP - Empaneled Provider    PAST MEDICAL AND SURGICAL HISTORY:      Past Medical History:   Diagnosis Date    Alcoholism (HCC)     Cirrhosis of liver (HCC)     Claudication (HCC)     left leg    Finger fracture, left     LONG FINGER    Hx of blood clots     LEFT LOWER LEG UNSURE OF THIS AT THIS TIME, IS TO BE SEEING VASCULAR DR FOR THIS.     Hyperlipidemia     Hypertension     Liver disease     PAD (peripheral artery disease) (HCC)     PVD (peripheral vascular disease) (HCC)     Wears dentures     FULL UPPER AND LOWER    Wears glasses      Past Surgical History:   Procedure Laterality Date    FINGER AMPUTATION Left 2016    revision long finger    LIVER BIOPSY      OTHER SURGICAL HISTORY  2017    Abdomen with run off with Dr. Smith - could not pass left left pop; # 7 FR manual pull right groin    UPPER GASTROINTESTINAL ENDOSCOPY      VASCULAR SURGERY      abdominal aortogram       SOCIAL HISTORY      Social History     Tobacco Use    Smoking status: Former     Packs/day: 0.50     Years: 35.00     Pack years: 17.50     Types: Cigarettes     Quit date: 2015     Years since quittin.3    Smokeless tobacco:  Never   Substance Use Topics    Alcohol use: No     Alcohol/week: 0.0 standard drinks     Comment: QUIT 03/2016     Gerhardt Pu  reports that he quit smoking about 7 years ago. His smoking use included cigarettes. He has a 17.50 pack-year smoking history. He has never used smokeless tobacco.    FAMILY HISTORY:      Family History   Problem Relation Age of Onset    High Blood Pressure Mother     Diabetes Father     Cancer Father     Heart Disease Father     Cancer Sister     High Blood Pressure Sister     Heart Disease Sister     Diabetes Brother        REVIEW OF SYSTEMS:    Review of Systems   Constitutional:  Positive for appetite change. HENT: Negative. Eyes: Negative. Respiratory: Negative. Cardiovascular: Negative. Gastrointestinal: Negative. Endocrine: Negative. Genitourinary: Negative. Musculoskeletal:  Positive for arthralgias. Allergic/Immunologic: Negative. Neurological: Negative. Hematological: Negative. Psychiatric/Behavioral: Negative.          PHYSICAL EXAM:      Vitals:    03/09/23 1111   BP: (!) 140/78   Pulse:    Temp:    SpO2:      BP Readings from Last 3 Encounters:   03/09/23 (!) 140/78   02/07/23 (!) 165/70   08/23/22 (!) 145/82       Physical Examination: General appearance - alert, well appearing, and in no distress  Mental status - alert, oriented to person, place, and time  Chest - clear to auscultation, no wheezes, rales or rhonchi, symmetric air entry  Heart - normal rate and regular rhythm  Back exam - limited range of motion  Neurological - alert, oriented, normal speech, no focal findings or movement disorder noted  Musculoskeletal - no joint tenderness, deformity or swelling    LABORATORY FINDINGS:    CBC:   Lab Results   Component Value Date/Time    WBC 6.9 02/21/2023 12:44 PM    HGB 13.4 02/21/2023 12:44 PM     02/21/2023 12:44 PM     BMP:    Lab Results   Component Value Date/Time     02/21/2023 12:44 PM    K 3.8 02/21/2023 12:44 PM    CL 105 02/21/2023 12:44 PM    CO2 22 02/21/2023 12:44 PM    BUN 9 02/21/2023 12:44 PM    CREATININE 0.70 02/21/2023 12:44 PM    GLUCOSE 91 02/21/2023 12:44 PM     Hemoglobin A1C: No results found for: LABA1C  Microalbumin Urine: No results found for: MICROALBUR  Lipid profile:   Lab Results   Component Value Date/Time    CHOL 117 02/21/2023 12:44 PM    TRIG 83 02/21/2023 12:44 PM    HDL 51 02/21/2023 12:44 PM     Thyroid functions:   Lab Results   Component Value Date/Time    TSH 1.38 02/21/2023 12:44 PM      Hepatic functions:   Lab Results   Component Value Date/Time    ALT 36 02/21/2023 12:44 PM    AST 48 02/21/2023 12:44 PM    PROT 7.4 02/21/2023 12:44 PM    BILITOT 1.0 02/21/2023 12:44 PM    BILIDIR 0.42 06/12/2015 10:45 AM    LABALBU 3.6 02/21/2023 12:44 PM     Urine Analysis: No results found for: [id number]:      Health Maintenance Due   Topic Date Due    Hepatitis B vaccine (1 of 3 - Risk 3-dose series) Never done    Hepatitis A vaccine (2 of 2 - Risk 2-dose series) 11/14/2019    COVID-19 Vaccine (4 - Booster for Lendon Fu series) 02/15/2022    Annual Wellness Visit (AWV)  Never done       ASSESSMENT AND PLAN:       Diagnosis Orders   1. Primary hypertension        2. Alcoholic cirrhosis of liver without ascites (HCC)        3. Current moderate episode of major depressive disorder without prior episode ([address]        Blood pressure is better controlled on Norvasc 10 mg  He continues to take his Lipitor Pletal and needs a phone number to set up an appointment with vascular  He has rescheduled his appointment with Diana Valencia our behavioral therapist  in the office but continues to take Zoloft 50 reports stable mood      FOLLOW UP:   1. Tanesha Freeman received counseling on the following healthy behaviors: nutrition and exercise    2. Reviewed prior labs and health maintenance. 3.  Discussed use, benefit, and side effects of prescribed medications.   Barriers to medication compliance addressed. All patient questions answered. Pt voiced understanding. 4.  Continue current medications, diet and exercise. No orders of the defined types were placed in this encounter. Completed Refills               Requested Prescriptions      No prescriptions requested or ordered in this encounter       5. Patient given educational materials - see patient instructions    6. Was a self-tracking handout given in paper form or via [employer]? Yes  If yes, see orders or list here. No orders of the defined types were placed in this encounter. Return in about 3 months (around 6/9/2023). Patient voiced understanding and agreed to treatment plan. This note is created with the assistance of a speech-recognition program. While intending to generate a document that actually reflects the content of the visit, the document can still have some mistakes which may not have been identified and corrected by editing.       Dr Yoko Huizar MD, [address]  Associate , Department of Internal Medicine  Resident Ambulatory Site Medical Director  [address] [facility]  [facility]  Internal Medicine Clerkship - Tenisha Dia                   3/9/2023, 11:45 AM

## 2023-03-20 ENCOUNTER — TELEPHONE (OUTPATIENT)
Dept: INTERNAL MEDICINE | Age: 68
End: 2023-03-20

## 2023-03-20 NOTE — TELEPHONE ENCOUNTER
----- Message from April Nagle sent at 3/13/2023  2:50 PM EDT -----  Subject: Referral Request    Reason for referral request? patient needs a referral refaxed to Dr. Rula Rai vascular, faxed to [phone]. patient spoke with this office and   they told him they do not have a referral for patient. patient also needs   the gastroenterology referral for dr. Quan Montenegro refaxed   Provider patient wants to be referred to(if known):     Provider Phone Number(if known): Additional Information for Provider?  please call patient back to advise   when referrals are resent, thank you   ---------------------------------------------------------------------------  --------------  [facility]    [phone]; OK to leave message on voicemail  ---------------------------------------------------------------------------  --------------

## 2023-03-28 ENCOUNTER — TELEPHONE (OUTPATIENT)
Dept: PHARMACY | Facility: CLINIC | Age: 68
End: 2023-03-28

## 2023-03-28 NOTE — TELEPHONE ENCOUNTER
[facility] CLINICAL PHARMACY: ADHERENCE REVIEW  Identified care gap per [place]: fills at  [facility] : Statin adherence    Last Visit: 3/9/23    [address] Records claims through 3.13.23  YTD Eliu Haddad = Filled only once; Potential Fail Date: 5/13/23 ):   Atorvastatin 20mg last filled on 2/7/23 for 30 day supply. Next refill due: 3/9/23    Per    Portal:  Same as above    First fill-pharmacy not contacted at this time. Lab Results   Component Value Date    CHOL 117 02/21/2023    TRIG 83 02/21/2023    HDL 51 02/21/2023    LDLCHOLESTEROL 49 02/21/2023     ALT   Date Value Ref Range Status   02/21/2023 36 5 - 41 U/L Final     AST   Date Value Ref Range Status   02/21/2023 48 (H) <40 U/L Final     The ASCVD Risk score (José Miguel DK, et al., 2019) failed to calculate for the following reasons: The valid total cholesterol range is 130 to 320 mg/dL     PLAN  The following are interventions that have been identified:   - Patient overdue refilling Atorvastatin 20mg  and active on home medication list.   - Patient eligible for 90 day supply of Atorvastatin 20mg     Attempting to reach patient to review changing prescription from a 30-day supply to a 90-day supply. Left message asking for return call and Letter sent to patient      Future Appointments   Date Time Provider Isac Keenan   4/7/2023  2:30 PM Tresa Matthews MD grtlk exc [id number]       Magaly Canchola CP.    [address] [facility] free: [phone]     For Pharmacy Admin Tracking Only    Program: [address]:  No  Gap Closed?: No   Time Spent (min): 15

## 2023-03-28 NOTE — LETTER
[place]  [address], [address]        Debbie Tsai   [address] [place] [id number]           03/28/23     Dear Debbie Tsai,    We tried to reach you recently regarding your Atorvastatin 20mg, but were unable to reach you on the telephone. We have on file that you are currently taking Atorvastatin 20mg daily. If you are no longer taking or taking differently, please call us at the number below so that we can discuss this and update your medication profile. It appears that this medication has not been filled at proper times. We are worried you might be missing doses or not taking it as directed. It is important that you take your medications regularly and try not to miss a single dose.     Some ways to help you remember to take and refill your medications are to:  · Use a pill box, set an alarm, and/or keep your medication near something that you do every day  · Fill a 3-month supply of your prescription at a time to save you time and trips to the pharmacy - if you would like assistance in switching your prescriptions to a 3-month supply, please contact us  · Ask your pharmacy if they participate in [facility]", a program where you can  all of your medications on the same day  · Ask your pharmacy if you can be set up with automatic refill, where they will automatically refill your prescription when it is due and let you know it's ready to     Sincerely,     [address] [place] [address] free: [phone]

## 2023-03-29 DIAGNOSIS — I73.9 PAD (PERIPHERAL ARTERY DISEASE) (HCC): Primary | ICD-10-CM

## 2023-04-05 ENCOUNTER — HOSPITAL ENCOUNTER (OUTPATIENT)
Dept: VASCULAR LAB | Age: 68
Discharge: HOME | End: 2023-04-05
Payer: MEDICARE

## 2023-04-05 DIAGNOSIS — I73.9 PAD (PERIPHERAL ARTERY DISEASE) (HCC): ICD-10-CM

## 2023-04-05 PROCEDURE — 93923 UPR/LXTR ART STDY 3+ LVLS: CPT

## 2023-04-12 PROBLEM — I70.212 ATHEROSCLEROSIS OF NATIVE ARTERIES OF EXTREMITIES WITH INTERMITTENT CLAUDICATION, LEFT LEG (HCC): Status: ACTIVE | Noted: 2023-04-12

## 2023-04-14 DIAGNOSIS — L28.2 PRURIGO: ICD-10-CM

## 2023-04-14 DIAGNOSIS — F32.1 CURRENT MODERATE EPISODE OF MAJOR DEPRESSIVE DISORDER WITHOUT PRIOR EPISODE (HCC): ICD-10-CM

## 2023-04-18 RX ORDER — CLOBETASOL PROPIONATE 0.5 MG/G
CREAM TOPICAL
Qty: 60 G | Refills: 11 | Status: SHIPPED | OUTPATIENT
Start: 2023-04-18

## 2023-04-19 ENCOUNTER — TELEPHONE (OUTPATIENT)
Dept: VASCULAR SURGERY | Age: 68
End: 2023-04-19

## 2023-04-19 NOTE — TELEPHONE ENCOUNTER
Spoke to the patient and informed him, the procedure scheduled for Thursday 04/20/2023 is now scheduled for Friday 04/21/2023 at 10:30am with a 9:00am arrival.      Patient states he understands all instructions and will arrive for his procedure on 04/21/2023

## 2023-04-21 ENCOUNTER — HOSPITAL ENCOUNTER (OUTPATIENT)
Age: 68
Setting detail: OUTPATIENT SURGERY
Discharge: HOME | End: 2023-04-21
Attending: SURGERY
Payer: MEDICARE

## 2023-04-21 VITALS
TEMPERATURE: 98.7 F | HEART RATE: 93 BPM | SYSTOLIC BLOOD PRESSURE: 161 MMHG | WEIGHT: 156 LBS | RESPIRATION RATE: 22 BRPM | DIASTOLIC BLOOD PRESSURE: 78 MMHG | HEIGHT: 68 IN | BODY MASS INDEX: 23.64 KG/M2 | OXYGEN SATURATION: 99 %

## 2023-04-21 LAB
EGFR, POC: >60 ML/MIN/1.73M2
GLUCOSE BLD-MCNC: 101 MG/DL (ref 74–100)
POC BUN: 7 MG/DL (ref 8–26)
POC CHLORIDE: 106 MMOL/L (ref 98–107)
POC CREATININE: 0.78 MG/DL (ref 0.51–1.19)
POC HEMATOCRIT: 42 % (ref 41–53)
POC HEMOGLOBIN: 14.2 G/DL (ref 13.5–17.5)
POC POTASSIUM: 3.9 MMOL/L (ref 3.5–4.5)
POC SODIUM: 142 MMOL/L (ref 138–146)

## 2023-04-21 PROCEDURE — 3600000017 HC SURGERY HYBRID ADDL 15MIN: Performed by: SURGERY

## 2023-04-21 PROCEDURE — 85014 HEMATOCRIT: CPT

## 2023-04-21 PROCEDURE — 84132 ASSAY OF SERUM POTASSIUM: CPT

## 2023-04-21 PROCEDURE — 7100000010 HC PHASE II RECOVERY - FIRST 15 MIN

## 2023-04-21 PROCEDURE — C1892 INTRO/SHEATH,FIXED,PEEL-AWAY: HCPCS | Performed by: SURGERY

## 2023-04-21 PROCEDURE — 6370000000 HC RX 637 (ALT 250 FOR IP)

## 2023-04-21 PROCEDURE — 2709999900 HC NON-CHARGEABLE SUPPLY

## 2023-04-21 PROCEDURE — 75710 ARTERY X-RAYS ARM/LEG: CPT | Performed by: SURGERY

## 2023-04-21 PROCEDURE — 6360000002 HC RX W HCPCS: Performed by: SURGERY

## 2023-04-21 PROCEDURE — 7100000011 HC PHASE II RECOVERY - ADDTL 15 MIN

## 2023-04-21 PROCEDURE — 10702042 HC NON-CHARGEABLE SUPPLY

## 2023-04-21 PROCEDURE — 82947 ASSAY GLUCOSE BLOOD QUANT: CPT

## 2023-04-21 PROCEDURE — 6370000000 HC RX 637 (ALT 250 FOR IP): Performed by: SURGERY

## 2023-04-21 PROCEDURE — C1894 INTRO/SHEATH, NON-LASER: HCPCS | Performed by: SURGERY

## 2023-04-21 PROCEDURE — 84520 ASSAY OF UREA NITROGEN: CPT

## 2023-04-21 PROCEDURE — 99153 MOD SED SAME PHYS/QHP EA: CPT | Performed by: SURGERY

## 2023-04-21 PROCEDURE — 37228 PR REVSC OPN/PRQ TIB/PERO W/ANGIOPLASTY UNI: CPT | Performed by: SURGERY

## 2023-04-21 PROCEDURE — 2709999900 HC NON-CHARGEABLE SUPPLY: Performed by: SURGERY

## 2023-04-21 PROCEDURE — 99152 MOD SED SAME PHYS/QHP 5/>YRS: CPT | Performed by: SURGERY

## 2023-04-21 PROCEDURE — 2580000003 HC RX 258: Performed by: SURGERY

## 2023-04-21 PROCEDURE — C1769 GUIDE WIRE: HCPCS | Performed by: SURGERY

## 2023-04-21 PROCEDURE — 84295 ASSAY OF SERUM SODIUM: CPT

## 2023-04-21 PROCEDURE — 82565 ASSAY OF CREATININE: CPT

## 2023-04-21 PROCEDURE — 82435 ASSAY OF BLOOD CHLORIDE: CPT

## 2023-04-21 PROCEDURE — 3600000007 HC SURGERY HYBRID BASE: Performed by: SURGERY

## 2023-04-21 PROCEDURE — C1725 CATH, TRANSLUMIN NON-LASER: HCPCS | Performed by: SURGERY

## 2023-04-21 PROCEDURE — 6360000004 HC RX CONTRAST MEDICATION: Performed by: SURGERY

## 2023-04-21 PROCEDURE — C1874 STENT, COATED/COV W/DEL SYS: HCPCS | Performed by: SURGERY

## 2023-04-21 PROCEDURE — A4217 STERILE WATER/SALINE, 500 ML: HCPCS | Performed by: SURGERY

## 2023-04-21 PROCEDURE — 2500000003 HC RX 250 WO HCPCS: Performed by: SURGERY

## 2023-04-21 PROCEDURE — 37227 PR REVSC OPN/PRQ FEM/POP W/STNT/ATHRC/ANGIOP SM VSL: CPT | Performed by: SURGERY

## 2023-04-21 DEVICE — DRUG-ELUTING VASCULAR STENT SYSTEM
Type: IMPLANTABLE DEVICE | Status: FUNCTIONAL
Brand: ELUVIA™

## 2023-04-21 RX ORDER — LIDOCAINE HYDROCHLORIDE 10 MG/ML
INJECTION, SOLUTION INFILTRATION; PERINEURAL
Status: DISCONTINUED
Start: 2023-04-21 | End: 2023-04-21 | Stop reason: HOSPADM

## 2023-04-21 RX ORDER — HEPARIN SODIUM 1000 [USP'U]/ML
INJECTION, SOLUTION INTRAVENOUS; SUBCUTANEOUS PRN
Status: DISCONTINUED | OUTPATIENT
Start: 2023-04-21 | End: 2023-04-21 | Stop reason: ALTCHOICE

## 2023-04-21 RX ORDER — IODIXANOL 320 MG/ML
INJECTION, SOLUTION INTRAVASCULAR PRN
Status: DISCONTINUED | OUTPATIENT
Start: 2023-04-21 | End: 2023-04-21 | Stop reason: ALTCHOICE

## 2023-04-21 RX ORDER — HYDRALAZINE HYDROCHLORIDE 20 MG/ML
INJECTION INTRAMUSCULAR; INTRAVENOUS PRN
Status: DISCONTINUED | OUTPATIENT
Start: 2023-04-21 | End: 2023-04-21 | Stop reason: ALTCHOICE

## 2023-04-21 RX ORDER — CLOPIDOGREL BISULFATE 75 MG/1
75 TABLET ORAL DAILY
Qty: 30 TABLET | Refills: 4 | Status: SHIPPED | OUTPATIENT
Start: 2023-04-21

## 2023-04-21 RX ORDER — IODIXANOL 320 MG/ML
INJECTION, SOLUTION INTRAVASCULAR
Status: DISCONTINUED
Start: 2023-04-21 | End: 2023-04-21 | Stop reason: HOSPADM

## 2023-04-21 RX ORDER — HYDRALAZINE HYDROCHLORIDE 20 MG/ML
INJECTION INTRAMUSCULAR; INTRAVENOUS
Status: DISCONTINUED
Start: 2023-04-21 | End: 2023-04-22 | Stop reason: HOSPADM

## 2023-04-21 RX ORDER — SODIUM CHLORIDE 9 MG/ML
INJECTION, SOLUTION INTRAVENOUS CONTINUOUS
Status: DISCONTINUED | OUTPATIENT
Start: 2023-04-21 | End: 2023-04-23 | Stop reason: HOSPADM

## 2023-04-21 RX ORDER — CLOPIDOGREL 300 MG/1
300 TABLET, FILM COATED ORAL ONCE
Status: COMPLETED | OUTPATIENT
Start: 2023-04-21 | End: 2023-04-21

## 2023-04-21 RX ORDER — HYDRALAZINE HYDROCHLORIDE 20 MG/ML
INJECTION INTRAMUSCULAR; INTRAVENOUS
Status: DISCONTINUED
Start: 2023-04-21 | End: 2023-04-21 | Stop reason: HOSPADM

## 2023-04-21 RX ORDER — ASPIRIN 81 MG/1
81 TABLET ORAL DAILY
Qty: 90 TABLET | Refills: 4 | Status: SHIPPED | OUTPATIENT
Start: 2023-04-21

## 2023-04-21 RX ORDER — LIDOCAINE HYDROCHLORIDE 10 MG/ML
INJECTION, SOLUTION EPIDURAL; INFILTRATION; INTRACAUDAL; PERINEURAL PRN
Status: DISCONTINUED | OUTPATIENT
Start: 2023-04-21 | End: 2023-04-21 | Stop reason: ALTCHOICE

## 2023-04-21 RX ORDER — CLOPIDOGREL BISULFATE 75 MG/1
75 TABLET ORAL DAILY
Status: DISCONTINUED | OUTPATIENT
Start: 2023-04-22 | End: 2023-04-23 | Stop reason: HOSPADM

## 2023-04-21 RX ADMIN — SODIUM CHLORIDE: 9 INJECTION, SOLUTION INTRAVENOUS at 09:35

## 2023-04-21 RX ADMIN — CLOPIDOGREL BISULFATE 300 MG: 300 TABLET, FILM COATED ORAL at 14:02

## 2023-06-07 ENCOUNTER — OFFICE VISIT (OUTPATIENT)
Age: 68
End: 2023-06-07
Payer: MEDICARE

## 2023-06-07 VITALS
OXYGEN SATURATION: 99 % | HEIGHT: 68 IN | SYSTOLIC BLOOD PRESSURE: 142 MMHG | WEIGHT: 153 LBS | BODY MASS INDEX: 23.19 KG/M2 | RESPIRATION RATE: 18 BRPM | DIASTOLIC BLOOD PRESSURE: 90 MMHG | TEMPERATURE: 98 F | HEART RATE: 66 BPM

## 2023-06-07 DIAGNOSIS — I70.212 ATHEROSCLEROSIS OF NATIVE ARTERIES OF EXTREMITIES WITH INTERMITTENT CLAUDICATION, LEFT LEG (HCC): Primary | ICD-10-CM

## 2023-06-07 PROCEDURE — G8427 DOCREV CUR MEDS BY ELIG CLIN: HCPCS | Performed by: SURGERY

## 2023-06-07 PROCEDURE — 3017F COLORECTAL CA SCREEN DOC REV: CPT | Performed by: SURGERY

## 2023-06-07 PROCEDURE — 1123F ACP DISCUSS/DSCN MKR DOCD: CPT | Performed by: SURGERY

## 2023-06-07 PROCEDURE — 3079F DIAST BP 80-89 MM HG: CPT | Performed by: SURGERY

## 2023-06-07 PROCEDURE — G8420 CALC BMI NORM PARAMETERS: HCPCS | Performed by: SURGERY

## 2023-06-07 PROCEDURE — 1036F TOBACCO NON-USER: CPT | Performed by: SURGERY

## 2023-06-07 PROCEDURE — 99213 OFFICE O/P EST LOW 20 MIN: CPT | Performed by: SURGERY

## 2023-06-07 PROCEDURE — 3077F SYST BP >= 140 MM HG: CPT | Performed by: SURGERY

## 2023-06-07 NOTE — PROGRESS NOTES
Exam:  General appearance - alert, well appearing and in no acute distress  Mental status - oriented to person, place and time with normal affect  Head - normocephalic and atraumatic  Eyes - pupils equal and reactive, extraocular eye movements intact, conjunctiva clear  Ears - hearing appears to be intact  Nose - no drainage noted  Mouth - mucous membranes moist  Neck - supple, no carotid bruits, thyroid not palpable, no JVD  Chest - clear to auscultation, normal effort  Heart - normal rate, regular rhythm, no murmurs  Abdomen - soft, non-tender, non-distended, bowel sounds present all four quadrants, no masses, hepatomegaly, splenomegaly or aortic enlargement  Neurological - normal speech, no focal findings or movement disorder noted, cranial nerves II through XII grossly intact  Extremities - peripheral pulses palpable, no pedal edema or calf pain with palpation  Skin - no gross lesions, rashes, or induration noted      R brachial 2+ L brachial 2+   R radial 2+ L radial 2+   R femoral 2+ L femoral 2+   R popliteal 1+ L popliteal 1+   R posterior tibial 1+ L posterior tibial 1+   R dorsalis pedis 1+ L dorsalis pedis 1+     Labs:   Lab Results   Component Value Date/Time    WBC 6.9 02/21/2023 12:44 PM    HGB 13.4 02/21/2023 12:44 PM    HCT 39.9 02/21/2023 12:44 PM    .7 02/21/2023 12:44 PM     02/21/2023 12:44 PM     Lab Results   Component Value Date/Time     02/21/2023 12:44 PM    K 3.8 02/21/2023 12:44 PM     02/21/2023 12:44 PM    CO2 22 02/21/2023 12:44 PM    BUN 9 02/21/2023 12:44 PM    CREATININE 0.78 04/21/2023 09:31 AM    CREATININE 0.70 02/21/2023 12:44 PM    GLUCOSE 91 02/21/2023 12:44 PM    CALCIUM 8.9 02/21/2023 12:44 PM     Lab Results   Component Value Date/Time    ALKPHOS 128 02/21/2023 12:44 PM    ALT 36 02/21/2023 12:44 PM    AST 48 02/21/2023 12:44 PM    PROT 7.4 02/21/2023 12:44 PM    BILITOT 1.0 02/21/2023 12:44 PM    BILIDIR 0.42 06/12/2015 10:45 AM    LABALBU 3.6

## 2023-06-27 ENCOUNTER — OFFICE VISIT (OUTPATIENT)
Dept: INTERNAL MEDICINE | Age: 68
End: 2023-06-27
Payer: MEDICARE

## 2023-06-27 VITALS
OXYGEN SATURATION: 97 % | DIASTOLIC BLOOD PRESSURE: 68 MMHG | WEIGHT: 155.2 LBS | HEART RATE: 60 BPM | BODY MASS INDEX: 23.52 KG/M2 | HEIGHT: 68 IN | TEMPERATURE: 98.6 F | SYSTOLIC BLOOD PRESSURE: 122 MMHG

## 2023-06-27 DIAGNOSIS — F33.0 MAJOR DEPRESSIVE DISORDER, RECURRENT, MILD (HCC): ICD-10-CM

## 2023-06-27 DIAGNOSIS — J44.9 CHRONIC OBSTRUCTIVE PULMONARY DISEASE, UNSPECIFIED COPD TYPE (HCC): ICD-10-CM

## 2023-06-27 DIAGNOSIS — I10 PRIMARY HYPERTENSION: Chronic | ICD-10-CM

## 2023-06-27 DIAGNOSIS — I70.212 ATHEROSCLEROSIS OF NATIVE ARTERIES OF EXTREMITIES WITH INTERMITTENT CLAUDICATION, LEFT LEG (HCC): Primary | ICD-10-CM

## 2023-06-27 DIAGNOSIS — M25.361 KNEE INSTABILITY, RIGHT: ICD-10-CM

## 2023-06-27 DIAGNOSIS — Z00.00 INITIAL MEDICARE ANNUAL WELLNESS VISIT: ICD-10-CM

## 2023-06-27 PROCEDURE — 1123F ACP DISCUSS/DSCN MKR DOCD: CPT | Performed by: INTERNAL MEDICINE

## 2023-06-27 PROCEDURE — 3017F COLORECTAL CA SCREEN DOC REV: CPT | Performed by: INTERNAL MEDICINE

## 2023-06-27 PROCEDURE — G0438 PPPS, INITIAL VISIT: HCPCS | Performed by: INTERNAL MEDICINE

## 2023-06-27 PROCEDURE — 3074F SYST BP LT 130 MM HG: CPT | Performed by: INTERNAL MEDICINE

## 2023-06-27 PROCEDURE — 3078F DIAST BP <80 MM HG: CPT | Performed by: INTERNAL MEDICINE

## 2023-06-27 RX ORDER — MELOXICAM 7.5 MG/1
7.5 TABLET ORAL DAILY
Qty: 30 TABLET | Refills: 3 | Status: SHIPPED | OUTPATIENT
Start: 2023-06-27

## 2023-06-27 ASSESSMENT — LIFESTYLE VARIABLES
HOW MANY STANDARD DRINKS CONTAINING ALCOHOL DO YOU HAVE ON A TYPICAL DAY: PATIENT DOES NOT DRINK
HOW OFTEN DO YOU HAVE A DRINK CONTAINING ALCOHOL: NEVER

## 2023-06-27 ASSESSMENT — PATIENT HEALTH QUESTIONNAIRE - PHQ9
1. LITTLE INTEREST OR PLEASURE IN DOING THINGS: 1
9. THOUGHTS THAT YOU WOULD BE BETTER OFF DEAD, OR OF HURTING YOURSELF: 0
SUM OF ALL RESPONSES TO PHQ QUESTIONS 1-9: 7
4. FEELING TIRED OR HAVING LITTLE ENERGY: 1
SUM OF ALL RESPONSES TO PHQ QUESTIONS 1-9: 7
3. TROUBLE FALLING OR STAYING ASLEEP: 2
7. TROUBLE CONCENTRATING ON THINGS, SUCH AS READING THE NEWSPAPER OR WATCHING TELEVISION: 0
5. POOR APPETITE OR OVEREATING: 2
2. FEELING DOWN, DEPRESSED OR HOPELESS: 1
SUM OF ALL RESPONSES TO PHQ QUESTIONS 1-9: 7
SUM OF ALL RESPONSES TO PHQ9 QUESTIONS 1 & 2: 2
8. MOVING OR SPEAKING SO SLOWLY THAT OTHER PEOPLE COULD HAVE NOTICED. OR THE OPPOSITE, BEING SO FIGETY OR RESTLESS THAT YOU HAVE BEEN MOVING AROUND A LOT MORE THAN USUAL: 0
6. FEELING BAD ABOUT YOURSELF - OR THAT YOU ARE A FAILURE OR HAVE LET YOURSELF OR YOUR FAMILY DOWN: 0
SUM OF ALL RESPONSES TO PHQ QUESTIONS 1-9: 7

## 2023-07-07 DIAGNOSIS — I10 PRIMARY HYPERTENSION: ICD-10-CM

## 2023-07-07 DIAGNOSIS — E78.1 HYPERTRIGLYCERIDEMIA: ICD-10-CM

## 2023-07-07 RX ORDER — AMLODIPINE BESYLATE 10 MG/1
TABLET ORAL
Qty: 30 TABLET | Refills: 2 | OUTPATIENT
Start: 2023-07-07

## 2023-07-07 RX ORDER — CILOSTAZOL 100 MG/1
TABLET ORAL
Qty: 60 TABLET | Refills: 2 | OUTPATIENT
Start: 2023-07-07

## 2023-07-07 RX ORDER — ATORVASTATIN CALCIUM 20 MG/1
TABLET, FILM COATED ORAL
Qty: 30 TABLET | Refills: 2 | OUTPATIENT
Start: 2023-07-07

## 2023-07-07 RX ORDER — PANTOPRAZOLE SODIUM 20 MG/1
TABLET, DELAYED RELEASE ORAL
Qty: 30 TABLET | Refills: 2 | OUTPATIENT
Start: 2023-07-07

## 2023-07-07 NOTE — TELEPHONE ENCOUNTER
E-scribe request from [facility] for Atorvastatin 20 mg, Amlodipine 10 mg, Cilostazol 100 mg and Pantoprazole 20 mg.  Medications were refilled on 6/2723 with 2-3 refills.

## 2023-08-07 DIAGNOSIS — E78.1 HYPERTRIGLYCERIDEMIA: ICD-10-CM

## 2023-08-07 DIAGNOSIS — I10 PRIMARY HYPERTENSION: ICD-10-CM

## 2023-08-08 RX ORDER — AMLODIPINE BESYLATE 10 MG/1
TABLET ORAL
Qty: 30 TABLET | Refills: 2 | Status: SHIPPED | OUTPATIENT
Start: 2023-08-08

## 2023-08-08 RX ORDER — CILOSTAZOL 100 MG/1
TABLET ORAL
Qty: 60 TABLET | Refills: 2 | Status: SHIPPED | OUTPATIENT
Start: 2023-08-08

## 2023-08-08 RX ORDER — PANTOPRAZOLE SODIUM 20 MG/1
TABLET, DELAYED RELEASE ORAL
Qty: 30 TABLET | Refills: 2 | Status: SHIPPED | OUTPATIENT
Start: 2023-08-08

## 2023-08-08 RX ORDER — ATORVASTATIN CALCIUM 20 MG/1
TABLET, FILM COATED ORAL
Qty: 30 TABLET | Refills: 2 | Status: SHIPPED | OUTPATIENT
Start: 2023-08-08

## 2023-10-06 DIAGNOSIS — E78.1 HYPERTRIGLYCERIDEMIA: ICD-10-CM

## 2023-10-06 DIAGNOSIS — I10 PRIMARY HYPERTENSION: ICD-10-CM

## 2023-10-06 NOTE — TELEPHONE ENCOUNTER
Last visit: 6/27/223  Last Med refill: 8/8/23  Does patient have enough medication for 72 hours: No:     Next Visit Date: Patient is on the waitlist for an appt in December. No future appointments.     Health Maintenance   Topic Date Due    Hepatitis B vaccine (1 of 3 - Risk 3-dose series) Never done    Pneumococcal 65+ years Vaccine (2 - PCV) 12/15/2017    Hepatitis A vaccine (2 of 2 - Risk 2-dose series) 11/14/2019    COVID-19 Vaccine (4 - Moderna series) 02/15/2022    Flu vaccine (1) 08/01/2023    Lipids  02/21/2024    Depression Monitoring  06/27/2024    Annual Wellness Visit (AWV)  06/27/2024    Colorectal Cancer Screen  04/22/2025    DTaP/Tdap/Td vaccine (2 - Td or Tdap) 04/11/2029    Shingles vaccine  Completed    AAA screen  Completed    Hepatitis C screen  Completed    Hib vaccine  Aged Out    Meningococcal (ACWY) vaccine  Aged Out    Depression Screen  Discontinued    Pneumococcal 0-64 years Vaccine  Discontinued    HIV screen  Discontinued    Prostate Specific Antigen (PSA) Screening or Monitoring  Discontinued       No results found for: \"LABA1C\"          ( goal A1C is < 7)   No components found for: \"LABMICR\"  LDL Cholesterol (mg/dL)   Date Value   02/21/2023 49   09/07/2022 68       (goal LDL is <100)   AST (U/L)   Date Value   02/21/2023 48 (H)     ALT (U/L)   Date Value   02/21/2023 36     BUN (mg/dL)   Date Value   02/21/2023 9     BP Readings from Last 3 Encounters:   06/27/23 122/68   06/07/23 (!) 142/90   04/21/23 (!) 161/78          (goal 120/80)    All Future Testing planned in [facility]  Lab Frequency Next Occurrence   XR KNEE RIGHT (1-2 VIEWS) Once 06/27/2023               Patient Active Problem List:     Hx of tuberculosis     Alcohol induced acute pancreatitis     HTN (hypertension)     Alcohol abuse     Tobacco abuse     Hypertriglyceridemia     Macrocytic anemia     Cirrhosis, alcoholic (HCC)     Diverticulosis     Internal hemorrhoids     Hypercholesteremia     Amputation finger

## 2023-10-10 RX ORDER — PANTOPRAZOLE SODIUM 20 MG/1
TABLET, DELAYED RELEASE ORAL
Qty: 30 TABLET | Refills: 11 | Status: SHIPPED | OUTPATIENT
Start: 2023-10-10

## 2023-10-10 RX ORDER — AMLODIPINE BESYLATE 10 MG/1
TABLET ORAL
Qty: 30 TABLET | Refills: 11 | Status: SHIPPED | OUTPATIENT
Start: 2023-10-10

## 2023-10-10 RX ORDER — CILOSTAZOL 100 MG/1
TABLET ORAL
Qty: 60 TABLET | Refills: 11 | Status: SHIPPED | OUTPATIENT
Start: 2023-10-10

## 2023-10-10 RX ORDER — ATORVASTATIN CALCIUM 20 MG/1
TABLET, FILM COATED ORAL
Qty: 30 TABLET | Refills: 11 | Status: SHIPPED | OUTPATIENT
Start: 2023-10-10

## 2023-10-11 RX ORDER — CILOSTAZOL 100 MG/1
TABLET ORAL
Qty: 60 TABLET | Refills: 11 | OUTPATIENT
Start: 2023-10-11

## 2024-01-08 ENCOUNTER — HOSPITAL ENCOUNTER (OUTPATIENT)
Dept: GENERAL RADIOLOGY | Age: 69
Discharge: HOME | End: 2024-01-10
Payer: MEDICARE

## 2024-01-08 ENCOUNTER — HOSPITAL ENCOUNTER (OUTPATIENT)
Age: 69
Discharge: HOME | End: 2024-01-10
Payer: MEDICARE

## 2024-01-08 DIAGNOSIS — M25.361 KNEE INSTABILITY, RIGHT: ICD-10-CM

## 2024-01-08 PROCEDURE — 73560 X-RAY EXAM OF KNEE 1 OR 2: CPT

## 2024-01-18 ENCOUNTER — OFFICE VISIT (OUTPATIENT)
Dept: INTERNAL MEDICINE | Age: 69
End: 2024-01-18
Payer: MEDICARE

## 2024-01-18 VITALS
WEIGHT: 153.4 LBS | SYSTOLIC BLOOD PRESSURE: 128 MMHG | DIASTOLIC BLOOD PRESSURE: 70 MMHG | BODY MASS INDEX: 23.25 KG/M2 | HEIGHT: 68 IN | OXYGEN SATURATION: 98 % | HEART RATE: 68 BPM | TEMPERATURE: 98.2 F

## 2024-01-18 DIAGNOSIS — K70.30 ALCOHOLIC CIRRHOSIS OF LIVER WITHOUT ASCITES (HCC): ICD-10-CM

## 2024-01-18 DIAGNOSIS — I70.212 ATHEROSCLEROSIS OF NATIVE ARTERIES OF EXTREMITIES WITH INTERMITTENT CLAUDICATION, LEFT LEG (HCC): ICD-10-CM

## 2024-01-18 DIAGNOSIS — F32.1 CURRENT MODERATE EPISODE OF MAJOR DEPRESSIVE DISORDER WITHOUT PRIOR EPISODE (HCC): ICD-10-CM

## 2024-01-18 DIAGNOSIS — J44.9 CHRONIC OBSTRUCTIVE PULMONARY DISEASE, UNSPECIFIED COPD TYPE (HCC): ICD-10-CM

## 2024-01-18 PROCEDURE — 1123F ACP DISCUSS/DSCN MKR DOCD: CPT | Performed by: INTERNAL MEDICINE

## 2024-01-18 PROCEDURE — 1036F TOBACCO NON-USER: CPT | Performed by: INTERNAL MEDICINE

## 2024-01-18 PROCEDURE — G8427 DOCREV CUR MEDS BY ELIG CLIN: HCPCS | Performed by: INTERNAL MEDICINE

## 2024-01-18 PROCEDURE — G8484 FLU IMMUNIZE NO ADMIN: HCPCS | Performed by: INTERNAL MEDICINE

## 2024-01-18 PROCEDURE — 3074F SYST BP LT 130 MM HG: CPT | Performed by: INTERNAL MEDICINE

## 2024-01-18 PROCEDURE — 3017F COLORECTAL CA SCREEN DOC REV: CPT | Performed by: INTERNAL MEDICINE

## 2024-01-18 PROCEDURE — 3078F DIAST BP <80 MM HG: CPT | Performed by: INTERNAL MEDICINE

## 2024-01-18 PROCEDURE — 99212 OFFICE O/P EST SF 10 MIN: CPT | Performed by: INTERNAL MEDICINE

## 2024-01-18 PROCEDURE — G8420 CALC BMI NORM PARAMETERS: HCPCS | Performed by: INTERNAL MEDICINE

## 2024-01-18 PROCEDURE — 3023F SPIROM DOC REV: CPT | Performed by: INTERNAL MEDICINE

## 2024-01-18 PROCEDURE — 99213 OFFICE O/P EST LOW 20 MIN: CPT | Performed by: INTERNAL MEDICINE

## 2024-01-18 ASSESSMENT — PATIENT HEALTH QUESTIONNAIRE - PHQ9
4. FEELING TIRED OR HAVING LITTLE ENERGY: 3
5. POOR APPETITE OR OVEREATING: 3
SUM OF ALL RESPONSES TO PHQ9 QUESTIONS 1 & 2: 2
SUM OF ALL RESPONSES TO PHQ QUESTIONS 1-9: 11
10. IF YOU CHECKED OFF ANY PROBLEMS, HOW DIFFICULT HAVE THESE PROBLEMS MADE IT FOR YOU TO DO YOUR WORK, TAKE CARE OF THINGS AT HOME, OR GET ALONG WITH OTHER PEOPLE: 0
SUM OF ALL RESPONSES TO PHQ QUESTIONS 1-9: 11
9. THOUGHTS THAT YOU WOULD BE BETTER OFF DEAD, OR OF HURTING YOURSELF: 0
8. MOVING OR SPEAKING SO SLOWLY THAT OTHER PEOPLE COULD HAVE NOTICED. OR THE OPPOSITE, BEING SO FIGETY OR RESTLESS THAT YOU HAVE BEEN MOVING AROUND A LOT MORE THAN USUAL: 0
SUM OF ALL RESPONSES TO PHQ QUESTIONS 1-9: 11
SUM OF ALL RESPONSES TO PHQ QUESTIONS 1-9: 11
3. TROUBLE FALLING OR STAYING ASLEEP: 3
6. FEELING BAD ABOUT YOURSELF - OR THAT YOU ARE A FAILURE OR HAVE LET YOURSELF OR YOUR FAMILY DOWN: 0
7. TROUBLE CONCENTRATING ON THINGS, SUCH AS READING THE NEWSPAPER OR WATCHING TELEVISION: 0
2. FEELING DOWN, DEPRESSED OR HOPELESS: 1
1. LITTLE INTEREST OR PLEASURE IN DOING THINGS: 1

## 2024-01-18 NOTE — PROGRESS NOTES
[facility]   Progress Note        Date of patient's visit: 1/18/2024  Patient's Name:  Silas Shrestha                   YOB: 1955        PCP:  Brina Rizo MD    Silas Shrestha is a 68 y.o. male who presents for   Chief Complaint   Patient presents with    Knee Pain     4 week follow up    and follow up of chronic medical problems.  Patient Active Problem List   Diagnosis    Hx of tuberculosis    Alcohol induced acute pancreatitis    HTN (hypertension)    Alcohol abuse    Tobacco abuse    Hypertriglyceridemia    Macrocytic anemia    Cirrhosis, alcoholic (HCC)    Diverticulosis    Internal hemorrhoids    Hypercholesteremia    Amputation finger    Claudication of left lower extremity (HCC)    Atherosclerosis of native arteries of extremities with intermittent claudication, left leg (HCC)    Chronic obstructive pulmonary disease, unspecified COPD type (HCC)    Major depressive disorder, recurrent, mild       HISTORY OF PRESENT ILLNESS:    History was obtained from the patient.   Subjective:       Silas Shrestha is a 68 y.o. male who presents for follow up of depression. Current symptoms include depressed mood and fatigue. Symptoms have been unchanged since that time. Patient denies impaired memory and recurrent thoughts of death. Previous treatment includes: medication. He complains of the following side effects from the treatment: none.    Continues to have arthralgias due to weather and arthritis. No changes from last eval.  Continues to use his medication and denies any claudication type symptoms in the leg.  He has seen vascular and had a left leg arteriogram with intervention in April 2023.    Continues to be free of alcohol use and denies using tobacco at this time.      Patient's allergies, medications, past medical, surgical, social and family histories were reviewed and updated as appropriate.    ALLERGIES    No Known Allergies      MEDICATIONS:

## 2024-01-23 ENCOUNTER — HOSPITAL ENCOUNTER (EMERGENCY)
Age: 69
Discharge: HOME | End: 2024-01-23
Attending: EMERGENCY MEDICINE
Payer: MEDICARE

## 2024-01-23 VITALS
SYSTOLIC BLOOD PRESSURE: 148 MMHG | TEMPERATURE: 98.5 F | OXYGEN SATURATION: 98 % | RESPIRATION RATE: 18 BRPM | DIASTOLIC BLOOD PRESSURE: 81 MMHG | HEART RATE: 81 BPM

## 2024-01-23 DIAGNOSIS — N30.01 ACUTE CYSTITIS WITH HEMATURIA: Primary | ICD-10-CM

## 2024-01-23 LAB
ALBUMIN SERPL-MCNC: 4 G/DL (ref 3.5–5.2)
ALBUMIN/GLOB SERPL: 1.1 {RATIO} (ref 1–2.5)
ALP SERPL-CCNC: 144 U/L (ref 40–129)
ALT SERPL-CCNC: 27 U/L (ref 5–41)
ANION GAP SERPL CALCULATED.3IONS-SCNC: 9 MMOL/L (ref 9–17)
AST SERPL-CCNC: 39 U/L
BACTERIA URNS QL MICRO: NORMAL
BASOPHILS # BLD: 0.03 K/UL (ref 0–0.2)
BASOPHILS NFR BLD: 0 % (ref 0–2)
BILIRUB DIRECT SERPL-MCNC: 0.6 MG/DL
BILIRUB INDIRECT SERPL-MCNC: 0.6 MG/DL (ref 0–1)
BILIRUB SERPL-MCNC: 1.2 MG/DL (ref 0.3–1.2)
BILIRUB UR QL STRIP: ABNORMAL
BUN SERPL-MCNC: 5 MG/DL (ref 8–23)
CALCIUM SERPL-MCNC: 9.1 MG/DL (ref 8.6–10.4)
CASTS #/AREA URNS LPF: NORMAL /LPF (ref 0–8)
CHLORIDE SERPL-SCNC: 104 MMOL/L (ref 98–107)
CLARITY UR: CLEAR
CO2 SERPL-SCNC: 27 MMOL/L (ref 20–31)
COLOR UR: ABNORMAL
CREAT SERPL-MCNC: 0.7 MG/DL (ref 0.7–1.2)
EOSINOPHIL # BLD: 0.11 K/UL (ref 0–0.44)
EOSINOPHILS RELATIVE PERCENT: 2 % (ref 1–4)
EPI CELLS #/AREA URNS HPF: NORMAL /HPF (ref 0–5)
ERYTHROCYTE [DISTWIDTH] IN BLOOD BY AUTOMATED COUNT: 12.6 % (ref 11.8–14.4)
GFR SERPL CREATININE-BSD FRML MDRD: >60 ML/MIN/1.73M2
GLUCOSE SERPL-MCNC: 99 MG/DL (ref 70–99)
GLUCOSE UR STRIP-MCNC: NEGATIVE MG/DL
HCT VFR BLD AUTO: 44.2 % (ref 40.7–50.3)
HGB BLD-MCNC: 14.9 G/DL (ref 13–17)
HGB UR QL STRIP.AUTO: NEGATIVE
IMM GRANULOCYTES # BLD AUTO: <0.03 K/UL (ref 0–0.3)
IMM GRANULOCYTES NFR BLD: 0 %
KETONES UR STRIP-MCNC: NEGATIVE MG/DL
LEUKOCYTE ESTERASE UR QL STRIP: ABNORMAL
LYMPHOCYTES NFR BLD: 2.52 K/UL (ref 1.1–3.7)
LYMPHOCYTES RELATIVE PERCENT: 37 % (ref 24–43)
MCH RBC QN AUTO: 35.7 PG (ref 25.2–33.5)
MCHC RBC AUTO-ENTMCNC: 33.7 G/DL (ref 28.4–34.8)
MCV RBC AUTO: 106 FL (ref 82.6–102.9)
MONOCYTES NFR BLD: 0.45 K/UL (ref 0.1–1.2)
MONOCYTES NFR BLD: 7 % (ref 3–12)
NEUTROPHILS NFR BLD: 54 % (ref 36–65)
NEUTS SEG NFR BLD: 3.7 K/UL (ref 1.5–8.1)
NITRITE UR QL STRIP: NEGATIVE
NRBC BLD-RTO: 0 PER 100 WBC
PH UR STRIP: 7 [PH] (ref 5–8)
PLATELET # BLD AUTO: 195 K/UL (ref 138–453)
PMV BLD AUTO: 9.9 FL (ref 8.1–13.5)
POTASSIUM SERPL-SCNC: 3.9 MMOL/L (ref 3.7–5.3)
PROT SERPL-MCNC: 7.8 G/DL (ref 6.4–8.3)
PROT UR STRIP-MCNC: ABNORMAL MG/DL
RBC # BLD AUTO: 4.17 M/UL (ref 4.21–5.77)
RBC # BLD: ABNORMAL 10*6/UL
RBC #/AREA URNS HPF: NORMAL /HPF (ref 0–4)
SODIUM SERPL-SCNC: 140 MMOL/L (ref 135–144)
SP GR UR STRIP: 1.02 (ref 1–1.03)
UROBILINOGEN UR STRIP-ACNC: ABNORMAL EU/DL (ref 0–1)
WBC #/AREA URNS HPF: NORMAL /HPF (ref 0–5)
WBC OTHER # BLD: 6.8 K/UL (ref 3.5–11.3)

## 2024-01-23 PROCEDURE — 85025 COMPLETE CBC W/AUTO DIFF WBC: CPT

## 2024-01-23 PROCEDURE — 80076 HEPATIC FUNCTION PANEL: CPT

## 2024-01-23 PROCEDURE — 80048 BASIC METABOLIC PNL TOTAL CA: CPT

## 2024-01-23 PROCEDURE — 81001 URINALYSIS AUTO W/SCOPE: CPT

## 2024-01-23 PROCEDURE — 99283 EMERGENCY DEPT VISIT LOW MDM: CPT

## 2024-01-23 RX ORDER — CEPHALEXIN 500 MG/1
500 CAPSULE ORAL 4 TIMES DAILY
Qty: 28 CAPSULE | Refills: 0 | Status: SHIPPED | OUTPATIENT
Start: 2024-01-23 | End: 2024-01-30

## 2024-01-23 ASSESSMENT — ENCOUNTER SYMPTOMS
VOMITING: 0
BLOOD IN STOOL: 0
WHEEZING: 0
COUGH: 0
SHORTNESS OF BREATH: 0
ABDOMINAL PAIN: 0

## 2024-01-23 NOTE — ED PROVIDER NOTES
[facility] ED  Emergency Department Encounter  Emergency Medicine Resident     Pt Name:Silas Shrestha  MRN: [id number]  Birthdate 1955  Date of evaluation: 1/23/24  PCP:  Brina Rizo MD  Note Started: 4:23 PM EST      CHIEF COMPLAINT       Chief Complaint   Patient presents with    Foot Pain     Denies any injury    Flank Pain     Hx of cirrhosis     Hematuria       HISTORY OF PRESENT ILLNESS  (Location/Symptom, Timing/Onset, Context/Setting, Quality, Duration, Modifying Factors, Severity.)      Slias Shrestha is a 68 y.o. male who presents with right flank pain for the past week as well as painless hematuria.  Patient states he does have a history of alcoholic cirrhosis, states he has not had any alcohol to drink in the past 5 years, denies any abdominal pain, denies fever/chills.  He denies any trauma or falls.  He denies any back pain.  He states he is compliant with his medications.  He denies any chest pain or shortness of breath.  He denies any testicular tenderness or scrotal edema, denies any urethral discharge, denies any penile swelling or pain.  Patient does endorse stress incontinence, states that this has been ongoing for months.  He denies previous similar episodes in the past.    Patient is also complaining of left hallux wound.  Patient does have a history of intermittent claudication of the legs, does have history of previous arteriogram with intervention by Dr. Delos Reyes.  Denying any injury to the foot.  Denies any falls.  States that he is meant to follow-up with further vascular intervention however was asked to follow-up with a cardiologist first.    PAST MEDICAL / SURGICAL / SOCIAL / FAMILY HISTORY      has a past medical history of Alcoholism (HCC), Cirrhosis of liver (HCC), Claudication (HCC), Finger fracture, left, Hx of blood clots, Hyperlipidemia, Hypertension, Liver disease, PAD (peripheral artery disease) (HCC), PVD (peripheral vascular disease) 
duskiness no pallor.  Cardiac exam regular rate and rhythm no murmurs rubs gallops, pulmonary clear bilaterally abdomen is minimally distended soft nontender no rebound no guarding.  Impression flank pain and hematuria, separately chronic peripheral arterial disease with claudication.  Will check abdominal labs and urine.  Needs follow-up with vascular/cardiology as scheduled for restratification and operative management.  Doppler signal is normal, would not    Amount and/or Complexity of Data Reviewed  External Data Reviewed: labs and notes.  Labs: ordered. Decision-making details documented in ED Course.    Risk  Prescription drug management.        Oc Robles MD, FACEP, FAAEM  Attending Emergency Physician        Oc Robles MD  01/23/24 0713

## 2024-01-23 NOTE — ED TRIAGE NOTES
67 yo male arrived to ED with c/o right sided flank pain and hematuria over the last 1-2 weeks. Patient states he has a history of cirrhosis of the liver. Patient is alert and oriented times 4, speaking full sentences, and answering questions appropriately.    Dr Cleveland at bedside

## 2024-01-23 NOTE — DISCHARGE INSTRUCTIONS
You were seen in the emergency department for right flank pain and blood in the urine.  There is evidence of a urinary tract infection.  You are being discharged on a course of antibiotics.  Take this 4 times daily for the next 7 days.  There is no evidence of worsening liver inflammation at this time.    If this continues to recur, schedule appointment to be seen by the urologist at the number below.    You do have a chronic wound, this is likely due to poor circulation in the left foot.  Ensure you schedule appointment to be seen by the vascular surgeon as soon as possible for follow-up.  There is no evidence of acute infection at this time.    Return to the emergency room if you have any worsening abdominal pain, fever/chills, difficulty urinating, difficulty walking, new numbness or weakness, or if you have any other acute concern.

## 2024-01-23 NOTE — ED NOTES
The following labs were labeled with appropriate pt sticker and tubed to lab:     [x] Blue     [x] Lavender   [] on ice  [x] Green/yellow  [x] Green/black [] on ice  [] Arredondo  [] on ice  [x] Yellow  [] Red  [] Pink  [] Type/ Screen  [] ABG  [] VBG    [] COVID-19 swab    [] Rapid  [] PCR  [] Flu swab  [] Peds Viral Panel     [] Urine Sample  [] Fecal Sample  [] Pelvic Cultures  [] Blood Cultures  [] X 2  [] STREP Cultures  [] Wound Cultures

## 2024-02-07 ENCOUNTER — OFFICE VISIT (OUTPATIENT)
Age: 69
End: 2024-02-07
Payer: MEDICARE

## 2024-02-07 VITALS
TEMPERATURE: 99 F | RESPIRATION RATE: 18 BRPM | DIASTOLIC BLOOD PRESSURE: 68 MMHG | OXYGEN SATURATION: 98 % | SYSTOLIC BLOOD PRESSURE: 118 MMHG | BODY MASS INDEX: 23.49 KG/M2 | HEART RATE: 67 BPM | HEIGHT: 68 IN | WEIGHT: 155 LBS

## 2024-02-07 DIAGNOSIS — I70.212 ATHEROSCLEROSIS OF NATIVE ARTERIES OF EXTREMITIES WITH INTERMITTENT CLAUDICATION, LEFT LEG (HCC): ICD-10-CM

## 2024-02-07 DIAGNOSIS — I70.222 ATHEROSCLEROSIS OF NATIVE ARTERIES OF EXTREMITIES WITH REST PAIN, LEFT LEG (HCC): Primary | ICD-10-CM

## 2024-02-07 PROCEDURE — 1123F ACP DISCUSS/DSCN MKR DOCD: CPT | Performed by: SURGERY

## 2024-02-07 PROCEDURE — G8427 DOCREV CUR MEDS BY ELIG CLIN: HCPCS | Performed by: SURGERY

## 2024-02-07 PROCEDURE — 3078F DIAST BP <80 MM HG: CPT | Performed by: SURGERY

## 2024-02-07 PROCEDURE — G8484 FLU IMMUNIZE NO ADMIN: HCPCS | Performed by: SURGERY

## 2024-02-07 PROCEDURE — 1036F TOBACCO NON-USER: CPT | Performed by: SURGERY

## 2024-02-07 PROCEDURE — 3074F SYST BP LT 130 MM HG: CPT | Performed by: SURGERY

## 2024-02-07 PROCEDURE — G8420 CALC BMI NORM PARAMETERS: HCPCS | Performed by: SURGERY

## 2024-02-07 PROCEDURE — 99214 OFFICE O/P EST MOD 30 MIN: CPT | Performed by: SURGERY

## 2024-02-07 PROCEDURE — 3017F COLORECTAL CA SCREEN DOC REV: CPT | Performed by: SURGERY

## 2024-02-07 NOTE — PROGRESS NOTES
(PLETAL) 100 MG tablet, TAKE ONE TABLET BY MOUTH TWICE DAILY @ 9AM-5PM, Disp: 60 tablet, Rfl: 11    atorvastatin (LIPITOR) 20 MG tablet, TAKE ONE TABLET BY MOUTH DAILY AT 5PM, Disp: 30 tablet, Rfl: 11    amLODIPine (NORVASC) 10 MG tablet, TAKE ONE TABLET BY MOUTH DAILY AT 9AM, Disp: 30 tablet, Rfl: 11    diclofenac sodium (VOLTAREN) 1 % GEL, Apply 2 g topically 4 times daily, Disp: 50 g, Rfl: 3    meloxicam (MOBIC) 7.5 MG tablet, Take 1 tablet by mouth daily (Patient not taking: Reported on 1/18/2024), Disp: 30 tablet, Rfl: 3    Vital Signs:  Vitals:    02/07/24 0949   Temp: 99 °F (37.2 °C)       Physical Exam:  General appearance - alert, well appearing and in no acute distress  Mental status - oriented to person, place and time with normal affect  Head - normocephalic and atraumatic  Eyes - pupils equal and reactive, extraocular eye movements intact, conjunctiva clear  Ears - hearing appears to be intact  Nose - no drainage noted  Mouth - mucous membranes moist  Neck - supple, no carotid bruits, thyroid not palpable, no JVD  Chest - clear to auscultation, normal effort  Heart - normal rate, regular rhythm, no murmurs  Abdomen - soft, non-tender, non-distended, bowel sounds present all four quadrants, no masses, hepatomegaly, splenomegaly or aortic enlargement  Neurological - normal speech, no focal findings or movement disorder noted, cranial nerves II through XII grossly intact  Extremities -Doppler signal in the feet.  No ulceration.  Left great toe is swollen and tender to palpation.  No open ulceration or wound.    Skin - no gross lesions, rashes, or induration noted      R brachial 2+ L brachial 2+   R radial 2+ L radial 2+   R femoral 2+ L femoral 2+   R popliteal 1+ L popliteal 0+   R posterior tibial 1+ L posterior tibial Doppler signal +   R dorsalis pedis 1+ L dorsalis pedis Doppler signal +     Labs:   Lab Results   Component Value Date/Time    WBC 6.8 01/23/2024 03:53 PM    HGB 14.9 01/23/2024 03:53 PM

## 2024-02-12 ENCOUNTER — TELEPHONE (OUTPATIENT)
Dept: VASCULAR SURGERY | Age: 69
End: 2024-02-12

## 2024-02-12 NOTE — TELEPHONE ENCOUNTER
Tried to call patient last Thursday - left message and again today and left message for him to call to schedule surgery.

## 2024-02-13 ENCOUNTER — HOSPITAL ENCOUNTER (OUTPATIENT)
Age: 69
Setting detail: SPECIMEN
Discharge: HOME | End: 2024-02-13

## 2024-02-13 DIAGNOSIS — K70.30 ALCOHOLIC CIRRHOSIS OF LIVER WITHOUT ASCITES (HCC): ICD-10-CM

## 2024-02-13 LAB
ALBUMIN SERPL-MCNC: 3.9 G/DL (ref 3.5–5.2)
ALBUMIN/GLOB SERPL: 1 {RATIO} (ref 1–2.5)
ALP SERPL-CCNC: 117 U/L (ref 40–129)
ALT SERPL-CCNC: 32 U/L (ref 10–50)
AST SERPL-CCNC: 49 U/L (ref 10–50)
BILIRUB DIRECT SERPL-MCNC: 0.4 MG/DL (ref 0–0.3)
BILIRUB INDIRECT SERPL-MCNC: 0.4 MG/DL (ref 0–1)
BILIRUB SERPL-MCNC: 0.8 MG/DL (ref 0–1.2)
GLOBULIN SER CALC-MCNC: 3.4 G/DL
PROT SERPL-MCNC: 7.3 G/DL (ref 6.6–8.7)

## 2024-02-14 NOTE — TELEPHONE ENCOUNTER
Spoke to patient - scheduled for angiogram Friday 2/23/2024 10:00 am - arrive 8:30 am - patient aware of NPO / arrival time, surgery time / location

## 2024-02-24 ENCOUNTER — HOSPITAL ENCOUNTER (EMERGENCY)
Age: 69
Discharge: HOME | End: 2024-02-24
Attending: EMERGENCY MEDICINE
Payer: MEDICARE

## 2024-02-24 ENCOUNTER — APPOINTMENT (OUTPATIENT)
Dept: GENERAL RADIOLOGY | Age: 69
End: 2024-02-24
Payer: MEDICARE

## 2024-02-24 VITALS
WEIGHT: 154.76 LBS | TEMPERATURE: 97.3 F | OXYGEN SATURATION: 97 % | BODY MASS INDEX: 23.88 KG/M2 | RESPIRATION RATE: 18 BRPM | HEART RATE: 64 BPM | SYSTOLIC BLOOD PRESSURE: 155 MMHG | DIASTOLIC BLOOD PRESSURE: 74 MMHG

## 2024-02-24 DIAGNOSIS — M79.675 GREAT TOE PAIN, LEFT: Primary | ICD-10-CM

## 2024-02-24 PROCEDURE — 73630 X-RAY EXAM OF FOOT: CPT

## 2024-02-24 PROCEDURE — 99283 EMERGENCY DEPT VISIT LOW MDM: CPT

## 2024-02-24 PROCEDURE — 6370000000 HC RX 637 (ALT 250 FOR IP): Performed by: STUDENT IN AN ORGANIZED HEALTH CARE EDUCATION/TRAINING PROGRAM

## 2024-02-24 RX ORDER — ACETAMINOPHEN 500 MG
1000 TABLET ORAL EVERY 6 HOURS PRN
Qty: 56 TABLET | Refills: 0 | Status: SHIPPED | OUTPATIENT
Start: 2024-02-24 | End: 2024-03-02

## 2024-02-24 RX ORDER — IBUPROFEN 800 MG/1
800 TABLET ORAL ONCE
Status: COMPLETED | OUTPATIENT
Start: 2024-02-24 | End: 2024-02-24

## 2024-02-24 RX ORDER — IBUPROFEN 400 MG/1
400 TABLET ORAL EVERY 8 HOURS PRN
Qty: 21 TABLET | Refills: 0 | Status: SHIPPED | OUTPATIENT
Start: 2024-02-24 | End: 2024-03-02

## 2024-02-24 RX ORDER — ACETAMINOPHEN 500 MG
1000 TABLET ORAL ONCE
Status: COMPLETED | OUTPATIENT
Start: 2024-02-24 | End: 2024-02-24

## 2024-02-24 RX ADMIN — IBUPROFEN 800 MG: 800 TABLET, FILM COATED ORAL at 15:12

## 2024-02-24 RX ADMIN — ACETAMINOPHEN 1000 MG: 500 TABLET ORAL at 15:12

## 2024-02-24 ASSESSMENT — LIFESTYLE VARIABLES: HOW OFTEN DO YOU HAVE A DRINK CONTAINING ALCOHOL: NEVER

## 2024-02-24 ASSESSMENT — PAIN DESCRIPTION - DESCRIPTORS: DESCRIPTORS: DISCOMFORT

## 2024-02-24 ASSESSMENT — PAIN DESCRIPTION - ORIENTATION: ORIENTATION: LEFT

## 2024-02-24 ASSESSMENT — PAIN SCALES - GENERAL: PAINLEVEL_OUTOF10: 9

## 2024-02-24 ASSESSMENT — PAIN DESCRIPTION - LOCATION: LOCATION: TOE (COMMENT WHICH ONE)

## 2024-02-24 NOTE — ED NOTES
Pt sts he was seen 2 weeks ago for this issue  Pt sts he was unable to get into see doctor for f/u  Left great tow with nail bed appears loose pt reported creamy drainage from toe

## 2024-02-24 NOTE — ED PROVIDER NOTES
Tender to palpation over the left medial distal aspect of the left great toe.  Neurovascularly intact.  Concern for osseous abnormality versus gout.  Will obtain x-ray.  Will treat symptoms.  Will reevaluate.    Amount and/or Complexity of Data Reviewed  Radiology: ordered. Decision-making details documented in ED Course.    Risk  OTC drugs.  Prescription drug management.      EMERGENCY DEPARTMENT COURSE:    ED Course as of 02/25/24 0002   Sat Feb 24, 2024   1546 XR FOOT LEFT (MIN 3 VIEWS) [AR]      ED Course User Index  [AR] Pepe Conte DO     Upon reevaluation patient resting comfortably, no acute distress.  Encouraged patient to follow-up with podiatrist.  Encouraged patient to follow-up with PCP.  Given return precautions.  Patient given prescriptions for Tylenol and ibuprofen.  Patient discharged home.  Patient understands and agrees with the plan.    FINAL IMPRESSION      1. Great toe pain, left          DISPOSITION / PLAN     DISPOSITION Decision To Discharge 02/24/2024 03:49:19 PM      PATIENT REFERRED TO:  Brina Rizo MD  [address] [place] [address] [id number] [phone]    Schedule an appointment as soon as possible for a visit   As needed    [facility] ED  [address] [facility] [id number] [phone]  Go to   If symptoms worsen    [facility]  [address] [place] [address]  Acc [address]  [facility] [address] [phone]  Schedule an appointment as soon as possible for a visit         DISCHARGE MEDICATIONS:  Discharge Medication List as of 2/24/2024  3:51 PM        START taking these medications    Details   acetaminophen (TYLENOL) 500 MG tablet Take 2 tablets by mouth every 6 hours as needed for Pain, Disp-56 tablet, R-0Print      ibuprofen (ADVIL;MOTRIN) 400 MG tablet Take 1 tablet by mouth every 8 hours as needed for Pain, Disp-21 tablet, R-0Print             Pepe Conte DO  Emergency Medicine Resident    (Please note that portions of this note were  completed with a voice recognition program.  Efforts were made to edit the dictations but occasionally words are mis-transcribed.)

## 2024-02-24 NOTE — ED PROVIDER NOTES
[facility] ED     Emergency Department     Faculty Attestation        I performed a history and physical examination of the patient and discussed management with the resident. I reviewed the resident’s note and agree with the documented findings and plan of care. Any areas of disagreement are noted on the chart. I was personally present for the key portions of any procedures. I have documented in the chart those procedures where I was not present during the key portions. I have reviewed the emergency nurses triage note. I agree with the chief complaint, past medical history, past surgical history, allergies, medications, social and family history as documented unless otherwise noted below.    For mid-level providers such as nurse practitioners as well as physicians assistants:    I have personally seen and evaluated the patient.    I find the patient's history and physical exam are consistent with NP/PA documentation.  I agree with the care provided, treatment rendered, disposition, & follow-up plan.     Additional findings are as noted.    Vital Signs: BP (!) 155/74   Pulse 64   Temp 97.3 °F (36.3 °C) (Oral)   Resp 18   Wt 70.2 kg (154 lb 12.2 oz)   SpO2 97%   BMI 23.88 kg/m²   PCP:  Brina Rizo MD    Pertinent Comments:           Critical Care  None          Kavon West MD    Attending Emergency Medicine Physician            Mode West MD  02/24/24 0734

## 2024-02-24 NOTE — DISCHARGE INSTRUCTIONS
You were evaluated in the emergency department for left big toe pain.  X-ray did not reveal any acute abnormalities.  You were given prescriptions for Tylenol and ibuprofen.  Please take these medications as prescribed.  Please follow-up with podiatry as soon as possible.  Please follow-up with your primary care physician.  Please return to the emergency department for any worsening symptoms, questions or concerns.

## 2024-02-26 ENCOUNTER — OFFICE VISIT (OUTPATIENT)
Dept: PODIATRY | Age: 69
End: 2024-02-26
Payer: MEDICARE

## 2024-02-26 VITALS
BODY MASS INDEX: 23.49 KG/M2 | SYSTOLIC BLOOD PRESSURE: 159 MMHG | HEIGHT: 68 IN | DIASTOLIC BLOOD PRESSURE: 85 MMHG | HEART RATE: 62 BPM | WEIGHT: 155 LBS

## 2024-02-26 DIAGNOSIS — I73.9 PAD (PERIPHERAL ARTERY DISEASE) (HCC): ICD-10-CM

## 2024-02-26 DIAGNOSIS — M79.675 GREAT TOE PAIN, LEFT: Primary | ICD-10-CM

## 2024-02-26 PROCEDURE — G8420 CALC BMI NORM PARAMETERS: HCPCS | Performed by: PODIATRIST

## 2024-02-26 PROCEDURE — 99203 OFFICE O/P NEW LOW 30 MIN: CPT | Performed by: PODIATRIST

## 2024-02-26 PROCEDURE — 3079F DIAST BP 80-89 MM HG: CPT | Performed by: PODIATRIST

## 2024-02-26 PROCEDURE — 1036F TOBACCO NON-USER: CPT | Performed by: PODIATRIST

## 2024-02-26 PROCEDURE — 3077F SYST BP >= 140 MM HG: CPT | Performed by: PODIATRIST

## 2024-02-26 PROCEDURE — G8427 DOCREV CUR MEDS BY ELIG CLIN: HCPCS | Performed by: PODIATRIST

## 2024-02-26 PROCEDURE — 3017F COLORECTAL CA SCREEN DOC REV: CPT | Performed by: PODIATRIST

## 2024-02-26 PROCEDURE — 99204 OFFICE O/P NEW MOD 45 MIN: CPT | Performed by: PODIATRIST

## 2024-02-26 PROCEDURE — 1123F ACP DISCUSS/DSCN MKR DOCD: CPT | Performed by: PODIATRIST

## 2024-02-26 PROCEDURE — G8484 FLU IMMUNIZE NO ADMIN: HCPCS | Performed by: PODIATRIST

## 2024-02-26 NOTE — PROGRESS NOTES
Patient instructed to remove shoes and socks and instructed to sit in exam chair.  Current PCP is Brina Rizo MD and date of last visit was 12/21/2023.   Do you have a follow up visit scheduled?  No  If yes, the date is

## 2024-02-27 NOTE — PROGRESS NOTES
[facility]  [address] [address] [facility] [address].   [address] [place], [place] [address]  Tel: [phone]  Fax: [phone]    Subjective     CC: left big toe pain    HPI:  Silas Shrestha is a 68 y.o. year old male who presents to clinic today for left big toe pain. Patient reports he might have stumped his toe about 2 weeks ago and it has been painful since then. Report there was some drainage on the tip of his big toe. He then went to the ED and was sent home. He has history of PAD and he is seeing Dr. Delos Reyes for vascular intervention. He reports an angiogram was done before but no angioplasty was performed because it is too occluded. He is scheduled for a bypass procedure for his LLE soon.  He admits intermittent claudication and rest pain of his LLE. Otherwise he denies other pedal complaints.   Primary care physician is Brina Rizo MD.    ROS:    Constitutional: Denies nausea, vomiting, fever, chills.  Neurologic: Denies numbness, tingling, and burning in the feet.    Vascular: admits symptoms of lower extremity claudication.    Skin: Denies open wounds.  Otherwise negative except as noted in the HPI.     PMH:  Past Medical History:   Diagnosis Date    Alcoholism (HCC)     Cirrhosis of liver (HCC)     Claudication (HCC)     left leg    Finger fracture, left     LONG FINGER    Hx of blood clots     LEFT LOWER LEG UNSURE OF THIS AT THIS TIME, IS TO BE SEEING VASCULAR DR FOR THIS.     Hyperlipidemia     Hypertension     Liver disease     PAD (peripheral artery disease) (HCC)     PVD (peripheral vascular disease) (HCC)     Wears dentures     FULL UPPER AND LOWER    Wears glasses        Surgical History:   Past Surgical History:   Procedure Laterality Date    FINGER AMPUTATION Left 11/28/2016    revision long finger    LIVER BIOPSY      OTHER SURGICAL HISTORY  02/14/2017    Abdomen with run off with Dr. Smith - could not pass left left pop; # 7 FR manual pull right groin    OTHER SURGICAL HISTORY  04/21/2023

## 2024-03-12 ENCOUNTER — TELEPHONE (OUTPATIENT)
Dept: VASCULAR SURGERY | Age: 69
End: 2024-03-12

## 2024-03-12 DIAGNOSIS — I70.213 ATHEROSCLEROSIS OF NATIVE ARTERY OF BOTH LOWER EXTREMITIES WITH INTERMITTENT CLAUDICATION (HCC): ICD-10-CM

## 2024-03-12 DIAGNOSIS — Z01.818 PREOP EXAMINATION: Primary | ICD-10-CM

## 2024-03-12 NOTE — TELEPHONE ENCOUNTER
Called and spoke to patient.  He did not show for his last angiogram and I have not been able to reach him.  I advised him that we need to do the angiogram, he needs to see cardiology (who has been trying to get a hold of him) so we can do a bypass  surgery.  His reasoning has been that he has been having issues with his toe and has been seeing podiatry.  I explained to him that I understand that, and can see podiatry notes.  I advised him that podiatry advised him to follow up with vascular as well.  He seemed confused as who was who.  I tried to explain that I am with his vascular doctor (Dr. Delos Reyes) and he needs to follow our plan along with podiatry so we can help him.  He seemed to understand and rescheduled his angiogram for Thursday 3/21/2024 11:30 am - arrive at 10:00 am.  He told me he would show up, and I mailed paperwork to him to remind him (I verified his address with him)  I then transferred him to cardiology so he can schedule that appointment so we can get him scheduled for his bypass surgery.

## 2024-03-18 ENCOUNTER — HOSPITAL ENCOUNTER (OUTPATIENT)
Dept: VASCULAR LAB | Age: 69
Discharge: HOME | End: 2024-03-20
Attending: SURGERY
Payer: MEDICARE

## 2024-03-18 DIAGNOSIS — Z01.818 PREOP EXAMINATION: ICD-10-CM

## 2024-03-18 DIAGNOSIS — I70.213 ATHEROSCLEROSIS OF NATIVE ARTERY OF BOTH LOWER EXTREMITIES WITH INTERMITTENT CLAUDICATION (HCC): ICD-10-CM

## 2024-03-18 LAB
VAS LEFT GSV ANKLE DIAM: 1.9 MM
VAS LEFT GSV AT KNEE DIAM: 2 MM
VAS LEFT GSV BK DIST DIAM: 2.1 MM
VAS LEFT GSV BK PROX DIAM: 1.9 MM
VAS LEFT GSV JUNC DIAM: 4.6 MM
VAS LEFT GSV THIGH MID DIAM: 2.1 MM
VAS LEFT SSV DIST DIAM: 2 MM
VAS LEFT SSV MID DIAM: 2.1 MM
VAS LEFT SSV PROX DIAM: 3.3 MM
VAS RIGHT GSV ANKLE DIAM: 1.2 MM
VAS RIGHT GSV AT KNEE DIAM: 1.7 MM
VAS RIGHT GSV BK DIST DIAM: 1.9 MM
VAS RIGHT GSV BK PROX DIAM: 1.8 MM
VAS RIGHT GSV JUNC DIAM: 5.2 MM
VAS RIGHT GSV THIGH MID DIAM: 1.5 MM
VAS RIGHT SSV DIST DIAM: 3 MM
VAS RIGHT SSV MID DIAM: 2.4 MM
VAS RIGHT SSV PROX DIAM: 2.9 MM

## 2024-03-18 PROCEDURE — 93971 EXTREMITY STUDY: CPT | Performed by: SURGERY

## 2024-03-18 PROCEDURE — 93970 EXTREMITY STUDY: CPT

## 2024-03-21 ENCOUNTER — OFFICE VISIT (OUTPATIENT)
Dept: OPERATING ROOM | Age: 69
End: 2024-03-21
Attending: SURGERY

## 2024-03-21 ENCOUNTER — HOSPITAL ENCOUNTER (OUTPATIENT)
Age: 69
Setting detail: OUTPATIENT SURGERY
Discharge: HOME | End: 2024-03-21
Attending: SURGERY | Admitting: SURGERY
Payer: MEDICARE

## 2024-03-21 VITALS
TEMPERATURE: 97.5 F | DIASTOLIC BLOOD PRESSURE: 66 MMHG | HEART RATE: 63 BPM | SYSTOLIC BLOOD PRESSURE: 160 MMHG | OXYGEN SATURATION: 96 %

## 2024-03-21 DIAGNOSIS — I70.222 ATHEROSCLEROSIS OF NATIVE ARTERIES OF EXTREMITIES WITH REST PAIN, LEFT LEG (HCC): Primary | ICD-10-CM

## 2024-03-21 LAB
BUN BLD-MCNC: 8 MG/DL (ref 8–26)
CHLORIDE BLD-SCNC: 112 MMOL/L (ref 98–107)
EGFR, POC: >60 ML/MIN/1.73M2
GLUCOSE BLD-MCNC: 95 MG/DL (ref 74–100)
HCT VFR BLD AUTO: 42 % (ref 41–53)
POC CREATININE: 0.7 MG/DL (ref 0.51–1.19)
POC HEMOGLOBIN (CALC): 14.3 G/DL (ref 13.5–17.5)
POTASSIUM BLD-SCNC: 3.7 MMOL/L (ref 3.5–4.5)
SODIUM BLD-SCNC: 144 MMOL/L (ref 138–146)

## 2024-03-21 PROCEDURE — A4217 STERILE WATER/SALINE, 500 ML: HCPCS | Performed by: SURGERY

## 2024-03-21 PROCEDURE — 6360000004 HC RX CONTRAST MEDICATION: Performed by: SURGERY

## 2024-03-21 PROCEDURE — 84132 ASSAY OF SERUM POTASSIUM: CPT

## 2024-03-21 PROCEDURE — C1894 INTRO/SHEATH, NON-LASER: HCPCS | Performed by: SURGERY

## 2024-03-21 PROCEDURE — 84295 ASSAY OF SERUM SODIUM: CPT

## 2024-03-21 PROCEDURE — 82947 ASSAY GLUCOSE BLOOD QUANT: CPT

## 2024-03-21 PROCEDURE — 99152 MOD SED SAME PHYS/QHP 5/>YRS: CPT | Performed by: SURGERY

## 2024-03-21 PROCEDURE — 3600000007 HC SURGERY HYBRID BASE: Performed by: SURGERY

## 2024-03-21 PROCEDURE — C1892 INTRO/SHEATH,FIXED,PEEL-AWAY: HCPCS | Performed by: SURGERY

## 2024-03-21 PROCEDURE — 2709999900 HC NON-CHARGEABLE SUPPLY: Performed by: SURGERY

## 2024-03-21 PROCEDURE — 6360000002 HC RX W HCPCS: Performed by: SURGERY

## 2024-03-21 PROCEDURE — 84520 ASSAY OF UREA NITROGEN: CPT

## 2024-03-21 PROCEDURE — 99153 MOD SED SAME PHYS/QHP EA: CPT | Performed by: SURGERY

## 2024-03-21 PROCEDURE — 85014 HEMATOCRIT: CPT

## 2024-03-21 PROCEDURE — 6370000000 HC RX 637 (ALT 250 FOR IP): Performed by: SURGERY

## 2024-03-21 PROCEDURE — 2500000003 HC RX 250 WO HCPCS: Performed by: SURGERY

## 2024-03-21 PROCEDURE — 82565 ASSAY OF CREATININE: CPT

## 2024-03-21 PROCEDURE — 7100000010 HC PHASE II RECOVERY - FIRST 15 MIN: Performed by: SURGERY

## 2024-03-21 PROCEDURE — C1760 CLOSURE DEV, VASC: HCPCS | Performed by: SURGERY

## 2024-03-21 PROCEDURE — 82435 ASSAY OF BLOOD CHLORIDE: CPT

## 2024-03-21 PROCEDURE — 75710 ARTERY X-RAYS ARM/LEG: CPT | Performed by: SURGERY

## 2024-03-21 PROCEDURE — C1887 CATHETER, GUIDING: HCPCS | Performed by: SURGERY

## 2024-03-21 PROCEDURE — 3600000017 HC SURGERY HYBRID ADDL 15MIN: Performed by: SURGERY

## 2024-03-21 PROCEDURE — 36247 INS CATH ABD/L-EXT ART 3RD: CPT | Performed by: SURGERY

## 2024-03-21 PROCEDURE — 2580000003 HC RX 258: Performed by: SURGERY

## 2024-03-21 PROCEDURE — 7100000011 HC PHASE II RECOVERY - ADDTL 15 MIN: Performed by: SURGERY

## 2024-03-21 RX ORDER — IODIXANOL 320 MG/ML
INJECTION, SOLUTION INTRAVASCULAR PRN
Status: DISCONTINUED | OUTPATIENT
Start: 2024-03-21 | End: 2024-03-21 | Stop reason: ALTCHOICE

## 2024-03-21 RX ORDER — HEPARIN SODIUM 1000 [USP'U]/ML
INJECTION, SOLUTION INTRAVENOUS; SUBCUTANEOUS PRN
Status: DISCONTINUED | OUTPATIENT
Start: 2024-03-21 | End: 2024-03-21 | Stop reason: ALTCHOICE

## 2024-03-21 RX ORDER — SODIUM CHLORIDE 9 MG/ML
INJECTION, SOLUTION INTRAVENOUS CONTINUOUS
Status: DISCONTINUED | OUTPATIENT
Start: 2024-03-21 | End: 2024-03-21 | Stop reason: HOSPADM

## 2024-03-21 RX ORDER — LIDOCAINE HYDROCHLORIDE 10 MG/ML
INJECTION, SOLUTION EPIDURAL; INFILTRATION; INTRACAUDAL; PERINEURAL PRN
Status: DISCONTINUED | OUTPATIENT
Start: 2024-03-21 | End: 2024-03-21 | Stop reason: ALTCHOICE

## 2024-03-21 RX ORDER — OXYCODONE HYDROCHLORIDE AND ACETAMINOPHEN 5; 325 MG/1; MG/1
2 TABLET ORAL ONCE
Status: COMPLETED | OUTPATIENT
Start: 2024-03-21 | End: 2024-03-21

## 2024-03-21 RX ORDER — HYDRALAZINE HYDROCHLORIDE 20 MG/ML
INJECTION INTRAMUSCULAR; INTRAVENOUS
Status: DISPENSED
Start: 2024-03-21 | End: 2024-03-21

## 2024-03-21 RX ORDER — OXYCODONE HYDROCHLORIDE AND ACETAMINOPHEN 5; 325 MG/1; MG/1
1 TABLET ORAL EVERY 6 HOURS PRN
Qty: 28 TABLET | Refills: 0 | Status: SHIPPED | OUTPATIENT
Start: 2024-03-21 | End: 2024-03-28

## 2024-03-21 RX ORDER — HYDRALAZINE HYDROCHLORIDE 20 MG/ML
INJECTION INTRAMUSCULAR; INTRAVENOUS PRN
Status: DISCONTINUED | OUTPATIENT
Start: 2024-03-21 | End: 2024-03-21 | Stop reason: ALTCHOICE

## 2024-03-21 RX ADMIN — OXYCODONE HYDROCHLORIDE AND ACETAMINOPHEN 2 TABLET: 5; 325 TABLET ORAL at 10:17

## 2024-03-21 ASSESSMENT — ENCOUNTER SYMPTOMS
ABDOMINAL PAIN: 0
COLOR CHANGE: 0
COUGH: 0
BACK PAIN: 0
CHEST TIGHTNESS: 0
SHORTNESS OF BREATH: 0
ABDOMINAL DISTENTION: 0

## 2024-03-21 ASSESSMENT — PAIN SCALES - GENERAL: PAINLEVEL_OUTOF10: 10

## 2024-03-21 ASSESSMENT — PAIN DESCRIPTION - LOCATION: LOCATION: FOOT

## 2024-03-21 ASSESSMENT — PAIN DESCRIPTION - ORIENTATION: ORIENTATION: LEFT

## 2024-03-21 NOTE — PROGRESS NOTES
Received post LLE Angiogram procedure to [facility] room 10. Assessment obtained. Restrictions reviewed with patient. Post procedure pathway initiated.  Rt. groin site soft , dressing dry and intact.  No hematoma noted.  Family at side.  Patient c/o pain to Rt. Foot, no pain meds ordered.  Pt. And family updated that no pain meds are ordered but when  Comes to talk with family we can ask for pain meds them.  Understanding verb. Head of bed flat with Rt. leg straight.

## 2024-03-21 NOTE — PROGRESS NOTES
Patient admitted, consent signed and questions answered. Patient ready for procedure. Call light to reach with side rails up 2 of 2. B/L Groin clipped with writer and Rossi RN present.  Family at bedside with patient.  History and physical complete.

## 2024-03-21 NOTE — PROGRESS NOTES
All discharge instructions reviewed, questions answered, paper signed and given copy. Patient discharged per wheelchair with sister, d/c paperwork and belongings.

## 2024-03-21 NOTE — PROGRESS NOTES
Ambulated to bathroom and in halls.  Gait steady.. Right groin puncture site and right pedal pulse assessment unchanged.

## 2024-03-21 NOTE — H&P
Provider   sertraline (ZOLOFT) 50 MG tablet Take 1 tablet by mouth daily 2/2/24   Patrice Erickson MD   acetaminophen (TYLENOL) 500 MG tablet Take 2 tablets by mouth every 6 hours as needed for Pain 2/24/24 3/2/24  Pepe Conte DO   ibuprofen (ADVIL;MOTRIN) 400 MG tablet Take 1 tablet by mouth every 8 hours as needed for Pain 2/24/24 3/2/24  Pepe Conte DO   aspirin 81 MG EC tablet Take 1 tablet by mouth daily    Patrice Ercikson MD   clobetasol (TEMOVATE) 0.05 % cream Apply 0.05 g topically 2 times daily 12/4/23   Patrice Erickson MD   pantoprazole (PROTONIX) 20 MG tablet TAKE ONE TABLET BY MOUTH DAILY AT 9AM 10/10/23   Brina Rizo MD   cilostazol (PLETAL) 100 MG tablet TAKE ONE TABLET BY MOUTH TWICE DAILY @ 9AM-5PM 10/10/23   Brina Rizo MD   atorvastatin (LIPITOR) 20 MG tablet TAKE ONE TABLET BY MOUTH DAILY AT 5PM 10/10/23   Brina Rizo MD   amLODIPine (NORVASC) 10 MG tablet TAKE ONE TABLET BY MOUTH DAILY AT 9AM 10/10/23   Brina Rizo MD   diclofenac sodium (VOLTAREN) 1 % GEL Apply 2 g topically 4 times daily 6/27/23   Brina Rizo MD   meloxicam (MOBIC) 7.5 MG tablet Take 1 tablet by mouth daily 6/27/23   Brina Rizo MD        Allergies   Patient has no known allergies.    Social History     Social History       Tobacco History       Smoking Status  Former Smoking Start Date  11/1980 Quit Date  11/2015 Average Packs/Day  0.5 packs/day for 35.0 years (17.5 ttl pk-yrs) Smoking Tobacco Type  Cigarettes from 11/1980 to 11/2015   Pack Year History     Packs/Day From To Years    0 11/2015  8.4    0.5 11/1980 11/2015 35.0      Smokeless Tobacco Use  Never              Alcohol History       Alcohol Use Status  No Comment  QUIT 03/2016              Drug Use       Drug Use Status  Yes Types  Marijuana (Weed) Frequency   7 times/week              Sexual Activity       Sexually Active  Not Asked                    Family History     Family History   Problem Relation Age of Onset     High Blood Pressure Mother     Diabetes Father     Cancer Father     Heart Disease Father     Cancer Sister     High Blood Pressure Sister     Heart Disease Sister     Diabetes Brother        Review of Systems   Review of Systems   Constitutional:  Negative for activity change, chills and fever.   HENT:  Negative for congestion.    Eyes:  Negative for visual disturbance.   Respiratory:  Negative for cough, chest tightness and shortness of breath.    Cardiovascular:  Negative for chest pain and leg swelling.   Gastrointestinal:  Negative for abdominal distention and abdominal pain.   Musculoskeletal:  Positive for gait problem. Negative for back pain and neck pain.   Skin:  Negative for color change, rash and wound.   Neurological:  Negative for dizziness, syncope, weakness and headaches.       Physical Exam   There were no vitals taken for this visit.    Physical Exam  Constitutional:       Appearance: Normal appearance. He is normal weight.   HENT:      Head: Normocephalic and atraumatic.      Nose: Nose normal.      Mouth/Throat:      Mouth: Mucous membranes are moist.      Pharynx: Oropharynx is clear.   Eyes:      Conjunctiva/sclera: Conjunctivae normal.      Pupils: Pupils are equal, round, and reactive to light.   Cardiovascular:      Rate and Rhythm: Normal rate and regular rhythm.   Abdominal:      General: Abdomen is flat.      Palpations: Abdomen is soft.   Musculoskeletal:         General: Normal range of motion.      Cervical back: No rigidity.   Skin:     General: Skin is warm and dry.      Capillary Refill: Capillary refill takes less than 2 seconds.   Neurological:      General: No focal deficit present.      Mental Status: He is alert and oriented to person, place, and time. Mental status is at baseline.   Psychiatric:         Mood and Affect: Mood normal.         Thought Content: Thought content normal.         Judgment: Judgment normal.         Labs    No results found for this or any previous

## 2024-03-21 NOTE — OP NOTE
Operative Note      Patient: Silas Shrestha  YOB: 1955  MRN: [id number]    Date of Procedure: 3/21/2024    Pre-Op Diagnosis Codes:     * PAD (peripheral artery disease) ([facility]) [I73.9]    Post-Op Diagnosis: Same       Procedure(s):  LEFT LOWER EXTREMITY ANGIOGRAM  US access right CFA    Surgeon(s):  Delos Reyes, Arthur, MD    Assistant:   * No surgical staff found *    Anesthesia: IV Sedation    Estimated Blood Loss (mL): Minimal    Complications: None    Specimens:   * No specimens in log *    Implants:  * No implants in log *      Drains: * No LDAs found *    Findings:   Occlusion of the left above knee SFA, popliteal artery above and below the knee  Primary runoff through AT and peroneal with diffuse AT disease        Detailed Description of Procedure:   This is a 68-year-old male with nonhealing ulcer on his left great toe.  He continues to have significant pain and despite having arteriogram last year the intervention did not stay open.  I had recommended a femoropopliteal bypass but he was lost to follow-up for about 6 months.  I repeated the arteriogram today to better see his distal runoff.    The patient was brought to the Cath Lab and placed supine the groins were cleaned prepped and sterile drapes were applied.  Ultrasound was used to identify the right common femoral artery.  Skin is notable anesthetized with lidocaine and under ultrasound guidance the artery was cannulated and a 5 Trinidadian sheath was placed.  An Omni Flush catheter was passed to the level of the abdominal aorta and aortogram and left lower extremity arteriogram were taken.  Patient was heparinized with 5000 units of heparin and a long 7 Trinidadian sheath was placed.  Using a crossing catheter and the Glidewire advantage the lesion and chronic occlusion at Rudy's canal was identified and crossed through the above-knee popliteal artery.  Continued injection through the sheath showed only two-vessel tibial runoff to the anterior

## 2024-03-28 NOTE — DISCHARGE INSTRUCTIONS
Preoperative Instructions:    Stop eating solid foods at MIDNIGHT the night prior to surgery. (This includes gum, mints, chewing tobacco). Smoking cessation is always advised.     Stop drinking clear liquids at MIDNIGHT the night prior to surgery.      Arrive at the [facility] by 6:30 AM on 4/8/2024 (or as directed by your surgeon's office).      If you're arriving at 6 AM, go to the [facility] first floor CAR 1.    If you're arriving at 6:30 AM or after, go to [facility] main floor registration.     PLEASE FOLLOW ALL PRE-OP MEDICATION INSTRUCTIONS AS DIRECTED BY YOUR SURGEON    If applicable:  -If you have been given a blood band, you must bring it with you the day of surgery, unclasped.  -Please use routine inhalers and bring inhalers the day of surgery.   -Bring C-Pap/Bi-pap with you morning of surgery if planning on staying in the hospital overnight.  -Please do not take diabetic medications on the day of surgery.   - Please do not wear any jewelry or body piercings day of surgery.     PLEASE NOTE:  THE ABOVE (IF ANY) DISCONTINUED MEDS MAY ONLY BE FROM   \"CLEANING UP\" THE MED LIST AND WERE NOT ACTUALLY CANCELLED; SEE CHART FOR DETAILS AND ALWAYS CHECK WITH PRESCRIBING PROVIDER BEFORE DISCONTINUING ANY MEDICATIONS        ___________________  _______________________  Signature (Provider)              Signature (Patient)     Day of Surgery/Procedure    As a patient at [facility] you can expect quality medical and nursing care that is centered on your individual needs.  Our goal is to make your surgical experience as comfortable as possible  .  Directions to the Critical access hospital [facility] [place] ([facility])  [facility] is located at [address] [place] [address].  The Critical access hospital [facility] [place] ([facility]) is across the street from the [facility]. You may park at the [facility] parking

## 2024-03-29 ENCOUNTER — HOSPITAL ENCOUNTER (OUTPATIENT)
Dept: PREADMISSION TESTING | Age: 69
End: 2024-03-29
Payer: MEDICARE

## 2024-03-29 ENCOUNTER — HOSPITAL ENCOUNTER (OUTPATIENT)
Dept: GENERAL RADIOLOGY | Age: 69
End: 2024-03-29
Payer: MEDICARE

## 2024-03-29 VITALS
BODY MASS INDEX: 23.49 KG/M2 | WEIGHT: 155 LBS | TEMPERATURE: 97.8 F | HEIGHT: 68 IN | SYSTOLIC BLOOD PRESSURE: 126 MMHG | RESPIRATION RATE: 18 BRPM | HEART RATE: 77 BPM | OXYGEN SATURATION: 98 % | DIASTOLIC BLOOD PRESSURE: 67 MMHG

## 2024-03-29 LAB
ABO + RH BLD: NORMAL
ANION GAP SERPL CALCULATED.3IONS-SCNC: 9 MMOL/L (ref 9–16)
ARM BAND NUMBER: NORMAL
BASOPHILS # BLD: <0.03 K/UL (ref 0–0.2)
BASOPHILS NFR BLD: 0 % (ref 0–2)
BLOOD BANK SAMPLE EXPIRATION: NORMAL
BLOOD GROUP ANTIBODIES SERPL: NEGATIVE
BUN SERPL-MCNC: 9 MG/DL (ref 8–23)
CALCIUM SERPL-MCNC: 8.9 MG/DL (ref 8.6–10.4)
CHLORIDE SERPL-SCNC: 108 MMOL/L (ref 98–107)
CO2 SERPL-SCNC: 24 MMOL/L (ref 20–31)
CREAT SERPL-MCNC: 0.8 MG/DL (ref 0.7–1.2)
EOSINOPHIL # BLD: 0.26 K/UL (ref 0–0.44)
EOSINOPHILS RELATIVE PERCENT: 5 % (ref 1–4)
ERYTHROCYTE [DISTWIDTH] IN BLOOD BY AUTOMATED COUNT: 12.7 % (ref 11.8–14.4)
GFR SERPL CREATININE-BSD FRML MDRD: >90 ML/MIN/1.73M2
GLUCOSE SERPL-MCNC: 119 MG/DL (ref 74–99)
HCT VFR BLD AUTO: 38.3 % (ref 40.7–50.3)
HGB BLD-MCNC: 12.4 G/DL (ref 13–17)
IMM GRANULOCYTES # BLD AUTO: <0.03 K/UL (ref 0–0.3)
IMM GRANULOCYTES NFR BLD: 0 %
INR PPP: 1.2
LYMPHOCYTES NFR BLD: 1.91 K/UL (ref 1.1–3.7)
LYMPHOCYTES RELATIVE PERCENT: 34 % (ref 24–43)
MCH RBC QN AUTO: 34.7 PG (ref 25.2–33.5)
MCHC RBC AUTO-ENTMCNC: 32.4 G/DL (ref 28.4–34.8)
MCV RBC AUTO: 107.3 FL (ref 82.6–102.9)
MONOCYTES NFR BLD: 0.46 K/UL (ref 0.1–1.2)
MONOCYTES NFR BLD: 8 % (ref 3–12)
NEUTROPHILS NFR BLD: 53 % (ref 36–65)
NEUTS SEG NFR BLD: 2.93 K/UL (ref 1.5–8.1)
NRBC BLD-RTO: 0 PER 100 WBC
PARTIAL THROMBOPLASTIN TIME: 30.9 SEC (ref 23–36.5)
PLATELET # BLD AUTO: 184 K/UL (ref 138–453)
PMV BLD AUTO: 9.7 FL (ref 8.1–13.5)
POTASSIUM SERPL-SCNC: 4 MMOL/L (ref 3.7–5.3)
PROTHROMBIN TIME: 14.6 SEC (ref 11.7–14.9)
RBC # BLD AUTO: 3.57 M/UL (ref 4.21–5.77)
RBC # BLD: ABNORMAL 10*6/UL
SODIUM SERPL-SCNC: 141 MMOL/L (ref 136–145)
WBC OTHER # BLD: 5.6 K/UL (ref 3.5–11.3)

## 2024-03-29 PROCEDURE — 85025 COMPLETE CBC W/AUTO DIFF WBC: CPT

## 2024-03-29 PROCEDURE — 86900 BLOOD TYPING SEROLOGIC ABO: CPT

## 2024-03-29 PROCEDURE — 86850 RBC ANTIBODY SCREEN: CPT

## 2024-03-29 PROCEDURE — 36415 COLL VENOUS BLD VENIPUNCTURE: CPT

## 2024-03-29 PROCEDURE — 85730 THROMBOPLASTIN TIME PARTIAL: CPT

## 2024-03-29 PROCEDURE — 85610 PROTHROMBIN TIME: CPT

## 2024-03-29 PROCEDURE — 86901 BLOOD TYPING SEROLOGIC RH(D): CPT

## 2024-03-29 PROCEDURE — 80048 BASIC METABOLIC PNL TOTAL CA: CPT

## 2024-03-29 PROCEDURE — 93005 ELECTROCARDIOGRAM TRACING: CPT | Performed by: SURGERY

## 2024-03-29 PROCEDURE — 71046 X-RAY EXAM CHEST 2 VIEWS: CPT

## 2024-03-29 ASSESSMENT — PAIN DESCRIPTION - PAIN TYPE: TYPE: ACUTE PAIN;CHRONIC PAIN

## 2024-03-29 ASSESSMENT — PAIN DESCRIPTION - ONSET: ONSET: ON-GOING

## 2024-03-29 ASSESSMENT — PAIN DESCRIPTION - LOCATION: LOCATION: TOE (COMMENT WHICH ONE)

## 2024-03-29 ASSESSMENT — PAIN SCALES - GENERAL: PAINLEVEL_OUTOF10: 7

## 2024-03-29 ASSESSMENT — PAIN DESCRIPTION - FREQUENCY: FREQUENCY: CONTINUOUS

## 2024-03-29 ASSESSMENT — PAIN DESCRIPTION - DESCRIPTORS: DESCRIPTORS: ACHING

## 2024-03-30 LAB
EKG ATRIAL RATE: 70 BPM
EKG P AXIS: 79 DEGREES
EKG P-R INTERVAL: 134 MS
EKG Q-T INTERVAL: 422 MS
EKG QRS DURATION: 82 MS
EKG QTC CALCULATION (BAZETT): 455 MS
EKG R AXIS: -21 DEGREES
EKG T AXIS: 37 DEGREES
EKG VENTRICULAR RATE: 70 BPM

## 2024-03-30 PROCEDURE — 93010 ELECTROCARDIOGRAM REPORT: CPT | Performed by: INTERNAL MEDICINE

## 2024-04-03 NOTE — PROGRESS NOTES
Patient not having any symptoms of pneumonia per Dr.Delos Reyes office  ([facility]) therefore they will proceed with surgery on 4/8/2024. Chest Xray on 3/29/2024 stated possible left lower lobe pneumonia.

## 2024-04-07 ENCOUNTER — ANESTHESIA EVENT (OUTPATIENT)
Dept: OPERATING ROOM | Age: 69
DRG: 252 | End: 2024-04-07
Payer: MEDICARE

## 2024-04-08 ENCOUNTER — ANESTHESIA (OUTPATIENT)
Dept: OPERATING ROOM | Age: 69
DRG: 252 | End: 2024-04-08
Payer: MEDICARE

## 2024-04-08 ENCOUNTER — HOSPITAL ENCOUNTER (INPATIENT)
Age: 69
LOS: 2 days | Discharge: HOME | DRG: 252 | End: 2024-04-10
Attending: SURGERY | Admitting: SURGERY
Payer: MEDICARE

## 2024-04-08 DIAGNOSIS — I70.222 ATHEROSCLEROSIS OF NATIVE ARTERIES OF EXTREMITIES WITH REST PAIN, LEFT LEG (HCC): Primary | ICD-10-CM

## 2024-04-08 LAB
ANION GAP SERPL CALCULATED.3IONS-SCNC: 11 MMOL/L (ref 9–16)
BASOPHILS # BLD: 0 K/UL (ref 0–0.2)
BASOPHILS NFR BLD: 0 % (ref 0–2)
BUN SERPL-MCNC: 12 MG/DL (ref 8–23)
CA-I BLD-SCNC: 1.09 MMOL/L (ref 1.13–1.33)
CALCIUM SERPL-MCNC: 7.7 MG/DL (ref 8.6–10.4)
CHLORIDE SERPL-SCNC: 108 MMOL/L (ref 98–107)
CO2 SERPL-SCNC: 18 MMOL/L (ref 20–31)
CREAT SERPL-MCNC: 0.8 MG/DL (ref 0.7–1.2)
EOSINOPHIL # BLD: 0 K/UL (ref 0–0.4)
EOSINOPHILS RELATIVE PERCENT: 0 % (ref 1–4)
ERYTHROCYTE [DISTWIDTH] IN BLOOD BY AUTOMATED COUNT: 13 % (ref 11.8–14.4)
GFR SERPL CREATININE-BSD FRML MDRD: >90 ML/MIN/1.73M2
GLUCOSE BLD-MCNC: 164 MG/DL (ref 75–110)
GLUCOSE BLD-MCNC: 187 MG/DL (ref 75–110)
GLUCOSE SERPL-MCNC: 204 MG/DL (ref 74–99)
HCT VFR BLD AUTO: 30.4 % (ref 40.7–50.3)
HGB BLD-MCNC: 9.6 G/DL (ref 13–17)
IMM GRANULOCYTES # BLD AUTO: 0 K/UL (ref 0–0.3)
IMM GRANULOCYTES NFR BLD: 0 %
LYMPHOCYTES NFR BLD: 0.68 K/UL (ref 1–4.8)
LYMPHOCYTES RELATIVE PERCENT: 6 % (ref 24–44)
MAGNESIUM SERPL-MCNC: 1.4 MG/DL (ref 1.6–2.4)
MCH RBC QN AUTO: 35.3 PG (ref 25.2–33.5)
MCHC RBC AUTO-ENTMCNC: 31.6 G/DL (ref 28.4–34.8)
MCV RBC AUTO: 111.8 FL (ref 82.6–102.9)
MONOCYTES NFR BLD: 0.23 K/UL (ref 0.1–0.8)
MONOCYTES NFR BLD: 2 % (ref 1–7)
MORPHOLOGY: ABNORMAL
NEUTROPHILS NFR BLD: 92 % (ref 36–66)
NEUTS SEG NFR BLD: 10.49 K/UL (ref 1.8–7.7)
NRBC BLD-RTO: 0 PER 100 WBC
PHOSPHATE SERPL-MCNC: 2.9 MG/DL (ref 2.5–4.5)
PLATELET # BLD AUTO: 200 K/UL (ref 138–453)
PMV BLD AUTO: 9.5 FL (ref 8.1–13.5)
POTASSIUM SERPL-SCNC: 3.7 MMOL/L (ref 3.7–5.3)
RBC # BLD AUTO: 2.72 M/UL (ref 4.21–5.77)
SODIUM SERPL-SCNC: 137 MMOL/L (ref 136–145)
WBC OTHER # BLD: 11.4 K/UL (ref 3.5–11.3)

## 2024-04-08 PROCEDURE — C1769 GUIDE WIRE: HCPCS | Performed by: SURGERY

## 2024-04-08 PROCEDURE — 36620 INSERTION CATHETER ARTERY: CPT | Performed by: ANESTHESIOLOGY

## 2024-04-08 PROCEDURE — 04CQ0ZZ EXTIRPATION OF MATTER FROM LEFT ANTERIOR TIBIAL ARTERY, OPEN APPROACH: ICD-10-PCS | Performed by: SURGERY

## 2024-04-08 PROCEDURE — A4217 STERILE WATER/SALINE, 500 ML: HCPCS | Performed by: SURGERY

## 2024-04-08 PROCEDURE — 36415 COLL VENOUS BLD VENIPUNCTURE: CPT

## 2024-04-08 PROCEDURE — 2580000003 HC RX 258

## 2024-04-08 PROCEDURE — C1887 CATHETER, GUIDING: HCPCS | Performed by: SURGERY

## 2024-04-08 PROCEDURE — 3600000013 HC SURGERY LEVEL 3 ADDTL 15MIN: Performed by: SURGERY

## 2024-04-08 PROCEDURE — 2580000003 HC RX 258: Performed by: STUDENT IN AN ORGANIZED HEALTH CARE EDUCATION/TRAINING PROGRAM

## 2024-04-08 PROCEDURE — 84100 ASSAY OF PHOSPHORUS: CPT

## 2024-04-08 PROCEDURE — 3700000001 HC ADD 15 MINUTES (ANESTHESIA): Performed by: SURGERY

## 2024-04-08 PROCEDURE — 85025 COMPLETE CBC W/AUTO DIFF WBC: CPT

## 2024-04-08 PROCEDURE — 6370000000 HC RX 637 (ALT 250 FOR IP): Performed by: STUDENT IN AN ORGANIZED HEALTH CARE EDUCATION/TRAINING PROGRAM

## 2024-04-08 PROCEDURE — 2500000003 HC RX 250 WO HCPCS: Performed by: STUDENT IN AN ORGANIZED HEALTH CARE EDUCATION/TRAINING PROGRAM

## 2024-04-08 PROCEDURE — C1768 GRAFT, VASCULAR: HCPCS | Performed by: SURGERY

## 2024-04-08 PROCEDURE — C1889 IMPLANT/INSERT DEVICE, NOC: HCPCS | Performed by: SURGERY

## 2024-04-08 PROCEDURE — 6360000002 HC RX W HCPCS

## 2024-04-08 PROCEDURE — 6360000002 HC RX W HCPCS: Performed by: SURGERY

## 2024-04-08 PROCEDURE — 82330 ASSAY OF CALCIUM: CPT

## 2024-04-08 PROCEDURE — 3600000003 HC SURGERY LEVEL 3 BASE: Performed by: SURGERY

## 2024-04-08 PROCEDURE — 4A133B1 MONITORING OF ARTERIAL PRESSURE, PERIPHERAL, PERCUTANEOUS APPROACH: ICD-10-PCS | Performed by: SURGERY

## 2024-04-08 PROCEDURE — 80048 BASIC METABOLIC PNL TOTAL CA: CPT

## 2024-04-08 PROCEDURE — 35666 BPG FEM-ANT TIB PST TIB/PRNL: CPT | Performed by: SURGERY

## 2024-04-08 PROCEDURE — 2000000000 HC ICU R&B

## 2024-04-08 PROCEDURE — 041L0JN BYPASS LEFT FEMORAL ARTERY TO POSTERIOR TIBIAL ARTERY WITH SYNTHETIC SUBSTITUTE, OPEN APPROACH: ICD-10-PCS | Performed by: SURGERY

## 2024-04-08 PROCEDURE — 2500000003 HC RX 250 WO HCPCS: Performed by: SURGERY

## 2024-04-08 PROCEDURE — 04CS0ZZ EXTIRPATION OF MATTER FROM LEFT POSTERIOR TIBIAL ARTERY, OPEN APPROACH: ICD-10-PCS | Performed by: SURGERY

## 2024-04-08 PROCEDURE — 83735 ASSAY OF MAGNESIUM: CPT

## 2024-04-08 PROCEDURE — B41G1ZZ FLUOROSCOPY OF LEFT LOWER EXTREMITY ARTERIES USING LOW OSMOLAR CONTRAST: ICD-10-PCS | Performed by: SURGERY

## 2024-04-08 PROCEDURE — 3700000000 HC ANESTHESIA ATTENDED CARE: Performed by: SURGERY

## 2024-04-08 PROCEDURE — 2709999900 HC NON-CHARGEABLE SUPPLY: Performed by: SURGERY

## 2024-04-08 PROCEDURE — 6360000002 HC RX W HCPCS: Performed by: STUDENT IN AN ORGANIZED HEALTH CARE EDUCATION/TRAINING PROGRAM

## 2024-04-08 PROCEDURE — C1892 INTRO/SHEATH,FIXED,PEEL-AWAY: HCPCS | Performed by: SURGERY

## 2024-04-08 PROCEDURE — C1894 INTRO/SHEATH, NON-LASER: HCPCS | Performed by: SURGERY

## 2024-04-08 PROCEDURE — 2500000003 HC RX 250 WO HCPCS

## 2024-04-08 PROCEDURE — 2580000003 HC RX 258: Performed by: SURGERY

## 2024-04-08 PROCEDURE — 6360000004 HC RX CONTRAST MEDICATION: Performed by: SURGERY

## 2024-04-08 PROCEDURE — 4A133J1 MONITORING OF ARTERIAL PULSE, PERIPHERAL, PERCUTANEOUS APPROACH: ICD-10-PCS | Performed by: SURGERY

## 2024-04-08 PROCEDURE — 2720000010 HC SURG SUPPLY STERILE: Performed by: SURGERY

## 2024-04-08 PROCEDURE — 6370000000 HC RX 637 (ALT 250 FOR IP): Performed by: SURGERY

## 2024-04-08 PROCEDURE — 82947 ASSAY GLUCOSE BLOOD QUANT: CPT

## 2024-04-08 PROCEDURE — 7100000000 HC PACU RECOVERY - FIRST 15 MIN

## 2024-04-08 PROCEDURE — 06BQ0ZZ EXCISION OF LEFT SAPHENOUS VEIN, OPEN APPROACH: ICD-10-PCS | Performed by: SURGERY

## 2024-04-08 PROCEDURE — 7100000001 HC PACU RECOVERY - ADDTL 15 MIN

## 2024-04-08 PROCEDURE — C1757 CATH, THROMBECTOMY/EMBOLECT: HCPCS | Performed by: SURGERY

## 2024-04-08 DEVICE — HORIZON TI MED 6/CART
Type: IMPLANTABLE DEVICE | Status: FUNCTIONAL
Brand: WECK

## 2024-04-08 DEVICE — PROPATEN VASCULAR GRAFT TW RR 7MMX80CM 60CM RINGS HEPARIN
Type: IMPLANTABLE DEVICE | Site: GROIN | Status: FUNCTIONAL
Brand: GORE PROPATEN VASCULAR GRAFT

## 2024-04-08 RX ORDER — ROCURONIUM BROMIDE 10 MG/ML
INJECTION, SOLUTION INTRAVENOUS PRN
Status: DISCONTINUED | OUTPATIENT
Start: 2024-04-08 | End: 2024-04-08 | Stop reason: SDUPTHER

## 2024-04-08 RX ORDER — OXYCODONE HYDROCHLORIDE 5 MG/1
10 TABLET ORAL EVERY 4 HOURS PRN
Status: DISCONTINUED | OUTPATIENT
Start: 2024-04-08 | End: 2024-04-10 | Stop reason: HOSPADM

## 2024-04-08 RX ORDER — CALCIUM GLUCONATE 20 MG/ML
1000 INJECTION, SOLUTION INTRAVENOUS ONCE
Status: COMPLETED | OUTPATIENT
Start: 2024-04-08 | End: 2024-04-08

## 2024-04-08 RX ORDER — SODIUM CHLORIDE 0.9 % (FLUSH) 0.9 %
5-40 SYRINGE (ML) INJECTION EVERY 12 HOURS SCHEDULED
Status: DISCONTINUED | OUTPATIENT
Start: 2024-04-08 | End: 2024-04-08 | Stop reason: HOSPADM

## 2024-04-08 RX ORDER — ACETAMINOPHEN 500 MG
1000 TABLET ORAL EVERY 8 HOURS SCHEDULED
Status: DISCONTINUED | OUTPATIENT
Start: 2024-04-08 | End: 2024-04-10 | Stop reason: HOSPADM

## 2024-04-08 RX ORDER — IODIXANOL 320 MG/ML
INJECTION, SOLUTION INTRAVASCULAR
Status: DISPENSED
Start: 2024-04-08 | End: 2024-04-08

## 2024-04-08 RX ORDER — PANTOPRAZOLE SODIUM 20 MG/1
20 TABLET, DELAYED RELEASE ORAL
Status: DISCONTINUED | OUTPATIENT
Start: 2024-04-09 | End: 2024-04-10 | Stop reason: HOSPADM

## 2024-04-08 RX ORDER — SODIUM CHLORIDE, SODIUM LACTATE, POTASSIUM CHLORIDE, CALCIUM CHLORIDE 600; 310; 30; 20 MG/100ML; MG/100ML; MG/100ML; MG/100ML
INJECTION, SOLUTION INTRAVENOUS CONTINUOUS
Status: DISCONTINUED | OUTPATIENT
Start: 2024-04-08 | End: 2024-04-09

## 2024-04-08 RX ORDER — ONDANSETRON 2 MG/ML
4 INJECTION INTRAMUSCULAR; INTRAVENOUS
Status: DISCONTINUED | OUTPATIENT
Start: 2024-04-08 | End: 2024-04-08 | Stop reason: HOSPADM

## 2024-04-08 RX ORDER — SODIUM CHLORIDE 0.9 % (FLUSH) 0.9 %
5-40 SYRINGE (ML) INJECTION EVERY 12 HOURS SCHEDULED
Status: DISCONTINUED | OUTPATIENT
Start: 2024-04-08 | End: 2024-04-10 | Stop reason: HOSPADM

## 2024-04-08 RX ORDER — SODIUM CHLORIDE 0.9 % (FLUSH) 0.9 %
5-40 SYRINGE (ML) INJECTION PRN
Status: DISCONTINUED | OUTPATIENT
Start: 2024-04-08 | End: 2024-04-08 | Stop reason: HOSPADM

## 2024-04-08 RX ORDER — AMLODIPINE BESYLATE 10 MG/1
10 TABLET ORAL DAILY
Status: DISCONTINUED | OUTPATIENT
Start: 2024-04-09 | End: 2024-04-08

## 2024-04-08 RX ORDER — INSULIN LISPRO 100 [IU]/ML
0-4 INJECTION, SOLUTION INTRAVENOUS; SUBCUTANEOUS NIGHTLY
Status: DISCONTINUED | OUTPATIENT
Start: 2024-04-08 | End: 2024-04-10 | Stop reason: HOSPADM

## 2024-04-08 RX ORDER — SODIUM CHLORIDE 9 MG/ML
INJECTION, SOLUTION INTRAVENOUS PRN
Status: DISCONTINUED | OUTPATIENT
Start: 2024-04-08 | End: 2024-04-10 | Stop reason: HOSPADM

## 2024-04-08 RX ORDER — ALBUTEROL SULFATE 2.5 MG/3ML
2.5 SOLUTION RESPIRATORY (INHALATION) EVERY 4 HOURS PRN
Status: DISCONTINUED | OUTPATIENT
Start: 2024-04-08 | End: 2024-04-10 | Stop reason: HOSPADM

## 2024-04-08 RX ORDER — ONDANSETRON 4 MG/1
4 TABLET, ORALLY DISINTEGRATING ORAL EVERY 8 HOURS PRN
Status: DISCONTINUED | OUTPATIENT
Start: 2024-04-08 | End: 2024-04-10 | Stop reason: HOSPADM

## 2024-04-08 RX ORDER — FENTANYL CITRATE 50 UG/ML
50 INJECTION, SOLUTION INTRAMUSCULAR; INTRAVENOUS EVERY 5 MIN PRN
Status: DISCONTINUED | OUTPATIENT
Start: 2024-04-08 | End: 2024-04-08 | Stop reason: HOSPADM

## 2024-04-08 RX ORDER — DIPHENHYDRAMINE HYDROCHLORIDE 50 MG/ML
12.5 INJECTION INTRAMUSCULAR; INTRAVENOUS
Status: DISCONTINUED | OUTPATIENT
Start: 2024-04-08 | End: 2024-04-08 | Stop reason: HOSPADM

## 2024-04-08 RX ORDER — DEXAMETHASONE SODIUM PHOSPHATE 10 MG/ML
INJECTION INTRAMUSCULAR; INTRAVENOUS PRN
Status: DISCONTINUED | OUTPATIENT
Start: 2024-04-08 | End: 2024-04-08 | Stop reason: SDUPTHER

## 2024-04-08 RX ORDER — IODIXANOL 320 MG/ML
INJECTION, SOLUTION INTRAVASCULAR PRN
Status: DISCONTINUED | OUTPATIENT
Start: 2024-04-08 | End: 2024-04-08 | Stop reason: HOSPADM

## 2024-04-08 RX ORDER — INSULIN LISPRO 100 [IU]/ML
0-16 INJECTION, SOLUTION INTRAVENOUS; SUBCUTANEOUS
Status: DISCONTINUED | OUTPATIENT
Start: 2024-04-08 | End: 2024-04-10 | Stop reason: HOSPADM

## 2024-04-08 RX ORDER — DEXTROSE MONOHYDRATE 100 MG/ML
INJECTION, SOLUTION INTRAVENOUS CONTINUOUS PRN
Status: DISCONTINUED | OUTPATIENT
Start: 2024-04-08 | End: 2024-04-10 | Stop reason: HOSPADM

## 2024-04-08 RX ORDER — ONDANSETRON 2 MG/ML
4 INJECTION INTRAMUSCULAR; INTRAVENOUS EVERY 6 HOURS PRN
Status: DISCONTINUED | OUTPATIENT
Start: 2024-04-08 | End: 2024-04-10 | Stop reason: HOSPADM

## 2024-04-08 RX ORDER — ASPIRIN 81 MG/1
81 TABLET ORAL DAILY
Status: DISCONTINUED | OUTPATIENT
Start: 2024-04-08 | End: 2024-04-10 | Stop reason: HOSPADM

## 2024-04-08 RX ORDER — AMLODIPINE BESYLATE 10 MG/1
10 TABLET ORAL DAILY
Status: DISCONTINUED | OUTPATIENT
Start: 2024-04-08 | End: 2024-04-10 | Stop reason: HOSPADM

## 2024-04-08 RX ORDER — PROPOFOL 10 MG/ML
INJECTION, EMULSION INTRAVENOUS PRN
Status: DISCONTINUED | OUTPATIENT
Start: 2024-04-08 | End: 2024-04-08 | Stop reason: SDUPTHER

## 2024-04-08 RX ORDER — MAGNESIUM SULFATE HEPTAHYDRATE 40 MG/ML
4000 INJECTION, SOLUTION INTRAVENOUS ONCE
Status: COMPLETED | OUTPATIENT
Start: 2024-04-08 | End: 2024-04-08

## 2024-04-08 RX ORDER — SODIUM CHLORIDE 9 MG/ML
INJECTION, SOLUTION INTRAVENOUS CONTINUOUS
Status: DISCONTINUED | OUTPATIENT
Start: 2024-04-08 | End: 2024-04-08

## 2024-04-08 RX ORDER — NALOXONE HYDROCHLORIDE 0.4 MG/ML
INJECTION, SOLUTION INTRAMUSCULAR; INTRAVENOUS; SUBCUTANEOUS PRN
Status: DISCONTINUED | OUTPATIENT
Start: 2024-04-08 | End: 2024-04-08 | Stop reason: HOSPADM

## 2024-04-08 RX ORDER — LANOLIN ALCOHOL/MO/W.PET/CERES
100 CREAM (GRAM) TOPICAL DAILY
Status: DISCONTINUED | OUTPATIENT
Start: 2024-04-08 | End: 2024-04-10 | Stop reason: HOSPADM

## 2024-04-08 RX ORDER — CEFAZOLIN SODIUM 1 G/3ML
INJECTION, POWDER, FOR SOLUTION INTRAMUSCULAR; INTRAVENOUS PRN
Status: DISCONTINUED | OUTPATIENT
Start: 2024-04-08 | End: 2024-04-08 | Stop reason: SDUPTHER

## 2024-04-08 RX ORDER — LABETALOL HYDROCHLORIDE 5 MG/ML
10 INJECTION, SOLUTION INTRAVENOUS EVERY 6 HOURS PRN
Status: DISCONTINUED | OUTPATIENT
Start: 2024-04-08 | End: 2024-04-10 | Stop reason: HOSPADM

## 2024-04-08 RX ORDER — PROTAMINE SULFATE 10 MG/ML
INJECTION, SOLUTION INTRAVENOUS PRN
Status: DISCONTINUED | OUTPATIENT
Start: 2024-04-08 | End: 2024-04-08 | Stop reason: SDUPTHER

## 2024-04-08 RX ORDER — PROTAMINE SULFATE 10 MG/ML
INJECTION, SOLUTION INTRAVENOUS
Status: DISPENSED
Start: 2024-04-08 | End: 2024-04-08

## 2024-04-08 RX ORDER — ONDANSETRON 2 MG/ML
INJECTION INTRAMUSCULAR; INTRAVENOUS PRN
Status: DISCONTINUED | OUTPATIENT
Start: 2024-04-08 | End: 2024-04-08 | Stop reason: SDUPTHER

## 2024-04-08 RX ORDER — HEPARIN SODIUM 1000 [USP'U]/ML
INJECTION, SOLUTION INTRAVENOUS; SUBCUTANEOUS PRN
Status: DISCONTINUED | OUTPATIENT
Start: 2024-04-08 | End: 2024-04-08 | Stop reason: SDUPTHER

## 2024-04-08 RX ORDER — SODIUM CHLORIDE, SODIUM LACTATE, POTASSIUM CHLORIDE, CALCIUM CHLORIDE 600; 310; 30; 20 MG/100ML; MG/100ML; MG/100ML; MG/100ML
INJECTION, SOLUTION INTRAVENOUS CONTINUOUS PRN
Status: DISCONTINUED | OUTPATIENT
Start: 2024-04-08 | End: 2024-04-08 | Stop reason: SDUPTHER

## 2024-04-08 RX ORDER — FENTANYL CITRATE 50 UG/ML
INJECTION, SOLUTION INTRAMUSCULAR; INTRAVENOUS PRN
Status: DISCONTINUED | OUTPATIENT
Start: 2024-04-08 | End: 2024-04-08 | Stop reason: SDUPTHER

## 2024-04-08 RX ORDER — FENTANYL CITRATE 50 UG/ML
25 INJECTION, SOLUTION INTRAMUSCULAR; INTRAVENOUS EVERY 5 MIN PRN
Status: DISCONTINUED | OUTPATIENT
Start: 2024-04-08 | End: 2024-04-08 | Stop reason: HOSPADM

## 2024-04-08 RX ORDER — SODIUM CHLORIDE 0.9 % (FLUSH) 0.9 %
5-40 SYRINGE (ML) INJECTION PRN
Status: DISCONTINUED | OUTPATIENT
Start: 2024-04-08 | End: 2024-04-10 | Stop reason: HOSPADM

## 2024-04-08 RX ORDER — OXYCODONE HYDROCHLORIDE 5 MG/1
5 TABLET ORAL EVERY 4 HOURS PRN
Status: DISCONTINUED | OUTPATIENT
Start: 2024-04-08 | End: 2024-04-10 | Stop reason: HOSPADM

## 2024-04-08 RX ORDER — GLUCAGON 1 MG/ML
1 KIT INJECTION PRN
Status: DISCONTINUED | OUTPATIENT
Start: 2024-04-08 | End: 2024-04-10 | Stop reason: HOSPADM

## 2024-04-08 RX ORDER — FENTANYL CITRATE 50 UG/ML
50 INJECTION, SOLUTION INTRAMUSCULAR; INTRAVENOUS
Status: DISCONTINUED | OUTPATIENT
Start: 2024-04-08 | End: 2024-04-09

## 2024-04-08 RX ORDER — CILOSTAZOL 100 MG/1
100 TABLET ORAL
Status: DISCONTINUED | OUTPATIENT
Start: 2024-04-09 | End: 2024-04-10 | Stop reason: HOSPADM

## 2024-04-08 RX ORDER — ATORVASTATIN CALCIUM 20 MG/1
20 TABLET, FILM COATED ORAL NIGHTLY
Status: DISCONTINUED | OUTPATIENT
Start: 2024-04-08 | End: 2024-04-10 | Stop reason: HOSPADM

## 2024-04-08 RX ORDER — ENOXAPARIN SODIUM 100 MG/ML
40 INJECTION SUBCUTANEOUS DAILY
Status: DISCONTINUED | OUTPATIENT
Start: 2024-04-09 | End: 2024-04-10 | Stop reason: HOSPADM

## 2024-04-08 RX ORDER — SODIUM CHLORIDE 9 MG/ML
INJECTION, SOLUTION INTRAVENOUS PRN
Status: DISCONTINUED | OUTPATIENT
Start: 2024-04-08 | End: 2024-04-08 | Stop reason: HOSPADM

## 2024-04-08 RX ORDER — MAGNESIUM HYDROXIDE 1200 MG/15ML
LIQUID ORAL CONTINUOUS PRN
Status: COMPLETED | OUTPATIENT
Start: 2024-04-08 | End: 2024-04-08

## 2024-04-08 RX ORDER — LIDOCAINE HYDROCHLORIDE 10 MG/ML
INJECTION, SOLUTION EPIDURAL; INFILTRATION; INTRACAUDAL; PERINEURAL PRN
Status: DISCONTINUED | OUTPATIENT
Start: 2024-04-08 | End: 2024-04-08 | Stop reason: SDUPTHER

## 2024-04-08 RX ORDER — POTASSIUM CHLORIDE 20 MEQ/1
40 TABLET, EXTENDED RELEASE ORAL ONCE
Status: COMPLETED | OUTPATIENT
Start: 2024-04-08 | End: 2024-04-08

## 2024-04-08 RX ORDER — KETOROLAC TROMETHAMINE 15 MG/ML
15 INJECTION, SOLUTION INTRAMUSCULAR; INTRAVENOUS EVERY 6 HOURS
Status: COMPLETED | OUTPATIENT
Start: 2024-04-08 | End: 2024-04-09

## 2024-04-08 RX ORDER — KETOROLAC TROMETHAMINE 30 MG/ML
INJECTION, SOLUTION INTRAMUSCULAR; INTRAVENOUS PRN
Status: DISCONTINUED | OUTPATIENT
Start: 2024-04-08 | End: 2024-04-08 | Stop reason: SDUPTHER

## 2024-04-08 RX ADMIN — OXYCODONE 10 MG: 5 TABLET ORAL at 19:59

## 2024-04-08 RX ADMIN — ROCURONIUM BROMIDE 50 MG: 10 INJECTION, SOLUTION INTRAVENOUS at 08:08

## 2024-04-08 RX ADMIN — FENTANYL CITRATE 50 MCG: 0.05 INJECTION, SOLUTION INTRAMUSCULAR; INTRAVENOUS at 08:52

## 2024-04-08 RX ADMIN — DEXAMETHASONE SODIUM PHOSPHATE 5 MG: 10 INJECTION INTRAMUSCULAR; INTRAVENOUS at 08:16

## 2024-04-08 RX ADMIN — Medication 10 MG: at 21:33

## 2024-04-08 RX ADMIN — ATORVASTATIN CALCIUM 20 MG: 20 TABLET, FILM COATED ORAL at 20:00

## 2024-04-08 RX ADMIN — HEPARIN SODIUM 3000 UNITS: 1000 INJECTION, SOLUTION INTRAVENOUS; SUBCUTANEOUS at 12:15

## 2024-04-08 RX ADMIN — SODIUM CHLORIDE, POTASSIUM CHLORIDE, SODIUM LACTATE AND CALCIUM CHLORIDE: 600; 310; 30; 20 INJECTION, SOLUTION INTRAVENOUS at 08:21

## 2024-04-08 RX ADMIN — ONDANSETRON 4 MG: 2 INJECTION INTRAMUSCULAR; INTRAVENOUS at 12:48

## 2024-04-08 RX ADMIN — FENTANYL CITRATE 50 MCG: 0.05 INJECTION, SOLUTION INTRAMUSCULAR; INTRAVENOUS at 08:08

## 2024-04-08 RX ADMIN — PROPOFOL 30 MG: 10 INJECTION, EMULSION INTRAVENOUS at 11:10

## 2024-04-08 RX ADMIN — PROPOFOL 150 MG: 10 INJECTION, EMULSION INTRAVENOUS at 08:08

## 2024-04-08 RX ADMIN — KETOROLAC TROMETHAMINE 15 MG: 15 INJECTION, SOLUTION INTRAMUSCULAR; INTRAVENOUS at 21:33

## 2024-04-08 RX ADMIN — AMLODIPINE BESYLATE 10 MG: 10 TABLET ORAL at 15:52

## 2024-04-08 RX ADMIN — SODIUM CHLORIDE, POTASSIUM CHLORIDE, SODIUM LACTATE AND CALCIUM CHLORIDE: 600; 310; 30; 20 INJECTION, SOLUTION INTRAVENOUS at 14:40

## 2024-04-08 RX ADMIN — ROCURONIUM BROMIDE 10 MG: 10 INJECTION, SOLUTION INTRAVENOUS at 10:50

## 2024-04-08 RX ADMIN — SODIUM CHLORIDE, POTASSIUM CHLORIDE, SODIUM LACTATE AND CALCIUM CHLORIDE: 600; 310; 30; 20 INJECTION, SOLUTION INTRAVENOUS at 11:12

## 2024-04-08 RX ADMIN — KETOROLAC TROMETHAMINE 30 MG: 30 INJECTION, SOLUTION INTRAMUSCULAR; INTRAVENOUS at 12:57

## 2024-04-08 RX ADMIN — INSULIN LISPRO 4 UNITS: 100 INJECTION, SOLUTION INTRAVENOUS; SUBCUTANEOUS at 16:37

## 2024-04-08 RX ADMIN — SODIUM CHLORIDE, POTASSIUM CHLORIDE, SODIUM LACTATE AND CALCIUM CHLORIDE: 600; 310; 30; 20 INJECTION, SOLUTION INTRAVENOUS at 10:00

## 2024-04-08 RX ADMIN — FENTANYL CITRATE 25 MCG: 0.05 INJECTION, SOLUTION INTRAMUSCULAR; INTRAVENOUS at 09:22

## 2024-04-08 RX ADMIN — SODIUM CHLORIDE, PRESERVATIVE FREE 10 ML: 5 INJECTION INTRAVENOUS at 20:00

## 2024-04-08 RX ADMIN — FENTANYL CITRATE 50 MCG: 0.05 INJECTION, SOLUTION INTRAMUSCULAR; INTRAVENOUS at 08:29

## 2024-04-08 RX ADMIN — PROPOFOL 30 MG: 10 INJECTION, EMULSION INTRAVENOUS at 09:11

## 2024-04-08 RX ADMIN — MAGNESIUM SULFATE HEPTAHYDRATE 4000 MG: 40 INJECTION, SOLUTION INTRAVENOUS at 17:59

## 2024-04-08 RX ADMIN — ROCURONIUM BROMIDE 20 MG: 10 INJECTION, SOLUTION INTRAVENOUS at 09:10

## 2024-04-08 RX ADMIN — CEFAZOLIN 2 G: 1 INJECTION, POWDER, FOR SOLUTION INTRAMUSCULAR; INTRAVENOUS at 12:32

## 2024-04-08 RX ADMIN — CALCIUM GLUCONATE 1000 MG: 20 INJECTION, SOLUTION INTRAVENOUS at 16:42

## 2024-04-08 RX ADMIN — OXYCODONE 10 MG: 5 TABLET ORAL at 14:52

## 2024-04-08 RX ADMIN — ROCURONIUM BROMIDE 10 MG: 10 INJECTION, SOLUTION INTRAVENOUS at 10:01

## 2024-04-08 RX ADMIN — CEFAZOLIN 2 G: 1 INJECTION, POWDER, FOR SOLUTION INTRAMUSCULAR; INTRAVENOUS at 08:32

## 2024-04-08 RX ADMIN — ASPIRIN 81 MG: 81 TABLET, COATED ORAL at 14:35

## 2024-04-08 RX ADMIN — SERTRALINE 50 MG: 50 TABLET, FILM COATED ORAL at 15:50

## 2024-04-08 RX ADMIN — PROTAMINE SULFATE 50 MG: 10 INJECTION, SOLUTION INTRAVENOUS at 12:44

## 2024-04-08 RX ADMIN — SUGAMMADEX 150 MG: 100 INJECTION, SOLUTION INTRAVENOUS at 13:04

## 2024-04-08 RX ADMIN — HEPARIN SODIUM 3000 UNITS: 1000 INJECTION, SOLUTION INTRAVENOUS; SUBCUTANEOUS at 10:55

## 2024-04-08 RX ADMIN — SODIUM CHLORIDE: 9 INJECTION, SOLUTION INTRAVENOUS at 14:03

## 2024-04-08 RX ADMIN — Medication 100 MG: at 15:50

## 2024-04-08 RX ADMIN — ACETAMINOPHEN 1000 MG: 500 TABLET ORAL at 21:32

## 2024-04-08 RX ADMIN — KETOROLAC TROMETHAMINE 30 MG: 30 INJECTION, SOLUTION INTRAMUSCULAR; INTRAVENOUS at 12:58

## 2024-04-08 RX ADMIN — ROCURONIUM BROMIDE 10 MG: 10 INJECTION, SOLUTION INTRAVENOUS at 12:15

## 2024-04-08 RX ADMIN — LIDOCAINE HYDROCHLORIDE 50 MG: 10 INJECTION, SOLUTION EPIDURAL; INFILTRATION; INTRACAUDAL; PERINEURAL at 08:08

## 2024-04-08 RX ADMIN — PROPOFOL 20 MG: 10 INJECTION, EMULSION INTRAVENOUS at 09:20

## 2024-04-08 RX ADMIN — KETOROLAC TROMETHAMINE 15 MG: 15 INJECTION, SOLUTION INTRAMUSCULAR; INTRAVENOUS at 16:38

## 2024-04-08 RX ADMIN — SODIUM CHLORIDE: 9 INJECTION, SOLUTION INTRAVENOUS at 07:39

## 2024-04-08 RX ADMIN — HEPARIN SODIUM 7000 UNITS: 1000 INJECTION, SOLUTION INTRAVENOUS; SUBCUTANEOUS at 10:00

## 2024-04-08 RX ADMIN — FENTANYL CITRATE 25 MCG: 0.05 INJECTION, SOLUTION INTRAMUSCULAR; INTRAVENOUS at 10:00

## 2024-04-08 RX ADMIN — FENTANYL CITRATE 50 MCG: 0.05 INJECTION, SOLUTION INTRAMUSCULAR; INTRAVENOUS at 08:25

## 2024-04-08 RX ADMIN — PROPOFOL 50 MCG/KG/MIN: 10 INJECTION, EMULSION INTRAVENOUS at 09:18

## 2024-04-08 RX ADMIN — POTASSIUM CHLORIDE 40 MEQ: 1500 TABLET, EXTENDED RELEASE ORAL at 16:38

## 2024-04-08 RX ADMIN — ACETAMINOPHEN 1000 MG: 500 TABLET ORAL at 14:35

## 2024-04-08 ASSESSMENT — PAIN DESCRIPTION - DESCRIPTORS
DESCRIPTORS: ACHING;THROBBING

## 2024-04-08 ASSESSMENT — PAIN SCALES - GENERAL
PAINLEVEL_OUTOF10: 5
PAINLEVEL_OUTOF10: 7
PAINLEVEL_OUTOF10: 2
PAINLEVEL_OUTOF10: 5
PAINLEVEL_OUTOF10: 8
PAINLEVEL_OUTOF10: 10
PAINLEVEL_OUTOF10: 5
PAINLEVEL_OUTOF10: 7
PAINLEVEL_OUTOF10: 10
PAINLEVEL_OUTOF10: 10

## 2024-04-08 ASSESSMENT — PAIN DESCRIPTION - ORIENTATION
ORIENTATION: LEFT

## 2024-04-08 ASSESSMENT — PAIN DESCRIPTION - LOCATION
LOCATION: LEG

## 2024-04-08 ASSESSMENT — PAIN DESCRIPTION - ONSET
ONSET: ON-GOING

## 2024-04-08 ASSESSMENT — PAIN DESCRIPTION - FREQUENCY
FREQUENCY: CONTINUOUS

## 2024-04-08 ASSESSMENT — PAIN DESCRIPTION - PAIN TYPE
TYPE: SURGICAL PAIN;ACUTE PAIN
TYPE: ACUTE PAIN
TYPE: ACUTE PAIN

## 2024-04-08 NOTE — H&P
VASCULAR SURGERY H&P  [facility]      Patient's Name/ Date of Birth/ Gender: Silas Shrestha / 1955 (69 y.o.) / male     Surgeon: Dr. Delos Reyes    History of present Illness: Pt is a 69 y.o. male presents for left lower extremity bypass. He had revascularization about 6 months ago and because of his complaints of continued rest pain he was supposed to have a femoral-popliteal artery bypass. He did not show up in clinic for another 6 to 7 months and recently had left lower extremity arteriogram on 3/21 for pre-op planning.     Pt had PAT appointment on 3/29 and CXR demonstrated possible pneumonia. Pt denies symptoms of pneumonia at this time. Normal EKG per anesthesia.    Past Medical History:  has a past medical history of Alcoholism (HCC), Cirrhosis of liver (HCC), Claudication (HCC), COPD (chronic obstructive pulmonary disease) (HCC), Finger fracture, left, Hx of blood clots, Hx of hepatitis C, Hx of pancreatitis, Hx of tuberculosis, Hyperlipidemia, Hypertension, Liver disease, PAD (peripheral artery disease) (HCC), PVD (peripheral vascular disease) (HCC), Under care of service provider, Wears dentures, and Wears glasses.    Past Surgical History:   Past Surgical History:   Procedure Laterality Date    COLONOSCOPY      around 2014    FEMORAL BYPASS  04/08/2024    FINGER AMPUTATION Left 11/28/2016    revision long finger    LIVER BIOPSY      OTHER SURGICAL HISTORY  02/14/2017    Abdomen with run off with Dr. Smith - could not pass left left pop; # 7 FR manual pull right groin    OTHER SURGICAL HISTORY  04/21/2023    LE Angiogram    UPPER GASTROINTESTINAL ENDOSCOPY      VASCULAR SURGERY      abdominal aortogram    VASCULAR SURGERY Left 04/21/2023    LEFT LOWER EXTREMITY ANGIOGRAM WITH LEFT DISTAL SFA  AND ABOVE KNEE POPLITEAL ARTERY KAYLYN  STENT 6MM X 120MM X 130CM performed by Arthur Delos Reyes, MD at [facility]    VASCULAR SURGERY Left 03/21/2024    LEFT LOWER EXTREMITY ANGIOGRAM  Palpations: Abdomen is soft.   Musculoskeletal:         General: Normal range of motion.      Cervical back: No rigidity.   Skin:     General: Skin is warm and dry.      Capillary Refill: Capillary refill takes less than 2 seconds.   Neurological:      General: No focal deficit present.      Mental Status: He is alert and oriented to person, place, and time. Mental status is at baseline.   Psychiatric:         Mood and Affect: Mood normal.         Thought Content: Thought content normal.         Judgment: Judgment normal.     Labs:   Lab Results   Component Value Date/Time    WBC 5.6 03/29/2024 09:15 AM    HGB 12.4 03/29/2024 09:15 AM    HCT 38.3 03/29/2024 09:15 AM    .3 03/29/2024 09:15 AM     03/29/2024 09:15 AM     Lab Results   Component Value Date/Time     03/29/2024 09:15 AM    K 4.0 03/29/2024 09:15 AM     03/29/2024 09:15 AM    CO2 24 03/29/2024 09:15 AM    BUN 9 03/29/2024 09:15 AM    CREATININE 0.8 03/29/2024 09:15 AM    GLUCOSE 119 03/29/2024 09:15 AM    CALCIUM 8.9 03/29/2024 09:15 AM     Lab Results   Component Value Date/Time    ALKPHOS 117 02/13/2024 03:33 PM    ALT 32 02/13/2024 03:33 PM    AST 49 02/13/2024 03:33 PM    PROT 7.3 02/13/2024 03:33 PM    BILITOT 0.8 02/13/2024 03:33 PM    BILIDIR 0.4 02/13/2024 03:33 PM    LABALBU 3.9 02/13/2024 03:33 PM     No results found for: \"LACTA\"  Lab Results   Component Value Date/Time    AMYLASE 177 06/03/2013 01:04 AM     Lab Results   Component Value Date/Time    LIPASE 39 06/12/2015 10:45 AM     Lab Results   Component Value Date/Time    INR 1.2 03/29/2024 09:15 AM       Impression:  68-year-old male with rest pain left leg     Plan  Plan to proceed to the OR for left lower extremity bypass. Risks and benefits were discussed with the patient and all questions answered. Will proceed to the OR.  Pt will be admitted post-op.      Minoo Skinner DO   General Surgery PGY-3  4/8/24 7:38 AM

## 2024-04-08 NOTE — OP NOTE
Operative Note      Patient: Silas Shrestha  YOB: 1955  MRN: [id number]    Date of Procedure: 4/8/2024    Pre-Op Diagnosis Codes:     * PAD (peripheral artery disease) ([facility]) [I73.9]    Post-Op Diagnosis: Same       Procedure(s):  LOWER EXTREMITY FEMORAL TO TIBIAL BYPASS WITH PROPATIN GRAFT, IPSILATERAL SUPERIFICAL SAPHENOUS VEIN HARVEST, LOWER EXTREMITY ANGIOGRAM COMPLETION    Surgeon(s):  Delos Reyes, Arthur, MD    Assistant:   * No surgical staff found *    Anesthesia: General    Estimated Blood Loss (mL): 300     Complications: None    Specimens:   * No specimens in log *    Implants:  Implant Name Type Inv. Item Serial No.  Lot No. LRB No. Used Action   GRAFT VASC L80CM DIA7MM RNG L60CM EPTFE THN WALLED CBAS HEP - [id number] Vascular grafts GRAFT VASC L80CM DIA7MM RNG L60CM EPTFE THN WALLED CBAS HEP [id number]  [employer]  Left 1 Implanted   CLIP LIG M SRAVAN TI HRT SHP WIRE HORZ 6 CLIPS PER PK - [id number]  CLIP LIG M SRAVAN TI HRT SHP WIRE HORZ 6 CLIPS PER PK  [employer] [id number] Left 1 Implanted         Drains: * No LDAs found *    Findings:  Infection Present At Time Of Surgery (PATOS) (choose all levels that have infection present):    Other Findings: Chronic occlusion of the femoral popliteal and tibial vessels.    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  He had several arteriograms with attempted revascularization ultimately unsuccessful.  He presents for elective bypass for limb salvage.    Patient was brought to the operating room placed supine the entire left leg was cleaned and prepped and sterile drapes were applied.  The short saphenous vein was harvested on the left.  The initial plan was to go from the distal SFA at Rudy's canal to the tibioperoneal trunk with endarterectomy of the trunk and anterior tibial artery origin.  The vein was harvested for about  25 cm.  Next of the superficial femoral artery was exposed at [facility] and this was previously stented.  Vesseloops were placed for proximal control.  Distally dissection was carried out to expose the popliteal artery.  The anterior tibial vein was ligated and divided.  Several of the tibial veins were noted to be tortuous and these were likewise divided.  Bleeding was encountered and they were suture-ligated with 5-0 Prolene.  The patient was heparinized with 7000 units of heparin and given 3000 unit boluses after each hour.  Inflow was attempted to be reestablished first.  At [facility] the fascial band was released and a longitudinal arteriotomy was created.  There is no flow and the stent was encountered and removed in its entirety.  On the arteriogram it showed that there was good inflow until the genicular level but this turned out to not have any significant inflow and a Vamshi balloon could not be passed proximally.  The artery was then sutured closed with 5-0 Prolene and a femoral cutdown was performed.  The plan to use is only available relatively short vein was abandoned and the decision was made to use a PTFE graft.  The graft was tunneled anatomically in the popliteal fossa and then subsartorial he.  In the groin the proximal anastomosis was created with running 5-0 Prolene suture.  The graft was clamped with a padded clamp.  Distally the popliteal and tibial endarterectomy was performed.  Arterial backbleeding was observed although a Vamshi balloon could not be passed distally.  Initially on the arteriogram it appeared that the anterior tibial artery was inline and this is what I thought the anastomosis was going to.  The TP trunk also had arterial backbleeding which was fair and also flushed with heparinized saline.  The anastomosis was completed with running 5-0 Prolene as well below the knee.  A completion arteriogram did not show significant runoff.  The anterior tibial artery origin

## 2024-04-08 NOTE — CARE COORDINATION
Case Management Assessment  Initial Evaluation    Date/Time of Evaluation: 4/8/2024 5:08 PM  Assessment Completed by: Carol Renteria RN    If patient is discharged prior to next notation, then this note serves as note for discharge by case management.    Patient Name: Silas Shrestha                   YOB: 1955  Diagnosis: PAD (peripheral artery disease) (H[facility]) [I73.9]  Ischemia of lower extremity [I99.8]                   Date / Time: 4/8/2024  6:38 AM    Patient Admission Status: Inpatient   Readmission Risk (Low < 19, Mod (19-27), High > 27): Readmission Risk Score: 10.3    Current PCP: Brina Rizo MD  PCP verified by CM? (P) Yes    Chart Reviewed: Yes      History Provided by: (P) Patient  Patient Orientation: (P) Alert and Oriented    Patient Cognition: (P) Alert    Hospitalization in the last 30 days (Readmission):  No    If yes, Readmission Assessment in CM Navigator will be completed.    Advance Directives:      Code Status: Full Code   Patient's Primary Decision Maker is: (P) Legal Next of Kin      Discharge Planning:    Patient lives with: (P) Children Type of Home: (P) House  Primary Care Giver: (P) Self  Patient Support Systems include: (P) Children   Current Financial resources: (P) Medicare  Current community resources:    Current services prior to admission: (P) Durable Medical Equipment            Current DME: (P) Walker            Type of Home Care services:  (P) None    ADLS  Prior functional level: (P) Independent in ADLs/IADLs  Current functional level: (P) Independent in ADLs/IADLs    PT AM-PAC:   /24  OT AM-PAC:   /24    Family can provide assistance at DC: (P) Yes  Would you like Case Management to discuss the discharge plan with any other family members/significant others, and if so, who?    Plans to Return to Present Housing: (P) Yes  Other Identified Issues/Barriers to RETURNING to current housing: none  Potential Assistance needed at discharge: (P) N/A

## 2024-04-08 NOTE — PROGRESS NOTES
Patient admitted, consent signed and questions answered. Patient ready for procedure. Call light to reach with side rails up 2 of 2. Bilateral groin clipped with writer and Diamond RN present.  Sister at bedside with patient.  History and physical needs to be completed.

## 2024-04-08 NOTE — ANESTHESIA PROCEDURE NOTES
Arterial Line:    An arterial line was placed using ultrasound guidance, in the procedure area for the following indication(s): continuous blood pressure monitoring and blood sampling needed.    A 20 gauge (size), 4.45 cm (length), Arrow (type) catheter was placed, Seldinger technique not used, into the left radial artery, secured by Tegaderm and tape.  Anesthesia type: General    Events:  patient tolerated procedure well with no complications.4/8/2024 8:17 AM4/8/2024 8:21 AM  Anesthesiologist: Gary Ortiz MD  Resident/CRNA: Kayleen Cruz APRN - JOSE L  Other anesthesia staff: Abdon Caceres RN  Performed: Anesthesiologist   Preanesthetic Checklist  Completed: patient identified, IV checked, site marked, risks and benefits discussed, surgical/procedural consents, equipment checked, pre-op evaluation, timeout performed, anesthesia consent given, oxygen available, monitors applied/VS acknowledged, fire risk safety assessment completed and verbalized and blood product R/B/A discussed and consented

## 2024-04-08 NOTE — CARE COORDINATION
SBIRT and consult complete.  Received social work consult for consideration of rehab.  Chart reviewed, noted that pt states he uses marijuana daily. Met with pt to complete assessment.  Pt admits to daily marijuana use but denies other drug use.  Pt also denies concerns regarding his use and declines need for treatment resources.  Pt denies current alcohol use, but does admit to pass use many years ago.  Pt denies past or present depression/SI.  Pt declines need for resources at this time.       Alcohol Screening and Brief Intervention        No results for input(s): \"ALC\" in the last 72 hours.    Alcohol Pre-screening  (MEN ONLY) How many times in the past year have you had 5 or more drinks in a day?: None          Drug Pre-Screening        Drug Screening DAST  TOTAL SCORE:: 1    Mood Pre-Screening (PHQ-2)  During the past 2 weeks, have you been bothered by, feeling down, depressed or hopeless?  No        I have interviewed Silas Shrestha, [id number] regarding  His alcohol consumption/drug use and risk for excessive use. Screenings were negative.  Patient  N/A intervention at this time.    Deferred []    Completed on: 4/8/2024   STEFANIA Goodrich

## 2024-04-08 NOTE — PROGRESS NOTES
POST-OPERATIVE PROGRESS NOTE    Patient: Silas Shrestha    DATE: 4/8/2024    Surgery: LOWER EXTREMITY FEMORAL TO TIBIAL BYPASS WITH PROPATIN GRAFT, IPSILATERAL SUPERIFICAL SAPHENOUS VEIN HARVEST, LOWER EXTREMITY ANGIOGRAM COMPLETION     Subjective:  Pt states that he is doing well.  Pt's pain at the surgical site is tolerable. Ariza in place, pt is making adequate urine. Art line correlating with cuff pressure and can be removed. Richard hugger in place. Monophasic AT and Pt signals present on exam.    Objective:  Vital signs and Nurse's note reviewed  Post-op vital signs: stable    BP (!) 153/70   Pulse 85   Temp 98 °F (36.7 °C) (Axillary)   Resp 18   Ht 1.727 m (5' 7.99\")   Wt 77.2 kg (170 lb 3.1 oz)   SpO2 100%   BMI 25.88 kg/m²    Gen:  A&Ox3, NAD  CV: RRR, no m/h/t  Resp: LCTAB, no wheeze or rhonchi  Abd:  Soft, nttp, nd, wounds clean, dry and intact; no erythema induration or exudate  Ext:  Warm, no cyanosis or edema    Assessment:   POD # 0 S/P left lower extremity femoral to tibial bypass  Recovering well post-op     Plan:    Continue current care  Pain control: Tyl, Toradol, Akosua, Fentanyl  Diet: Regular  Increase activity as tolerated.  Wound care: Keep ace wrap in place. Resident to change dressings on POD 2  SBP goal <165, home norvasc restarted. Labetalol prn added on    Electronically signed by Minoo Skinner DO  on 4/8/2024 at 5:23 PM

## 2024-04-08 NOTE — ANESTHESIA PRE PROCEDURE
Average packs/day: 0.5 packs/day for 35.0 years (17.5 ttl pk-yrs)     Types: Cigarettes     Start date: 1980     Quit date: 2015     Years since quittin.4    Smokeless tobacco: Never   Substance Use Topics    Alcohol use: No     Alcohol/week: 0.0 standard drinks of alcohol     Comment: QUIT 2016                                Counseling given: Not Answered      Vital Signs (Current): There were no vitals filed for this visit.                                           BP Readings from Last 3 Encounters:   24 126/67   24 (!) 160/66   24 (!) 159/85       NPO Status:                                                                                 BMI:   Wt Readings from Last 3 Encounters:   24 70.3 kg (155 lb)   24 70.3 kg (155 lb)   24 70.2 kg (154 lb 12.2 oz)     There is no height or weight on file to calculate BMI.    CBC:   Lab Results   Component Value Date/Time    WBC 5.6 2024 09:15 AM    RBC 3.57 2024 09:15 AM    HGB 12.4 2024 09:15 AM    HCT 38.3 2024 09:15 AM    .3 2024 09:15 AM    RDW 12.7 2024 09:15 AM     2024 09:15 AM       CMP:   Lab Results   Component Value Date/Time     2024 09:15 AM    K 4.0 2024 09:15 AM     2024 09:15 AM    CO2 24 2024 09:15 AM    BUN 9 2024 09:15 AM    CREATININE 0.8 2024 09:15 AM    GFRAA >60 2022 07:56 AM    AGRATIO 1.0 2024 03:33 PM    LABGLOM >90 2024 09:15 AM    GLUCOSE 119 2024 09:15 AM    PROT 7.3 2024 03:33 PM    CALCIUM 8.9 2024 09:15 AM    BILITOT 0.8 2024 03:33 PM    ALKPHOS 117 2024 03:33 PM    AST 49 2024 03:33 PM    ALT 32 2024 03:33 PM       POC Tests: No results for input(s): \"POCGLU\", \"POCNA\", \"POCK\", \"POCCL\", \"POCBUN\", \"POCHEMO\", \"POCHCT\" in the last 72 hours.    Coags:   Lab Results   Component Value Date/Time    PROTIME 14.6 2024 09:15 AM

## 2024-04-09 LAB
ANION GAP SERPL CALCULATED.3IONS-SCNC: 8 MMOL/L (ref 9–16)
BASOPHILS # BLD: 0 K/UL (ref 0–0.2)
BASOPHILS NFR BLD: 0 % (ref 0–2)
BUN SERPL-MCNC: 14 MG/DL (ref 8–23)
CA-I BLD-SCNC: 1.11 MMOL/L (ref 1.13–1.33)
CALCIUM SERPL-MCNC: 8.1 MG/DL (ref 8.6–10.4)
CHLORIDE SERPL-SCNC: 110 MMOL/L (ref 98–107)
CO2 SERPL-SCNC: 20 MMOL/L (ref 20–31)
CREAT SERPL-MCNC: 0.9 MG/DL (ref 0.7–1.2)
EOSINOPHIL # BLD: 0 K/UL (ref 0–0.44)
EOSINOPHILS RELATIVE PERCENT: 0 % (ref 1–4)
ERYTHROCYTE [DISTWIDTH] IN BLOOD BY AUTOMATED COUNT: 13.1 % (ref 11.8–14.4)
GFR SERPL CREATININE-BSD FRML MDRD: >90 ML/MIN/1.73M2
GLUCOSE BLD-MCNC: 123 MG/DL (ref 75–110)
GLUCOSE BLD-MCNC: 126 MG/DL (ref 75–110)
GLUCOSE BLD-MCNC: 132 MG/DL (ref 75–110)
GLUCOSE BLD-MCNC: 146 MG/DL (ref 75–110)
GLUCOSE SERPL-MCNC: 152 MG/DL (ref 74–99)
HCT VFR BLD AUTO: 26.5 % (ref 40.7–50.3)
HGB BLD-MCNC: 8.5 G/DL (ref 13–17)
IMM GRANULOCYTES # BLD AUTO: 0 K/UL (ref 0–0.3)
IMM GRANULOCYTES NFR BLD: 0 %
LYMPHOCYTES NFR BLD: 1.68 K/UL (ref 1.1–3.7)
LYMPHOCYTES RELATIVE PERCENT: 14 % (ref 24–43)
MAGNESIUM SERPL-MCNC: 2.5 MG/DL (ref 1.6–2.4)
MCH RBC QN AUTO: 35.4 PG (ref 25.2–33.5)
MCHC RBC AUTO-ENTMCNC: 32.1 G/DL (ref 28.4–34.8)
MCV RBC AUTO: 110.4 FL (ref 82.6–102.9)
MONOCYTES NFR BLD: 0.84 K/UL (ref 0.1–1.2)
MONOCYTES NFR BLD: 7 % (ref 3–12)
MORPHOLOGY: ABNORMAL
NEUTROPHILS NFR BLD: 79 % (ref 36–65)
NEUTS SEG NFR BLD: 9.48 K/UL (ref 1.5–8.1)
NRBC BLD-RTO: 0 PER 100 WBC
PHOSPHATE SERPL-MCNC: 3.6 MG/DL (ref 2.5–4.5)
PLATELET # BLD AUTO: 184 K/UL (ref 138–453)
PMV BLD AUTO: 9.7 FL (ref 8.1–13.5)
POTASSIUM SERPL-SCNC: 5.3 MMOL/L (ref 3.7–5.3)
RBC # BLD AUTO: 2.4 M/UL (ref 4.21–5.77)
SODIUM SERPL-SCNC: 138 MMOL/L (ref 136–145)
WBC OTHER # BLD: 12 K/UL (ref 3.5–11.3)

## 2024-04-09 PROCEDURE — 36415 COLL VENOUS BLD VENIPUNCTURE: CPT

## 2024-04-09 PROCEDURE — 82330 ASSAY OF CALCIUM: CPT

## 2024-04-09 PROCEDURE — 2060000000 HC ICU INTERMEDIATE R&B

## 2024-04-09 PROCEDURE — 6370000000 HC RX 637 (ALT 250 FOR IP): Performed by: STUDENT IN AN ORGANIZED HEALTH CARE EDUCATION/TRAINING PROGRAM

## 2024-04-09 PROCEDURE — 97535 SELF CARE MNGMENT TRAINING: CPT

## 2024-04-09 PROCEDURE — 84100 ASSAY OF PHOSPHORUS: CPT

## 2024-04-09 PROCEDURE — 82947 ASSAY GLUCOSE BLOOD QUANT: CPT

## 2024-04-09 PROCEDURE — 80048 BASIC METABOLIC PNL TOTAL CA: CPT

## 2024-04-09 PROCEDURE — 2580000003 HC RX 258: Performed by: STUDENT IN AN ORGANIZED HEALTH CARE EDUCATION/TRAINING PROGRAM

## 2024-04-09 PROCEDURE — 6360000002 HC RX W HCPCS: Performed by: STUDENT IN AN ORGANIZED HEALTH CARE EDUCATION/TRAINING PROGRAM

## 2024-04-09 PROCEDURE — 83735 ASSAY OF MAGNESIUM: CPT

## 2024-04-09 PROCEDURE — 85025 COMPLETE CBC W/AUTO DIFF WBC: CPT

## 2024-04-09 PROCEDURE — 97166 OT EVAL MOD COMPLEX 45 MIN: CPT

## 2024-04-09 PROCEDURE — 97162 PT EVAL MOD COMPLEX 30 MIN: CPT

## 2024-04-09 PROCEDURE — 97530 THERAPEUTIC ACTIVITIES: CPT

## 2024-04-09 RX ADMIN — ASPIRIN 81 MG: 81 TABLET, COATED ORAL at 08:30

## 2024-04-09 RX ADMIN — KETOROLAC TROMETHAMINE 15 MG: 15 INJECTION, SOLUTION INTRAMUSCULAR; INTRAVENOUS at 10:43

## 2024-04-09 RX ADMIN — SODIUM CHLORIDE, PRESERVATIVE FREE 10 ML: 5 INJECTION INTRAVENOUS at 19:45

## 2024-04-09 RX ADMIN — SODIUM CHLORIDE, PRESERVATIVE FREE 10 ML: 5 INJECTION INTRAVENOUS at 08:30

## 2024-04-09 RX ADMIN — CILOSTAZOL 100 MG: 100 TABLET ORAL at 08:30

## 2024-04-09 RX ADMIN — INSULIN LISPRO 4 UNITS: 100 INJECTION, SOLUTION INTRAVENOUS; SUBCUTANEOUS at 11:59

## 2024-04-09 RX ADMIN — KETOROLAC TROMETHAMINE 15 MG: 15 INJECTION, SOLUTION INTRAMUSCULAR; INTRAVENOUS at 04:09

## 2024-04-09 RX ADMIN — ACETAMINOPHEN 1000 MG: 500 TABLET ORAL at 22:16

## 2024-04-09 RX ADMIN — ACETAMINOPHEN 1000 MG: 500 TABLET ORAL at 14:01

## 2024-04-09 RX ADMIN — PANTOPRAZOLE SODIUM 20 MG: 20 TABLET, DELAYED RELEASE ORAL at 08:30

## 2024-04-09 RX ADMIN — OXYCODONE 10 MG: 5 TABLET ORAL at 10:44

## 2024-04-09 RX ADMIN — ACETAMINOPHEN 1000 MG: 500 TABLET ORAL at 05:28

## 2024-04-09 RX ADMIN — AMLODIPINE BESYLATE 10 MG: 10 TABLET ORAL at 08:30

## 2024-04-09 RX ADMIN — SODIUM CHLORIDE, POTASSIUM CHLORIDE, SODIUM LACTATE AND CALCIUM CHLORIDE: 600; 310; 30; 20 INJECTION, SOLUTION INTRAVENOUS at 04:10

## 2024-04-09 RX ADMIN — CILOSTAZOL 100 MG: 100 TABLET ORAL at 14:01

## 2024-04-09 RX ADMIN — ATORVASTATIN CALCIUM 20 MG: 20 TABLET, FILM COATED ORAL at 19:45

## 2024-04-09 RX ADMIN — ENOXAPARIN SODIUM 40 MG: 100 INJECTION SUBCUTANEOUS at 08:30

## 2024-04-09 RX ADMIN — Medication 100 MG: at 08:30

## 2024-04-09 RX ADMIN — SERTRALINE 50 MG: 50 TABLET, FILM COATED ORAL at 08:30

## 2024-04-09 ASSESSMENT — PAIN DESCRIPTION - FREQUENCY: FREQUENCY: CONTINUOUS

## 2024-04-09 ASSESSMENT — PAIN DESCRIPTION - DESCRIPTORS: DESCRIPTORS: ACHING

## 2024-04-09 ASSESSMENT — PAIN DESCRIPTION - ORIENTATION
ORIENTATION: LEFT
ORIENTATION: LEFT

## 2024-04-09 ASSESSMENT — PAIN DESCRIPTION - LOCATION
LOCATION: LEG
LOCATION: LEG

## 2024-04-09 ASSESSMENT — PAIN DESCRIPTION - ONSET: ONSET: ON-GOING

## 2024-04-09 ASSESSMENT — PAIN SCALES - GENERAL
PAINLEVEL_OUTOF10: 7
PAINLEVEL_OUTOF10: 4
PAINLEVEL_OUTOF10: 8

## 2024-04-09 ASSESSMENT — PAIN DESCRIPTION - PAIN TYPE: TYPE: ACUTE PAIN;SURGICAL PAIN

## 2024-04-09 NOTE — ANESTHESIA POSTPROCEDURE EVALUATION
Department of Anesthesiology  Postprocedure Note    Patient: Silas Shrestha  MRN: [id number]  YOB: 1955  Date of evaluation: 4/9/2024    Procedure Summary       Date: 04/08/24 Room / Location: [place] [address] / [facility]    Anesthesia Start: 0802 Anesthesia Stop: 1333    Procedure: LOWER EXTREMITY FEMORAL TO TIBIAL BYPASS WITH PROPATIN GRAFT, IPSILATERAL SUPERIFICAL SAPHENOUS VEIN HARVEST, LOWER EXTREMITY ANGIOGRAM COMPLETION (Left: Chest) Diagnosis:       PAD (peripheral artery disease) ([facility])      (PAD (peripheral artery disease) ([facility]) [I73.9])    Surgeons: Delos Reyes, Arthur, MD Responsible Provider: Gary Ortiz MD    Anesthesia Type: General ASA Status: 4            Anesthesia Type: General    Ricardo Phase I: Ricardo Score: 7    Ricardo Phase II:      Anesthesia Post Evaluation    Patient location during evaluation: PACU  Patient participation: complete - patient participated  Level of consciousness: awake  Pain score: 1  Airway patency: patent  Nausea & Vomiting: no nausea and no vomiting  Cardiovascular status: blood pressure returned to baseline and hemodynamically stable  Respiratory status: acceptable  Hydration status: euvolemic  Pain management: adequate    No notable events documented.

## 2024-04-09 NOTE — PROGRESS NOTES
Occupational Therapy  Facility/Department: [facility] CAR 1- SICU  Occupational Therapy Initial Assessment    Name: Silas Shrestha  : 1955  MRN: [id number]  Date of Service: 2024  Obtained from medical chart:   \"  LOWER EXTREMITY FEMORAL TO TIBIAL BYPASS WITH PROPATIN GRAFT, IPSILATERAL SUPERIFICAL SAPHENOUS VEIN HARVEST, LOWER EXTREMITY ANGIOGRAM COMPLETION , done 24  \"    Discharge Recommendations:  Patient would benefit from continued therapy after discharge     Patient Diagnosis(es): There were no encounter diagnoses.  Past Medical History:  has a past medical history of Alcoholism (HCC), Cirrhosis of liver (HCC), Claudication (HCC), COPD (chronic obstructive pulmonary disease) (HCC), Finger fracture, left, Hx of blood clots, Hx of hepatitis C, Hx of pancreatitis, Hx of tuberculosis, Hyperlipidemia, Hypertension, Liver disease, PAD (peripheral artery disease) (HCC), PVD (peripheral vascular disease) (HCC), Under care of service provider, Wears dentures, and Wears glasses.  Past Surgical History:  has a past surgical history that includes Upper gastrointestinal endoscopy; liver biopsy; vascular surgery; Finger amputation (Left, 2016); other surgical history (2017); other surgical history (2023); vascular surgery (Left, 2023); vascular surgery (Left, 2024); Colonoscopy; femoral bypass (2024); and femoral bypass (Left, 2024).     Assessment   Performance deficits / Impairments: Decreased functional mobility ;Decreased ADL status;Decreased endurance;Decreased balance;Decreased safe awareness;Decreased cognition;Decreased high-level IADLs  Assessment: Patient demonstrates decreased safety awareness during functional tasks, requiring multiple verbal cues to prevent falls with task engagement.  Pt completed LB dressing tasks at CGA for safety and transfers/mobility at CGA to complete mobility to bathroom for ADL engagement. Patient would benefit from continued acute  OT services to address functional deficits through skilled intervention to promote independence and safety with ADL/IADLs and functional transfers/mobility for safe return to prior living environment and level of function.  Prognosis: Good  Decision Making: Medium Complexity  REQUIRES OT FOLLOW-UP: Yes  Activity Tolerance  Activity Tolerance: Patient Tolerated treatment well        Plan   Occupational Therapy Plan  Times Per Week: 3-4x/wk  Current Treatment Recommendations: Strengthening, Functional mobility training, Balance training, Safety education & training, Patient/Caregiver education & training, Self-Care / ADL, Equipment evaluation, education, & procurement, Positioning, Endurance training     Restrictions  Restrictions/Precautions  Restrictions/Precautions: Weight Bearing, Surgical Protocols, Fall Risk, Up as Tolerated  Required Braces or Orthoses?: No  Lower Extremity Weight Bearing Restrictions  Left Lower Extremity Weight Bearing: Weight Bearing As Tolerated  Position Activity Restriction  Other position/activity restrictions: S/p left lower extremity femoral to tibial bypass 4/8/2024    Subjective   General  Patient assessed for rehabilitation services?: Yes  Family / Caregiver Present: No  General Comment  Comments: RN ok'd patient for OT session. Pt pleasant, cooperative and agreeable. Pt reports 4/10 pain in the L LE and declines intervention from therapist. Therapist provided repositioning with good success.     Social/Functional History  Social/Functional History  Lives With: Son  Type of Home: House  Home Layout: Two level, Bed/Bath upstairs (pt plans to stay on 2nd level with bed and bath initially)  Home Access: Stairs to enter with rails  Entrance Stairs - Number of Steps: 5-6 LISA, 12 to 2nd floor  Entrance Stairs - Rails: Both  Bathroom Shower/Tub: Tub/Shower unit  Bathroom Toilet: Standard  Home Equipment: Walker, rolling  Has the patient had two or more falls in the past year or any fall

## 2024-04-09 NOTE — PLAN OF CARE
Problem: Discharge Planning  Goal: Discharge to home or other facility with appropriate resources  4/9/2024 1620 by Altaf Will RN  Outcome: Progressing  4/9/2024 1620 by Altaf Will RN  Outcome: Progressing     Problem: Pain  Goal: Verbalizes/displays adequate comfort level or baseline comfort level  4/9/2024 1620 by Altaf Will RN  Outcome: Progressing  4/9/2024 1620 by Altaf Will RN  Outcome: Progressing     Problem: ABCDS Injury Assessment  Goal: Absence of physical injury  4/9/2024 1620 by Altaf Will RN  Outcome: Progressing  4/9/2024 1620 by Altaf Will RN  Outcome: Progressing     Problem: Safety - Adult  Goal: Free from fall injury  4/9/2024 1620 by Altaf Will RN  Outcome: Progressing  4/9/2024 1620 by Altaf Will RN  Outcome: Progressing

## 2024-04-09 NOTE — PROGRESS NOTES
Physical Therapy  Facility/Department: [facility] CAR 1- SICU  Physical Therapy Initial Assessment    Name: Silas Shrestha  : 1955  MRN: [id number]  Date of Service: 2024    Pt admitted for LOWER EXTREMITY FEMORAL TO TIBIAL BYPASS WITH PROPATIN GRAFT, IPSILATERAL SUPERIFICAL SAPHENOUS VEIN HARVEST, LOWER EXTREMITY ANGIOGRAM COMPLETION , done 24.     Discharge Recommendations:  Further therapy recommended at discharge.        PT Equipment Recommendations  Equipment Needed: No      Patient Diagnosis(es): There were no encounter diagnoses.  Past Medical History:  has a past medical history of Alcoholism (HCC), Cirrhosis of liver (HCC), Claudication (HCC), COPD (chronic obstructive pulmonary disease) (HCC), Finger fracture, left, Hx of blood clots, Hx of hepatitis C, Hx of pancreatitis, Hx of tuberculosis, Hyperlipidemia, Hypertension, Liver disease, PAD (peripheral artery disease) (HCC), PVD (peripheral vascular disease) (HCC), Under care of service provider, Wears dentures, and Wears glasses.  Past Surgical History:  has a past surgical history that includes Upper gastrointestinal endoscopy; liver biopsy; vascular surgery; Finger amputation (Left, 2016); other surgical history (2017); other surgical history (2023); vascular surgery (Left, 2023); vascular surgery (Left, 2024); Colonoscopy; femoral bypass (2024); and femoral bypass (Left, 2024).    Assessment   Pt cooperative, motivated, impulsive, difficulty \"normalizing\" gait with rwalker, but improved with practice.  Safe for home DC with son when medically appropriate.   Body Structures, Functions, Activity Limitations Requiring Skilled Therapeutic Intervention: Decreased functional mobility ;Decreased safe awareness;Decreased balance;Increased pain;Decreased posture  Therapy Prognosis: Good  Decision Making: Medium Complexity  Barriers to Learning: pt had a lot of difficulty understanding directions for step

## 2024-04-09 NOTE — PROGRESS NOTES
Vascular Surgery   Progress Note    PATIENT NAME: Silas Shrestha     TODAY'S DATE: 4/9/2024, 7:36 AM    SUBJECTIVE:     Pt seen and examined at bedside.  No acute overnight events. Pt pain is well controlled. Art line and sanchez were removed. Vitals within normal limits. Pt tolerating regular diet. AT and PT monophasic signals present.    OBJECTIVE:   VITALS:  /65   Pulse 64   Temp 98.1 °F (36.7 °C) (Oral)   Resp 16   Ht 1.727 m (5' 7.99\")   Wt 77.2 kg (170 lb 3.1 oz)   SpO2 98%   BMI 25.88 kg/m²      INTAKE/OUTPUT:      Intake/Output Summary (Last 24 hours) at 4/9/2024 0736  Last data filed at 4/9/2024 0600  Gross per 24 hour   Intake 2728.57 ml   Output 895 ml   Net 1833.57 ml       PHYSICAL EXAM:  General Appearance: awake, alert, oriented, in no acute distress  HEENT:  Normocephalic, atraumatic, mucus membranes moist   Heart: Regular rate and rhythm  Lungs: normal effort with symmetric rise and fall of chest wall  Abdomen: soft, nondistended, nontender to palpation  Extremities: Left lower extremity with ace wrap in place. Monophasic AT and PT signals present. Pt has motor and sensation intact.   Skin: Skin color, texture, turgor normal. No rashes or lesions.      Data:  CBC:   Recent Labs     04/08/24  1435 04/09/24  0244   WBC 11.4* 12.0*   HGB 9.6* 8.5*    184     Chemistry:   Recent Labs     04/08/24  1435 04/09/24  0244    138   K 3.7 5.3   * 110*   CO2 18* 20   GLUCOSE 204* 152*   BUN 12 14   CREATININE 0.8 0.9   MG 1.4* 2.5*   ANIONGAP 11 8*   LABGLOM >90 >90   CALCIUM 7.7* 8.1*   CAION 1.09* 1.11*   PHOS 2.9 3.6       ASSESSMENT:  69 y.o. male S/p left lower extremity femoral to tibial bypass 4/8/2024    Plan:  General diet, discontinue IV fluids  Monitor I/Os, sanchez removed this am  Home anti-hypertensive meds ordered  Pain control: tylenol, toradol, Akosua, fentanyl  HDSS for glucose control   Start DVT ppx today: Lovenox  Continue ASA  Pt to work with PT/OT

## 2024-04-09 NOTE — CARE COORDINATION
Transitional planning. PT/OT ordered. Plan is home w/ son, sister will transport, has established PCP, denies HC needs at time.

## 2024-04-10 VITALS
HEIGHT: 68 IN | TEMPERATURE: 97.9 F | HEART RATE: 95 BPM | SYSTOLIC BLOOD PRESSURE: 167 MMHG | DIASTOLIC BLOOD PRESSURE: 137 MMHG | RESPIRATION RATE: 26 BRPM | WEIGHT: 170.19 LBS | BODY MASS INDEX: 25.79 KG/M2 | OXYGEN SATURATION: 95 %

## 2024-04-10 LAB
ANION GAP SERPL CALCULATED.3IONS-SCNC: 9 MMOL/L (ref 9–16)
BASOPHILS # BLD: 0 K/UL (ref 0–0.2)
BASOPHILS NFR BLD: 0 % (ref 0–2)
BUN SERPL-MCNC: 20 MG/DL (ref 8–23)
CALCIUM SERPL-MCNC: 7.9 MG/DL (ref 8.6–10.4)
CHLORIDE SERPL-SCNC: 109 MMOL/L (ref 98–107)
CO2 SERPL-SCNC: 21 MMOL/L (ref 20–31)
CREAT SERPL-MCNC: 0.9 MG/DL (ref 0.7–1.2)
EOSINOPHIL # BLD: 0 K/UL (ref 0–0.44)
EOSINOPHILS RELATIVE PERCENT: 0 % (ref 1–4)
ERYTHROCYTE [DISTWIDTH] IN BLOOD BY AUTOMATED COUNT: 13.3 % (ref 11.8–14.4)
GFR SERPL CREATININE-BSD FRML MDRD: >90 ML/MIN/1.73M2
GLUCOSE BLD-MCNC: 106 MG/DL (ref 75–110)
GLUCOSE BLD-MCNC: 112 MG/DL (ref 75–110)
GLUCOSE BLD-MCNC: 124 MG/DL (ref 75–110)
GLUCOSE SERPL-MCNC: 115 MG/DL (ref 74–99)
HCT VFR BLD AUTO: 21.1 % (ref 40.7–50.3)
HCT VFR BLD AUTO: 21.5 % (ref 40.7–50.3)
HCT VFR BLD AUTO: 25.4 % (ref 40.7–50.3)
HGB BLD-MCNC: 6.7 G/DL (ref 13–17)
HGB BLD-MCNC: 6.9 G/DL (ref 13–17)
HGB BLD-MCNC: 8.3 G/DL (ref 13–17)
IMM GRANULOCYTES # BLD AUTO: 0.12 K/UL (ref 0–0.3)
IMM GRANULOCYTES NFR BLD: 1 %
LYMPHOCYTES NFR BLD: 3.57 K/UL (ref 1.1–3.7)
LYMPHOCYTES RELATIVE PERCENT: 31 % (ref 24–43)
MCH RBC QN AUTO: 35.8 PG (ref 25.2–33.5)
MCHC RBC AUTO-ENTMCNC: 31.2 G/DL (ref 28.4–34.8)
MCV RBC AUTO: 115 FL (ref 82.6–102.9)
MONOCYTES NFR BLD: 0.92 K/UL (ref 0.1–1.2)
MONOCYTES NFR BLD: 8 % (ref 3–12)
MORPHOLOGY: ABNORMAL
NEUTROPHILS NFR BLD: 60 % (ref 36–65)
NEUTS SEG NFR BLD: 6.89 K/UL (ref 1.5–8.1)
NRBC BLD-RTO: 0 PER 100 WBC
PLATELET # BLD AUTO: 163 K/UL (ref 138–453)
PMV BLD AUTO: 9.8 FL (ref 8.1–13.5)
POTASSIUM SERPL-SCNC: 4.5 MMOL/L (ref 3.7–5.3)
RBC # BLD AUTO: 1.87 M/UL (ref 4.21–5.77)
SODIUM SERPL-SCNC: 139 MMOL/L (ref 136–145)
WBC OTHER # BLD: 11.5 K/UL (ref 3.5–11.3)

## 2024-04-10 PROCEDURE — 36430 TRANSFUSION BLD/BLD COMPNT: CPT

## 2024-04-10 PROCEDURE — 80048 BASIC METABOLIC PNL TOTAL CA: CPT

## 2024-04-10 PROCEDURE — 85018 HEMOGLOBIN: CPT

## 2024-04-10 PROCEDURE — 6370000000 HC RX 637 (ALT 250 FOR IP): Performed by: STUDENT IN AN ORGANIZED HEALTH CARE EDUCATION/TRAINING PROGRAM

## 2024-04-10 PROCEDURE — 6360000002 HC RX W HCPCS: Performed by: STUDENT IN AN ORGANIZED HEALTH CARE EDUCATION/TRAINING PROGRAM

## 2024-04-10 PROCEDURE — 30233N1 TRANSFUSION OF NONAUTOLOGOUS RED BLOOD CELLS INTO PERIPHERAL VEIN, PERCUTANEOUS APPROACH: ICD-10-PCS | Performed by: SURGERY

## 2024-04-10 PROCEDURE — 85014 HEMATOCRIT: CPT

## 2024-04-10 PROCEDURE — 85025 COMPLETE CBC W/AUTO DIFF WBC: CPT

## 2024-04-10 PROCEDURE — 82947 ASSAY GLUCOSE BLOOD QUANT: CPT

## 2024-04-10 PROCEDURE — 36415 COLL VENOUS BLD VENIPUNCTURE: CPT

## 2024-04-10 PROCEDURE — 2580000003 HC RX 258: Performed by: STUDENT IN AN ORGANIZED HEALTH CARE EDUCATION/TRAINING PROGRAM

## 2024-04-10 PROCEDURE — P9040 RBC LEUKOREDUCED IRRADIATED: HCPCS

## 2024-04-10 RX ORDER — OXYCODONE HYDROCHLORIDE 5 MG/1
5 TABLET ORAL EVERY 6 HOURS PRN
Qty: 12 TABLET | Refills: 0 | Status: SHIPPED | OUTPATIENT
Start: 2024-04-10 | End: 2024-04-13

## 2024-04-10 RX ORDER — SODIUM CHLORIDE 9 MG/ML
INJECTION, SOLUTION INTRAVENOUS PRN
Status: DISCONTINUED | OUTPATIENT
Start: 2024-04-10 | End: 2024-04-10 | Stop reason: HOSPADM

## 2024-04-10 RX ADMIN — CILOSTAZOL 100 MG: 100 TABLET ORAL at 17:22

## 2024-04-10 RX ADMIN — ENOXAPARIN SODIUM 40 MG: 100 INJECTION SUBCUTANEOUS at 08:20

## 2024-04-10 RX ADMIN — SODIUM CHLORIDE, PRESERVATIVE FREE 10 ML: 5 INJECTION INTRAVENOUS at 11:13

## 2024-04-10 RX ADMIN — SERTRALINE 50 MG: 50 TABLET, FILM COATED ORAL at 08:23

## 2024-04-10 RX ADMIN — OXYCODONE 10 MG: 5 TABLET ORAL at 03:31

## 2024-04-10 RX ADMIN — ACETAMINOPHEN 1000 MG: 500 TABLET ORAL at 15:04

## 2024-04-10 RX ADMIN — PANTOPRAZOLE SODIUM 20 MG: 20 TABLET, DELAYED RELEASE ORAL at 08:20

## 2024-04-10 RX ADMIN — Medication 100 MG: at 08:21

## 2024-04-10 RX ADMIN — ASPIRIN 81 MG: 81 TABLET, COATED ORAL at 08:22

## 2024-04-10 RX ADMIN — CILOSTAZOL 100 MG: 100 TABLET ORAL at 08:19

## 2024-04-10 RX ADMIN — AMLODIPINE BESYLATE 10 MG: 10 TABLET ORAL at 08:21

## 2024-04-10 RX ADMIN — ACETAMINOPHEN 1000 MG: 500 TABLET ORAL at 06:13

## 2024-04-10 RX ADMIN — OXYCODONE 5 MG: 5 TABLET ORAL at 08:27

## 2024-04-10 ASSESSMENT — PAIN SCALES - GENERAL
PAINLEVEL_OUTOF10: 7
PAINLEVEL_OUTOF10: 2
PAINLEVEL_OUTOF10: 2
PAINLEVEL_OUTOF10: 5
PAINLEVEL_OUTOF10: 0
PAINLEVEL_OUTOF10: 9
PAINLEVEL_OUTOF10: 0
PAINLEVEL_OUTOF10: 5

## 2024-04-10 ASSESSMENT — PAIN DESCRIPTION - FREQUENCY: FREQUENCY: CONTINUOUS

## 2024-04-10 ASSESSMENT — PAIN DESCRIPTION - DESCRIPTORS
DESCRIPTORS: DISCOMFORT
DESCRIPTORS: ACHING
DESCRIPTORS: ACHING

## 2024-04-10 ASSESSMENT — PAIN DESCRIPTION - ORIENTATION
ORIENTATION: LEFT

## 2024-04-10 ASSESSMENT — PAIN DESCRIPTION - ONSET: ONSET: ON-GOING

## 2024-04-10 ASSESSMENT — PAIN DESCRIPTION - LOCATION
LOCATION: LEG

## 2024-04-10 ASSESSMENT — PAIN - FUNCTIONAL ASSESSMENT
PAIN_FUNCTIONAL_ASSESSMENT: ACTIVITIES ARE NOT PREVENTED
PAIN_FUNCTIONAL_ASSESSMENT: ACTIVITIES ARE NOT PREVENTED

## 2024-04-10 ASSESSMENT — PAIN DESCRIPTION - PAIN TYPE: TYPE: ACUTE PAIN;SURGICAL PAIN

## 2024-04-10 NOTE — CARE COORDINATION
DME for walker faxed along with face sheet and face to face to Medical Services, pt notified he or family will need to  walker, address and phone number provided to pt for Medical Services and copy of all paperwork provided for RN to give pt at discharge

## 2024-04-10 NOTE — PLAN OF CARE
Silas Shrestha was evaluated today and a DME order was entered for a standard walker because he requires this to successfully complete daily living tasks of ambulating.  A standard walker is necessary due to the patient's impaired ambulation and mobility restrictions and he can ambulate only by using a walker instead of a lesser assistive device, such as a cane or crutch.  The need for this equipment was discussed with the patient and he understands and is in agreement.

## 2024-04-10 NOTE — PLAN OF CARE
Problem: Discharge Planning  Goal: Discharge to home or other facility with appropriate resources  4/10/2024 1904 by Isha Carpenter RN  Outcome: Completed  4/10/2024 1342 by Isha Carpenter RN  Outcome: Progressing     Problem: Pain  Goal: Verbalizes/displays adequate comfort level or baseline comfort level  4/10/2024 1904 by Isha Carpenter RN  Outcome: Completed  4/10/2024 1342 by Isha Carpenter RN  Outcome: Progressing     Problem: ABCDS Injury Assessment  Goal: Absence of physical injury  4/10/2024 1904 by Isha Carpenter RN  Outcome: Completed  4/10/2024 1342 by Isha Carpenter RN  Outcome: Progressing     Problem: Safety - Adult  Goal: Free from fall injury  4/10/2024 1904 by Isha Carpenter RN  Outcome: Completed  4/10/2024 1342 by Isha Carpenter RN  Outcome: Progressing

## 2024-04-10 NOTE — CONSENT
Informed Consent for Blood Component Transfusion Note    I have discussed with the patient the rationale for blood component transfusion; its benefits in treating or preventing fatigue, organ damage, or death; and its risk which includes mild transfusion reactions, rare risk of blood borne infection, or more serious but rare reactions. I have discussed the alternatives to transfusion, including the risk and consequences of not receiving transfusion. The patient had an opportunity to ask questions and had agreed to proceed with transfusion of blood components.    Electronically signed by Minoo Forbes DO on 4/10/24 at 10:53 AM EDT

## 2024-04-10 NOTE — DISCHARGE SUMMARY
[facility]  Vascular Surgery  Discharge Summary    Name: Silas Shrestha  MRN: [id number]  Age: 69 y.o.  Sex: male.  : 1955  Admit Date:  2024  Discharge Date: 4/10/2024     Disposition: Home  Condition on Discharge: Stable    Consults:  None    Surgery:  Left femoral to tibial bypass with graft    Physical Exam on Day of Discharge:  General Appearance: awake, alert, oriented, in no acute distress  HEENT:  Normocephalic, atraumatic, mucus membranes moist   Heart: Regular rate and rhythm  Lungs: normal effort with symmetric rise and fall of chest wall  Abdomen: soft, nondistended, nontender to palpation  Extremities: Left lower extremity with ace wrap in place. Monophasic AT and PT signals present. Pt has motor and sensation intact.   Skin: Skin color, texture, turgor normal. No rashes or lesions.    Patient Instructions:   See pre-printed instructions in chart and given to patient upon discharge.    Discharge Medications:      Medication List        START taking these medications      oxyCODONE 5 MG immediate release tablet  Commonly known as: ROXICODONE  Take 1 tablet by mouth every 6 hours as needed for Pain for up to 3 days. Max Daily Amount: 20 mg            CONTINUE taking these medications      acetaminophen 500 MG tablet  Commonly known as: TYLENOL  Take 2 tablets by mouth every 6 hours as needed for Pain     amLODIPine 10 MG tablet  Commonly known as: NORVASC  TAKE ONE TABLET BY MOUTH DAILY AT 9AM     aspirin 81 MG EC tablet     atorvastatin 20 MG tablet  Commonly known as: LIPITOR  TAKE ONE TABLET BY MOUTH DAILY AT 5PM     cilostazol 100 MG tablet  Commonly known as: PLETAL  TAKE ONE TABLET BY MOUTH TWICE DAILY @ 9AM-5PM     clobetasol 0.05 % cream  Commonly known as: TEMOVATE     diclofenac sodium 1 % Gel  Commonly known as: VOLTAREN  Apply 2 g topically 4 times daily     pantoprazole 20 MG tablet  Commonly known as: PROTONIX  TAKE ONE TABLET BY MOUTH DAILY AT 9AM     sertraline 50 MG

## 2024-04-10 NOTE — PLAN OF CARE
Problem: Discharge Planning  Goal: Discharge to home or other facility with appropriate resources  4/10/2024 1342 by Isha Carpenter RN  Outcome: Progressing  4/10/2024 0426 by Avelino Kam RN  Outcome: Progressing     Problem: Pain  Goal: Verbalizes/displays adequate comfort level or baseline comfort level  4/10/2024 1342 by Isha Carpenter RN  Outcome: Progressing  4/10/2024 0426 by Avelino Kam RN  Outcome: Progressing     Problem: ABCDS Injury Assessment  Goal: Absence of physical injury  4/10/2024 1342 by Isha Carpenter RN  Outcome: Progressing  4/10/2024 0426 by Avelino Kam RN  Outcome: Progressing     Problem: Safety - Adult  Goal: Free from fall injury  4/10/2024 1342 by Isha Carpenter RN  Outcome: Progressing  4/10/2024 0426 by Avelino Kam RN  Outcome: Progressing

## 2024-04-10 NOTE — PROGRESS NOTES
Vascular Surgery   Progress Note    PATIENT NAME: Silas Shrestha     TODAY'S DATE: 4/10/2024, 7:03 AM    SUBJECTIVE:     Pt seen and examined at bedside.  No acute overnight events. Pt pain is well controlled. Art line and sanchez were removed. Vitals within normal limits. Pt tolerating regular diet. AT and PT monophasic signals present.    OBJECTIVE:   VITALS:  BP (!) 128/44   Pulse 78   Temp 98.2 °F (36.8 °C) (Oral)   Resp 15   Ht 1.727 m (5' 7.99\")   Wt 77.2 kg (170 lb 3.1 oz)   SpO2 97%   BMI 25.88 kg/m²      INTAKE/OUTPUT:      Intake/Output Summary (Last 24 hours) at 4/10/2024 0703  Last data filed at 4/9/2024 1219  Gross per 24 hour   Intake 355.22 ml   Output --   Net 355.22 ml         PHYSICAL EXAM:  General Appearance: awake, alert, oriented, in no acute distress  HEENT:  Normocephalic, atraumatic, mucus membranes moist   Heart: Regular rate and rhythm  Lungs: normal effort with symmetric rise and fall of chest wall  Abdomen: soft, nondistended, nontender to palpation  Extremities: Left lower extremity with ace wrap in place. Monophasic AT and PT signals present. Pt has motor and sensation intact.   Skin: Skin color, texture, turgor normal. No rashes or lesions.      Data:  CBC:   Recent Labs     04/08/24  1435 04/09/24  0244 04/10/24  0423   WBC 11.4* 12.0* 11.5*   HGB 9.6* 8.5* 6.7*    184 163       Chemistry:   Recent Labs     04/08/24  1435 04/09/24  0244 04/10/24  0423    138 139   K 3.7 5.3 4.5   * 110* 109*   CO2 18* 20 21   GLUCOSE 204* 152* 115*   BUN 12 14 20   CREATININE 0.8 0.9 0.9   MG 1.4* 2.5*  --    ANIONGAP 11 8* 9   LABGLOM >90 >90 >90   CALCIUM 7.7* 8.1* 7.9*   CAION 1.09* 1.11*  --    PHOS 2.9 3.6  --          ASSESSMENT:  69 y.o. male S/p left lower extremity femoral to tibial bypass 4/8/2024    Plan:  Patient is stable for discharge to home, pending H&H recheck.  General diet  Monitor I/Os,  Home anti-hypertensive meds ordered  Pain control: tylenol,

## 2024-04-10 NOTE — PLAN OF CARE
Problem: Discharge Planning  Goal: Discharge to home or other facility with appropriate resources  4/10/2024 0426 by Avelino Kam RN  Outcome: Progressing  4/9/2024 1620 by Altaf Will RN  Outcome: Progressing  4/9/2024 1620 by Altaf Will RN  Outcome: Progressing     Problem: Pain  Goal: Verbalizes/displays adequate comfort level or baseline comfort level  4/10/2024 0426 by Avelino Kam RN  Outcome: Progressing  4/9/2024 1620 by Altaf Will RN  Outcome: Progressing  4/9/2024 1620 by Altaf Will RN  Outcome: Progressing     Problem: ABCDS Injury Assessment  Goal: Absence of physical injury  4/10/2024 0426 by Avelino Kam RN  Outcome: Progressing  4/9/2024 1620 by Altaf Will RN  Outcome: Progressing  4/9/2024 1620 by Altaf Will RN  Outcome: Progressing     Problem: Safety - Adult  Goal: Free from fall injury  4/10/2024 0426 by Avelino Kam RN  Outcome: Progressing  4/9/2024 1620 by Altaf Will RN  Outcome: Progressing  4/9/2024 1620 by Altaf Will RN  Outcome: Progressing

## 2024-04-11 ENCOUNTER — CARE COORDINATION (OUTPATIENT)
Dept: CASE MANAGEMENT | Age: 69
End: 2024-04-11

## 2024-04-11 DIAGNOSIS — I73.9 CLAUDICATION OF LEFT LOWER EXTREMITY (HCC): Primary | ICD-10-CM

## 2024-04-11 LAB
ABO/RH: NORMAL
ANTIBODY SCREEN: NEGATIVE
ARM BAND NUMBER: NORMAL
BLOOD BANK BLOOD PRODUCT EXPIRATION DATE: NORMAL
BLOOD BANK DISPENSE STATUS: NORMAL
BLOOD BANK ISBT PRODUCT BLOOD TYPE: 9500
BLOOD BANK PRODUCT CODE: NORMAL
BLOOD BANK SAMPLE EXPIRATION: NORMAL
BLOOD BANK UNIT TYPE AND RH: NORMAL
BPU ID: NORMAL
COMPONENT: NORMAL
CROSSMATCH RESULT: NORMAL
TRANSFUSION STATUS: NORMAL
UNIT DIVISION: 0
UNIT ISSUE DATE/TIME: NORMAL

## 2024-04-11 PROCEDURE — 1111F DSCHRG MED/CURRENT MED MERGE: CPT

## 2024-04-11 RX ORDER — CLOBETASOL PROPIONATE 0.5 MG/G
CREAM TOPICAL
Qty: 60 G | Refills: 11 | Status: SHIPPED | OUTPATIENT
Start: 2024-04-11

## 2024-04-11 NOTE — TELEPHONE ENCOUNTER
A Refill Has Been Requested for Silas Shrestha    Medication Requested  Requested Prescriptions     Pending Prescriptions Disp Refills    sertraline (ZOLOFT) 50 MG tablet [Pharmacy Med Name: sertraline 50 mg tablet] 30 tablet 11     Sig: TAKE ONE TABLET BY MOUTH DAILY AT 9AM    clobetasol (TEMOVATE) 0.05 % cream [Pharmacy Med Name: clobetasol 0.05 % topical cream] 60 g 11     Sig: APPLY TOPICALLY TWICE DAILY TO ACTIVE RASH (BULK)       Last Visit Date (If Applicable)  1/18/2024    Next Visit Date (If Applicable)  Visit date not found

## 2024-04-11 NOTE — CARE COORDINATION
Care Transitions Note    Initial Call - Call within 2 business days of discharge: Yes     Patient Current Location:  Home: [address] [place] [id number]    Care Transition Nurse contacted the patient by telephone to perform post hospital discharge assessment, verified name and  as identifiers. Provided introduction to self, and explanation of the Care Transition Nurse role.     Patient: Silas Shrestha    Patient : 1955   MRN: [id number]    Reason for Admission: Atherosclerosis  Discharge Date: 4/10/24  RURS: Readmission Risk Score: 12.9      Last Discharge Facility       Date Complaint Diagnosis Description Type Department Provider    24  Atherosclerosis of native arteries of extremities with rest pain, left leg (HCC) Admission (Discharged) [facility] Delos Reyes, Arthur, MD            Was this an external facility discharge? No    Additional needs identified to be addressed with provider   No needs identified             Method of communication with provider: none.    Patients top risk factors for readmission: functional physical ability and medical condition-     Interventions to addrFemoral-Tibial Bypass Surgery: What to Expect at Home  Your Recovery  Femoral-tibial bypass is surgery to bypass diseased blood vessels in the lower leg or foot. Your doctor used something  called a graft to make the blood go around (bypass) the narrowed or blocked part of your blood vessel.  You will have some pain from the cuts (incisions) the doctor made. The pain usually gets better after about 1 week. Your  doctor will give you pain medicine. You can expect your leg to be swollen at first. This is a normal part of recovery and  may last 2 or 3 months.  You may stay in the hospital for at least 2 days.  You will need to take it easy for 2 to 6 weeks at home. It may take 6 to 12 weeks to fully recover.  You will need to have regular checkups with your doctor to make sure the graft is working.  This care sheet

## 2024-04-15 ENCOUNTER — OFFICE VISIT (OUTPATIENT)
Dept: PODIATRY | Age: 69
End: 2024-04-15
Payer: MEDICARE

## 2024-04-15 VITALS
HEIGHT: 68 IN | BODY MASS INDEX: 25.76 KG/M2 | SYSTOLIC BLOOD PRESSURE: 132 MMHG | DIASTOLIC BLOOD PRESSURE: 70 MMHG | HEART RATE: 70 BPM | WEIGHT: 170 LBS

## 2024-04-15 DIAGNOSIS — I70.262 ATHEROSCLEROSIS OF NATIVE ARTERY OF LEFT LOWER EXTREMITY WITH GANGRENE (HCC): Primary | ICD-10-CM

## 2024-04-15 DIAGNOSIS — M79.675 GREAT TOE PAIN, LEFT: ICD-10-CM

## 2024-04-15 PROCEDURE — 99213 OFFICE O/P EST LOW 20 MIN: CPT

## 2024-04-15 PROCEDURE — 3078F DIAST BP <80 MM HG: CPT

## 2024-04-15 PROCEDURE — G8419 CALC BMI OUT NRM PARAM NOF/U: HCPCS

## 2024-04-15 PROCEDURE — 1036F TOBACCO NON-USER: CPT

## 2024-04-15 PROCEDURE — 1123F ACP DISCUSS/DSCN MKR DOCD: CPT

## 2024-04-15 PROCEDURE — G8427 DOCREV CUR MEDS BY ELIG CLIN: HCPCS

## 2024-04-15 PROCEDURE — 3017F COLORECTAL CA SCREEN DOC REV: CPT

## 2024-04-15 PROCEDURE — 1111F DSCHRG MED/CURRENT MED MERGE: CPT

## 2024-04-15 PROCEDURE — 3075F SYST BP GE 130 - 139MM HG: CPT

## 2024-04-15 NOTE — PROGRESS NOTES
[facility]  [address]   [address] [place] [address]  Tel: [phone]  Fax: [phone]    Subjective     CC: left big toe pain    Interval HPI:     Patient returns for left big toe pain. He underwent Left femoral to tibial bypass with graft with Dr. Delos Reyes on 4/8/2024. Has not yet followed up with him since discharged from hospital. Reports pain to left big toe has increased since surgery, however there is no drainage to toe.     HPI:  Silas Shrestha is a 69 y.o. year old male who presents to clinic today for left big toe pain. Patient reports he might have stumped his toe about 2 weeks ago and it has been painful since then. Report there was some drainage on the tip of his big toe. He then went to the ED and was sent home. He has history of PAD and he is seeing Dr. Delos Reyes for vascular intervention. He reports an angiogram was done before but no angioplasty was performed because it is too occluded. He is scheduled for a bypass procedure for his LLE soon.  He admits intermittent claudication and rest pain of his LLE. Otherwise he denies other pedal complaints.   Primary care physician is Brina Rizo MD.    ROS:    Constitutional: Denies nausea, vomiting, fever, chills.  Neurologic: Denies numbness, tingling, and burning in the feet.    Vascular: admits symptoms of lower extremity claudication.    Skin: Denies open wounds.  Otherwise negative except as noted in the HPI.     PMH:  Past Medical History:   Diagnosis Date    Alcoholism (HCC)     Cirrhosis of liver (HCC) 03/2015    Claudication (HCC) 06/2015    left leg    COPD (chronic obstructive pulmonary disease) (HCC) 08/2022    Finger fracture, left     LONG FINGER    Hx of blood clots     LEFT LOWER LEG UNSURE OF THIS AT THIS TIME, IS TO BE SEEING VASCULAR DR FOR THIS.     Hx of hepatitis C     Hx of pancreatitis     6/2013    Hx of tuberculosis     Hyperlipidemia     Hypertension     Liver disease     PAD (peripheral

## 2024-04-17 ENCOUNTER — CARE COORDINATION (OUTPATIENT)
Dept: CASE MANAGEMENT | Age: 69
End: 2024-04-17

## 2024-04-17 RX ORDER — OXYCODONE HYDROCHLORIDE AND ACETAMINOPHEN 5; 325 MG/1; MG/1
1 TABLET ORAL EVERY 6 HOURS PRN
Qty: 28 TABLET | Refills: 0 | Status: SHIPPED | OUTPATIENT
Start: 2024-04-17 | End: 2024-04-24 | Stop reason: SDUPTHER

## 2024-04-17 NOTE — CARE COORDINATION
Writer received a call back from speciality clinic Nurse Flor. They recommended that pt go to ER to be evaluated . Writer called pt and pt stated that he will call his ride as soon as call is ended to go get checked out.  Mita Menjivar LPN   Care Transitions Nurse  Cell    
pain?    Mita Tompkins, YASHN

## 2024-04-18 ENCOUNTER — CARE COORDINATION (OUTPATIENT)
Dept: CASE MANAGEMENT | Age: 69
End: 2024-04-18

## 2024-04-18 NOTE — CARE COORDINATION
Care Transitions Follow Up Call    Patient Current Location:  Home: [address] [place] [id number]    [facility] Care Coordinator contacted the patient by telephone to follow up after admission on 24.  Verified name and  with patient as identifiers.    Patient: Silas Shrestha  Patient : 1955   MRN: [id number]  Reason for Admission: lower extremity femoral to tibial bypass.   Discharge Date: 4/10/24 RARS: Readmission Risk Score: 12.9      Needs to be reviewed by the provider   Additional needs identified to be addressed with provider: No  none             Method of communication with provider: none.    Subsequent transitional call. Spoke to Silas today he reports that he is feeling better today. No blood , no pus/drainage fever or chills. Stated that he had his shirt tucked in. Picked up his pain medication area to Lt groin has went down -did not apply ice. He stated he did call Dr. Urena's office left a VM awaiting a return call. Aware of directions that he was advised to go to ER to be evaluated on 24- he did not go. He voices no other needs/concerns at  this time.     Addressed changes since last contact:   has medication   Discussed follow-up appointments. If no appointment was previously scheduled, appointment scheduling offered: Yes.   Is follow up appointment scheduled within 7 days of discharge? Yes.    Follow Up  Future Appointments   Date Time Provider Department Center   2024  2:20 PM [facility]   2024  2:45 PM Domenico iKng DPM [facility] Care Coordinator reviewed discharge instructions, medical action plan, and red flags with patient and discussed any barriers to care and/or understanding of plan of care after discharge. Discussed appropriate site of care based on symptoms and resources available to patient including: PCP  Specialist  Home health  [employer]. The patient agrees to contact the PCP office for questions

## 2024-04-24 ENCOUNTER — OFFICE VISIT (OUTPATIENT)
Age: 69
End: 2024-04-24

## 2024-04-24 VITALS
OXYGEN SATURATION: 100 % | DIASTOLIC BLOOD PRESSURE: 62 MMHG | WEIGHT: 153 LBS | HEART RATE: 58 BPM | RESPIRATION RATE: 18 BRPM | SYSTOLIC BLOOD PRESSURE: 117 MMHG | BODY MASS INDEX: 23.19 KG/M2 | TEMPERATURE: 98 F | HEIGHT: 68 IN

## 2024-04-24 DIAGNOSIS — I70.262 ATHEROSCLEROSIS OF NATIVE ARTERY OF LEFT LOWER EXTREMITY WITH GANGRENE (HCC): ICD-10-CM

## 2024-04-24 DIAGNOSIS — I70.222 ATHEROSCLEROSIS OF NATIVE ARTERIES OF EXTREMITIES WITH REST PAIN, LEFT LEG (HCC): Primary | ICD-10-CM

## 2024-04-24 PROCEDURE — 99024 POSTOP FOLLOW-UP VISIT: CPT | Performed by: SURGERY

## 2024-04-24 RX ORDER — OXYCODONE HYDROCHLORIDE AND ACETAMINOPHEN 5; 325 MG/1; MG/1
1 TABLET ORAL EVERY 6 HOURS PRN
Qty: 40 TABLET | Refills: 0 | Status: SHIPPED | OUTPATIENT
Start: 2024-04-24 | End: 2024-05-04

## 2024-04-24 NOTE — PROGRESS NOTES
clear to auscultation, normal effort  Heart - normal rate, regular rhythm, no murmurs  Abdomen - soft, non-tender, non-distended, bowel sounds present all four quadrants, no masses, hepatomegaly, splenomegaly or aortic enlargement  Neurological - normal speech, no focal findings or movement disorder noted, cranial nerves II through XII grossly intact  Extremities -groin wound with some serous drainage.  No foul smell.  Monophasic Doppler signal in the dorsalis pedis and biphasic in the posterior tibial.  All infrainguinal incisions are otherwise well-healed and staples were removed.  Skin - no gross lesions, rashes, or induration noted        Labs:   Lab Results   Component Value Date/Time    WBC 11.5 04/10/2024 04:23 AM    HGB 8.3 04/10/2024 05:01 PM    HCT 25.4 04/10/2024 05:01 PM    .0 04/10/2024 04:23 AM     04/10/2024 04:23 AM     Lab Results   Component Value Date/Time     04/10/2024 04:23 AM    K 4.5 04/10/2024 04:23 AM     04/10/2024 04:23 AM    CO2 21 04/10/2024 04:23 AM    BUN 20 04/10/2024 04:23 AM    CREATININE 0.9 04/10/2024 04:23 AM    GLUCOSE 115 04/10/2024 04:23 AM    CALCIUM 7.9 04/10/2024 04:23 AM     Lab Results   Component Value Date/Time    ALKPHOS 117 02/13/2024 03:33 PM    ALT 32 02/13/2024 03:33 PM    AST 49 02/13/2024 03:33 PM    PROT 7.3 02/13/2024 03:33 PM    BILITOT 0.8 02/13/2024 03:33 PM    BILIDIR 0.4 02/13/2024 03:33 PM     No results found for: \"LACTA\"  Lab Results   Component Value Date/Time    AMYLASE 177 06/03/2013 01:04 AM     Lab Results   Component Value Date/Time    LIPASE 39 06/12/2015 10:45 AM     Lab Results   Component Value Date/Time    INR 1.2 03/29/2024 09:15 AM         Assessment:  69-year-old male with 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 Atherosclerosis of native arteries of extremities with rest pain, left leg (HCC)        Plan:   I think his perfusion is marginal at best.  I had a very honest conversation

## 2024-04-25 ENCOUNTER — OFFICE VISIT (OUTPATIENT)
Dept: INTERNAL MEDICINE | Age: 69
End: 2024-04-25
Payer: MEDICARE

## 2024-04-25 ENCOUNTER — CARE COORDINATION (OUTPATIENT)
Dept: CASE MANAGEMENT | Age: 69
End: 2024-04-25

## 2024-04-25 VITALS
WEIGHT: 151 LBS | OXYGEN SATURATION: 100 % | DIASTOLIC BLOOD PRESSURE: 64 MMHG | SYSTOLIC BLOOD PRESSURE: 132 MMHG | TEMPERATURE: 98.2 F | HEIGHT: 68 IN | HEART RATE: 86 BPM | BODY MASS INDEX: 22.88 KG/M2

## 2024-04-25 DIAGNOSIS — I73.9 CLAUDICATION OF LEFT LOWER EXTREMITY (HCC): ICD-10-CM

## 2024-04-25 DIAGNOSIS — I73.9 PAD (PERIPHERAL ARTERY DISEASE) (HCC): Primary | ICD-10-CM

## 2024-04-25 PROCEDURE — 3078F DIAST BP <80 MM HG: CPT | Performed by: INTERNAL MEDICINE

## 2024-04-25 PROCEDURE — 1123F ACP DISCUSS/DSCN MKR DOCD: CPT | Performed by: INTERNAL MEDICINE

## 2024-04-25 PROCEDURE — 1111F DSCHRG MED/CURRENT MED MERGE: CPT | Performed by: INTERNAL MEDICINE

## 2024-04-25 PROCEDURE — G8427 DOCREV CUR MEDS BY ELIG CLIN: HCPCS | Performed by: INTERNAL MEDICINE

## 2024-04-25 PROCEDURE — 99213 OFFICE O/P EST LOW 20 MIN: CPT | Performed by: INTERNAL MEDICINE

## 2024-04-25 PROCEDURE — 3075F SYST BP GE 130 - 139MM HG: CPT | Performed by: INTERNAL MEDICINE

## 2024-04-25 PROCEDURE — 1036F TOBACCO NON-USER: CPT | Performed by: INTERNAL MEDICINE

## 2024-04-25 PROCEDURE — 99212 OFFICE O/P EST SF 10 MIN: CPT | Performed by: INTERNAL MEDICINE

## 2024-04-25 PROCEDURE — 3017F COLORECTAL CA SCREEN DOC REV: CPT | Performed by: INTERNAL MEDICINE

## 2024-04-25 PROCEDURE — G8420 CALC BMI NORM PARAMETERS: HCPCS | Performed by: INTERNAL MEDICINE

## 2024-04-25 SDOH — ECONOMIC STABILITY: INCOME INSECURITY: HOW HARD IS IT FOR YOU TO PAY FOR THE VERY BASICS LIKE FOOD, HOUSING, MEDICAL CARE, AND HEATING?: NOT HARD AT ALL

## 2024-04-25 SDOH — ECONOMIC STABILITY: FOOD INSECURITY: WITHIN THE PAST 12 MONTHS, YOU WORRIED THAT YOUR FOOD WOULD RUN OUT BEFORE YOU GOT MONEY TO BUY MORE.: NEVER TRUE

## 2024-04-25 SDOH — ECONOMIC STABILITY: FOOD INSECURITY: WITHIN THE PAST 12 MONTHS, THE FOOD YOU BOUGHT JUST DIDN'T LAST AND YOU DIDN'T HAVE MONEY TO GET MORE.: NEVER TRUE

## 2024-04-25 ASSESSMENT — ENCOUNTER SYMPTOMS
EYES NEGATIVE: 1
ALLERGIC/IMMUNOLOGIC NEGATIVE: 1
GASTROINTESTINAL NEGATIVE: 1
RESPIRATORY NEGATIVE: 1

## 2024-04-25 NOTE — PROGRESS NOTES
[facility]   Progress Note      Date of patient's visit: 4/25/2024  Patient's Name:  Silas Shrestha                   YOB: 1955        PCP:  Brina Rizo MD    Silas Shrestha is a 69 y.o. male who presents for   Chief Complaint   Patient presents with    Toe Pain     Left great toe pain    and follow up of chronic medical problems.  Patient Active Problem List   Diagnosis    Hx of tuberculosis    Alcohol induced acute pancreatitis    HTN (hypertension)    Alcohol abuse    Tobacco abuse    Hypertriglyceridemia    Macrocytic anemia    Cirrhosis, alcoholic (HCC)    Diverticulosis    Internal hemorrhoids    Hypercholesteremia    Amputation finger    Claudication of left lower extremity (HCC)    Chronic obstructive pulmonary disease, unspecified COPD type (HCC)    Major depressive disorder, recurrent, mild    Atherosclerosis of native arteries of extremities with rest pain, left leg (HCC)    Great toe pain, left       HISTORY OF PRESENT ILLNESS:    History was obtained from the patient.     Silas Shrestha is a 69 y.o. male with history of left lower extremity rest pain who on 4/8/2024 had left femoral to popliteal bypass.  He has seen both podiatry and vascular for postoperative follow-up after left femoral to tibial bypass with graft.  He originally developed gangrene of the left great toe after clipping his toenail.  He is healing well but there is still a chance he could still lose the leg as a below-knee amputation.  He is adherent with his medication, has good BP control. Not smoking anymore.    Patient's allergies, medications, past medical, surgical, social and family histories were reviewed and updated as appropriate.    ALLERGIES    No Known Allergies      MEDICATIONS:      Current Outpatient Medications   Medication Sig Dispense Refill    oxyCODONE-acetaminophen (PERCOCET) 5-325 MG per tablet Take 1 tablet by mouth every 6 hours as needed for Pain for up to

## 2024-04-25 NOTE — CARE COORDINATION
Care Transitions Note    Follow Up Call      Patient Current Location:  Home: [address] [place] [id number]    Care Transition Nurse contacted the patient by telephone. Verified name and  as identifiers.    Additional needs identified to be addressed with provider   No needs identified                 Method of communication with provider: none.    Care Summary Note: Writer spoke to patient, he stated he was doing pretty good, he had an appointment with pcp today, no new medications, is to continue with his current medications and continue his current diet and exercise plan, denied any c/o fever, chills, n/v/d, sob or chest pain at time of call, appetite has been good, will follow//JU    Addressed changes since last contact:  Review of patient management of conditions/medications:         Advance Care Planning:   Does patient have an Advance Directive: reviewed during previous call, see note. .    Medication Review:  No changes since last call.     Remote Patient Monitoring:  Offered patient enrollment in the Remote Patient Monitoring (RPM) program for in-home monitoring: Yes, but did not enroll at this time: declined to enroll in the program because not really interested .    Assessments:   Goals Addressed    None          Follow Up Appointment:   Reviewed upcoming appointment(s).  Future Appointments         Provider Specialty Dept Phone    2024 1:00 PM SCHEDULE, [facility] NURSE Internal Medicine [phone]    2024 10:45 AM Anya Escalante DPM Podiatry [phone]    2024 11:15 AM Delos Reyes, Arthur, MD Vascular Surgery [phone]            Care Transition Nurse provided contact information.  Plan for follow-up call in 6-10 days based on severity of symptoms and risk factors.  Plan for next call:  m follow up on pain, s/s f infection, diet      Nilsa Holman RN

## 2024-05-02 ENCOUNTER — CARE COORDINATION (OUTPATIENT)
Dept: CASE MANAGEMENT | Age: 69
End: 2024-05-02

## 2024-05-02 NOTE — CARE COORDINATION
Care Transitions Note    Follow Up Call      Patient Current Location:  Home: [address] [place] [address]    Care Transition Nurse contacted the patient by telephone. Verified name and  as identifiers.    Additional needs identified to be addressed with provider                     Method of communication with provider:    .    Care Summary Note: Writer spoke to patient, he is doing ok, still has some foot pain but denied any c/o fever, chills, n/v/d, sob ,chest pain or dizziness, reviewed up coming appointments, no new needs at this time, will follow//JU    Addressed changes since last contact:  Review of patient management of conditions/medications:         Advance Care Planning:   Does patient have an Advance Directive: reviewed during previous call, see note. .    Medication Review:  No changes since last call.     Remote Patient Monitoring:  Offered patient enrollment in the Remote Patient Monitoring (RPM) program for in-home monitoring: Yes, but did not enroll at this time:   .    Assessments:   Goals Addressed    None          Follow Up Appointment:   Reviewed upcoming appointment(s).  Future Appointments         Provider Specialty Dept Phone    2024 1:00 PM SCHEDULE, [facility] NURSE Internal Medicine [phone]    2024 10:45 AM Anya Escalante DPM Podiatry [phone]    2024 11:15 AM Delos Reyes, Arthur, MD Vascular Surgery [phone]            Care Transition Nurse provided contact information.  Plan for follow-up call in 2-5 days based on severity of symptoms and risk factors.  Plan for next call: symptom management-follow up n foot pain  follow-up appointment-follow up appt      Nilsa Holman RN

## 2024-05-08 ENCOUNTER — CARE COORDINATION (OUTPATIENT)
Dept: CASE MANAGEMENT | Age: 69
End: 2024-05-08

## 2024-05-08 NOTE — CARE COORDINATION
Care Transitions Final Call    Patient Current Location:  Home: [address] [place] [id number]    [facility] Care Coordinator contacted the patient by telephone to follow up after admission on 24.  Verified name and  with patient as identifiers.    Patient: Silas Shrestha  Patient : 1955   MRN: [id number]  Reason for Admission: Lt femoral to tibal bypass with graft   Discharge Date: 4/10/24 RARS: Readmission Risk Score: 12.9      Needs to be reviewed by the provider   Additional needs identified to be addressed with provider: No  none             Method of communication with provider: none.    Subsequent transitional  final call. Spoke to Silas today he reports no new pain still has foot pain has used ice/ elevating. Uses a cane He  has a pain management appointment on 24 pcp office visit 24  has a vascular follow on 24 and podiatry on 24. He denies CP sob HA dizziness fever/chills N/V/D.  He voiced no new needs concerns does accept referral to ACM Mo Quinones.     Addressed changes since last contact:   dicussed follow up appointments   Discussed follow-up appointments. If no appointment was previously scheduled, appointment scheduling offered: Yes.   Is follow up appointment scheduled within 7 days of discharge? Yes.    Follow Up  Future Appointments   Date Time Provider Department Center   2024  1:00 PM SCHEDULE, [facility] NURSE [facility]   2024  8:40 AM Deo Barrett MD [facility] [place]   2024 10:45 AM Anya Escalante DPM [facility]   2024 11:15 AM Delos Reyes, Arthur, MD [facility] Care Coordinator reviewed discharge instructions, medical action plan, and red flags with patient and discussed any barriers to care and/or understanding of plan of care after discharge. Discussed appropriate site of care based on symptoms and resources available to patient including: PCP  Specialist  Urgent care clinics  When to call

## 2024-05-13 ENCOUNTER — CARE COORDINATION (OUTPATIENT)
Dept: CARE COORDINATION | Age: 69
End: 2024-05-13

## 2024-05-13 NOTE — CARE COORDINATION
Called and spoke with Mr. Shrestha. States that he's doing ok. Has his appointments set up but has two scheduled for 5/22 half an hour appart in two different places. Patient states that he would like to cancel his Podiatry appointment and keep the one with the vascular surgeon. He does not have the phone number and asked if I would call them and just cancel it.  Patient declined ACM at this time    Dr. Anya Escalante - [facility]  [phone]

## 2024-05-14 ENCOUNTER — CARE COORDINATION (OUTPATIENT)
Dept: CARE COORDINATION | Age: 69
End: 2024-05-14

## 2024-05-14 NOTE — CARE COORDINATION
Called [facility] (Dr. Escalante) and canceled the appointment for 5/22 at 10:45. Patient will need to call the office to reschedule.  Call to patient and explained above and that he will need to reschedule. Phone number for office provided [phone].   Chronic schizophrenia   F20.9   Chronic schizophrenia   F20.9   Chronic schizophrenia   F20.9   Chronic schizophrenia   F20.9   Chronic schizophrenia   F20.9   Chronic schizophrenia   F20.9   Chronic schizophrenia   F20.9   Chronic schizophrenia   F20.9   Chronic schizophrenia   F20.9   Chronic schizophrenia   F20.9   Chronic schizophrenia   F20.9   Chronic schizophrenia   F20.9   Chronic schizophrenia   F20.9   Chronic schizophrenia   F20.9   Chronic schizophrenia   F20.9   Mixed origin delirium   F05   Chronic schizophrenia   F20.9   Chronic schizophrenia   F20.9

## 2024-05-21 ENCOUNTER — HOSPITAL ENCOUNTER (EMERGENCY)
Age: 69
Discharge: HOME | End: 2024-05-21
Attending: EMERGENCY MEDICINE
Payer: MEDICARE

## 2024-05-21 ENCOUNTER — OFFICE VISIT (OUTPATIENT)
Dept: INTERNAL MEDICINE | Age: 69
End: 2024-05-21
Payer: MEDICARE

## 2024-05-21 ENCOUNTER — APPOINTMENT (OUTPATIENT)
Dept: GENERAL RADIOLOGY | Age: 69
End: 2024-05-21
Payer: MEDICARE

## 2024-05-21 VITALS
OXYGEN SATURATION: 98 % | RESPIRATION RATE: 16 BRPM | HEART RATE: 73 BPM | TEMPERATURE: 98.1 F | DIASTOLIC BLOOD PRESSURE: 88 MMHG | SYSTOLIC BLOOD PRESSURE: 133 MMHG

## 2024-05-21 VITALS
DIASTOLIC BLOOD PRESSURE: 80 MMHG | HEART RATE: 64 BPM | WEIGHT: 139.4 LBS | HEIGHT: 68 IN | SYSTOLIC BLOOD PRESSURE: 162 MMHG | BODY MASS INDEX: 21.13 KG/M2

## 2024-05-21 DIAGNOSIS — I73.9 CLAUDICATION OF LEFT LOWER EXTREMITY (HCC): Primary | ICD-10-CM

## 2024-05-21 DIAGNOSIS — Z51.89 VISIT FOR WOUND CHECK: Primary | ICD-10-CM

## 2024-05-21 DIAGNOSIS — R79.9 ABNORMAL FINDING OF BLOOD CHEMISTRY, UNSPECIFIED: ICD-10-CM

## 2024-05-21 DIAGNOSIS — Z00.00 MEDICARE ANNUAL WELLNESS VISIT, SUBSEQUENT: ICD-10-CM

## 2024-05-21 DIAGNOSIS — I73.9 PAD (PERIPHERAL ARTERY DISEASE) (HCC): ICD-10-CM

## 2024-05-21 LAB
ANION GAP SERPL CALCULATED.3IONS-SCNC: 12 MMOL/L (ref 9–16)
BASOPHILS # BLD: <0.03 K/UL (ref 0–0.2)
BASOPHILS NFR BLD: 0 % (ref 0–2)
BUN SERPL-MCNC: 9 MG/DL (ref 8–23)
CALCIUM SERPL-MCNC: 8.5 MG/DL (ref 8.6–10.4)
CHLORIDE SERPL-SCNC: 108 MMOL/L (ref 98–107)
CO2 SERPL-SCNC: 23 MMOL/L (ref 20–31)
CREAT SERPL-MCNC: 0.7 MG/DL (ref 0.7–1.2)
CRP SERPL HS-MCNC: <3 MG/L (ref 0–5)
EOSINOPHIL # BLD: 0.24 K/UL (ref 0–0.44)
EOSINOPHILS RELATIVE PERCENT: 3 % (ref 1–4)
ERYTHROCYTE [DISTWIDTH] IN BLOOD BY AUTOMATED COUNT: 13.5 % (ref 11.8–14.4)
ERYTHROCYTE [SEDIMENTATION RATE] IN BLOOD BY PHOTOMETRIC METHOD: 18 MM/HR (ref 0–20)
GFR, ESTIMATED: >90 ML/MIN/1.73M2
GLUCOSE SERPL-MCNC: 102 MG/DL (ref 74–99)
HCT VFR BLD AUTO: 38.5 % (ref 40.7–50.3)
HGB BLD-MCNC: 12.3 G/DL (ref 13–17)
IMM GRANULOCYTES # BLD AUTO: <0.03 K/UL (ref 0–0.3)
IMM GRANULOCYTES NFR BLD: 0 %
LYMPHOCYTES NFR BLD: 2.78 K/UL (ref 1.1–3.7)
LYMPHOCYTES RELATIVE PERCENT: 38 % (ref 24–43)
MCH RBC QN AUTO: 34.2 PG (ref 25.2–33.5)
MCHC RBC AUTO-ENTMCNC: 31.9 G/DL (ref 28.4–34.8)
MCV RBC AUTO: 106.9 FL (ref 82.6–102.9)
MONOCYTES NFR BLD: 0.6 K/UL (ref 0.1–1.2)
MONOCYTES NFR BLD: 8 % (ref 3–12)
NEUTROPHILS NFR BLD: 51 % (ref 36–65)
NEUTS SEG NFR BLD: 3.59 K/UL (ref 1.5–8.1)
NRBC BLD-RTO: 0 PER 100 WBC
PLATELET # BLD AUTO: 237 K/UL (ref 138–453)
PMV BLD AUTO: 9.6 FL (ref 8.1–13.5)
POTASSIUM SERPL-SCNC: 3.6 MMOL/L (ref 3.7–5.3)
RBC # BLD AUTO: 3.6 M/UL (ref 4.21–5.77)
RBC # BLD: ABNORMAL 10*6/UL
SODIUM SERPL-SCNC: 143 MMOL/L (ref 136–145)
WBC OTHER # BLD: 7.3 K/UL (ref 3.5–11.3)

## 2024-05-21 PROCEDURE — G0439 PPPS, SUBSEQ VISIT: HCPCS | Performed by: INTERNAL MEDICINE

## 2024-05-21 PROCEDURE — 3017F COLORECTAL CA SCREEN DOC REV: CPT | Performed by: INTERNAL MEDICINE

## 2024-05-21 PROCEDURE — 3078F DIAST BP <80 MM HG: CPT | Performed by: INTERNAL MEDICINE

## 2024-05-21 PROCEDURE — 73630 X-RAY EXAM OF FOOT: CPT

## 2024-05-21 PROCEDURE — 99214 OFFICE O/P EST MOD 30 MIN: CPT | Performed by: INTERNAL MEDICINE

## 2024-05-21 PROCEDURE — 85025 COMPLETE CBC W/AUTO DIFF WBC: CPT

## 2024-05-21 PROCEDURE — 1123F ACP DISCUSS/DSCN MKR DOCD: CPT | Performed by: INTERNAL MEDICINE

## 2024-05-21 PROCEDURE — 80048 BASIC METABOLIC PNL TOTAL CA: CPT

## 2024-05-21 PROCEDURE — 86140 C-REACTIVE PROTEIN: CPT

## 2024-05-21 PROCEDURE — 99284 EMERGENCY DEPT VISIT MOD MDM: CPT

## 2024-05-21 PROCEDURE — 3077F SYST BP >= 140 MM HG: CPT | Performed by: INTERNAL MEDICINE

## 2024-05-21 PROCEDURE — 73590 X-RAY EXAM OF LOWER LEG: CPT

## 2024-05-21 PROCEDURE — 85652 RBC SED RATE AUTOMATED: CPT

## 2024-05-21 RX ORDER — OXYCODONE HYDROCHLORIDE AND ACETAMINOPHEN 5; 325 MG/1; MG/1
1 TABLET ORAL EVERY 6 HOURS PRN
Qty: 3 TABLET | Refills: 0 | Status: SHIPPED | OUTPATIENT
Start: 2024-05-21 | End: 2024-05-24

## 2024-05-21 RX ORDER — SERTRALINE HYDROCHLORIDE 100 MG/1
100 TABLET, FILM COATED ORAL DAILY
Qty: 30 TABLET | Refills: 1 | Status: SHIPPED | OUTPATIENT
Start: 2024-05-21

## 2024-05-21 ASSESSMENT — PATIENT HEALTH QUESTIONNAIRE - PHQ9
3. TROUBLE FALLING OR STAYING ASLEEP: NEARLY EVERY DAY
SUM OF ALL RESPONSES TO PHQ9 QUESTIONS 1 & 2: 3
SUM OF ALL RESPONSES TO PHQ QUESTIONS 1-9: 12
4. FEELING TIRED OR HAVING LITTLE ENERGY: NEARLY EVERY DAY
SUM OF ALL RESPONSES TO PHQ QUESTIONS 1-9: 11
10. IF YOU CHECKED OFF ANY PROBLEMS, HOW DIFFICULT HAVE THESE PROBLEMS MADE IT FOR YOU TO DO YOUR WORK, TAKE CARE OF THINGS AT HOME, OR GET ALONG WITH OTHER PEOPLE: SOMEWHAT DIFFICULT
6. FEELING BAD ABOUT YOURSELF - OR THAT YOU ARE A FAILURE OR HAVE LET YOURSELF OR YOUR FAMILY DOWN: SEVERAL DAYS
SUM OF ALL RESPONSES TO PHQ QUESTIONS 1-9: 12
8. MOVING OR SPEAKING SO SLOWLY THAT OTHER PEOPLE COULD HAVE NOTICED. OR THE OPPOSITE, BEING SO FIGETY OR RESTLESS THAT YOU HAVE BEEN MOVING AROUND A LOT MORE THAN USUAL: NOT AT ALL
1. LITTLE INTEREST OR PLEASURE IN DOING THINGS: SEVERAL DAYS
5. POOR APPETITE OR OVEREATING: SEVERAL DAYS
SUM OF ALL RESPONSES TO PHQ QUESTIONS 1-9: 12
7. TROUBLE CONCENTRATING ON THINGS, SUCH AS READING THE NEWSPAPER OR WATCHING TELEVISION: NOT AT ALL
2. FEELING DOWN, DEPRESSED OR HOPELESS: MORE THAN HALF THE DAYS
9. THOUGHTS THAT YOU WOULD BE BETTER OFF DEAD, OR OF HURTING YOURSELF: SEVERAL DAYS

## 2024-05-21 ASSESSMENT — ENCOUNTER SYMPTOMS
CHEST TIGHTNESS: 0
ABDOMINAL PAIN: 0
EYES NEGATIVE: 1
VOMITING: 0
DIARRHEA: 0
ABDOMINAL DISTENTION: 0
SHORTNESS OF BREATH: 0
NAUSEA: 0
COUGH: 0

## 2024-05-21 ASSESSMENT — COLUMBIA-SUICIDE SEVERITY RATING SCALE - C-SSRS
2. HAVE YOU ACTUALLY HAD ANY THOUGHTS OF KILLING YOURSELF?: NO
1. WITHIN THE PAST MONTH, HAVE YOU WISHED YOU WERE DEAD OR WISHED YOU COULD GO TO SLEEP AND NOT WAKE UP?: NO
6. HAVE YOU EVER DONE ANYTHING, STARTED TO DO ANYTHING, OR PREPARED TO DO ANYTHING TO END YOUR LIFE?: NO

## 2024-05-21 ASSESSMENT — LIFESTYLE VARIABLES
HOW OFTEN DO YOU HAVE A DRINK CONTAINING ALCOHOL: NEVER
HOW MANY STANDARD DRINKS CONTAINING ALCOHOL DO YOU HAVE ON A TYPICAL DAY: PATIENT DOES NOT DRINK

## 2024-05-21 NOTE — PROGRESS NOTES
Medicare Annual Wellness Visit    Silas Shrestha is here for Medicare AWV (Last AWV 6.27.2023), Follow-up (Left femoral to tibia bypass, non-healing wound back of left leg, states pus comes out of it), Toe Injury (Nail fell off left great toe a couple days before vascular surgery, does not appear to be healing gets pus every now and then), and Health Maintenance (Declined vaccines)    Assessment & Plan   Claudication of left lower extremity (HCC)  -     [facility]  -     [facility]  PAD (peripheral artery disease) (HCC)  -     [facility]  -     [facility]  -     Lipid Panel; Future  Medicare annual wellness visit, subsequent  Abnormal finding of blood chemistry, unspecified  -     Lipid Panel; Future    Recommendations for Preventive Services Due: see orders and patient instructions/AVS.  Recommended screening schedule for the next 5-10 years is provided to the patient in written form: see Patient Instructions/AVS.     Return in 4 weeks (on 6/18/2024).     Subjective   The following acute and/or chronic problems were also addressed today:  Wound not healing    Patient's complete Health Risk Assessment and screening values have been reviewed and are found in Flowsheets. The following problems were reviewed today and where indicated follow up appointments were made and/or referrals ordered.    Positive Risk Factor Screenings with Interventions:    Fall Risk:  Do you feel unsteady or are you worried about falling? : (!) yes (pt very unsteady on feet, uses cane when he is out and about)  2 or more falls in past year?: no  Fall with injury in past year?: no     Interventions:    Reviewed medications, home hazards, visual acuity, and co-morbidities that can increase risk for falls     Depression:  PHQ-2 Score: 3  PHQ-9 Total Score: 12    Interpretation:  5-9 mild   10-14 moderate   15-19 moderately

## 2024-05-21 NOTE — ED PROVIDER NOTES
[facility]     Emergency Department     Faculty Attestation  3:46 PM EDT      I performed a history and physical examination of the patient and discussed management with the resident. I have reviewed and agree with the resident’s findings including all diagnostic interpretations, and treatment plans as written. Any areas of disagreement are noted on the chart. I was personally present for the key portions of any procedures. I have documented in the chart those procedures where I was not present during the key portions. I have reviewed the emergency nurses triage note. I agree with the chief complaint, past medical history, past surgical history, allergies, medications, social and family history as documented unless otherwise noted below. Documentation of the HPI, Physical Exam and Medical Decision Making performed by scribes is based on my personal performance of the HPI, PE and MDM. For Physician Assistant/ Nurse Practitioner cases/documentation I have personally evaluated this patient and have completed at least one if not all key elements of the E/M (history, physical exam, and MDM). Additional findings are as noted.    Patient s/p LOWER EXTREMITY FEMORAL TO TIBIAL BYPASS WITH PROPATIN GRAFT, IPSILATERAL SUPERIFICAL SAPHENOUS VEIN HARVEST, LOWER EXTREMITY ANGIOGRAM COMPLETION by Dr. Delos reyes on 4/8/2024, patient has ongoing pain and swelling and redness to the left big toe, and also not healed distal aspect of the incision on calf with purulent drainage  Saw pcp today and sent over due to wound, is scheduled to see DR. SWIFT tomorrow.    Will upload photo of wound, check labs, and speak with vascular      Hui Vidal D.O, M.P.H  Attending Emergency Medicine Physician         Hui Vidal, DO  05/21/24 [id number]

## 2024-05-21 NOTE — PATIENT INSTRUCTIONS
calcium requirement with diet alone, but a vitamin D supplement is usually necessary to meet this goal.  When exposed to the sun, use a sunscreen that protects against both UVA and UVB radiation with an SPF of 30 or greater. Reapply every 2 to 3 hours or after sweating, drying off with a towel, or swimming.  Always wear a seat belt when traveling in a car. Always wear a helmet when riding a bicycle or motorcycle.       Preventing Falls: Care Instructions  Injuries and health problems such as trouble walking or poor eyesight can increase your risk of falling. So can some medicines. But there are things you can do to help prevent falls. You can exercise to get stronger. You can also arrange your home to make it safer.    Talk to your doctor about the medicines you take. Ask if any of them increase the risk of falls and whether they can be changed or stopped.   Try to exercise regularly. It can help improve your strength and balance. This can help lower your risk of falling.     Practice fall safety and prevention.    Wear low-heeled shoes that fit well and give your feet good support. Talk to your doctor if you have foot problems that make this hard.  Carry a cellphone or wear a medical alert device that you can use to call for help.  Use stepladders instead of chairs to reach high objects. Don't climb if you're at risk for falls. Ask for help, if needed.  Wear the correct eyeglasses, if you need them.    Make your home safer.    Remove rugs, cords, clutter, and furniture from walkways.  Keep your house well lit. Use night-lights in hallways and bathrooms.  Install and use sturdy handrails on stairways.  Wear nonskid footwear, even inside. Don't walk barefoot or in socks without shoes.    Be safe outside.    Use handrails, curb cuts, and ramps whenever possible.  Keep your hands free by using a shoulder bag or backpack.  Try to walk in well-lit areas. Watch out for uneven ground, changes in pavement, and debris.  Be

## 2024-05-21 NOTE — CONSULTS
Division of Vascular Surgery        New Consult      Physician Requesting Consult:  Bushra Salazar    Reason for Consult:   Post op fem tib bypass with Dr. Delos Reyes, distal wound dehisced vs nonhealing with purulent drainage,     Chief Complaint:     Post op fem tib bypass with Dr. Delos Reyes, distal wound dehisced vs nonhealing with purulent drainage     History of Present Illness:      Silas Shrestha is a 69 y.o. gentleman with a past medical history of claudication of the left lower extremity, atherosclerosis, HTN, and hypercholesteremia who presents s/p left femoral to tibial bypass with graft on 4/8/2024 for treatment of his left lower extremity rest pain. He states the wound has been open for the last 1 week and has drained clear fluid twice including today's episode. He states the drainage is associated with a foul smell coming from the area, tenderness along the medial aspect of the LLE, and a sharp, throbbing pain that he rates as a 7/10. He does endorse chills, but no chest pain, SOB,  fever, diarrhea, nausea, or vomiting.      Medical History:     Past Medical History:   Diagnosis Date    Alcoholism (HCC)     Cirrhosis of liver (HCC) 03/2015    Claudication (HCC) 06/2015    left leg    COPD (chronic obstructive pulmonary disease) (HCC) 08/2022    Finger fracture, left     LONG FINGER    Hx of blood clots     LEFT LOWER LEG UNSURE OF THIS AT THIS TIME, IS TO BE SEEING VASCULAR DR FOR THIS.     Hx of hepatitis C     Hx of pancreatitis     6/2013    Hx of tuberculosis     Hyperlipidemia     Hypertension     Liver disease     PAD (peripheral artery disease) (HCC)     PVD (peripheral vascular disease) (HCC)     Under care of service provider [facility]/29/2024    [facility]    Wears dentures     FULL UPPER AND LOWER    Wears glasses        Surgical History:     Past Surgical History:   Procedure Laterality Date    COLONOSCOPY      around 2014    FEMORAL BYPASS   left side.      Heart sounds: Normal heart sounds.      Comments: Doppler signals to DP and PT bilaterally  Pulmonary:      Effort: No respiratory distress.   Chest:      Chest wall: No tenderness.   Abdominal:      General: There is no distension.      Tenderness: There is no abdominal tenderness.   Musculoskeletal:         General: Tenderness present.      Cervical back: Normal range of motion.      Left lower leg: Tenderness present. No swelling. No edema.        Legs:       Comments: Decreased sensation superior to medial LLE incision, tenderness to palpation extending from inferior border of LLE incision to the postero-medial malleolus    Feet:      Comments: Discoloration and thickening of the toenail of the first left digit  Skin:     Comments: No drainage able to be expressed from wound, no surrounding erythema   Neurological:      General: No focal deficit present.      Mental Status: He is alert and oriented to person, place, and time.         Imaging/Labs:     EXAMINATION:  THREE XRAY VIEWS OF THE LEFT FOOT 5/21/2024 12:45 pm    IMPRESSION:  No acute bony abnormalities are noted     EXAMINATION:  4 XRAY VIEWS OF THE LEFT TIBIA AND FIBULA    IMPRESSION:  No acute osseous abnormality. No soft tissue gas noted.    Results for orders placed or performed during the hospital encounter of 05/21/24   CBC with Auto Differential   Result Value Ref Range    WBC 7.3 3.5 - 11.3 k/uL    RBC 3.60 (L) 4.21 - 5.77 m/uL    Hemoglobin 12.3 (L) 13.0 - 17.0 g/dL    Hematocrit 38.5 (L) 40.7 - 50.3 %    .9 (H) 82.6 - 102.9 fL    MCH 34.2 (H) 25.2 - 33.5 pg    MCHC 31.9 28.4 - 34.8 g/dL    RDW 13.5 11.8 - 14.4 %    Platelets 237 138 - 453 k/uL    MPV 9.6 8.1 - 13.5 fL    NRBC Automated 0.0 0.0 per 100 WBC    Neutrophils % 51 36 - 65 %    Lymphocytes % 38 24 - 43 %    Monocytes % 8 3 - 12 %    Eosinophils % 3 1 - 4 %    Basophils % 0 0 - 2 %    Immature Granulocytes % 0 0 %    Neutrophils Absolute 3.59 1.50 - 8.10 k/uL

## 2024-05-21 NOTE — ED PROVIDER NOTES
[facility]  Emergency Department Encounter  Emergency Medicine Resident     Pt Name:Silas Shrestha  MRN: [id number]  Birthdate 1955  Date of evaluation: 5/21/24  PCP:  Brina Rizo MD  Note Started: 3:29 PM EDT      CHIEF COMPLAINT       Chief Complaint   Patient presents with    Wound Dehiscence       HISTORY OF PRESENT ILLNESS  (Location/Symptom, Timing/Onset, Context/Setting, Quality, Duration, Modifying Factors, Severity.)      Silas Shrestha is a 69 y.o. male who presents for evaluation of wound.  On chart review, patient had a fem-tib bypass with Dr. Delos Reyes on 4/8/2024.  Patient states he has had poor healing of the lower portion of his wound.  Started noticing drainage from it today.  He has not had fevers or chills.  Patient also has a wound on his left great toe.  He states it has been like this for a while.  Has had increased pain in this timeframe.    PAST MEDICAL / SURGICAL / SOCIAL / FAMILY HISTORY      has a past medical history of Alcoholism (HCC), Cirrhosis of liver (HCC), Claudication (HCC), COPD (chronic obstructive pulmonary disease) (HCC), Finger fracture, left, Hx of blood clots, Hx of hepatitis C, Hx of pancreatitis, Hx of tuberculosis, Hyperlipidemia, Hypertension, Liver disease, PAD (peripheral artery disease) (HCC), PVD (peripheral vascular disease) (HCC), Under care of service provider, Wears dentures, and Wears glasses.       has a past surgical history that includes Upper gastrointestinal endoscopy; liver biopsy; vascular surgery; Finger amputation (Left, 11/28/2016); other surgical history (02/14/2017); other surgical history (04/21/2023); vascular surgery (Left, 04/21/2023); vascular surgery (Left, 03/21/2024); Colonoscopy; femoral bypass (04/08/2024); and femoral bypass (Left, 4/8/2024).      Social History     Socioeconomic History    Marital status:      Spouse name: Not on file    Number of children: Not on file    Years of

## 2024-05-21 NOTE — DISCHARGE INSTRUCTIONS
You are seen in the ER today for evaluation of your wound.  We did labs and x-rays which were unremarkable.  The vascular surgery team would like a dressing placed on this and you should keep your follow-up appointment with Dr. Delos Reyes tomorrow.  Will discharge you with some pain medication.  Additionally, will refer you to wound care.  Please follow-up as scheduled.  Please see your primary care provider to be reassessed in the next 3 to 5 days.  Please return to the ER for any new or worsening symptoms.  Otherwise, please follow-up outpatient as discussed above.    PLEASE RETURN TO THE EMERGENCY DEPARTMENT IMMEDIATELY if you develop any concerning symptoms such as: chest pain, shortness of breath, feeling like your heart is racing, high fever not relieved by acetaminophen (Tylenol) and/or ibuprofen (Motrin / Advil), chills, persistent nausea and/or vomiting, loss of consciousness, numbness, weakness or tingling in the arms or legs or change in color of the extremities, changes in mental status, persistent or severe headache, blurry vision, loss of bladder / bowel control, unable to follow up with your physician, or other any other care or concern.

## 2024-05-21 NOTE — ED PROVIDER NOTES
FACULTY SIGN-OUT  ADDENDUM     Care of this patient was assumed from previous attending physician. The patient's initial evaluation and plan have been discussed with the prior provider who initially evaluated the patient.      Note Started: 6:15 PM EDT    Attestation  I was available and discussed any additional care issues that arose and coordinated the management plans with the resident(s) caring for the patient during my duty period. Any areas of disagreement with resident's documentation of care or procedures are noted on the chart. I was personally present for the key portions of any/all procedures, during my duty period. I have documented in the chart those procedures where I was not present during the key portions.       ED COURSE      The patient was given the following medications:  No orders of the defined types were placed in this encounter.      RECENT VITALS:   Temp: 98.1 °F (36.7 °C), Pulse: 73, Respirations: 16, BP: 133/88    MEDICAL DECISION MAKING        Silas Shrestha is a 69 y.o. male who presents to the Emergency Department with complaints of toe redness and swelling. Recent femoral bypass. Wound dehiscence. Await vascular recs.      Lupe Gordon MD  Attending Emergency Physician    (Please note that portions of this note were completed with a voice recognition program.  Efforts were made to edit the dictations but occasionally words are mis-transcribed.)

## 2024-05-21 NOTE — ED NOTES
Pt to ed via ambulatory to room with complaints of his wound opening up on the back of his left leg  Pt states he had a clogged artery in his left leg and had the procedure completed in April  Pt states it has been open for some time and causing him a lot of pain  Pt took two motrin tablets while writer was assessing pt  Pt states pain 10/10   Pt states his dr advised him to come here  Pt denies any injury to leg  Pt denies having any other complaints  Pt alert and oriented x4, talking in complete sentences, respirations even and unlabored. Pt acting age appropriate. White board updated, will continue to plan of care

## 2024-05-29 ENCOUNTER — OFFICE VISIT (OUTPATIENT)
Age: 69
End: 2024-05-29

## 2024-05-29 VITALS
BODY MASS INDEX: 21.22 KG/M2 | RESPIRATION RATE: 18 BRPM | HEART RATE: 73 BPM | HEIGHT: 68 IN | OXYGEN SATURATION: 94 % | DIASTOLIC BLOOD PRESSURE: 95 MMHG | SYSTOLIC BLOOD PRESSURE: 146 MMHG | WEIGHT: 140 LBS

## 2024-05-29 DIAGNOSIS — I70.262 ATHEROSCLEROSIS OF NATIVE ARTERY OF LEFT LOWER EXTREMITY WITH GANGRENE (HCC): Primary | ICD-10-CM

## 2024-05-29 PROCEDURE — 99024 POSTOP FOLLOW-UP VISIT: CPT | Performed by: SURGERY

## 2024-05-29 RX ORDER — DOXYCYCLINE HYCLATE 100 MG
100 TABLET ORAL 2 TIMES DAILY
Qty: 20 TABLET | Refills: 0 | Status: SHIPPED | OUTPATIENT
Start: 2024-05-29 | End: 2024-06-08

## 2024-05-29 NOTE — H&P (VIEW-ONLY)
[facility]GERY [facility]INIC  [facility]  [address] [address]  [phone]    Silas Shrestha  1955  69 y.o.male       Chief Complaint:  Chief Complaint   Patient presents with    Follow-up     PAD       History of present Illness:  Pt is here today for postoperative follow-up after left femoral to tibial bypass with graft.  This is a 69-year-old male who developed gangrene of the left great toe after clipping his toenail.  He underwent a foot tibial bypass but still has significant pain in the great toe.    I again discussed with him whether or not he would just want the toe amputation.  He has been resistant to having an amputation but that is the only thing that is really causing him pain.  He has dry gangrene on the tip of the toe and the toe is nonsalvageable.  He is agreeable to having a transmetatarsal amputation of the left hallux.    Past Medical History:  has a past medical history of Alcoholism (HCC), Cirrhosis of liver (HCC), Claudication (HCC), COPD (chronic obstructive pulmonary disease) (HCC), Finger fracture, left, Hx of blood clots, Hx of hepatitis C, Hx of pancreatitis, Hx of tuberculosis, Hyperlipidemia, Hypertension, Liver disease, PAD (peripheral artery disease) (HCC), PVD (peripheral vascular disease) (HCC), Under care of service provider, Wears dentures, and Wears glasses.    Past Surgical History:   Past Surgical History:   Procedure Laterality Date    COLONOSCOPY      around 2014    FEMORAL BYPASS  04/08/2024    FEMORAL BYPASS Left 4/8/2024    LOWER EXTREMITY FEMORAL TO TIBIAL BYPASS WITH PROPATIN GRAFT, IPSILATERAL SUPERIFICAL SAPHENOUS VEIN HARVEST, LOWER EXTREMITY ANGIOGRAM COMPLETION performed by Delos Reyes, Arthur, MD at [facility]    FINGER AMPUTATION Left 11/28/2016    revision long finger    LIVER BIOPSY      OTHER SURGICAL HISTORY  02/14/2017    Abdomen with run off with Dr. Smith - could not pass left left pop; #

## 2024-05-29 NOTE — PROGRESS NOTES
7 FR manual pull right groin    OTHER SURGICAL HISTORY  04/21/2023    LE Angiogram    UPPER GASTROINTESTINAL ENDOSCOPY      VASCULAR SURGERY      abdominal aortogram    VASCULAR SURGERY Left 04/21/2023    LEFT LOWER EXTREMITY ANGIOGRAM WITH LEFT DISTAL SFA  AND ABOVE KNEE POPLITEAL ARTERY KAYLYN  STENT 6MM X 120MM X 130CM performed by Arthur Delos Reyes, MD at [facility]    VASCULAR SURGERY Left 03/21/2024    LEFT LOWER EXTREMITY ANGIOGRAM performed by Delos Reyes, Arthur, MD at [facility]       Social History:  reports that he quit smoking about 8 years ago. His smoking use included cigarettes. He started smoking about 43 years ago. He has a 17.5 pack-year smoking history. He has never used smokeless tobacco. He reports current drug use. Frequency: 7.00 times per week. Drug: Marijuana (Weed). He reports that he does not drink alcohol.    Family History: family history includes Cancer in his father and sister; Diabetes in his brother and father; Heart Disease in his father and sister; High Blood Pressure in his mother and sister.    Review of Systems:   Constitutional:  negative for  fevers, chills, sweats, fatigue, and weight loss  HEENT:  negative for vision or hearing changes,   Respiratory:  negative for shortness of breath, cough, or congestion  Cardiovascular:  negative for  chest pain, palpitations  Gastrointestinal:  negative for nausea, vomiting, diarrhea, constipation, abdominal pain  Genitourinary:  negative for frequency, dysuria  Integument/Breast:  negative for rash, skin lesions  Musculoskeletal:  negative for muscle aches or joint pain  Neurological:  negative for headaches, dizziness, lightheadedness, numbness, pain and tingling extremities  Behavior/Psych:  negative for depression and anxiety    Allergies: Patient has no known allergies.    Current Meds:  Current Outpatient Medications:     doxycycline hyclate (VIBRA-TABS) 100 MG tablet, Take 1 tablet by mouth 2 times daily for 10 days, Disp: 20

## 2024-06-03 ENCOUNTER — HOSPITAL ENCOUNTER (OUTPATIENT)
Dept: WOUND CARE | Age: 69
Discharge: HOME | End: 2024-06-03
Payer: MEDICARE

## 2024-06-03 VITALS
HEART RATE: 77 BPM | TEMPERATURE: 98.2 F | RESPIRATION RATE: 16 BRPM | DIASTOLIC BLOOD PRESSURE: 80 MMHG | SYSTOLIC BLOOD PRESSURE: 169 MMHG

## 2024-06-03 DIAGNOSIS — L97.922 ULCER OF LEFT LOWER LEG, WITH FAT LAYER EXPOSED (HCC): Primary | ICD-10-CM

## 2024-06-03 DIAGNOSIS — T81.89XD NONHEALING SURGICAL WOUND, SUBSEQUENT ENCOUNTER: ICD-10-CM

## 2024-06-03 PROCEDURE — 11042 DBRDMT SUBQ TIS 1ST 20SQCM/<: CPT | Performed by: NURSE PRACTITIONER

## 2024-06-03 PROCEDURE — 99213 OFFICE O/P EST LOW 20 MIN: CPT

## 2024-06-03 PROCEDURE — 99203 OFFICE O/P NEW LOW 30 MIN: CPT | Performed by: NURSE PRACTITIONER

## 2024-06-03 PROCEDURE — 11042 DBRDMT SUBQ TIS 1ST 20SQCM/<: CPT

## 2024-06-03 RX ORDER — LIDOCAINE HYDROCHLORIDE 20 MG/ML
JELLY TOPICAL ONCE
OUTPATIENT
Start: 2024-06-03 | End: 2024-06-03

## 2024-06-03 RX ORDER — LIDOCAINE HYDROCHLORIDE 40 MG/ML
SOLUTION TOPICAL ONCE
OUTPATIENT
Start: 2024-06-03 | End: 2024-06-03

## 2024-06-03 ASSESSMENT — ENCOUNTER SYMPTOMS
SHORTNESS OF BREATH: 0
DIARRHEA: 0
COUGH: 0
NAUSEA: 0
RHINORRHEA: 0
VOMITING: 0

## 2024-06-03 ASSESSMENT — PAIN SCALES - GENERAL: PAINLEVEL_OUTOF10: 7

## 2024-06-03 ASSESSMENT — PAIN DESCRIPTION - LOCATION: LOCATION: LEG

## 2024-06-03 ASSESSMENT — PAIN DESCRIPTION - ORIENTATION: ORIENTATION: LEFT

## 2024-06-03 ASSESSMENT — PAIN DESCRIPTION - DESCRIPTORS: DESCRIPTORS: THROBBING;SORE

## 2024-06-03 NOTE — DISCHARGE INSTRUCTIONS
[facility] -Phone: [phone] Fax: [phone]   Visit  Discharge Instructions / Physician Orders    DATE: 6/3/2024     Home Care: N/A     SUPPLIES ORDERED THRU: N/A     Wound Location: Left Lower Leg     Cleanse with: Liquid antibacterial soap and water, rinse well      Dressing Orders: Collagen into wound bed, cover with Silvercel Alginate, Silicone border bandage     Frequency: DAILY     Additional Orders: Increase protein to diet (meat, cheese, eggs, fish, peanut butter, nuts and beans)  ELEVATE LEGS AS MUCH AS POSSIBLE    Your next appointment with Wound Care Center is in 1 week     (Please note your next appointment above and if you are unable to keep, kindly give a 24 hour notice. Thank you.)  If more than 15 min late we cannot guarantee you will be seen due to clinician schedule  Per Policy, Excessive cancellation will call for dismissal from program.     If you experience any of the following, please call the Wound Care Center during business hours:  [phone]     * Increase in Pain  * Temperature over 101  * Increase in drainage from your wound  * Drainage with a foul odor  * Bleeding  * Increase in swelling  * Need for compression bandage changes due to slippage, breakthrough drainage.     If you need medical attention outside of the business hours of the Wound Care Centers please contact your PCP or go to the an urgent care or emergency department     The information contained in the After Visit Summary has been reviewed with me, the patient and/or responsible adult, by my health care provider(s). I had the opportunity to ask questions regarding this information. I have elected to receive;      []After Visit Summary  [x]Comprehensive Discharge Instruction      Patient signature______________________________________Date:________   Electronically signed by Mónica Albrecht RN on 6/3/2024 at 1:56 PM   Electronically signed by LEROY VALVERDE - CNP on 6/3/2024 at 1:35 PM

## 2024-06-03 NOTE — PROGRESS NOTES
tablet 0    sertraline (ZOLOFT) 100 MG tablet Take 1 tablet by mouth daily 30 tablet 1    clobetasol (TEMOVATE) 0.05 % cream APPLY TOPICALLY TWICE DAILY TO ACTIVE RASH (BULK) (Patient not taking: Reported on 5/21/2024) 60 g 11    acetaminophen (TYLENOL) 500 MG tablet Take 2 tablets by mouth every 6 hours as needed for Pain 56 tablet 0    aspirin 81 MG EC tablet Take 1 tablet by mouth daily      pantoprazole (PROTONIX) 20 MG tablet TAKE ONE TABLET BY MOUTH DAILY AT 9AM 30 tablet 11    cilostazol (PLETAL) 100 MG tablet TAKE ONE TABLET BY MOUTH TWICE DAILY @ 9AM-5PM (Patient not taking: Reported on 5/21/2024) 60 tablet 11    atorvastatin (LIPITOR) 20 MG tablet TAKE ONE TABLET BY MOUTH DAILY AT 5PM 30 tablet 11    amLODIPine (NORVASC) 10 MG tablet TAKE ONE TABLET BY MOUTH DAILY AT 9AM 30 tablet 11    diclofenac sodium (VOLTAREN) 1 % GEL Apply 2 g topically 4 times daily 50 g 3     No current facility-administered medications on file prior to encounter.       REVIEW OF SYSTEMS    Review of Systems   Constitutional:  Negative for chills, fatigue and fever.   HENT:  Negative for congestion and rhinorrhea.    Respiratory:  Negative for cough and shortness of breath.    Cardiovascular:  Negative for chest pain and leg swelling.        PAD   Gastrointestinal:  Negative for diarrhea, nausea and vomiting.   Musculoskeletal:  Negative for gait problem.   Skin:  Positive for wound (left lower leg).        Dry gangrene to left great toe   Allergic/Immunologic: Negative for immunocompromised state.   Neurological:  Negative for dizziness, syncope and weakness.   Psychiatric/Behavioral:  Negative for agitation. The patient is not nervous/anxious.        Objective:      BP (!) 169/80   Pulse 77   Temp 98.2 °F (36.8 °C) (Tympanic)   Resp 16     Wt Readings from Last 3 Encounters:   05/29/24 63.5 kg (140 lb)   05/21/24 63.2 kg (139 lb 6.4 oz)   04/25/24 68.5 kg (151 lb)       PHYSICAL EXAM    General Appearance: alert and oriented

## 2024-06-06 ENCOUNTER — OFFICE VISIT (OUTPATIENT)
Dept: OPERATING ROOM | Age: 69
End: 2024-06-06
Attending: SURGERY

## 2024-06-07 NOTE — DISCHARGE INSTRUCTIONS
[facility] -Phone: [phone] Fax: [phone]    Visit  Discharge Instructions / Physician Orders     DATE: 6/11/2024     Home Care: N/A     SUPPLIES ORDERED THRU: N/A     Wound Location: Left Lower Leg     Cleanse with: Liquid antibacterial soap and water, rinse well      Dressing Orders: Collagen into wound bed, cover with Silvercel Alginate, Silicone border bandage     Frequency: DAILY     Additional Orders: Increase protein to diet (meat, cheese, eggs, fish, peanut butter, nuts and beans)  ELEVATE LEGS AS MUCH AS POSSIBLE     Your next appointment with Wound Care Center is in 1 week     (Please note your next appointment above and if you are unable to keep, kindly give a 24 hour notice. Thank you.)  If more than 15 min late we cannot guarantee you will be seen due to clinician schedule  Per Policy, Excessive cancellation will call for dismissal from program.     If you experience any of the following, please call the Wound Care Center during business hours:  [phone]     * Increase in Pain  * Temperature over 101  * Increase in drainage from your wound  * Drainage with a foul odor  * Bleeding  * Increase in swelling  * Need for compression bandage changes due to slippage, breakthrough drainage.     If you need medical attention outside of the business hours of the Wound Care Centers please contact your PCP or go to the an urgent care or emergency department     The information contained in the After Visit Summary has been reviewed with me, the patient and/or responsible adult, by my health care provider(s). I had the opportunity to ask questions regarding this information. I have elected to receive;      []After Visit Summary  [x]Comprehensive Discharge Instruction        Patient signature______________________________________Date:________

## 2024-06-11 ENCOUNTER — HOSPITAL ENCOUNTER (OUTPATIENT)
Dept: WOUND CARE | Age: 69
Discharge: HOME | End: 2024-06-11

## 2024-06-11 ENCOUNTER — FOLLOWUP TELEPHONE ENCOUNTER (OUTPATIENT)
Dept: WOUND CARE | Age: 69
End: 2024-06-11

## 2024-06-11 NOTE — PROGRESS NOTES
Pt no called no showed for [facility] apt today. Called pt to reschedule. He stated he forgot about the apt. Pt would not like to reschedule at this time because he will be going to surgery soon with the provider. Pt requested an order of pain pills be sent in, expressed to pt that he needs to contact his PCP for that as Wound Care cannot order.

## 2024-06-14 ENCOUNTER — HOSPITAL ENCOUNTER (OUTPATIENT)
Age: 69
Discharge: HOME | End: 2024-06-14
Payer: MEDICARE

## 2024-06-14 DIAGNOSIS — I73.9 PAD (PERIPHERAL ARTERY DISEASE) (HCC): ICD-10-CM

## 2024-06-14 DIAGNOSIS — R79.9 ABNORMAL FINDING OF BLOOD CHEMISTRY, UNSPECIFIED: ICD-10-CM

## 2024-06-14 LAB
CHOLEST SERPL-MCNC: 135 MG/DL (ref 0–199)
CHOLESTEROL/HDL RATIO: 2
HDLC SERPL-MCNC: 56 MG/DL
LDLC SERPL CALC-MCNC: 56 MG/DL (ref 0–100)
TRIGL SERPL-MCNC: 115 MG/DL
VLDLC SERPL CALC-MCNC: 23 MG/DL

## 2024-06-14 PROCEDURE — 36415 COLL VENOUS BLD VENIPUNCTURE: CPT

## 2024-06-14 PROCEDURE — 80061 LIPID PANEL: CPT

## 2024-06-17 ENCOUNTER — TELEPHONE (OUTPATIENT)
Dept: INTERNAL MEDICINE | Age: 69
End: 2024-06-17

## 2024-06-17 NOTE — TELEPHONE ENCOUNTER
Pt is calling the office for some pain medication, pt states he had a procedure done on 5/8/24 and the pcp did not give him any pain medication, pt want some pain medi send to his pharmacy. Thanks please advise

## 2024-06-18 NOTE — TELEPHONE ENCOUNTER
Pain meds are not given without an appointment. We have a list of things that require appointment. Please guide patient appropriately.

## 2024-06-19 ENCOUNTER — TELEMEDICINE (OUTPATIENT)
Dept: INTERNAL MEDICINE | Age: 69
End: 2024-06-19
Payer: MEDICARE

## 2024-06-19 DIAGNOSIS — I73.9 PAD (PERIPHERAL ARTERY DISEASE) (HCC): Primary | ICD-10-CM

## 2024-06-19 PROCEDURE — 99441 PR PHYS/QHP TELEPHONE EVALUATION 5-10 MIN: CPT | Performed by: INTERNAL MEDICINE

## 2024-06-19 RX ORDER — HYDROCODONE BITARTRATE AND ACETAMINOPHEN 5; 325 MG/1; MG/1
1 TABLET ORAL EVERY 6 HOURS PRN
Qty: 12 TABLET | Refills: 0 | Status: ON HOLD | OUTPATIENT
Start: 2024-06-19 | End: 2024-06-21 | Stop reason: HOSPADM

## 2024-06-19 NOTE — PROGRESS NOTES
Documentation:    Silas Shrestha is a 69 y.o. male evaluated via telephone on 6/19/2024 for Toe Pain (Patient states Vasc surgery moved surgery up to Fri 6/21, patient requesting pain medication till surgery.  He has pain that is 10/10 in his left leg and foot.  He has a slow healing wound along incision line from left femoral to tibial bypass graft 4/18/2024. He also has dry gangrene to his left great toe with plan for amputation 6/21/2024 with Dr Delos Reyes      Diagnosis Orders   1. PAD (peripheral artery disease) ([facility])  HYDROcodone-acetaminophen (NORCO) 5-325 MG per tablet          Total Time: minutes: 5-10 minutes    Silas Shrestha was evaluated through a synchronous (real-time) audio encounter. Patient identification was verified at the start of the visit. He (or guardian if applicable) is aware that this is a billable service, which includes applicable co-pays. This visit was conducted with the patient's (and/or legal guardian's) verbal consent. He has not had a related appointment within my department in the past 7 days or scheduled within the next 24 hours.   The patient was located at Home: [address] [place], [place] [id number].  The provider was located at Facility (Appt Dept): [address] [place] [phone].    Note: not billable if this call serves to triage the patient into an appointment for the relevant concern          Brina Rizo MD

## 2024-06-21 ENCOUNTER — ANESTHESIA (OUTPATIENT)
Dept: OPERATING ROOM | Age: 69
End: 2024-06-21
Payer: MEDICARE

## 2024-06-21 ENCOUNTER — HOSPITAL ENCOUNTER (OUTPATIENT)
Age: 69
Setting detail: OBSERVATION
Discharge: HOME | End: 2024-06-22
Attending: SURGERY | Admitting: SURGERY
Payer: MEDICARE

## 2024-06-21 ENCOUNTER — ANESTHESIA EVENT (OUTPATIENT)
Dept: OPERATING ROOM | Age: 69
End: 2024-06-21
Payer: MEDICARE

## 2024-06-21 DIAGNOSIS — G89.18 POST-OP PAIN: Primary | ICD-10-CM

## 2024-06-21 DIAGNOSIS — I96 GANGRENE (HCC): ICD-10-CM

## 2024-06-21 PROBLEM — Z89.9 S/P AMPUTATION: Status: ACTIVE | Noted: 2024-06-21

## 2024-06-21 PROBLEM — Z89.422 S/P AMPUTATION OF LESSER TOE, LEFT (HCC): Status: ACTIVE | Noted: 2024-06-21

## 2024-06-21 LAB
BUN BLD-MCNC: 12 MG/DL (ref 8–26)
CHLORIDE BLD-SCNC: 107 MMOL/L (ref 98–107)
EGFR, POC: >90 ML/MIN/1.73M2
GLUCOSE BLD-MCNC: 92 MG/DL (ref 74–100)
HCT VFR BLD AUTO: 37 % (ref 41–53)
POC CREATININE: 0.8 MG/DL (ref 0.51–1.19)
POC HEMOGLOBIN (CALC): 12.6 G/DL (ref 13.5–17.5)
POTASSIUM BLD-SCNC: 3.9 MMOL/L (ref 3.5–4.5)
SODIUM BLD-SCNC: 143 MMOL/L (ref 138–146)

## 2024-06-21 PROCEDURE — 88311 DECALCIFY TISSUE: CPT

## 2024-06-21 PROCEDURE — 84132 ASSAY OF SERUM POTASSIUM: CPT

## 2024-06-21 PROCEDURE — 6370000000 HC RX 637 (ALT 250 FOR IP): Performed by: STUDENT IN AN ORGANIZED HEALTH CARE EDUCATION/TRAINING PROGRAM

## 2024-06-21 PROCEDURE — 84295 ASSAY OF SERUM SODIUM: CPT

## 2024-06-21 PROCEDURE — 82435 ASSAY OF BLOOD CHLORIDE: CPT

## 2024-06-21 PROCEDURE — 2709999900 HC NON-CHARGEABLE SUPPLY: Performed by: SURGERY

## 2024-06-21 PROCEDURE — 2580000003 HC RX 258

## 2024-06-21 PROCEDURE — 7100000001 HC PACU RECOVERY - ADDTL 15 MIN: Performed by: SURGERY

## 2024-06-21 PROCEDURE — 2500000003 HC RX 250 WO HCPCS

## 2024-06-21 PROCEDURE — 6360000002 HC RX W HCPCS

## 2024-06-21 PROCEDURE — 88305 TISSUE EXAM BY PATHOLOGIST: CPT

## 2024-06-21 PROCEDURE — 2500000003 HC RX 250 WO HCPCS: Performed by: SURGERY

## 2024-06-21 PROCEDURE — 3700000000 HC ANESTHESIA ATTENDED CARE: Performed by: SURGERY

## 2024-06-21 PROCEDURE — 2580000003 HC RX 258: Performed by: SURGERY

## 2024-06-21 PROCEDURE — 82565 ASSAY OF CREATININE: CPT

## 2024-06-21 PROCEDURE — 28810 AMPUTATION TOE & METATARSAL: CPT | Performed by: SURGERY

## 2024-06-21 PROCEDURE — 7100000010 HC PHASE II RECOVERY - FIRST 15 MIN: Performed by: SURGERY

## 2024-06-21 PROCEDURE — 2580000003 HC RX 258: Performed by: STUDENT IN AN ORGANIZED HEALTH CARE EDUCATION/TRAINING PROGRAM

## 2024-06-21 PROCEDURE — G0378 HOSPITAL OBSERVATION PER HR: HCPCS

## 2024-06-21 PROCEDURE — 3600000007 HC SURGERY HYBRID BASE: Performed by: SURGERY

## 2024-06-21 PROCEDURE — 82947 ASSAY GLUCOSE BLOOD QUANT: CPT

## 2024-06-21 PROCEDURE — 7100000000 HC PACU RECOVERY - FIRST 15 MIN: Performed by: SURGERY

## 2024-06-21 PROCEDURE — 3700000001 HC ADD 15 MINUTES (ANESTHESIA): Performed by: SURGERY

## 2024-06-21 PROCEDURE — 84520 ASSAY OF UREA NITROGEN: CPT

## 2024-06-21 PROCEDURE — 85014 HEMATOCRIT: CPT

## 2024-06-21 PROCEDURE — 3600000017 HC SURGERY HYBRID ADDL 15MIN: Performed by: SURGERY

## 2024-06-21 RX ORDER — LIDOCAINE HYDROCHLORIDE 10 MG/ML
INJECTION, SOLUTION INFILTRATION; PERINEURAL
Status: DISPENSED
Start: 2024-06-21 | End: 2024-06-21

## 2024-06-21 RX ORDER — SODIUM CHLORIDE 0.9 % (FLUSH) 0.9 %
5-40 SYRINGE (ML) INJECTION PRN
Status: DISCONTINUED | OUTPATIENT
Start: 2024-06-21 | End: 2024-06-22 | Stop reason: HOSPADM

## 2024-06-21 RX ORDER — ONDANSETRON 2 MG/ML
4 INJECTION INTRAMUSCULAR; INTRAVENOUS EVERY 6 HOURS PRN
Status: DISCONTINUED | OUTPATIENT
Start: 2024-06-21 | End: 2024-06-22 | Stop reason: HOSPADM

## 2024-06-21 RX ORDER — AMLODIPINE BESYLATE 10 MG/1
10 TABLET ORAL DAILY
Status: DISCONTINUED | OUTPATIENT
Start: 2024-06-22 | End: 2024-06-22 | Stop reason: HOSPADM

## 2024-06-21 RX ORDER — ACETAMINOPHEN 500 MG
1000 TABLET ORAL EVERY 8 HOURS SCHEDULED
Status: DISCONTINUED | OUTPATIENT
Start: 2024-06-21 | End: 2024-06-22 | Stop reason: HOSPADM

## 2024-06-21 RX ORDER — OXYCODONE HYDROCHLORIDE 5 MG/1
5 TABLET ORAL EVERY 6 HOURS PRN
Status: DISCONTINUED | OUTPATIENT
Start: 2024-06-21 | End: 2024-06-22 | Stop reason: HOSPADM

## 2024-06-21 RX ORDER — MIDAZOLAM HYDROCHLORIDE 1 MG/ML
INJECTION INTRAMUSCULAR; INTRAVENOUS PRN
Status: DISCONTINUED | OUTPATIENT
Start: 2024-06-21 | End: 2024-06-21 | Stop reason: SDUPTHER

## 2024-06-21 RX ORDER — ONDANSETRON 4 MG/1
4 TABLET, ORALLY DISINTEGRATING ORAL EVERY 8 HOURS PRN
Status: DISCONTINUED | OUTPATIENT
Start: 2024-06-21 | End: 2024-06-22 | Stop reason: HOSPADM

## 2024-06-21 RX ORDER — NALOXONE HYDROCHLORIDE 0.4 MG/ML
INJECTION, SOLUTION INTRAMUSCULAR; INTRAVENOUS; SUBCUTANEOUS PRN
Status: CANCELLED | OUTPATIENT
Start: 2024-06-21

## 2024-06-21 RX ORDER — PANTOPRAZOLE SODIUM 20 MG/1
20 TABLET, DELAYED RELEASE ORAL
Status: DISCONTINUED | OUTPATIENT
Start: 2024-06-22 | End: 2024-06-22 | Stop reason: HOSPADM

## 2024-06-21 RX ORDER — ATORVASTATIN CALCIUM 20 MG/1
20 TABLET, FILM COATED ORAL NIGHTLY
Status: DISCONTINUED | OUTPATIENT
Start: 2024-06-21 | End: 2024-06-22 | Stop reason: HOSPADM

## 2024-06-21 RX ORDER — SODIUM CHLORIDE 0.9 % (FLUSH) 0.9 %
5-40 SYRINGE (ML) INJECTION PRN
Status: CANCELLED | OUTPATIENT
Start: 2024-06-21

## 2024-06-21 RX ORDER — SODIUM CHLORIDE 9 MG/ML
INJECTION, SOLUTION INTRAVENOUS PRN
Status: CANCELLED | OUTPATIENT
Start: 2024-06-21

## 2024-06-21 RX ORDER — SODIUM CHLORIDE 0.9 % (FLUSH) 0.9 %
5-40 SYRINGE (ML) INJECTION EVERY 12 HOURS SCHEDULED
Status: CANCELLED | OUTPATIENT
Start: 2024-06-21

## 2024-06-21 RX ORDER — KETAMINE HCL IN NACL, ISO-OSM 100MG/10ML
SYRINGE (ML) INJECTION PRN
Status: DISCONTINUED | OUTPATIENT
Start: 2024-06-21 | End: 2024-06-21 | Stop reason: SDUPTHER

## 2024-06-21 RX ORDER — FENTANYL CITRATE 50 UG/ML
25 INJECTION, SOLUTION INTRAMUSCULAR; INTRAVENOUS EVERY 5 MIN PRN
Status: CANCELLED | OUTPATIENT
Start: 2024-06-21

## 2024-06-21 RX ORDER — SODIUM CHLORIDE 0.9 % (FLUSH) 0.9 %
5-40 SYRINGE (ML) INJECTION EVERY 12 HOURS SCHEDULED
Status: DISCONTINUED | OUTPATIENT
Start: 2024-06-21 | End: 2024-06-22 | Stop reason: HOSPADM

## 2024-06-21 RX ORDER — SODIUM CHLORIDE 9 MG/ML
INJECTION, SOLUTION INTRAVENOUS CONTINUOUS
Status: DISCONTINUED | OUTPATIENT
Start: 2024-06-21 | End: 2024-06-21 | Stop reason: HOSPADM

## 2024-06-21 RX ORDER — MAGNESIUM HYDROXIDE 1200 MG/15ML
LIQUID ORAL CONTINUOUS PRN
Status: COMPLETED | OUTPATIENT
Start: 2024-06-21 | End: 2024-06-21

## 2024-06-21 RX ORDER — LIDOCAINE HYDROCHLORIDE 10 MG/ML
INJECTION, SOLUTION EPIDURAL; INFILTRATION; INTRACAUDAL; PERINEURAL PRN
Status: DISCONTINUED | OUTPATIENT
Start: 2024-06-21 | End: 2024-06-21 | Stop reason: ALTCHOICE

## 2024-06-21 RX ORDER — LIDOCAINE HYDROCHLORIDE 10 MG/ML
INJECTION, SOLUTION EPIDURAL; INFILTRATION; INTRACAUDAL; PERINEURAL PRN
Status: DISCONTINUED | OUTPATIENT
Start: 2024-06-21 | End: 2024-06-21 | Stop reason: SDUPTHER

## 2024-06-21 RX ORDER — OXYCODONE HYDROCHLORIDE 5 MG/1
5 TABLET ORAL EVERY 6 HOURS PRN
Qty: 12 TABLET | Refills: 0 | Status: SHIPPED | OUTPATIENT
Start: 2024-06-21 | End: 2024-06-24

## 2024-06-21 RX ORDER — SODIUM CHLORIDE 9 MG/ML
INJECTION, SOLUTION INTRAVENOUS CONTINUOUS PRN
Status: DISCONTINUED | OUTPATIENT
Start: 2024-06-21 | End: 2024-06-21 | Stop reason: SDUPTHER

## 2024-06-21 RX ORDER — CILOSTAZOL 100 MG/1
100 TABLET ORAL
Status: DISCONTINUED | OUTPATIENT
Start: 2024-06-21 | End: 2024-06-22 | Stop reason: HOSPADM

## 2024-06-21 RX ORDER — FENTANYL CITRATE 50 UG/ML
INJECTION, SOLUTION INTRAMUSCULAR; INTRAVENOUS PRN
Status: DISCONTINUED | OUTPATIENT
Start: 2024-06-21 | End: 2024-06-21 | Stop reason: SDUPTHER

## 2024-06-21 RX ORDER — SODIUM CHLORIDE 9 MG/ML
INJECTION, SOLUTION INTRAVENOUS PRN
Status: DISCONTINUED | OUTPATIENT
Start: 2024-06-21 | End: 2024-06-22 | Stop reason: HOSPADM

## 2024-06-21 RX ORDER — METHOCARBAMOL 750 MG/1
750 TABLET, FILM COATED ORAL 4 TIMES DAILY PRN
Status: DISCONTINUED | OUTPATIENT
Start: 2024-06-21 | End: 2024-06-22 | Stop reason: HOSPADM

## 2024-06-21 RX ORDER — PROPOFOL 10 MG/ML
INJECTION, EMULSION INTRAVENOUS PRN
Status: DISCONTINUED | OUTPATIENT
Start: 2024-06-21 | End: 2024-06-21 | Stop reason: SDUPTHER

## 2024-06-21 RX ORDER — ENOXAPARIN SODIUM 100 MG/ML
30 INJECTION SUBCUTANEOUS 2 TIMES DAILY
Status: DISCONTINUED | OUTPATIENT
Start: 2024-06-22 | End: 2024-06-22 | Stop reason: HOSPADM

## 2024-06-21 RX ORDER — POLYETHYLENE GLYCOL 3350 17 G/17G
17 POWDER, FOR SOLUTION ORAL DAILY
Status: DISCONTINUED | OUTPATIENT
Start: 2024-06-21 | End: 2024-06-22 | Stop reason: HOSPADM

## 2024-06-21 RX ADMIN — SODIUM CHLORIDE, PRESERVATIVE FREE 10 ML: 5 INJECTION INTRAVENOUS at 21:14

## 2024-06-21 RX ADMIN — MIDAZOLAM 2 MG: 1 INJECTION INTRAMUSCULAR; INTRAVENOUS at 11:23

## 2024-06-21 RX ADMIN — ACETAMINOPHEN 1000 MG: 500 TABLET ORAL at 21:14

## 2024-06-21 RX ADMIN — LIDOCAINE HYDROCHLORIDE 50 MG: 10 INJECTION, SOLUTION EPIDURAL; INFILTRATION; INTRACAUDAL; PERINEURAL at 11:27

## 2024-06-21 RX ADMIN — PROPOFOL 100 MG: 10 INJECTION, EMULSION INTRAVENOUS at 11:28

## 2024-06-21 RX ADMIN — Medication 2 G: at 11:33

## 2024-06-21 RX ADMIN — SODIUM CHLORIDE: 9 INJECTION, SOLUTION INTRAVENOUS at 11:46

## 2024-06-21 RX ADMIN — Medication 20 MG: at 11:35

## 2024-06-21 RX ADMIN — OXYCODONE 5 MG: 5 TABLET ORAL at 17:21

## 2024-06-21 RX ADMIN — FENTANYL CITRATE 25 MCG: 50 INJECTION, SOLUTION INTRAMUSCULAR; INTRAVENOUS at 11:33

## 2024-06-21 RX ADMIN — CILOSTAZOL 100 MG: 100 TABLET ORAL at 15:52

## 2024-06-21 RX ADMIN — FENTANYL CITRATE 25 MCG: 50 INJECTION, SOLUTION INTRAMUSCULAR; INTRAVENOUS at 12:09

## 2024-06-21 RX ADMIN — ACETAMINOPHEN 1000 MG: 500 TABLET ORAL at 15:51

## 2024-06-21 RX ADMIN — SODIUM CHLORIDE: 9 INJECTION, SOLUTION INTRAVENOUS at 11:19

## 2024-06-21 RX ADMIN — ATORVASTATIN CALCIUM 20 MG: 20 TABLET, FILM COATED ORAL at 21:14

## 2024-06-21 RX ADMIN — SODIUM CHLORIDE: 9 INJECTION, SOLUTION INTRAVENOUS at 10:08

## 2024-06-21 ASSESSMENT — PAIN DESCRIPTION - ORIENTATION
ORIENTATION: LEFT

## 2024-06-21 ASSESSMENT — PAIN DESCRIPTION - DESCRIPTORS
DESCRIPTORS: THROBBING

## 2024-06-21 ASSESSMENT — PAIN SCALES - GENERAL
PAINLEVEL_OUTOF10: 8
PAINLEVEL_OUTOF10: 4
PAINLEVEL_OUTOF10: 7

## 2024-06-21 ASSESSMENT — PAIN DESCRIPTION - LOCATION
LOCATION: FOOT
LOCATION: FOOT
LOCATION: TOE (COMMENT WHICH ONE)

## 2024-06-21 NOTE — INTERVAL H&P NOTE
Update History & Physical    The patient's History and Physical of June 29, 2024 was reviewed with the patient and I examined the patient. There was no change. The surgical site was confirmed by the patient and me.     Plan: The risks, benefits, expected outcome, and alternative to the recommended procedure have been discussed with the patient. Patient understands and wants to proceed with the procedure.     Electronically signed by Arthur Delos Reyes, MD on 6/21/2024 at 10:58 AM

## 2024-06-21 NOTE — PROGRESS NOTES
Report called to ALESSANDRA Claudio, all questions answered, no concerns at this time. Patient transported to [facility] with family and all belongings by OLENA Vanegas.

## 2024-06-21 NOTE — DISCHARGE INSTRUCTIONS
Okay to resume all home medications  Wear post op shoe when ambulating   Keep wound clean and dry   Tylenol for pain, save narcotic pain medication for breakthrough pain   Okay for regular diet       Follow up with Dr. Delos Reyes, call the office to make an appointment

## 2024-06-21 NOTE — PROGRESS NOTES
Patient returned to PCC room post procedure.  Assessment & VS obtained per policy. Restrictions/Education to be reviewed with patient. Post procedure pathway initiated. Pt without complications.  RN at bedside. Pulses obtained and documented. Will continue to monitor.

## 2024-06-21 NOTE — ANESTHESIA POSTPROCEDURE EVALUATION
Department of Anesthesiology  Postprocedure Note    Patient: Silas Shrestha  MRN: [id number]  YOB: 1955  Date of evaluation: 6/21/2024    Procedure Summary       Date: 06/21/24 Room / Location: [place] [address] / [facility]    Anesthesia Start: 1121 Anesthesia Stop: 1211    Procedure: LEFT GREAT TOE AMPUTATION (Left: First Toe) Diagnosis:       Gangrene (HCC)      (Gangrene (HCC) [I96])    Surgeons: Delos Reyes, Arthur, MD Responsible Provider: Willard Chi MD    Anesthesia Type: general ASA Status: 3            Anesthesia Type: No value filed.    Ricardo Phase I: Ricardo Score: 10    Ricardo Phase II:      Anesthesia Post Evaluation    Patient location during evaluation: bedside  Patient participation: complete - patient participated  Level of consciousness: awake  Airway patency: patent  Nausea & Vomiting: no nausea and no vomiting  Cardiovascular status: hemodynamically stable  Respiratory status: acceptable  Hydration status: stable  Comments: BP (!) 170/85   Pulse 59   Temp 97.5 °F (36.4 °C) (Temporal)   Resp 17   Ht 1.702 m (5' 7\")   Wt 64.9 kg (143 lb)   SpO2 98%   BMI 22.40 kg/m²       No notable events documented.

## 2024-06-21 NOTE — ANESTHESIA PRE PROCEDURE
\"LABABO\"    Drug/Infectious Status (If Applicable):  Lab Results   Component Value Date/Time    HEPCAB REACTIVE 04/03/2019 10:15 AM       COVID-19 Screening (If Applicable):   Lab Results   Component Value Date/Time    COVID19 Not Detected 12/10/2020 10:22 PM           Anesthesia Evaluation  Patient summary reviewed   no history of anesthetic complications:   Airway: Mallampati: II       Mouth opening: > = 3 FB   Dental:    (+) edentulous      Pulmonary:normal exam    (+)  COPD:                                     Cardiovascular:    (+) hypertension:      ECG reviewed                        Neuro/Psych:   (+) psychiatric history:            GI/Hepatic/Renal:   (+) hepatitis:, liver disease:          Endo/Other: Negative Endo/Other ROS                    Abdominal:             Vascular:   + PVD, aortic or cerebral, DVT.       Other Findings:             Anesthesia Plan      general     ASA 3     (LMA)  Induction: intravenous.    MIPS: Postoperative opioids intended, Prophylactic antiemetics administered and Postoperative trial extubation.  Anesthetic plan and risks discussed with patient.      Plan discussed with CRNA.                    Narrative & Impression    Normal sinus rhythm  Normal ECG  When compared with ECG of 10-DEC-2020 18:45,  Questionable change in QRS axis  Nonspecific T wave abnormality now evident in Lateral leads      Specimen Collected: 03/29/24 08:54 EDT                 Willard Chi MD   6/21/2024

## 2024-06-21 NOTE — OP NOTE
Operative Note      Patient: Silas Shrestha  YOB: 1955  MRN: [id number]    Date of Procedure: 6/21/2024    Pre-Op Diagnosis Codes:     * Gangrene (HCC) [I96]  Gangrene of the left great toe    Post-Op Diagnosis: Same       Procedure(s):  LEFT GREAT TOE AMPUTATION, transmetatarsal    Surgeon(s):  Delos Reyes, Arthur, MD    Assistant:   * No surgical staff found *    Anesthesia: General    Estimated Blood Loss (mL): less than 50     Complications: None    Specimens:   ID Type Source Tests Collected by Time Destination   A : LEFT GREAT TOE AMPUTATION Tissue Tissue SURGICAL PATHOLOGY Delos Reyes, Arthur, MD 6/21/2024 1134        Implants:  * No implants in log *      Drains: * No LDAs found *    Findings:  Infection Present At Time Of Surgery (PATOS) (choose all levels that have infection present):  No infection present  Other Findings:     Detailed Description of Procedure:   This is a 69-year-old male who presented with dry gangrene of the left great toe.  He underwent femoral-popliteal artery bypass but still had significant ischemic pain at the toe site.  This area was dry and the eschar never fell off.  He was keeping him awake at night and we discussed toe amputation for the marginally perfused tissue and he was agreeable.    The patient was brought to the operating room and placed supine the left leg below the knee was cleaned and prepped and sterile drapes were applied.  Lidocaine was used to locally anesthetize around the entire base of the toe.  An elliptical incision was made at the base of the fifth toe and a scalpel was used to create an incision.  The dry gangrenous portion was removed.  Rongeurs were used to remove the articular cartilage at the metatarsal head until the more robust portion of the first metatarsal head was encountered.  There is actually very good bleeding from both the bone and the skin edges at this site.  The edges were smoothed as much as possible and the area was

## 2024-06-21 NOTE — PROGRESS NOTES
Patient admitted, consent signed and questions answered. Patient ready for procedure. Call light to reach with side rails up 2 of 2. present.  Family at bedside with patient.

## 2024-06-22 VITALS
TEMPERATURE: 98.1 F | SYSTOLIC BLOOD PRESSURE: 129 MMHG | RESPIRATION RATE: 18 BRPM | HEIGHT: 67 IN | OXYGEN SATURATION: 90 % | DIASTOLIC BLOOD PRESSURE: 80 MMHG | BODY MASS INDEX: 22.44 KG/M2 | HEART RATE: 65 BPM | WEIGHT: 143 LBS

## 2024-06-22 PROCEDURE — G0378 HOSPITAL OBSERVATION PER HR: HCPCS

## 2024-06-22 PROCEDURE — 96372 THER/PROPH/DIAG INJ SC/IM: CPT

## 2024-06-22 PROCEDURE — 2580000003 HC RX 258: Performed by: STUDENT IN AN ORGANIZED HEALTH CARE EDUCATION/TRAINING PROGRAM

## 2024-06-22 PROCEDURE — 6370000000 HC RX 637 (ALT 250 FOR IP): Performed by: STUDENT IN AN ORGANIZED HEALTH CARE EDUCATION/TRAINING PROGRAM

## 2024-06-22 PROCEDURE — 6360000002 HC RX W HCPCS: Performed by: STUDENT IN AN ORGANIZED HEALTH CARE EDUCATION/TRAINING PROGRAM

## 2024-06-22 RX ADMIN — CILOSTAZOL 100 MG: 100 TABLET ORAL at 08:25

## 2024-06-22 RX ADMIN — ENOXAPARIN SODIUM 30 MG: 100 INJECTION SUBCUTANEOUS at 08:25

## 2024-06-22 RX ADMIN — AMLODIPINE BESYLATE 10 MG: 10 TABLET ORAL at 08:25

## 2024-06-22 RX ADMIN — SERTRALINE HYDROCHLORIDE 100 MG: 50 TABLET ORAL at 08:25

## 2024-06-22 RX ADMIN — OXYCODONE 5 MG: 5 TABLET ORAL at 01:18

## 2024-06-22 RX ADMIN — SODIUM CHLORIDE, PRESERVATIVE FREE 10 ML: 5 INJECTION INTRAVENOUS at 08:28

## 2024-06-22 RX ADMIN — ACETAMINOPHEN 1000 MG: 500 TABLET ORAL at 06:37

## 2024-06-22 RX ADMIN — POLYETHYLENE GLYCOL 3350 17 G: 17 POWDER, FOR SOLUTION ORAL at 08:25

## 2024-06-22 RX ADMIN — OXYCODONE 5 MG: 5 TABLET ORAL at 08:25

## 2024-06-22 RX ADMIN — PANTOPRAZOLE SODIUM 20 MG: 20 TABLET, DELAYED RELEASE ORAL at 06:37

## 2024-06-22 ASSESSMENT — PAIN DESCRIPTION - ORIENTATION: ORIENTATION: LEFT

## 2024-06-22 ASSESSMENT — PAIN SCALES - GENERAL
PAINLEVEL_OUTOF10: 6
PAINLEVEL_OUTOF10: 7
PAINLEVEL_OUTOF10: 9
PAINLEVEL_OUTOF10: 0

## 2024-06-22 ASSESSMENT — PAIN DESCRIPTION - LOCATION: LOCATION: FOOT

## 2024-06-22 ASSESSMENT — PAIN - FUNCTIONAL ASSESSMENT: PAIN_FUNCTIONAL_ASSESSMENT: ACTIVITIES ARE NOT PREVENTED

## 2024-06-22 ASSESSMENT — PAIN DESCRIPTION - DESCRIPTORS: DESCRIPTORS: ACHING

## 2024-06-22 NOTE — PROGRESS NOTES
Division of Vascular Surgery             Progress Note      Name: Silas Shrestha  MRN: [id number]         Overnight Events:     No acute event overnight      Subjective:     Patient seen examined this morning, no acute event overnight.  I changed his wound dressing this morning, no active bleeding.  Patient stated he is able to change his wound just, his son is taking care of him at home.  I gave him extra supply for him to go home with    Physical Exam:     Vitals:  BP (!) 145/73   Pulse 61   Temp 97.8 °F (36.6 °C) (Oral)   Resp 18   Ht 1.702 m (5' 7\")   Wt 64.9 kg (143 lb)   SpO2 97%   BMI 22.40 kg/m²     General appearance - alert, well appearing and in no acute distress  Mental status - oriented to person, place and time with normal affect  Head - normocephalic and atraumatic  Neck - supple, no carotid bruits, thyroid not palpable, no JVD  Chest - clear to auscultation, normal effort  Heart - normal rate, regular rhythm, no murmurs  Abdomen - soft, non-tender, non-distended, bowel sounds present all four quadrants, no masses  Neurological - normal speech, no focal findings or movement disorder noted, cranial nerves II through XII grossly intact  Extremities - peripheral pulses palpable, no pedal edema or calf pain with palpation  Skin - no gross lesions, rashes, or induration noted  Foot Exam -left great toe amputation site looks dry intact, no active bleeding.  Steri-Strip is in place.    Data:  CBC: No results for input(s): \"WBC\", \"HGB\", \"PLT\" in the last 72 hours.  Chemistry:   Recent Labs     06/21/24  0944   CREATININE 0.8     Hepatic: No results for input(s): \"AST\", \"ALT\", \"ALKPHOS\", \"BILITOT\", \"BILIDIR\" in the last 72 hours.    Invalid input(s): \"ALB\"  Coagulation: No results for input(s): \"APTT\", \"PROT\", \"INR\" in the last 72 hours.      Radiology Review:    No results found.        Plan:     Plan for discharge this morning, follow-up with Dr. Delos Reyes the following week  Wound dress  changes daily, patient understand how to change and he stated his son can take care of that as well.          [facility] [place]  Electronically signed by Chayo Leija DO  on 6/22/2024 at 7:53 AM

## 2024-06-22 NOTE — PROGRESS NOTES
Patient given AVS summary and is dressed. IV discontinued. Went over meds and follow up apt.. patient given orthotic shoe

## 2024-06-24 ENCOUNTER — CARE COORDINATION (OUTPATIENT)
Dept: CARE COORDINATION | Age: 69
End: 2024-06-24

## 2024-06-24 DIAGNOSIS — Z89.422 S/P AMPUTATION OF LESSER TOE, LEFT (HCC): Primary | ICD-10-CM

## 2024-06-24 LAB — SURGICAL PATHOLOGY REPORT: NORMAL

## 2024-06-24 PROCEDURE — 1111F DSCHRG MED/CURRENT MED MERGE: CPT | Performed by: INTERNAL MEDICINE

## 2024-06-24 NOTE — CARE COORDINATION
Care Transitions Note    Initial Call - Call within 2 business days of discharge: Yes    Patient Current Location:  Home: [address] [place], [place] [id number]    Care Transition Nurse contacted the patient by telephone to perform post hospital discharge assessment, verified name and  as identifiers. Provided introduction to self, and explanation of the Care Transition Nurse role.     Patient: Silas Shrestha    Patient : 1955   MRN: [id number]    Reason for Admission: Amputation of left great toe  Discharge Date: 24  RURS: Readmission Risk Score: 12.9      Last Discharge Facility       Date Complaint Diagnosis Description Type Department Provider    24  Post-op pain ... Admission (Discharged) [facility] Delos Reyes, Arthur, MD            Was this an external facility discharge? No    Additional needs identified to be addressed with provider   No needs identified             Method of communication with provider: none.    Patients top risk factors for readmission: medical condition-Toe amputation    Interventions to address risk factors:   Review of patient management of conditions/medications: Medications as ordered, monitor for s/s of infection.     Care Summary Note: Spoke with patient today for 24 hour initial follow up call. Patient came in for planned surgery with vascular for amputation of the left great toe.  Patient had wound to left toe that was non healing, had dry gangrene.  He was having pain from the toe and was recommended amputation as the toe was not viable.  Patient had amputation done, no complications with surgery and was discharged to home.  Today, he said he is doing ok, pain is much better now to left foot now that amputation has been done.  He has some dressings that was given to him by surgeon to do dressing changes at home. Patient asking today if he can soak his foot.  Expressed he should not be soaking his foot at all until all wounds are completely closed and given the

## 2024-06-27 ENCOUNTER — CARE COORDINATION (OUTPATIENT)
Dept: CARE COORDINATION | Age: 69
End: 2024-06-27

## 2024-06-27 NOTE — CARE COORDINATION
Care Transitions Note    Follow Up Call     Patient Current Location:  Home: [address] [place] [id number]      Additional needs identified to be addressed with provider   No needs identified                 Method of communication with provider: none.    Method of communication with provider:  surgeon office closed, will call in am about getting something different for pain .    Care Summary Note: Spoke with patient today for follow up transitional call. Patient said his foot is quite painful and has been having issues with increased  pain when he stands. When patient first came home, his pain was well managed and was better than what it was prior to the amputation when he still was dealing with gangrenous big toe.  He has been doing dressing changes to the site daily but said he has primarily been in bed or sitting since Monday.  He uses the walker to steady himself but it is too wide to get into his bathroom.  He denies any s/s of infection, no purulent or malodorous drainage, no f/c. He has used his pain medication and has been using Tylenol for the time being which is not giving adequate control. Foot hurts worse when hanging or when first getting up.  Discussed using ice to foot if swelling present, this may help with any inflammation which in turn should help with some pain control.  He does c/o phantom pain as well.  Advised I would call to schedule his follow up with surgeon and see about something for pain management. In the meantime, he was advised to use the Tylenol but to take consistently ATC to stay on top of the pain vs waiting until he feels he needs something and to continue this for the next couple of days.    TC placed to surgeons office but is closed for the day. Will call in am to schedule and request something for his discomfort.     Plan of care updates since last contact:  Education: Take Tylenol for pain management, suggested taking ATC for next couple of days until pain better

## 2024-06-28 ENCOUNTER — CARE COORDINATION (OUTPATIENT)
Dept: CARE COORDINATION | Age: 69
End: 2024-06-28

## 2024-06-28 NOTE — CARE COORDINATION
Care Transitions Note    Follow Up Call     Attempted to reach patient for transitions of care follow up.  Unable to reach patient.      Outreach Attempts:   1st attempt    Care Summary Note: Attempted to reach patient to follow up on his foot pain.  I had called Dr. Delos Reyes office yesterday but they were closed.  TC to office today to inquire about something for nerve pain as patient said the Roxicodone not effective for his phantom pain.  Office advised MD will not order anything else until seen in office.  Patient was scheduled his follow up for 7/10 @ 8:30 am.  TC to patient and left message regarding pain medication and appt. Will follow up again next week.     Follow Up Appointment:       Plan for follow-up call in 2-5 days based on severity of symptoms and risk factors. Plan for next call:  check toe pain, remind of appt with surgeon     Kiya Deal RN

## 2024-07-01 ENCOUNTER — CARE COORDINATION (OUTPATIENT)
Dept: CARE COORDINATION | Age: 69
End: 2024-07-01

## 2024-07-01 DIAGNOSIS — Z89.422 S/P AMPUTATION OF LESSER TOE, LEFT (HCC): Primary | ICD-10-CM

## 2024-07-01 NOTE — CARE COORDINATION
Care Transitions Note    Follow Up Call     Patient Current Location:  Home: [address] [place] [id number]    Care Transition Nurse contacted the patient by telephone. Verified name and  as identifiers.    Additional needs identified to be addressed with provider   High priority: Patient had Great toe to left foot amputated  d/t gangrene. Dr. Delos Reyes did the surgery, ordered Akosua for pain for him. He is c/o phantom pain to that foot, he is not getting up very much and has been home for a week now. Akosua isn't helping with phantom pain, I asked the surgeon on Friday if he could get something but they will not order anything else.  I was wondering if something like Gabapentin would help.  Also, now that he has been home for a week, he said he isn't able to get up and about that much, states he needs help with ADL's.  He would like to have home care for a bit until he is able to manage on his own.  I believe he had a VV with you on  just prior to his amputation.  If there is something you could order for pain he uses [facility].                  Method of communication with provider: chart routing.    Care Summary Note: Spoke with patient today for follow up. I tried to reach him last Friday, he has been having phantom pain to left foot at amputation site. He said the Akosua that he got at discharge does not help.  I did call the surgeon's office last week but they said MD will not order any medication until seen in the office.  He has been doing own dressing changes to his foot, states the site looks like it is healing, no s/s of infection, no drainage. He has his leg elevated most the time but when he goes to put it down, the foot starts to throb and he can not tolerate so he elevates again.  He has not been able to do regular ADL's as he had before and has trouble getting into the bathroom as his walker is wider than the door opening.    I expressed I can try asking his PCP for something for the

## 2024-07-02 RX ORDER — GABAPENTIN 100 MG/1
100 CAPSULE ORAL 3 TIMES DAILY
Qty: 90 CAPSULE | Refills: 0 | Status: SHIPPED | OUTPATIENT
Start: 2024-07-02 | End: 2024-08-01

## 2024-07-03 ENCOUNTER — TELEPHONE (OUTPATIENT)
Dept: VASCULAR SURGERY | Age: 69
End: 2024-07-03

## 2024-07-03 ENCOUNTER — CARE COORDINATION (OUTPATIENT)
Dept: CARE COORDINATION | Age: 69
End: 2024-07-03

## 2024-07-03 NOTE — CARE COORDINATION
medications?: No  Do you currently have any active services?: No  Do you have any needs or concerns that I can assist you with?: No  Identified Barriers: None  Care Transitions Interventions     Other Services: Declined     Specialty Service Referral: Declined    Other Interventions:              Follow Up Appointment:   Reviewed upcoming appointment(s).  Future Appointments         Provider Specialty Dept Phone    7/10/2024 1:15 PM Delos Reyes, Arthur, MD Vascular Surgery [phone]            LPN Care Coordinator provided contact information.  Plan for follow-up call in 2-5 days based on severity of symptoms and risk factors.  Plan for next call: symptom management-is Neurontin helping with his pain?   referrals-Did PCP order HHC?      Alissa Woods LPN

## 2024-07-03 NOTE — TELEPHONE ENCOUNTER
Patient called in to schedule a post op appointment. He states he is in a lot of pain and has had to call the ambulance twice. Pain management will not give him any pain meds either, is there anything we could do to help this patient?

## 2024-07-05 ENCOUNTER — CARE COORDINATION (OUTPATIENT)
Dept: CARE COORDINATION | Age: 69
End: 2024-07-05

## 2024-07-08 ENCOUNTER — CARE COORDINATION (OUTPATIENT)
Dept: CARE COORDINATION | Age: 69
End: 2024-07-08

## 2024-07-08 NOTE — CARE COORDINATION
Subsequent and Final Calls  Care Transitions Interventions  Other Interventions:              Follow Up Appointment:   Reviewed upcoming appointment(s).  Future Appointments         Provider Specialty Dept Phone    7/10/2024 1:15 PM Delos Reyes, Arthur, MD Vascular Surgery [phone]            Care Transition Nurse provided contact information.  Plan for follow-up on next business day.  based on severity of symptoms and risk factors.  Plan for next call:  check if Neurontin script went through to pharmacy.       Kiya Deal RN

## 2024-07-09 ENCOUNTER — CARE COORDINATION (OUTPATIENT)
Dept: CARE COORDINATION | Age: 69
End: 2024-07-09

## 2024-07-09 ENCOUNTER — TELEPHONE (OUTPATIENT)
Dept: INTERNAL MEDICINE | Age: 69
End: 2024-07-09

## 2024-07-09 DIAGNOSIS — Z89.422 S/P AMPUTATION OF LESSER TOE, LEFT (HCC): Primary | ICD-10-CM

## 2024-07-09 RX ORDER — SERTRALINE HYDROCHLORIDE 100 MG/1
100 TABLET, FILM COATED ORAL DAILY
Qty: 30 TABLET | Refills: 1 | Status: SHIPPED | OUTPATIENT
Start: 2024-07-09

## 2024-07-09 NOTE — TELEPHONE ENCOUNTER
Writer received a call from Kiya Deal RN regarding gabapentin prescription from 7/2/2024 not being received by the pharmacy. Per Dr. Rizo, it is okay for the care transitions nurse to call in the prescription.    Electronically signed by Zach Pierre MA on 7/9/2024 at 1:12 PM

## 2024-07-09 NOTE — CARE COORDINATION
Care Transitions Note    Follow Up Call     Attempted to reach patient for transitions of care follow up.  Unable to reach patient.      Outreach Attempts:   Multiple attempts to contact patient at phone numbers on file.     Care Summary Note: TC placed to PCP office, several attempts made to get through to office with no success. Contacted  who asked PCP if ok for CTN to call in prescription for patient as it never went through electronically.  Was given verbal ok to call in.  TC to [facility], patient was called in a 30 day supply of Gapapentin 100 mg tabs to take TID, quantity of 90 capsules.  Attempted to reach patient to let him know that script was called in but no answer and mailbox full.      Follow Up Appointment:   Future Appointments         Provider Specialty Dept Phone    7/10/2024 1:15 PM Delos Reyes, Arthur, MD Vascular Surgery [phone]            Plan for follow-up call in 2-5 days based on severity of symptoms and risk factors. Plan for next call:  Review vascular surgeon follow up appt, script for Gabapentin was called in today, make sure patient has or knows to .     Kiya Deal RN

## 2024-07-09 NOTE — TELEPHONE ENCOUNTER
Last visit: 6/19/24  Last Med refill: 5/21/24  Does patient have enough medication for 72 hours: No:     Next Visit Date: Patient is on the waitlist for an appt in August  Future Appointments   Date Time Provider Department Center   7/10/2024  1:15 PM Delos Reyes, Arthur, MD [facility]       Health Maintenance   Topic Date Due    Hepatitis B vaccine (1 of 3 - Risk 3-dose series) Never done    Respiratory Syncytial Virus (RSV) Pregnant or age 60 yrs+ (1 - 1-dose 60+ series) Never done    Pneumococcal 65+ years Vaccine (2 of 2 - PCV) 12/15/2017    Hepatitis A vaccine (2 of 2 - Risk 2-dose series) 11/14/2019    COVID-19 Vaccine (4 - 2023-24 season) 09/01/2023    Flu vaccine (1) 08/01/2024    Colorectal Cancer Screen  04/22/2025    Depression Monitoring  05/21/2025    Lipids  06/14/2025    DTaP/Tdap/Td vaccine (2 - Td or Tdap) 04/11/2029    Annual Wellness Visit (Medicare Advantage)  Completed    Shingles vaccine  Completed    AAA screen  Completed    Hepatitis C screen  Completed    Hib vaccine  Aged Out    Polio vaccine  Aged Out    Meningococcal (ACWY) vaccine  Aged Out    Depression Screen  Discontinued    Pneumococcal 0-64 years Vaccine  Discontinued    HIV screen  Discontinued    Prostate Specific Antigen (PSA) Screening or Monitoring  Discontinued       No results found for: \"LABA1C\"          ( goal A1C is < 7)   No components found for: \"LABMICR\"  No components found for: \"LDLCHOLESTEROL\", \"LDLCALC\"    (goal LDL is <100)   AST (U/L)   Date Value   02/13/2024 49     ALT (U/L)   Date Value   02/13/2024 32     BUN (mg/dL)   Date Value   05/21/2024 9     BP Readings from Last 3 Encounters:   06/22/24 129/80   06/03/24 (!) 169/80   05/29/24 (!) 146/95          (goal 120/80)    All Future Testing planned in [facility]  Lab Frequency Next Occurrence   Vascular duplex vein mapping lower bilateral Once 02/07/2024               Patient Active Problem List:     Hx of tuberculosis     Alcohol induced acute

## 2024-07-10 ENCOUNTER — OFFICE VISIT (OUTPATIENT)
Age: 69
End: 2024-07-10
Payer: MEDICARE

## 2024-07-10 VITALS
HEIGHT: 67 IN | WEIGHT: 145 LBS | OXYGEN SATURATION: 97 % | HEART RATE: 74 BPM | SYSTOLIC BLOOD PRESSURE: 174 MMHG | RESPIRATION RATE: 18 BRPM | BODY MASS INDEX: 22.76 KG/M2 | DIASTOLIC BLOOD PRESSURE: 82 MMHG

## 2024-07-10 DIAGNOSIS — I70.222 ATHEROSCLEROSIS OF NATIVE ARTERIES OF EXTREMITIES WITH REST PAIN, LEFT LEG (HCC): Primary | ICD-10-CM

## 2024-07-10 PROCEDURE — 3077F SYST BP >= 140 MM HG: CPT | Performed by: SURGERY

## 2024-07-10 PROCEDURE — 99213 OFFICE O/P EST LOW 20 MIN: CPT | Performed by: SURGERY

## 2024-07-10 PROCEDURE — G8420 CALC BMI NORM PARAMETERS: HCPCS | Performed by: SURGERY

## 2024-07-10 PROCEDURE — 1123F ACP DISCUSS/DSCN MKR DOCD: CPT | Performed by: SURGERY

## 2024-07-10 PROCEDURE — 1036F TOBACCO NON-USER: CPT | Performed by: SURGERY

## 2024-07-10 PROCEDURE — 3079F DIAST BP 80-89 MM HG: CPT | Performed by: SURGERY

## 2024-07-10 PROCEDURE — 3017F COLORECTAL CA SCREEN DOC REV: CPT | Performed by: SURGERY

## 2024-07-10 PROCEDURE — G8427 DOCREV CUR MEDS BY ELIG CLIN: HCPCS | Performed by: SURGERY

## 2024-07-10 RX ORDER — OXYCODONE HYDROCHLORIDE 5 MG/1
10 TABLET ORAL EVERY 6 HOURS PRN
Qty: 56 TABLET | Refills: 0 | Status: SHIPPED | OUTPATIENT
Start: 2024-07-10 | End: 2024-07-17

## 2024-07-10 NOTE — PROGRESS NOTES
[facility] VASCULAR SURGERY CLINIC  [facility]  [facility]  [address] [facility] [address]  [phone]    Silas Clemens Shrestha  1955  69 y.o.male       Chief Complaint:  Chief Complaint   Patient presents with    Follow-up     Post op toe amp       History of present Illness:  Pt is here today for postoperative follow-up after left femoral to tibial bypass with graft.  This is a 69-year-old male who developed gangrene of the left great toe after clipping his toenail.  He underwent a foot tibial bypass but still has significant pain in the great toe.    Packing again for amputation of the left great toe and this area appears to be dry gangrene.  He is still not sleeping very much only 15 to 30-minute intervals every night because of the rest pain.  I had a very cy discussion that the neck step would be for a ray amputation and possibly TMA.  If that fails as well he will need a below-knee amputation and this is mostly because of small vessel disease.  He has a very robust posterior tibial signal biphasic to triphasic however this signal dissipates significantly into the foot and his anterior tibial is likewise chronically occluded.  I provided him some pain medication to at least allow him to get some sleep but he will need a reoperation with a VAC    Past Medical History:  has a past medical history of Alcoholism (HCC), Cirrhosis of liver (HCC), Claudication (HCC), COPD (chronic obstructive pulmonary disease) (HCC), COVID-19 vaccine administered, Depression, Finger fracture, left, Foot pain, left, Gangrene (HCC), Hx of blood clots, Hx of hepatitis C, Hx of pancreatitis, Hx of tuberculosis, Hyperlipidemia, Hypertension, Impaired mobility, Intermittent claudication (HCC), Liver disease, PAD (peripheral artery disease) (HCC), PVD (peripheral vascular disease) (HCC), Swelling, Ulcer of foot (HCC), Under care of service provider, Wears dentures, and Wears glasses.    Past Surgical

## 2024-07-10 NOTE — H&P (VIEW-ONLY)
[facility] VASCULAR SURGERY CLINIC  [facility]  [facility]  [address] [facility] [address]  [phone]    Silas Clemens Shrestha  1955  69 y.o.male       Chief Complaint:  Chief Complaint   Patient presents with    Follow-up     Post op toe amp       History of present Illness:  Pt is here today for postoperative follow-up after left femoral to tibial bypass with graft.  This is a 69-year-old male who developed gangrene of the left great toe after clipping his toenail.  He underwent a foot tibial bypass but still has significant pain in the great toe.    Packing again for amputation of the left great toe and this area appears to be dry gangrene.  He is still not sleeping very much only 15 to 30-minute intervals every night because of the rest pain.  I had a very cy discussion that the neck step would be for a ray amputation and possibly TMA.  If that fails as well he will need a below-knee amputation and this is mostly because of small vessel disease.  He has a very robust posterior tibial signal biphasic to triphasic however this signal dissipates significantly into the foot and his anterior tibial is likewise chronically occluded.  I provided him some pain medication to at least allow him to get some sleep but he will need a reoperation with a VAC    Past Medical History:  has a past medical history of Alcoholism (HCC), Cirrhosis of liver (HCC), Claudication (HCC), COPD (chronic obstructive pulmonary disease) (HCC), COVID-19 vaccine administered, Depression, Finger fracture, left, Foot pain, left, Gangrene (HCC), Hx of blood clots, Hx of hepatitis C, Hx of pancreatitis, Hx of tuberculosis, Hyperlipidemia, Hypertension, Impaired mobility, Intermittent claudication (HCC), Liver disease, PAD (peripheral artery disease) (HCC), PVD (peripheral vascular disease) (HCC), Swelling, Ulcer of foot (HCC), Under care of service provider, Wears dentures, and Wears glasses.    Past Surgical  Value Date/Time    INR 1.2 03/29/2024 09:15 AM         Assessment:  69 y.o. male with 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 Atherosclerosis of native arteries of extremities with rest pain, left leg (HCC)          Plan:   He will be scheduled for a ray amputation of the left great toe with wound VAC application.    No orders of the defined types were placed in this encounter.          Electronically signed by Arthur Delos Reyes, MD on 7/10/2024 at 1:37 PM     Copy sent to Brina Tolentino MD

## 2024-07-11 ENCOUNTER — CARE COORDINATION (OUTPATIENT)
Dept: CARE COORDINATION | Age: 69
End: 2024-07-11

## 2024-07-11 NOTE — CARE COORDINATION
Care Transitions Note    Follow Up Call     Patient Current Location:  Home: [address] [place] [id number]    [facility] Care Coordinator contacted the patient by telephone. Verified name and  as identifiers.    Additional needs identified to be addressed with provider   No needs identified                 Method of communication with provider: none.    Care Summary Note: Writer spoke to Silas today for transitional care call. He reports that gabapentin is helping the pain he will  his oxycodone today he stated it is ready to be picked up. He is taking every 6 hours prn for 7 days. He went to post -op will be scheduled for amputation of left great toe with wound vac. Pt stated that he was upset he was sent home without any pain medication. He expressed concerns regarding future amputation. L toe uncovered at this time pt still in bed. Wears boot when out of bed. Denies fever/chills. Eating fish for protein. Expressed he will take pain medication if he is having pain-discussed not letting pain get out of control.  has been taking day time naps because he can't sleep at times due to pain. Open to referral to dietician.     Plan of care updates since last contact:  Education: protein  discussed pot op appointment discussed oxycodone         Advance Care Planning:   Does patient have an Advance Directive:  not on file.  .    Medication Review:  Medications changed since last call, reviewed today.     Remote Patient Monitoring:  Offered patient enrollment in the Remote Patient Monitoring (RPM) program for in-home monitoring: Yes, but did not enroll at this time: did not discuss today  .    Assessments:  Care Transitions ED Follow Up    Care Transitions Interventions     Other Services: Declined     Specialty Service Referral: Declined      Do you have all of your prescriptions and are they filled?: Yes                   Follow Up Appointment:   Reviewed upcoming appointment(s).      LPN Care Coordinator

## 2024-07-12 ENCOUNTER — CARE COORDINATION (OUTPATIENT)
Dept: CARE COORDINATION | Age: 69
End: 2024-07-12

## 2024-07-12 NOTE — CARE COORDINATION
Nutrition Care Coordinator Follow-Up visit:    Food Recall:    Activity Level:  Sedentary:  Lightly Active:  Moderately Active:  Very Active:    Adult BMI:  Underweight (below 18.5)  Normal Weight (18.5-24.9)  Overweight (25-29.9)  Obese (30-39.9)  Morbidly Obese (>40)    Weight Change:    Established nutritional needs:  Calorie:  Protein:  CHO:  Total Fat:    Plan:  Plan was established with patient:  Increase dietary fiber by consuming whole grains, fruits and vegetables:  Limit dietary cholesterol to >200mg/day:  Increase water intake:  Avoid added sugar:  Avoid sweetened beverages:  Choose lean meats:      Monitoring:  Will monitor weight:  Will monitor adherence to meal plan:  Will monitor adherence to exercise plan:  Will monitor HGA1c:    Handouts Provided :  Low Carb snacking:  Carb counting /individual meal plan:  Portion Control:  Food Labels:  Physical Activity:  Low Fat/Cholesterol:  Hypo/Hyperglycemia:  Calorie Controlled Meal Plan:    Goals:  Increase water consumption to 8oz. 6-8 times daily:  Manage blood sugars by consuming 3 meals spaced every 4-5 hours with 2-3 snacks daily:  Increase fiber and decrease fat intake by consuming 1-2 fruit servings and 2-3 vegetable servings per day.  Increase physical activity by:  Consume less than 2,000mg of sodium/day  Avoid consumption of sweetened beverages and added sugar by reading food labels:  Monitor blood sugars by using meter to check blood glucose before morning meal and 2 hours after a meal daily:  Decrease risk of coronary heart disease by consuming fish that contains omega-3 fatty acids at least twice a week, avoiding partially hydrogenated oil/trans fats and limiting saturated fat intake by reading food labels:    Patient goals set:  1.  2.       CLAUDIA New   
Referral from CECIL, Eva Tompkins LPN-  Open to referral please see today's encounter - patient s/p toe amputation    Will reach out to patient for consult.  IZABELA Persaud  
evidenced by wound on toe not healing per patient    NUTRITION INTERVENTION  Nutrition Prescription:    cardiac diet providing 1800 kcals/day   Protein needs:87-112gms/d    Patient Goals:  1.Discussed increasing protein intake- Protein provides the building blocks for muscle and skin repair; and you need more protein for wound healing. It also helps to boost immunity. Eat 3 to 4 servings per day. (One serving is 3-4 oz) Good sources include:   Lean animal meat such as chicken, turkey, fish  Beans, peas, lentils or tofu  Nuts, peanut butter, or seeds  Cheese, yogurt, cottage cheese or eggs  Milk or a milk alternative like fortified soy    2.Carbohydrates Carbohydrates supply the energy your body needs to heal. Choose whole grains over refined grains. Some examples include:   Whole grain breads and cereals  Potatoes, rice or pasta  A variety of fruits and vegetables  Avoid refined and added sugars--these can promote bacteria growth   3.Fluid is critical to wound healing, and you need more than usual. Water replaces fluid lost due to draining wounds. Drink half of your body weight in ounces, unless your doctor advises you otherwise.  Fluids can include:   Water  Milk or fortified soy beverage  100% fruit or vegetable juice  Soup  Coffee or tea  4. Vitamins and Minerals Vitamin A, vitamin C, and zinc help your body to repair tissue damage, fight infections and keep your skin healthy. Try these foods: Vitamin A Vitamin A is found in animal foods and some brightly colored vegetables and fruits. ? Apricots  ? Leafy greens  ? Cantaloupe  ? Carrots  ? Milk   ? Cheese ? Eggs ? Liver ? [place] ? Pumpkin   Vitamin C Ask your doctor or dietitian if you should take Many vegetables and fruits are high in vitamin C. Eat bright red, orange and green veggies and fruits.  ? Broccoli  ? Strawberries ? Citrus fruits  ? Cantaloupe  ? Kiwi   Zinc ? Bell Pepper ? Tomatoes ? Potato with skin   Zinc is a mineral that is found mainly in animal

## 2024-07-16 ENCOUNTER — CARE COORDINATION (OUTPATIENT)
Dept: CARE COORDINATION | Age: 69
End: 2024-07-16

## 2024-07-17 ENCOUNTER — CARE COORDINATION (OUTPATIENT)
Dept: CARE COORDINATION | Age: 69
End: 2024-07-17

## 2024-07-17 NOTE — CARE COORDINATION
Care Transitions Note    Follow Up Call     Patient Current Location:  Home: [address] [place] [id number]    [facility] Care Coordinator contacted the patient by telephone. Verified name and  as identifiers.    Additional needs identified to be addressed with provider   No needs identified                 Method of communication with provider: none.    Care Summary Note: writer spoke to Silas today for follow up transitional care call. Silas stated that he is doing okay today. He voiced no new needs or concerns at this time. He has picked up his oxycodone.     Plan of care updates since last contact:  No concerns needs at this time doing okay        Advance Care Planning:   Does patient have an Advance Directive: reviewed during previous call, see note. .    Medication Review:  No changes since last call.     Remote Patient Monitoring:  Offered patient enrollment in the Remote Patient Monitoring (RPM) program for in-home monitoring: .did not address today     Assessments:  Care Transitions ED Follow Up    Care Transitions Interventions     Other Services: Declined     Specialty Service Referral: Declined      Do you have any ongoing symptoms?: No   Do you have all of your prescriptions and are they filled?: Yes   Were you discharged with any Home Care or Post Acute Services or do you currently have any active services?: No         Do you have any needs or concerns that I can assist you with?: No   Identified Barriers: None             Follow Up Appointment:   Reviewed upcoming appointment(s). None       [facility] Care Coordinator provided contact information.  Plan for follow-up call in 6-10 days based on severity of symptoms and risk factors.  Plan for next call: symptom management-How is pain any new or worse ing symptoms ? Fever /chills?       Mita Tompkins LPN

## 2024-07-17 NOTE — CARE COORDINATION
Per RD request, educational material mailed to patient.  [facility] protein and wound healing   Ensure coupons x5

## 2024-07-23 ENCOUNTER — CARE COORDINATION (OUTPATIENT)
Dept: CARE COORDINATION | Age: 69
End: 2024-07-23

## 2024-07-23 NOTE — CARE COORDINATION
Care Transitions Note    Final Call     Patient Current Location:  Home: [address] [place] [address]    [facility] Care Coordinator contacted the patient by telephone. Verified name and  as identifiers.    Additional needs identified to be addressed with provider   No needs identified                 Method of communication with provider: chart routing.    Care Summary Note: Writer spoke to Silas today he report he has a pain level 2-3  right now  he stated the pain medication is helping and that he will be taking next dose soon. He stated that pain will increase when he put weight on it . Writer advised him to keep weight off. He stated it has no odor and that he will be changing dressing in a few .Reminde to keep area clean with a dry dressing. He denies fever.chills N/V/D discussed watching out for fever chills, increased /foul drainage to report changes to provider if he need to. He is okay with ending transitional care call has writer's contact information. Pt okay with continued care with  AHSAN Quinones    Plan of care updates since last contact:  Education: discussed changing dressing keeping clean /dry       Advance Care Planning:   Does patient have an Advance Directive:  not on file  .    Medication Review:  No changes since last call.     Remote Patient Monitoring:  Offered patient enrollment in the Remote Patient Monitoring (RPM) program for in-home monitoring: Yes, but did not enroll at this time: declined to enroll in the program becausenot interested .    Assessments:  Care Transitions Subsequent and Final Call    Subsequent and Final Calls  Do you have any ongoing symptoms?: No  Have your medications changed?: No  Do you have any questions related to your medications?: No  Do you currently have any active services?: No  Do you have any needs or concerns that I can assist you with?: No  Identified Barriers: None  Care Transitions Interventions     Other Services: Declined     Specialty Service

## 2024-07-29 ENCOUNTER — CARE COORDINATION (OUTPATIENT)
Dept: CARE COORDINATION | Age: 69
End: 2024-07-29

## 2024-07-29 NOTE — CARE COORDINATION
Nutrition Care Coordinator Follow-Up visit:    Food Recall:eating 2 meals/d    Activity Level:  Sedentary:X  Lightly Active:  Moderately Active:  Very Active:    Adult BMI:  Underweight (below 18.5)  Normal Weight (18.5-24.9)X  Overweight (25-29.9)  Obese (30-39.9)  Morbidly Obese (>40)    Weight Change:    Plan:  Plan was established with patient:  Small frequent meals: X  Increase protein intake: X    Monitoring:  Will monitor weight:  Will monitor adherence to meal plan:X  Will monitor adherence to exercise plan:  Will monitor HGA1c:    Handouts Provided :  High protein foods/snacks: X    Goals:  Patient goals set:  1.Reviewed increasing protein intake- Protein provides the building blocks for muscle and skin repair; and you need more protein for wound healing. It also helps to boost immunity. Eat 3 to 4 servings per day. (One serving is 3-4 oz) Good sources include:   Lean animal meat such as chicken, turkey, fish  Beans, peas, lentils or tofu  Nuts, peanut butter, or seeds  Cheese, yogurt, cottage cheese or eggs  Milk or a milk alternative like fortified soy    2.Reviewed-Carbohydrates supply the energy your body needs to heal. Choose whole grains over refined grains. Some examples include:   Whole grain breads and cereals  Potatoes, rice or pasta  A variety of fruits and vegetables  Avoid refined and added sugars--these can promote bacteria growth   3.Reviewed-Fluid is critical to wound healing, and you need more than usual. Water replaces fluid lost due to draining wounds. Drink half of your body weight in ounces, unless your doctor advises you otherwise.  Fluids can include:   Water  Milk or fortified soy beverage  100% fruit or vegetable juice  Soup  Coffee or tea  4. Reviewed-Vitamins and Minerals Vitamin A, vitamin C, and zinc help your body to repair tissue damage, fight infections and keep your skin healthy. Try these foods: Vitamin A Vitamin A is found in animal foods and some brightly colored

## 2024-07-29 NOTE — CARE COORDINATION
Ambulatory Care Coordination Note     2024 5:32 PM     Patient Current Location:  Home: [address] [place], [place] [id number]     This patient was received as a referral from Ambulatory Care Manager .    Patient contacted the patient by telephone. Verified name and  with patient as identifiers. Provided introduction to self, and explanation of the ACM role.   Patient declined care management services at this time.          ACM: Jose Roberto Quinones RN     Challenges to be reviewed by the provider   Additional needs identified to be addressed with provider No  none               Method of communication with provider: none.    Care Summary Note: Email From CRUZ Kiser RN about patient lacking any in home services. Patient is not mobile. States that he has to crawl to the bathroom. Not able to do ADL's cook. His son is with him for a short period but will need services after surgery on . Will F/U with in house care management to make sure that the services needed are arranged prior to his discharge

## 2024-07-31 ENCOUNTER — ANESTHESIA EVENT (OUTPATIENT)
Dept: OPERATING ROOM | Age: 69
End: 2024-07-31
Payer: MEDICARE

## 2024-07-31 ENCOUNTER — ANESTHESIA (OUTPATIENT)
Dept: OPERATING ROOM | Age: 69
End: 2024-07-31
Payer: MEDICARE

## 2024-07-31 ENCOUNTER — HOSPITAL ENCOUNTER (INPATIENT)
Age: 69
LOS: 6 days | Discharge: HOME | End: 2024-08-06
Attending: SURGERY | Admitting: SURGERY
Payer: MEDICARE

## 2024-07-31 DIAGNOSIS — G89.18 ACUTE POSTOPERATIVE PAIN: Primary | ICD-10-CM

## 2024-07-31 DIAGNOSIS — I70.262 ATHEROSCLEROSIS OF NATIVE ARTERY OF LEFT LOWER EXTREMITY WITH GANGRENE (HCC): ICD-10-CM

## 2024-07-31 LAB
ANION GAP SERPL CALCULATED.3IONS-SCNC: 11 MMOL/L (ref 9–16)
BASOPHILS # BLD: 0.03 K/UL (ref 0–0.2)
BASOPHILS NFR BLD: 0 % (ref 0–2)
BUN BLD-MCNC: 16 MG/DL (ref 8–26)
BUN SERPL-MCNC: 12 MG/DL (ref 8–23)
CALCIUM SERPL-MCNC: 9.3 MG/DL (ref 8.6–10.4)
CHLORIDE BLD-SCNC: 109 MMOL/L (ref 98–107)
CHLORIDE SERPL-SCNC: 107 MMOL/L (ref 98–107)
CO2 SERPL-SCNC: 22 MMOL/L (ref 20–31)
CREAT SERPL-MCNC: 0.9 MG/DL (ref 0.7–1.2)
EGFR, POC: 66 ML/MIN/1.73M2
EOSINOPHIL # BLD: 0.39 K/UL (ref 0–0.44)
EOSINOPHILS RELATIVE PERCENT: 4 % (ref 1–4)
ERYTHROCYTE [DISTWIDTH] IN BLOOD BY AUTOMATED COUNT: 13.3 % (ref 11.8–14.4)
GFR, ESTIMATED: >90 ML/MIN/1.73M2
GLUCOSE BLD-MCNC: 97 MG/DL (ref 74–100)
GLUCOSE SERPL-MCNC: 100 MG/DL (ref 74–99)
HCT VFR BLD AUTO: 40.2 % (ref 40.7–50.3)
HCT VFR BLD AUTO: 44 % (ref 41–53)
HGB BLD-MCNC: 13 G/DL (ref 13–17)
IMM GRANULOCYTES # BLD AUTO: 0.03 K/UL (ref 0–0.3)
IMM GRANULOCYTES NFR BLD: 0 %
LYMPHOCYTES NFR BLD: 2.51 K/UL (ref 1.1–3.7)
LYMPHOCYTES RELATIVE PERCENT: 25 % (ref 24–43)
MCH RBC QN AUTO: 34.9 PG (ref 25.2–33.5)
MCHC RBC AUTO-ENTMCNC: 32.3 G/DL (ref 28.4–34.8)
MCV RBC AUTO: 108.1 FL (ref 82.6–102.9)
MONOCYTES NFR BLD: 0.5 K/UL (ref 0.1–1.2)
MONOCYTES NFR BLD: 5 % (ref 3–12)
NEUTROPHILS NFR BLD: 66 % (ref 36–65)
NEUTS SEG NFR BLD: 6.76 K/UL (ref 1.5–8.1)
NRBC BLD-RTO: 0 PER 100 WBC
PLATELET # BLD AUTO: 263 K/UL (ref 138–453)
PMV BLD AUTO: 9.4 FL (ref 8.1–13.5)
POC CREATININE: 1.2 MG/DL (ref 0.51–1.19)
POC HEMOGLOBIN (CALC): 14.9 G/DL (ref 13.5–17.5)
POC LACTIC ACID: 0.9 MMOL/L (ref 0.56–1.39)
POTASSIUM BLD-SCNC: 3.6 MMOL/L (ref 3.5–4.5)
POTASSIUM SERPL-SCNC: 3.7 MMOL/L (ref 3.7–5.3)
RBC # BLD AUTO: 3.72 M/UL (ref 4.21–5.77)
RBC # BLD: ABNORMAL 10*6/UL
SODIUM BLD-SCNC: 145 MMOL/L (ref 138–146)
SODIUM SERPL-SCNC: 140 MMOL/L (ref 136–145)
WBC OTHER # BLD: 10.2 K/UL (ref 3.5–11.3)

## 2024-07-31 PROCEDURE — 82565 ASSAY OF CREATININE: CPT

## 2024-07-31 PROCEDURE — 36415 COLL VENOUS BLD VENIPUNCTURE: CPT

## 2024-07-31 PROCEDURE — 82435 ASSAY OF BLOOD CHLORIDE: CPT

## 2024-07-31 PROCEDURE — 85025 COMPLETE CBC W/AUTO DIFF WBC: CPT

## 2024-07-31 PROCEDURE — 6360000002 HC RX W HCPCS: Performed by: NURSE ANESTHETIST, CERTIFIED REGISTERED

## 2024-07-31 PROCEDURE — 3700000000 HC ANESTHESIA ATTENDED CARE: Performed by: SURGERY

## 2024-07-31 PROCEDURE — 87075 CULTR BACTERIA EXCEPT BLOOD: CPT

## 2024-07-31 PROCEDURE — 3600000003 HC SURGERY LEVEL 3 BASE: Performed by: SURGERY

## 2024-07-31 PROCEDURE — 86403 PARTICLE AGGLUT ANTBDY SCRN: CPT

## 2024-07-31 PROCEDURE — 0Y6N0Z9 DETACHMENT AT LEFT FOOT, PARTIAL 1ST RAY, OPEN APPROACH: ICD-10-PCS | Performed by: SURGERY

## 2024-07-31 PROCEDURE — 6360000002 HC RX W HCPCS

## 2024-07-31 PROCEDURE — 3600000013 HC SURGERY LEVEL 3 ADDTL 15MIN: Performed by: SURGERY

## 2024-07-31 PROCEDURE — 3700000001 HC ADD 15 MINUTES (ANESTHESIA): Performed by: SURGERY

## 2024-07-31 PROCEDURE — 6370000000 HC RX 637 (ALT 250 FOR IP)

## 2024-07-31 PROCEDURE — 7100000011 HC PHASE II RECOVERY - ADDTL 15 MIN: Performed by: SURGERY

## 2024-07-31 PROCEDURE — 2500000003 HC RX 250 WO HCPCS: Performed by: SURGERY

## 2024-07-31 PROCEDURE — 84520 ASSAY OF UREA NITROGEN: CPT

## 2024-07-31 PROCEDURE — 84132 ASSAY OF SERUM POTASSIUM: CPT

## 2024-07-31 PROCEDURE — 82947 ASSAY GLUCOSE BLOOD QUANT: CPT

## 2024-07-31 PROCEDURE — 80048 BASIC METABOLIC PNL TOTAL CA: CPT

## 2024-07-31 PROCEDURE — 2709999900 HC NON-CHARGEABLE SUPPLY: Performed by: SURGERY

## 2024-07-31 PROCEDURE — 2580000003 HC RX 258

## 2024-07-31 PROCEDURE — 87186 SC STD MICRODIL/AGAR DIL: CPT

## 2024-07-31 PROCEDURE — 87070 CULTURE OTHR SPECIMN AEROBIC: CPT

## 2024-07-31 PROCEDURE — 85014 HEMATOCRIT: CPT

## 2024-07-31 PROCEDURE — 7100000010 HC PHASE II RECOVERY - FIRST 15 MIN: Performed by: SURGERY

## 2024-07-31 PROCEDURE — 84295 ASSAY OF SERUM SODIUM: CPT

## 2024-07-31 PROCEDURE — 87184 SC STD DISK METHOD PER PLATE: CPT

## 2024-07-31 PROCEDURE — 1200000000 HC SEMI PRIVATE

## 2024-07-31 PROCEDURE — 87205 SMEAR GRAM STAIN: CPT

## 2024-07-31 PROCEDURE — 2500000003 HC RX 250 WO HCPCS: Performed by: NURSE ANESTHETIST, CERTIFIED REGISTERED

## 2024-07-31 PROCEDURE — 83605 ASSAY OF LACTIC ACID: CPT

## 2024-07-31 PROCEDURE — 2580000003 HC RX 258: Performed by: SURGERY

## 2024-07-31 PROCEDURE — 87077 CULTURE AEROBIC IDENTIFY: CPT

## 2024-07-31 PROCEDURE — 28140 REMOVAL OF METATARSAL: CPT | Performed by: SURGERY

## 2024-07-31 PROCEDURE — 6360000002 HC RX W HCPCS: Performed by: STUDENT IN AN ORGANIZED HEALTH CARE EDUCATION/TRAINING PROGRAM

## 2024-07-31 RX ORDER — GABAPENTIN 100 MG/1
100 CAPSULE ORAL 3 TIMES DAILY
Status: DISCONTINUED | OUTPATIENT
Start: 2024-07-31 | End: 2024-08-03

## 2024-07-31 RX ORDER — PANTOPRAZOLE SODIUM 40 MG/1
40 TABLET, DELAYED RELEASE ORAL
Status: DISCONTINUED | OUTPATIENT
Start: 2024-08-01 | End: 2024-08-06 | Stop reason: HOSPADM

## 2024-07-31 RX ORDER — ASPIRIN 81 MG/1
81 TABLET ORAL DAILY
Status: DISCONTINUED | OUTPATIENT
Start: 2024-07-31 | End: 2024-08-06 | Stop reason: HOSPADM

## 2024-07-31 RX ORDER — MAGNESIUM HYDROXIDE 1200 MG/15ML
LIQUID ORAL CONTINUOUS PRN
Status: COMPLETED | OUTPATIENT
Start: 2024-07-31 | End: 2024-07-31

## 2024-07-31 RX ORDER — SODIUM CHLORIDE, SODIUM LACTATE, POTASSIUM CHLORIDE, CALCIUM CHLORIDE 600; 310; 30; 20 MG/100ML; MG/100ML; MG/100ML; MG/100ML
INJECTION, SOLUTION INTRAVENOUS CONTINUOUS
Status: ACTIVE | OUTPATIENT
Start: 2024-07-31 | End: 2024-07-31

## 2024-07-31 RX ORDER — MIDAZOLAM HYDROCHLORIDE 1 MG/ML
INJECTION INTRAMUSCULAR; INTRAVENOUS PRN
Status: DISCONTINUED | OUTPATIENT
Start: 2024-07-31 | End: 2024-07-31 | Stop reason: SDUPTHER

## 2024-07-31 RX ORDER — LIDOCAINE HYDROCHLORIDE 10 MG/ML
INJECTION, SOLUTION EPIDURAL; INFILTRATION; INTRACAUDAL; PERINEURAL PRN
Status: DISCONTINUED | OUTPATIENT
Start: 2024-07-31 | End: 2024-07-31 | Stop reason: SDUPTHER

## 2024-07-31 RX ORDER — HYDRALAZINE HYDROCHLORIDE 20 MG/ML
10 INJECTION INTRAMUSCULAR; INTRAVENOUS
Status: COMPLETED | OUTPATIENT
Start: 2024-07-31 | End: 2024-07-31

## 2024-07-31 RX ORDER — CEFAZOLIN SODIUM 1 G/3ML
INJECTION, POWDER, FOR SOLUTION INTRAMUSCULAR; INTRAVENOUS PRN
Status: DISCONTINUED | OUTPATIENT
Start: 2024-07-31 | End: 2024-07-31 | Stop reason: SDUPTHER

## 2024-07-31 RX ORDER — SODIUM CHLORIDE 9 MG/ML
INJECTION, SOLUTION INTRAVENOUS PRN
Status: DISCONTINUED | OUTPATIENT
Start: 2024-07-31 | End: 2024-07-31 | Stop reason: HOSPADM

## 2024-07-31 RX ORDER — ONDANSETRON 4 MG/1
4 TABLET, ORALLY DISINTEGRATING ORAL EVERY 8 HOURS PRN
Status: DISCONTINUED | OUTPATIENT
Start: 2024-07-31 | End: 2024-08-06 | Stop reason: HOSPADM

## 2024-07-31 RX ORDER — ACETAMINOPHEN 500 MG
1000 TABLET ORAL EVERY 6 HOURS SCHEDULED
Status: DISCONTINUED | OUTPATIENT
Start: 2024-07-31 | End: 2024-08-06 | Stop reason: HOSPADM

## 2024-07-31 RX ORDER — AMLODIPINE BESYLATE 10 MG/1
10 TABLET ORAL DAILY
Status: DISCONTINUED | OUTPATIENT
Start: 2024-07-31 | End: 2024-08-06 | Stop reason: HOSPADM

## 2024-07-31 RX ORDER — METHOCARBAMOL 750 MG/1
750 TABLET, FILM COATED ORAL EVERY 6 HOURS
Status: DISCONTINUED | OUTPATIENT
Start: 2024-07-31 | End: 2024-08-06 | Stop reason: HOSPADM

## 2024-07-31 RX ORDER — SODIUM CHLORIDE 0.9 % (FLUSH) 0.9 %
5-40 SYRINGE (ML) INJECTION PRN
Status: DISCONTINUED | OUTPATIENT
Start: 2024-07-31 | End: 2024-07-31 | Stop reason: HOSPADM

## 2024-07-31 RX ORDER — IPRATROPIUM BROMIDE AND ALBUTEROL SULFATE 2.5; .5 MG/3ML; MG/3ML
1 SOLUTION RESPIRATORY (INHALATION)
Status: DISCONTINUED | OUTPATIENT
Start: 2024-07-31 | End: 2024-07-31 | Stop reason: HOSPADM

## 2024-07-31 RX ORDER — PROPOFOL 10 MG/ML
INJECTION, EMULSION INTRAVENOUS CONTINUOUS PRN
Status: DISCONTINUED | OUTPATIENT
Start: 2024-07-31 | End: 2024-07-31 | Stop reason: SDUPTHER

## 2024-07-31 RX ORDER — HYDRALAZINE HYDROCHLORIDE 20 MG/ML
10 INJECTION INTRAMUSCULAR; INTRAVENOUS EVERY 4 HOURS PRN
Status: DISCONTINUED | OUTPATIENT
Start: 2024-07-31 | End: 2024-08-06 | Stop reason: HOSPADM

## 2024-07-31 RX ORDER — LABETALOL HYDROCHLORIDE 5 MG/ML
10 INJECTION, SOLUTION INTRAVENOUS
Status: COMPLETED | OUTPATIENT
Start: 2024-07-31 | End: 2024-07-31

## 2024-07-31 RX ORDER — SODIUM CHLORIDE 0.9 % (FLUSH) 0.9 %
5-40 SYRINGE (ML) INJECTION EVERY 12 HOURS SCHEDULED
Status: DISCONTINUED | OUTPATIENT
Start: 2024-07-31 | End: 2024-07-31 | Stop reason: HOSPADM

## 2024-07-31 RX ORDER — LIDOCAINE HYDROCHLORIDE 10 MG/ML
INJECTION, SOLUTION INFILTRATION; PERINEURAL PRN
Status: DISCONTINUED | OUTPATIENT
Start: 2024-07-31 | End: 2024-07-31 | Stop reason: ALTCHOICE

## 2024-07-31 RX ORDER — ENOXAPARIN SODIUM 100 MG/ML
40 INJECTION SUBCUTANEOUS DAILY
Status: DISCONTINUED | OUTPATIENT
Start: 2024-07-31 | End: 2024-08-06 | Stop reason: HOSPADM

## 2024-07-31 RX ORDER — ONDANSETRON 2 MG/ML
4 INJECTION INTRAMUSCULAR; INTRAVENOUS EVERY 6 HOURS PRN
Status: DISCONTINUED | OUTPATIENT
Start: 2024-07-31 | End: 2024-08-06 | Stop reason: HOSPADM

## 2024-07-31 RX ORDER — METOCLOPRAMIDE HYDROCHLORIDE 5 MG/ML
10 INJECTION INTRAMUSCULAR; INTRAVENOUS
Status: DISCONTINUED | OUTPATIENT
Start: 2024-07-31 | End: 2024-07-31 | Stop reason: HOSPADM

## 2024-07-31 RX ORDER — NALOXONE HYDROCHLORIDE 0.4 MG/ML
INJECTION, SOLUTION INTRAMUSCULAR; INTRAVENOUS; SUBCUTANEOUS PRN
Status: DISCONTINUED | OUTPATIENT
Start: 2024-07-31 | End: 2024-07-31

## 2024-07-31 RX ORDER — SODIUM CHLORIDE 9 MG/ML
INJECTION, SOLUTION INTRAVENOUS CONTINUOUS
Status: DISCONTINUED | OUTPATIENT
Start: 2024-07-31 | End: 2024-07-31

## 2024-07-31 RX ORDER — KETOROLAC TROMETHAMINE 30 MG/ML
15 INJECTION, SOLUTION INTRAMUSCULAR; INTRAVENOUS EVERY 6 HOURS
Status: COMPLETED | OUTPATIENT
Start: 2024-07-31 | End: 2024-08-01

## 2024-07-31 RX ORDER — GLYCOPYRROLATE 0.2 MG/ML
INJECTION INTRAMUSCULAR; INTRAVENOUS PRN
Status: DISCONTINUED | OUTPATIENT
Start: 2024-07-31 | End: 2024-07-31 | Stop reason: SDUPTHER

## 2024-07-31 RX ORDER — LABETALOL HYDROCHLORIDE 5 MG/ML
10 INJECTION, SOLUTION INTRAVENOUS EVERY 4 HOURS PRN
Status: DISCONTINUED | OUTPATIENT
Start: 2024-07-31 | End: 2024-08-06 | Stop reason: HOSPADM

## 2024-07-31 RX ORDER — LIDOCAINE HYDROCHLORIDE 10 MG/ML
INJECTION, SOLUTION INFILTRATION; PERINEURAL
Status: DISPENSED
Start: 2024-07-31 | End: 2024-08-01

## 2024-07-31 RX ORDER — SODIUM CHLORIDE 0.9 % (FLUSH) 0.9 %
5-40 SYRINGE (ML) INJECTION EVERY 12 HOURS SCHEDULED
Status: DISCONTINUED | OUTPATIENT
Start: 2024-07-31 | End: 2024-08-06 | Stop reason: HOSPADM

## 2024-07-31 RX ORDER — SODIUM CHLORIDE 0.9 % (FLUSH) 0.9 %
5-40 SYRINGE (ML) INJECTION PRN
Status: DISCONTINUED | OUTPATIENT
Start: 2024-07-31 | End: 2024-08-06 | Stop reason: HOSPADM

## 2024-07-31 RX ORDER — IBUPROFEN 400 MG/1
400 TABLET ORAL EVERY 6 HOURS
Status: DISCONTINUED | OUTPATIENT
Start: 2024-07-31 | End: 2024-07-31

## 2024-07-31 RX ORDER — FENTANYL CITRATE 50 UG/ML
25 INJECTION, SOLUTION INTRAMUSCULAR; INTRAVENOUS EVERY 5 MIN PRN
Status: DISCONTINUED | OUTPATIENT
Start: 2024-07-31 | End: 2024-07-31

## 2024-07-31 RX ORDER — SODIUM CHLORIDE 9 MG/ML
INJECTION, SOLUTION INTRAVENOUS PRN
Status: DISCONTINUED | OUTPATIENT
Start: 2024-07-31 | End: 2024-08-06 | Stop reason: HOSPADM

## 2024-07-31 RX ORDER — PROCHLORPERAZINE EDISYLATE 5 MG/ML
5 INJECTION INTRAMUSCULAR; INTRAVENOUS
Status: DISCONTINUED | OUTPATIENT
Start: 2024-07-31 | End: 2024-07-31

## 2024-07-31 RX ORDER — ATORVASTATIN CALCIUM 10 MG/1
10 TABLET, FILM COATED ORAL NIGHTLY
Status: DISCONTINUED | OUTPATIENT
Start: 2024-07-31 | End: 2024-08-06 | Stop reason: HOSPADM

## 2024-07-31 RX ORDER — FENTANYL CITRATE 50 UG/ML
50 INJECTION, SOLUTION INTRAMUSCULAR; INTRAVENOUS EVERY 5 MIN PRN
Status: COMPLETED | OUTPATIENT
Start: 2024-07-31 | End: 2024-07-31

## 2024-07-31 RX ADMIN — METHOCARBAMOL 750 MG: 750 TABLET ORAL at 16:10

## 2024-07-31 RX ADMIN — HYDRALAZINE HYDROCHLORIDE 10 MG: 20 INJECTION, SOLUTION INTRAMUSCULAR; INTRAVENOUS at 14:23

## 2024-07-31 RX ADMIN — METHOCARBAMOL 750 MG: 750 TABLET ORAL at 20:37

## 2024-07-31 RX ADMIN — KETOROLAC TROMETHAMINE 15 MG: 30 INJECTION, SOLUTION INTRAMUSCULAR; INTRAVENOUS at 16:11

## 2024-07-31 RX ADMIN — KETOROLAC TROMETHAMINE 15 MG: 30 INJECTION, SOLUTION INTRAMUSCULAR; INTRAVENOUS at 20:37

## 2024-07-31 RX ADMIN — ASPIRIN 81 MG: 81 TABLET, COATED ORAL at 16:10

## 2024-07-31 RX ADMIN — SODIUM CHLORIDE: 9 INJECTION, SOLUTION INTRAVENOUS at 12:05

## 2024-07-31 RX ADMIN — SERTRALINE HYDROCHLORIDE 100 MG: 50 TABLET ORAL at 16:10

## 2024-07-31 RX ADMIN — Medication 30 MG: at 12:58

## 2024-07-31 RX ADMIN — SODIUM CHLORIDE, PRESERVATIVE FREE 10 ML: 5 INJECTION INTRAVENOUS at 20:37

## 2024-07-31 RX ADMIN — ATORVASTATIN CALCIUM 10 MG: 10 TABLET, FILM COATED ORAL at 20:37

## 2024-07-31 RX ADMIN — GABAPENTIN 100 MG: 100 CAPSULE ORAL at 20:37

## 2024-07-31 RX ADMIN — ENOXAPARIN SODIUM 40 MG: 100 INJECTION SUBCUTANEOUS at 16:11

## 2024-07-31 RX ADMIN — AMLODIPINE BESYLATE 10 MG: 10 TABLET ORAL at 16:10

## 2024-07-31 RX ADMIN — GABAPENTIN 100 MG: 100 CAPSULE ORAL at 16:10

## 2024-07-31 RX ADMIN — PROPOFOL 120 MCG/KG/MIN: 10 INJECTION, EMULSION INTRAVENOUS at 12:54

## 2024-07-31 RX ADMIN — LABETALOL HYDROCHLORIDE 10 MG: 5 INJECTION, SOLUTION INTRAVENOUS at 13:54

## 2024-07-31 RX ADMIN — GLYCOPYRROLATE 0.1 MG: 0.2 INJECTION INTRAMUSCULAR; INTRAVENOUS at 12:47

## 2024-07-31 RX ADMIN — Medication 20 MG: at 13:13

## 2024-07-31 RX ADMIN — CEFAZOLIN 2 G: 1 INJECTION, POWDER, FOR SOLUTION INTRAMUSCULAR; INTRAVENOUS at 12:56

## 2024-07-31 RX ADMIN — FENTANYL CITRATE 50 MCG: 50 INJECTION, SOLUTION INTRAMUSCULAR; INTRAVENOUS at 13:52

## 2024-07-31 RX ADMIN — ACETAMINOPHEN 1000 MG: 500 TABLET ORAL at 16:10

## 2024-07-31 RX ADMIN — HYDROMORPHONE HYDROCHLORIDE 0.5 MG: 1 INJECTION, SOLUTION INTRAMUSCULAR; INTRAVENOUS; SUBCUTANEOUS at 14:02

## 2024-07-31 RX ADMIN — LIDOCAINE HYDROCHLORIDE 50 MG: 10 INJECTION, SOLUTION EPIDURAL; INFILTRATION; INTRACAUDAL; PERINEURAL at 12:54

## 2024-07-31 RX ADMIN — FENTANYL CITRATE 50 MCG: 50 INJECTION, SOLUTION INTRAMUSCULAR; INTRAVENOUS at 14:22

## 2024-07-31 RX ADMIN — MIDAZOLAM 2 MG: 1 INJECTION INTRAMUSCULAR; INTRAVENOUS at 12:47

## 2024-07-31 ASSESSMENT — PAIN SCALES - GENERAL
PAINLEVEL_OUTOF10: 9
PAINLEVEL_OUTOF10: 3
PAINLEVEL_OUTOF10: 10
PAINLEVEL_OUTOF10: 3
PAINLEVEL_OUTOF10: 7
PAINLEVEL_OUTOF10: 7

## 2024-07-31 ASSESSMENT — PAIN DESCRIPTION - LOCATION: LOCATION: FOOT

## 2024-07-31 ASSESSMENT — PAIN DESCRIPTION - ORIENTATION: ORIENTATION: LEFT

## 2024-07-31 ASSESSMENT — COPD QUESTIONNAIRES: CAT_SEVERITY: NO INTERVAL CHANGE

## 2024-07-31 NOTE — PROGRESS NOTES
Patient returned to PCC room post procedure.  Assessment & VS obtained per policy. Restrictions/Education reviewed with patient. Post procedure pathway initiated. Pt without complications.  RN at bedside. Pulses obtained and documented. Will continue to monitor.

## 2024-07-31 NOTE — PROGRESS NOTES
Dr. Delos Reyes aware of pt need for pain medication and increased blood pressure. Dr. Delos Reyes states pt is still okay to go to 327. See MAR for pain and blood pressure medication documentation.

## 2024-07-31 NOTE — OP NOTE
Operative Note      Patient: Silas Shrestha  YOB: 1955  MRN: [id number]    Date of Procedure: 7/31/2024    Pre-Op Diagnosis Codes:     * Atherosclerosis of native artery of left lower extremity with gangrene (HCC) [I70.262]    Post-Op Diagnosis: Same       Procedure(s):  RAY AMPUTATION OF HALLUX, APPLICATION OF WOUND VAC    Surgeon(s):  Delos Reyes, Arthur, MD    Assistant:   * No surgical staff found *    Anesthesia: Monitor Anesthesia Care    Estimated Blood Loss (mL): less than 50     Complications: None    Specimens:   ID Type Source Tests Collected by Time Destination   1 : wound culture of left foot Body Fluid Fluid CULTURE, ANAEROBIC AND AEROBIC Delos Reyes, Arthur, MD 7/31/2024 5480        Implants:  * No implants in log *      Drains:   Negative Pressure Wound Therapy Foot Left;Anterior (Active)       Findings:  Infection Present At Time Of Surgery (PATOS) (choose all levels that have infection present):  - Deep Infection (muscle/fascia) present as evidenced by purulent fluid  Other Findings: good bleeding from skin edges, all necrotic tissue was debrided.    Detailed Description of Procedure:   This is a 69-year-old male who initially presented with dry gangrene to the left great toe.  He underwent revascularization with a bypass and also had a toe amputation.  Despite the toe amputation of the skin edges became necrotic likely due to small vessel disease despite revascularization and he developed significant pain.  He is here for ray amputation and understands that if this is unable to heal he will likely need a below-knee amputation.    The patient was brought to the operating room placed supine the left foot was cleaned and prepped and sterile drapes were applied.  Scalpel was used to make an elliptical incision including the base of the first metatarsal all the way to the second toe.  The other toes appeared well-perfused and had no ischemic changes.  All the necrotic tissue was

## 2024-07-31 NOTE — ANESTHESIA POSTPROCEDURE EVALUATION
Department of Anesthesiology  Postprocedure Note    Patient: Silas Shrestha  MRN: [id number]  YOB: 1955  Date of evaluation: 7/31/2024    Procedure Summary       Date: 07/31/24 Room / Location: [place] [address] / [facility]    Anesthesia Start: 1247 Anesthesia Stop: 1342    Procedure: RAY AMPUTATION OF HALLUX, APPLICATION OF WOUND VAC (Left: Foot) Diagnosis:       Atherosclerosis of native artery of left lower extremity with gangrene (HCC)      (Atherosclerosis of native artery of left lower extremity with gangrene (HCC) [I70.262])    Surgeons: Delos Reyes, Arthur, MD Responsible Provider: Adán Durham MD    Anesthesia Type: General ASA Status: 3            Anesthesia Type: General    Ricardo Phase I: Ricardo Score: 10    Ricardo Phase II:      Anesthesia Post Evaluation    Patient location during evaluation: PACU  Patient participation: complete - patient participated  Level of consciousness: awake and alert  Pain score: 0  Airway patency: patent  Nausea & Vomiting: no vomiting and no nausea  Cardiovascular status: hemodynamically stable  Respiratory status: acceptable  Hydration status: stable  Pain management: adequate    No notable events documented.

## 2024-07-31 NOTE — INTERVAL H&P NOTE
Update History & Physical    The patient's History and Physical of July 10, 2024 was reviewed with the patient and I examined the patient. There was no change. The surgical site was confirmed by the patient and me.     Plan: The risks, benefits, expected outcome, and alternative to the recommended procedure have been discussed with the patient. Patient understands and wants to proceed with the procedure. Plan for Left Great toe ray amputation possible TMA with wound vac placement.    Electronically signed by Janet Gonzalez MD on 7/31/2024 at 12:08 PM

## 2024-07-31 NOTE — ANESTHESIA PRE PROCEDURE
[facility]   • FINGER AMPUTATION Left 2016    revision long finger   • LIVER BIOPSY     • OTHER SURGICAL HISTORY  2017    Abdomen with run off with Dr. Smith - could not pass left left pop; # 7 FR manual pull right groin   • OTHER SURGICAL HISTORY  2023    LE Angiogram   • TOE AMPUTATION Left 2024    LEFT GREAT TOE AMPUTATION performed by Delos Reyes, Arthur, MD at [facility]   • UPPER GASTROINTESTINAL ENDOSCOPY     • VASCULAR SURGERY      abdominal aortogram   • VASCULAR SURGERY Left 2023    LEFT LOWER EXTREMITY ANGIOGRAM WITH LEFT DISTAL SFA  AND ABOVE KNEE POPLITEAL ARTERY KAYLYN  STENT 6MM X 120MM X 130CM performed by Arthur Delos Reyes, MD at [facility]   • VASCULAR SURGERY Left 2024    LEFT LOWER EXTREMITY ANGIOGRAM performed by Delos Reyes, Arthur, MD at [facility]   • VASCULAR SURGERY Left 2024    Great toe amputation       Social History:    Social History     Tobacco Use   • Smoking status: Former     Current packs/day: 0.00     Average packs/day: 0.5 packs/day for 35.0 years (17.5 ttl pk-yrs)     Types: Cigarettes     Start date: 1980     Quit date: 2015     Years since quittin.7   • Smokeless tobacco: Never   Substance Use Topics   • Alcohol use: No     Alcohol/week: 0.0 standard drinks of alcohol     Comment: QUIT 2016                                Counseling given: Not Answered      Vital Signs (Current):   Vitals:    24 1052   Height: 1.702 m (5' 7\")                                              BP Readings from Last 3 Encounters:   07/10/24 (!) 174/82   24 129/80   24 (!) 169/80       NPO Status: Time of last liquid consumption: 2200                        Time of last solid consumption: 2100                                                      BMI:   Wt Readings from Last 3 Encounters:   07/10/24 65.8 kg (145 lb)   24 64.9 kg (143 lb)   24 63.5 kg (140 lb)     Body mass index is 22.71 kg/m².    CBC:   Lab Results   Component

## 2024-08-01 PROCEDURE — 6370000000 HC RX 637 (ALT 250 FOR IP)

## 2024-08-01 PROCEDURE — 1200000000 HC SEMI PRIVATE

## 2024-08-01 PROCEDURE — 6360000002 HC RX W HCPCS

## 2024-08-01 PROCEDURE — 2580000003 HC RX 258

## 2024-08-01 RX ADMIN — GABAPENTIN 100 MG: 100 CAPSULE ORAL at 20:45

## 2024-08-01 RX ADMIN — HYDROMORPHONE HYDROCHLORIDE 0.5 MG: 1 INJECTION, SOLUTION INTRAMUSCULAR; INTRAVENOUS; SUBCUTANEOUS at 20:45

## 2024-08-01 RX ADMIN — METHOCARBAMOL 750 MG: 750 TABLET ORAL at 01:26

## 2024-08-01 RX ADMIN — ACETAMINOPHEN 1000 MG: 500 TABLET ORAL at 12:27

## 2024-08-01 RX ADMIN — GABAPENTIN 100 MG: 100 CAPSULE ORAL at 08:29

## 2024-08-01 RX ADMIN — HYDROMORPHONE HYDROCHLORIDE 0.5 MG: 1 INJECTION, SOLUTION INTRAMUSCULAR; INTRAVENOUS; SUBCUTANEOUS at 01:05

## 2024-08-01 RX ADMIN — ATORVASTATIN CALCIUM 10 MG: 10 TABLET, FILM COATED ORAL at 20:45

## 2024-08-01 RX ADMIN — METHOCARBAMOL 750 MG: 750 TABLET ORAL at 08:29

## 2024-08-01 RX ADMIN — ASPIRIN 81 MG: 81 TABLET, COATED ORAL at 08:29

## 2024-08-01 RX ADMIN — AMLODIPINE BESYLATE 10 MG: 10 TABLET ORAL at 08:29

## 2024-08-01 RX ADMIN — ACETAMINOPHEN 1000 MG: 500 TABLET ORAL at 01:26

## 2024-08-01 RX ADMIN — SODIUM CHLORIDE, PRESERVATIVE FREE 10 ML: 5 INJECTION INTRAVENOUS at 20:45

## 2024-08-01 RX ADMIN — METHOCARBAMOL 750 MG: 750 TABLET ORAL at 20:45

## 2024-08-01 RX ADMIN — METHOCARBAMOL 750 MG: 750 TABLET ORAL at 14:39

## 2024-08-01 RX ADMIN — SODIUM CHLORIDE, PRESERVATIVE FREE 10 ML: 5 INJECTION INTRAVENOUS at 08:30

## 2024-08-01 RX ADMIN — ACETAMINOPHEN 1000 MG: 500 TABLET ORAL at 05:50

## 2024-08-01 RX ADMIN — SODIUM CHLORIDE, PRESERVATIVE FREE 10 ML: 5 INJECTION INTRAVENOUS at 01:05

## 2024-08-01 RX ADMIN — ACETAMINOPHEN 1000 MG: 500 TABLET ORAL at 18:13

## 2024-08-01 RX ADMIN — PANTOPRAZOLE SODIUM 40 MG: 40 TABLET, DELAYED RELEASE ORAL at 05:50

## 2024-08-01 RX ADMIN — GABAPENTIN 100 MG: 100 CAPSULE ORAL at 14:39

## 2024-08-01 RX ADMIN — SERTRALINE HYDROCHLORIDE 100 MG: 50 TABLET ORAL at 08:29

## 2024-08-01 RX ADMIN — KETOROLAC TROMETHAMINE 15 MG: 30 INJECTION, SOLUTION INTRAMUSCULAR; INTRAVENOUS at 08:29

## 2024-08-01 RX ADMIN — ENOXAPARIN SODIUM 40 MG: 100 INJECTION SUBCUTANEOUS at 08:30

## 2024-08-01 RX ADMIN — KETOROLAC TROMETHAMINE 15 MG: 30 INJECTION, SOLUTION INTRAMUSCULAR; INTRAVENOUS at 01:27

## 2024-08-01 ASSESSMENT — PAIN SCALES - GENERAL
PAINLEVEL_OUTOF10: 6
PAINLEVEL_OUTOF10: 3
PAINLEVEL_OUTOF10: 5
PAINLEVEL_OUTOF10: 4
PAINLEVEL_OUTOF10: 7
PAINLEVEL_OUTOF10: 3

## 2024-08-01 ASSESSMENT — PAIN SCALES - WONG BAKER
WONGBAKER_NUMERICALRESPONSE: NO HURT
WONGBAKER_NUMERICALRESPONSE: NO HURT

## 2024-08-01 ASSESSMENT — PAIN DESCRIPTION - LOCATION
LOCATION: FOOT
LOCATION: FOOT

## 2024-08-01 NOTE — PROGRESS NOTES
Division of Vascular Surgery             Progress Note      Name: Silas Shrestha  MRN: [id number]     8/1/2024  6:47 PM    Overnight Events:     No acute overnight events      Subjective:     Patient is POD #1 after left hallux ray amputation with wound VAC application.  Tolerating diet, passing urine, passing flatus and motion.  No complaint of fever, nausea, vomiting, chills    Physical Exam:     Vitals:  BP (!) 147/80   Pulse 69   Temp 97.6 °F (36.4 °C) (Oral)   Resp 16   Ht 1.702 m (5' 7\")   Wt 69.9 kg (154 lb)   SpO2 99%   BMI 24.12 kg/m²       General appearance - alert, well appearing and in no acute distress  Mental status - oriented to person, place and time with normal affect  Head - normocephalic and atraumatic  Neck - supple, no carotid bruits, thyroid not palpable, no JVD  Chest - clear to auscultation, normal effort  Heart - normal rate, regular rhythm, no murmurs  Abdomen - soft, non-tender, non-distended, bowel sounds present all four quadrants, no masses  Neurological - normal speech, no focal findings or movement disorder noted, cranial nerves II through XII grossly intact  Extremities - peripheral pulses palpable, no pedal edema or calf pain with palpation  Skin - no gross lesions, rashes, or induration noted  Foot Exam -wound VAC in situ.  Adequate suction maintained.  Vascular Exam -has biphasic signal in left DP/AT and PT      Imaging:   No pertinent imaging required      Assessment:     Patient is POD #1 after ray amputation of left hallux.  Pain is controlled with p.o. and IV pain medications.      Plan:   As discussed with attending, Dr. Delos Reyes  Pain controlled with p.o. and IV medication.  Monitor vitals and urine output  Wound VAC @ 125 mmHg suction to continue.  Continue DVT prophylaxis  Patient will be discharged with wound VAC and home health care    Janet Gonzalez MD  PGY 4  Surgery Resident    [facility] [place]

## 2024-08-01 NOTE — PLAN OF CARE
Problem: Safety - Adult  Goal: Free from fall injury  8/1/2024 0854 by Alysia Arguelles RN  Outcome: Progressing   Patient free from falls. Bed locked and in lowest position. Hourly rounding maintained. Call light and patient belongings within reach. Room free of clutter and adequate lighting provided. Non slip footwear applied.

## 2024-08-01 NOTE — PROGRESS NOTES
POST OP NOTE    SUBJECTIVE  Pt s/p ray amputation of the left hallux. The patient is resting comfortably in his bed. Per his nurse, he is having some mild discomfort. He is able to eat and drink okay. No issues with urinating, but the patient has yet to have flatus or a bowel movement.    OBJECTIVE  VITALS:  BP (!) 148/78   Pulse 75   Temp 98.5 °F (36.9 °C) (Oral)   Resp 18   Ht 1.702 m (5' 7\")   Wt 69.9 kg (154 lb)   SpO2 98%   BMI 24.12 kg/m²         GENERAL:  awake and alert.  No acute distress  CARDIOVASCULAR:  regular rate and rhythm   LUNGS:  CTA Bilaterally  ABDOMEN:   Abdomen soft, non-tender, non-distended  INCISION: Incision clean/dry/intact; wound vac on suction with minimal output.    ASSESSMENT  1. POD# 0 s/p amputation of the left hallux.    PLAN  1. Pain management- acetaminophen, gabapentin, dilaudid,   2. DVT proph- Lovenox  3. GI proph- Protonix  4. Will monitor for bowel movement    Michael Herndon DO  8/1/2024 at 4:17 AM

## 2024-08-01 NOTE — PLAN OF CARE
Problem: Discharge Planning  Goal: Discharge to home or other facility with appropriate resources  Outcome: Progressing  Flowsheets (Taken 7/31/2024 1559 by Hakeem Dahl, RN)  Discharge to home or other facility with appropriate resources: Identify barriers to discharge with patient and caregiver     Problem: Safety - Adult  Goal: Free from fall injury  Outcome: Progressing     Problem: ABCDS Injury Assessment  Goal: Absence of physical injury  Outcome: Progressing  Flowsheets (Taken 7/31/2024 1555 by Hakeem Dahl, RN)  Absence of Physical Injury: Implement safety measures based on patient assessment     Problem: Pain  Goal: Verbalizes/displays adequate comfort level or baseline comfort level  Outcome: Progressing

## 2024-08-01 NOTE — PLAN OF CARE
Problem: Discharge Planning  Goal: Discharge to home or other facility with appropriate resources  8/1/2024 0122 by Ben Lopez RN  Outcome: Progressing  7/31/2024 2240 by Ben Lopez RN  Outcome: Progressing  Flowsheets (Taken 7/31/2024 1559 by Hakeem Dahl, RN)  Discharge to home or other facility with appropriate resources: Identify barriers to discharge with patient and caregiver     Problem: Safety - Adult  Goal: Free from fall injury  8/1/2024 0122 by Ben Lopez RN  Outcome: Progressing  7/31/2024 2240 by Ben Lopez RN  Outcome: Progressing     Problem: ABCDS Injury Assessment  Goal: Absence of physical injury  8/1/2024 0122 by Ben Lopez RN  Outcome: Progressing  7/31/2024 2240 by Ben Lopze RN  Outcome: Progressing  Flowsheets (Taken 7/31/2024 1555 by Hakeem Dahl, RN)  Absence of Physical Injury: Implement safety measures based on patient assessment     Problem: Pain  Goal: Verbalizes/displays adequate comfort level or baseline comfort level  8/1/2024 0122 by Ben Lopez RN  Outcome: Progressing  7/31/2024 2240 by Ben Lopez RN  Outcome: Progressing

## 2024-08-02 PROCEDURE — 6370000000 HC RX 637 (ALT 250 FOR IP)

## 2024-08-02 PROCEDURE — 97605 NEG PRS WND THER DME<=50SQCM: CPT

## 2024-08-02 PROCEDURE — 6360000002 HC RX W HCPCS

## 2024-08-02 PROCEDURE — 2580000003 HC RX 258

## 2024-08-02 PROCEDURE — 1200000000 HC SEMI PRIVATE

## 2024-08-02 RX ORDER — OXYCODONE HYDROCHLORIDE 5 MG/1
5 TABLET ORAL EVERY 4 HOURS PRN
Status: DISCONTINUED | OUTPATIENT
Start: 2024-08-02 | End: 2024-08-06 | Stop reason: HOSPADM

## 2024-08-02 RX ORDER — FENTANYL CITRATE 50 UG/ML
25 INJECTION, SOLUTION INTRAMUSCULAR; INTRAVENOUS ONCE
Status: COMPLETED | OUTPATIENT
Start: 2024-08-02 | End: 2024-08-02

## 2024-08-02 RX ORDER — GABAPENTIN 100 MG/1
100 CAPSULE ORAL ONCE
Status: COMPLETED | OUTPATIENT
Start: 2024-08-02 | End: 2024-08-02

## 2024-08-02 RX ORDER — OXYCODONE HYDROCHLORIDE 5 MG/1
2.5 TABLET ORAL EVERY 4 HOURS PRN
Status: DISCONTINUED | OUTPATIENT
Start: 2024-08-02 | End: 2024-08-02

## 2024-08-02 RX ADMIN — OXYCODONE HYDROCHLORIDE 5 MG: 5 TABLET ORAL at 11:35

## 2024-08-02 RX ADMIN — ACETAMINOPHEN 1000 MG: 500 TABLET ORAL at 01:27

## 2024-08-02 RX ADMIN — LABETALOL HYDROCHLORIDE 10 MG: 5 INJECTION, SOLUTION INTRAVENOUS at 21:33

## 2024-08-02 RX ADMIN — LABETALOL HYDROCHLORIDE 10 MG: 5 INJECTION, SOLUTION INTRAVENOUS at 00:32

## 2024-08-02 RX ADMIN — ASPIRIN 81 MG: 81 TABLET, COATED ORAL at 09:09

## 2024-08-02 RX ADMIN — METHOCARBAMOL 750 MG: 750 TABLET ORAL at 13:53

## 2024-08-02 RX ADMIN — HYDRALAZINE HYDROCHLORIDE 10 MG: 20 INJECTION INTRAMUSCULAR; INTRAVENOUS at 18:26

## 2024-08-02 RX ADMIN — ACETAMINOPHEN 1000 MG: 500 TABLET ORAL at 05:46

## 2024-08-02 RX ADMIN — OXYCODONE HYDROCHLORIDE 5 MG: 5 TABLET ORAL at 21:32

## 2024-08-02 RX ADMIN — ENOXAPARIN SODIUM 40 MG: 100 INJECTION SUBCUTANEOUS at 09:11

## 2024-08-02 RX ADMIN — FENTANYL CITRATE 25 MCG: 50 INJECTION, SOLUTION INTRAMUSCULAR; INTRAVENOUS at 13:53

## 2024-08-02 RX ADMIN — PANTOPRAZOLE SODIUM 40 MG: 40 TABLET, DELAYED RELEASE ORAL at 05:10

## 2024-08-02 RX ADMIN — GABAPENTIN 100 MG: 100 CAPSULE ORAL at 21:33

## 2024-08-02 RX ADMIN — ACETAMINOPHEN 1000 MG: 500 TABLET ORAL at 13:53

## 2024-08-02 RX ADMIN — AMLODIPINE BESYLATE 10 MG: 10 TABLET ORAL at 09:09

## 2024-08-02 RX ADMIN — HYDROMORPHONE HYDROCHLORIDE 0.5 MG: 1 INJECTION, SOLUTION INTRAMUSCULAR; INTRAVENOUS; SUBCUTANEOUS at 04:21

## 2024-08-02 RX ADMIN — ACETAMINOPHEN 1000 MG: 500 TABLET ORAL at 22:25

## 2024-08-02 RX ADMIN — METHOCARBAMOL 750 MG: 750 TABLET ORAL at 09:09

## 2024-08-02 RX ADMIN — GABAPENTIN 100 MG: 100 CAPSULE ORAL at 22:26

## 2024-08-02 RX ADMIN — METHOCARBAMOL 750 MG: 750 TABLET ORAL at 00:31

## 2024-08-02 RX ADMIN — GABAPENTIN 100 MG: 100 CAPSULE ORAL at 13:53

## 2024-08-02 RX ADMIN — HYDROMORPHONE HYDROCHLORIDE 0.5 MG: 1 INJECTION, SOLUTION INTRAMUSCULAR; INTRAVENOUS; SUBCUTANEOUS at 00:32

## 2024-08-02 RX ADMIN — OXYCODONE HYDROCHLORIDE 5 MG: 5 TABLET ORAL at 15:30

## 2024-08-02 RX ADMIN — GABAPENTIN 100 MG: 100 CAPSULE ORAL at 09:09

## 2024-08-02 RX ADMIN — ATORVASTATIN CALCIUM 10 MG: 10 TABLET, FILM COATED ORAL at 21:33

## 2024-08-02 RX ADMIN — LABETALOL HYDROCHLORIDE 10 MG: 5 INJECTION, SOLUTION INTRAVENOUS at 04:21

## 2024-08-02 RX ADMIN — SODIUM CHLORIDE, PRESERVATIVE FREE 10 ML: 5 INJECTION INTRAVENOUS at 09:10

## 2024-08-02 RX ADMIN — SERTRALINE HYDROCHLORIDE 100 MG: 50 TABLET ORAL at 09:10

## 2024-08-02 RX ADMIN — METHOCARBAMOL 750 MG: 750 TABLET ORAL at 21:33

## 2024-08-02 RX ADMIN — OXYCODONE HYDROCHLORIDE 2.5 MG: 5 TABLET ORAL at 05:46

## 2024-08-02 RX ADMIN — Medication 5 MG: at 21:33

## 2024-08-02 RX ADMIN — FENTANYL CITRATE 25 MCG: 50 INJECTION, SOLUTION INTRAMUSCULAR; INTRAVENOUS at 18:57

## 2024-08-02 RX ADMIN — SODIUM CHLORIDE, PRESERVATIVE FREE 10 ML: 5 INJECTION INTRAVENOUS at 21:34

## 2024-08-02 ASSESSMENT — PAIN SCALES - GENERAL
PAINLEVEL_OUTOF10: 5
PAINLEVEL_OUTOF10: 8
PAINLEVEL_OUTOF10: 4
PAINLEVEL_OUTOF10: 9
PAINLEVEL_OUTOF10: 4
PAINLEVEL_OUTOF10: 7
PAINLEVEL_OUTOF10: 7
PAINLEVEL_OUTOF10: 8
PAINLEVEL_OUTOF10: 9
PAINLEVEL_OUTOF10: 6
PAINLEVEL_OUTOF10: 8
PAINLEVEL_OUTOF10: 6
PAINLEVEL_OUTOF10: 0
PAINLEVEL_OUTOF10: 4
PAINLEVEL_OUTOF10: 3
PAINLEVEL_OUTOF10: 9
PAINLEVEL_OUTOF10: 10

## 2024-08-02 ASSESSMENT — PAIN DESCRIPTION - DESCRIPTORS
DESCRIPTORS: ACHING
DESCRIPTORS: SORE;THROBBING
DESCRIPTORS: PRESSURE
DESCRIPTORS: PRESSURE;STABBING;SHOOTING
DESCRIPTORS: ACHING
DESCRIPTORS: ACHING
DESCRIPTORS: NAGGING

## 2024-08-02 ASSESSMENT — PAIN SCALES - WONG BAKER
WONGBAKER_NUMERICALRESPONSE: HURTS LITTLE MORE
WONGBAKER_NUMERICALRESPONSE: 4;2
WONGBAKER_NUMERICALRESPONSE: NO HURT
WONGBAKER_NUMERICALRESPONSE: NO HURT

## 2024-08-02 ASSESSMENT — PAIN DESCRIPTION - LOCATION
LOCATION: FOOT
LOCATION: LEG;FOOT
LOCATION: FOOT;TOE (COMMENT WHICH ONE)
LOCATION: FOOT
LOCATION: LEG;FOOT
LOCATION: FOOT

## 2024-08-02 ASSESSMENT — PAIN DESCRIPTION - ORIENTATION
ORIENTATION: LEFT

## 2024-08-02 NOTE — PROGRESS NOTES
[facility] Wound Ostomy  Nurse  Consult Note       NAME:  Silas Shrestha  MEDICAL RECORD NUMBER:  [id number]  AGE: 69 y.o.   GENDER: male  : 1955  TODAY'S DATE:  2024    Subjective   Reason for [facility] Nurse Evaluation and Assessment: \"Wound vac s/p Left great toe amputation\"      Silas Shrestha is a 69 y.o. male referred by:   [x] Physician  [] Nursing  [] Other:       Wound History:   :  RAY AMPUTATION OF HALLUX, APPLICATION OF WOUND VAC  with Dr. 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        Objective    BP (!) 171/83   Pulse 61   Temp 98.4 °F (36.9 °C) (Oral)   Resp 18   Ht 1.702 m (5' 7\")   Wt 69.9 kg (154 lb)   SpO2 100%   BMI 24.12 kg/m²     LABS:  WBC:    Lab Results   Component Value Date/Time    WBC 10.2 2024 05:11 PM     H/H:    Lab Results   Component Value Date/Time    HGB 13.0 2024 05:11 PM    HCT 40.2 2024 05:11 PM       Assessment   Thong Risk Score: Thong Scale Score: 19    Patient Active Problem List   Diagnosis Code    Hx of tuberculosis Z86.11    Alcohol induced acute pancreatitis K85.20    HTN (hypertension) I10    Alcohol abuse F10.10    Tobacco abuse Z72.0    Hypertriglyceridemia E78.1    Macrocytic anemia D53.9    Cirrhosis, alcoholic (HCC) K70.30    Diverticulosis K57.90    Internal hemorrhoids K64.8    Hypercholesteremia E78.00    Amputation finger S68.119A    Claudication of left lower extremity (HCC) I73.9    Chronic obstructive pulmonary disease, unspecified COPD type ([facility]) J44.9    Major depressive disorder, recurrent, mild F33.0    Atherosclerosis of native arteries of extremities with rest pain, left leg (HCC) I70.222    Great toe pain, left M79.675    Ulcer of left lower leg, with fat layer exposed ([facility]) L97.922    Nonhealing surgical wound, subsequent encounter T81.89XD    S/P amputation of lesser toe, left

## 2024-08-02 NOTE — DISCHARGE INSTR - COC
Continuity of Care Form    Patient Name: Silas Shrestha   :  1955  MRN:  [id number]    Admit date:  2024  Discharge date:  24    Code Status Order: Full Code   Advance Directives:   Advance Care Flowsheet Documentation       Date/Time Healthcare Directive Type of Healthcare Directive Copy in Chart Healthcare Agent Appointed Healthcare Agent's Name Healthcare Agent's Phone Number    24 1159 No, patient does not have an advance directive for healthcare treatment -- -- -- -- --            Admitting Physician:  Arthur Delos Reyes, MD  PCP: Brina Rizo MD    Discharging Nurse: ADONAY Hunter RN   Discharging Hospital Unit/Room#: 0327/0327-02  Discharging Unit Phone Number: [phone]    Emergency Contact:   Extended Emergency Contact Information  Primary Emergency Contact: Rey Shrestha  Home Phone: [phone]  Relation: Brother/Sister   needed? No  Secondary Emergency Contact: Kailyn Batista  [employer] Phone: [phone]  Relation: Niece/Nephew  Preferred language: English   needed? No    Past Surgical History:  Past Surgical History:   Procedure Laterality Date    COLONOSCOPY      around     FEMORAL BYPASS  2024    FEMORAL BYPASS Left 2024    LOWER EXTREMITY FEMORAL TO TIBIAL BYPASS WITH PROPATIN GRAFT, IPSILATERAL SUPERIFICAL SAPHENOUS VEIN HARVEST, LOWER EXTREMITY ANGIOGRAM COMPLETION performed by Delos Reyes, Arthur, MD at [facility]    FINGER AMPUTATION Left 2016    revision long finger    LIVER BIOPSY      OTHER SURGICAL HISTORY  2017    Abdomen with run off with Dr. Smith - could not pass left left pop; # 7 FR manual pull right groin    OTHER SURGICAL HISTORY  2023    LE Angiogram    TOE AMPUTATION Left 2024    LEFT GREAT TOE AMPUTATION performed by Delos Reyes, Arthur, MD at [facility]    TOE AMPUTATION Left 2024    RAY AMPUTATION OF HALLUX, APPLICATION OF WOUND VAC performed by Delos Reyes, Arthur, MD at [facility]  GASTROINTESTINAL ENDOSCOPY      VASCULAR SURGERY      abdominal aortogram    VASCULAR SURGERY Left 04/21/2023    LEFT LOWER EXTREMITY ANGIOGRAM WITH LEFT DISTAL SFA  AND ABOVE KNEE POPLITEAL ARTERY KAYLYN  STENT 6MM X 120MM X 130CM performed by Arthur Delos Reyes, MD at [facility]    VASCULAR SURGERY Left 03/21/2024    LEFT LOWER EXTREMITY ANGIOGRAM performed by Delos Reyes, Arthur, MD at [facility]    VASCULAR SURGERY Left 06/21/2024    Great toe amputation       Immunization History:   Immunization History   Administered Date(s) Administered    COVID-19, MODERNA BLUE border, Primary or Immunocompromised, (age 12y+), IM, 100 mcg/0.5mL 05/20/2021, 06/17/2021, 12/21/2021    Hep A, HAVRIX, VAQTA, (age 19y+), IM, 1mL 05/14/2019    Influenza Virus Vaccine 11/20/2014    Influenza, FLUAD, (age 65 y+), Adjuvanted, 0.5mL 12/21/2023    Influenza, FLUARIX, FLULAVAL, FLUZONE (age 6 mo+) AND AFLURIA, (age 3 y+), PF, 0.5mL 12/15/2016, 10/11/2018, 02/07/2023    Pneumococcal, PPSV23, PNEUMOVAX 23, (age 2y+), SC/IM, 0.5mL 12/15/2016    TDaP, ADACEL (age 10y-64y), BOOSTRIX (age 10y+), IM, 0.5mL 04/11/2019    Zoster Recombinant (Shingrix) 05/14/2019, 07/25/2019       Active Problems:  Patient Active Problem List   Diagnosis Code    Hx of tuberculosis Z86.11    Alcohol induced acute pancreatitis K85.20    HTN (hypertension) I10    Alcohol abuse F10.10    Tobacco abuse Z72.0    Hypertriglyceridemia E78.1    Macrocytic anemia D53.9    Cirrhosis, alcoholic (HCC) K70.30    Diverticulosis K57.90    Internal hemorrhoids K64.8    Hypercholesteremia E78.00    Amputation finger S68.119A    Claudication of left lower extremity (HCC) I73.9    Chronic obstructive pulmonary disease, unspecified COPD type ([facility]) J44.9    Major depressive disorder, recurrent, mild F33.0    Atherosclerosis of native arteries of extremities with rest pain, left leg (HCC) I70.222    Great toe pain, left M79.675    Ulcer of left lower leg, with fat layer exposed (HCC)

## 2024-08-02 NOTE — PROGRESS NOTES
Division of Vascular Surgery             Progress Note      Name: Silas Shrestha  MRN: [id number]     8/2/2024  7:22 AM    Overnight Events:     No acute overnight events      Subjective:     Patient is POD #2 after left hallux ray amputation with wound VAC application. Endorsing pain at the surgical site. Has been unable to sleep or ambulate due to pain. Wound vac in place with good seal.     Physical Exam:     Vitals:  BP (!) 156/84   Pulse 64   Temp 98.4 °F (36.9 °C) (Oral)   Resp 16   Ht 1.702 m (5' 7\")   Wt 69.9 kg (154 lb)   SpO2 99%   BMI 24.12 kg/m²       General appearance - alert, well appearing and in no acute distress  Mental status - oriented to person, place and time with normal affect  Head - normocephalic and atraumatic  Chest - symmetric chest wall expansion, no accessory muscle use  Neurological - normal speech, no focal findings or movement disorder noted, cranial nerves II through XII grossly intact  Skin - no gross lesions, rashes, or induration noted  Foot Exam -wound VAC in situ.  Adequate suction maintained.  Vascular Exam -has biphasic signal in left DP/AT and PT      Imaging:   No pertinent imaging required      Assessment:     Patient is POD #2 after ray amputation of left hallux.  Pain is controlled with p.o. and IV pain medications.      Plan:   As discussed with attending, Dr. Delos Reyes  Transition to PO pain medications  Monitor vitals and urine output  Wound VAC @ 125 mmHg suction to continue. Bedside wound vac change today.  Continue DVT prophylaxis  Home health consult for assistance with wound vac changes at home  Likely discharge today or tomorrow if pain controlled with PO meds and continues to improve clinically.     Sosa Ritchie DO  General Surgery PGY-2  8/2/24 7:24 AM      [facility] [place]

## 2024-08-02 NOTE — CARE COORDINATION
Faxed completed [facility] wound vac order form with chart notes  Met with patient agreeable to any home care provider that takes his insurance                       Post Acute Facility/Agency List     Provided patient with the following list, the list includes the overall star ratings obtained from CMS per the Medicare Web site (www.Medicare.gov):     [] Long Term Acute Care Facilities  [] Acute Inpatient Rehabilitation Facilities  [] Skilled Nursing Facilities  [x] Home Care    Provided verbal instructions on how to utilize the QR Code to obtain additional detailed star ratings from www.Medicare.gov        Referral to [facility] can accept    Called [facility] received order reviewing documents

## 2024-08-03 LAB
MICROORGANISM SPEC CULT: ABNORMAL
MICROORGANISM/AGENT SPEC: ABNORMAL
MICROORGANISM/AGENT SPEC: ABNORMAL
SERVICE CMNT-IMP: ABNORMAL
SPECIMEN DESCRIPTION: ABNORMAL

## 2024-08-03 PROCEDURE — 6360000002 HC RX W HCPCS

## 2024-08-03 PROCEDURE — 1200000000 HC SEMI PRIVATE

## 2024-08-03 PROCEDURE — 6370000000 HC RX 637 (ALT 250 FOR IP)

## 2024-08-03 PROCEDURE — 2580000003 HC RX 258

## 2024-08-03 RX ORDER — GABAPENTIN 300 MG/1
300 CAPSULE ORAL 3 TIMES DAILY
Status: DISCONTINUED | OUTPATIENT
Start: 2024-08-03 | End: 2024-08-06 | Stop reason: HOSPADM

## 2024-08-03 RX ORDER — IBUPROFEN 600 MG/1
600 TABLET ORAL EVERY 6 HOURS
Status: DISCONTINUED | OUTPATIENT
Start: 2024-08-03 | End: 2024-08-06 | Stop reason: HOSPADM

## 2024-08-03 RX ADMIN — ENOXAPARIN SODIUM 40 MG: 100 INJECTION SUBCUTANEOUS at 08:55

## 2024-08-03 RX ADMIN — METHOCARBAMOL 750 MG: 750 TABLET ORAL at 08:55

## 2024-08-03 RX ADMIN — AMLODIPINE BESYLATE 10 MG: 10 TABLET ORAL at 08:55

## 2024-08-03 RX ADMIN — GABAPENTIN 300 MG: 300 CAPSULE ORAL at 08:55

## 2024-08-03 RX ADMIN — ACETAMINOPHEN 1000 MG: 500 TABLET ORAL at 17:28

## 2024-08-03 RX ADMIN — METHOCARBAMOL 750 MG: 750 TABLET ORAL at 21:43

## 2024-08-03 RX ADMIN — ATORVASTATIN CALCIUM 10 MG: 10 TABLET, FILM COATED ORAL at 21:42

## 2024-08-03 RX ADMIN — ASPIRIN 81 MG: 81 TABLET, COATED ORAL at 08:55

## 2024-08-03 RX ADMIN — IBUPROFEN 600 MG: 600 TABLET, FILM COATED ORAL at 14:42

## 2024-08-03 RX ADMIN — IBUPROFEN 600 MG: 600 TABLET, FILM COATED ORAL at 08:55

## 2024-08-03 RX ADMIN — METHOCARBAMOL 750 MG: 750 TABLET ORAL at 14:42

## 2024-08-03 RX ADMIN — GABAPENTIN 300 MG: 300 CAPSULE ORAL at 14:42

## 2024-08-03 RX ADMIN — IBUPROFEN 600 MG: 600 TABLET, FILM COATED ORAL at 21:43

## 2024-08-03 RX ADMIN — SODIUM CHLORIDE, PRESERVATIVE FREE 10 ML: 5 INJECTION INTRAVENOUS at 21:43

## 2024-08-03 RX ADMIN — SODIUM CHLORIDE, PRESERVATIVE FREE 10 ML: 5 INJECTION INTRAVENOUS at 08:56

## 2024-08-03 RX ADMIN — ACETAMINOPHEN 1000 MG: 500 TABLET ORAL at 11:47

## 2024-08-03 RX ADMIN — OXYCODONE HYDROCHLORIDE 5 MG: 5 TABLET ORAL at 11:47

## 2024-08-03 RX ADMIN — PANTOPRAZOLE SODIUM 40 MG: 40 TABLET, DELAYED RELEASE ORAL at 05:50

## 2024-08-03 RX ADMIN — OXYCODONE HYDROCHLORIDE 5 MG: 5 TABLET ORAL at 21:43

## 2024-08-03 RX ADMIN — GABAPENTIN 300 MG: 300 CAPSULE ORAL at 21:42

## 2024-08-03 RX ADMIN — SERTRALINE HYDROCHLORIDE 100 MG: 50 TABLET ORAL at 08:55

## 2024-08-03 RX ADMIN — ACETAMINOPHEN 1000 MG: 500 TABLET ORAL at 05:50

## 2024-08-03 RX ADMIN — OXYCODONE HYDROCHLORIDE 5 MG: 5 TABLET ORAL at 05:50

## 2024-08-03 ASSESSMENT — PAIN DESCRIPTION - ORIENTATION
ORIENTATION: LEFT

## 2024-08-03 ASSESSMENT — PAIN SCALES - GENERAL
PAINLEVEL_OUTOF10: 3
PAINLEVEL_OUTOF10: 4
PAINLEVEL_OUTOF10: 7
PAINLEVEL_OUTOF10: 0
PAINLEVEL_OUTOF10: 6
PAINLEVEL_OUTOF10: 7
PAINLEVEL_OUTOF10: 7
PAINLEVEL_OUTOF10: 5
PAINLEVEL_OUTOF10: 0
PAINLEVEL_OUTOF10: 4

## 2024-08-03 ASSESSMENT — PAIN DESCRIPTION - DESCRIPTORS
DESCRIPTORS: ACHING

## 2024-08-03 ASSESSMENT — PAIN DESCRIPTION - LOCATION
LOCATION: LEG;FOOT
LOCATION: FOOT
LOCATION: FOOT
LOCATION: LEG;FOOT

## 2024-08-03 ASSESSMENT — PAIN SCALES - WONG BAKER
WONGBAKER_NUMERICALRESPONSE: HURTS A LITTLE BIT
WONGBAKER_NUMERICALRESPONSE: HURTS A LITTLE BIT
WONGBAKER_NUMERICALRESPONSE: NO HURT
WONGBAKER_NUMERICALRESPONSE: NO HURT
WONGBAKER_NUMERICALRESPONSE: HURTS A LITTLE BIT
WONGBAKER_NUMERICALRESPONSE: NO HURT
WONGBAKER_NUMERICALRESPONSE: HURTS A LITTLE BIT

## 2024-08-03 NOTE — PROGRESS NOTES
Division of Vascular Surgery             Progress Note      Name: Silas Shrestha  MRN: [id number]     8/3/2024  8:18 AM    Overnight Events:     No acute overnight events      Subjective:     Patient is POD #3 after left hallux ray amputation with wound VAC application. Endorsing pain at the surgical site. Has been unable to sleep or ambulate due to pain. Wound vac in place with good seal.   Pain control is an issue for him. Required extra pain meds overnight.     Physical Exam:     Vitals:  BP (!) 153/79   Pulse 67   Temp 98.2 °F (36.8 °C) (Oral)   Resp 17   Ht 1.702 m (5' 7\")   Wt 69.9 kg (154 lb)   SpO2 97%   BMI 24.12 kg/m²       General appearance - alert, well appearing and in no acute distress  Mental status - oriented to person, place and time with normal affect  Head - normocephalic and atraumatic  Chest - symmetric chest wall expansion, no accessory muscle use  Neurological - normal speech, no focal findings or movement disorder noted, cranial nerves II through XII grossly intact  Skin - no gross lesions, rashes, or induration noted  Foot Exam -wound VAC in situ.  Adequate suction maintained.  Vascular Exam -has biphasic signal in left DP/AT and PT      Imaging:   No pertinent imaging required      Assessment:     Patient is POD #3  after ray amputation of left hallux.  Pain is controlled with p.o. and IV pain medications.      Plan:   As discussed with attending, Dr. Delos Reyes  Transition to PO pain medications. Adding ibuprofen to alternate with tylenol. Increasing dose of Gabapentin to 300 mg  Monitor vitals and urine output  Wound VAC @ 125 mmHg suction to continue. Bedside wound vac change today.  Continue DVT prophylaxis  Home health consult for assistance with wound vac changes at home  Patient is medically stable for discharge once home wound vac care is established     Janet Gonzalez MD   General Surgery PGY-4  8/3/2024  8:18 AM      [facility]

## 2024-08-03 NOTE — PLAN OF CARE
Problem: Discharge Planning  Goal: Discharge to home or other facility with appropriate resources  8/3/2024 1800 by Peri Felder RN  Outcome: Progressing     Problem: Safety - Adult  Goal: Free from fall injury  8/3/2024 1800 by Peri Felder RN  Outcome: Progressing     Problem: ABCDS Injury Assessment  Goal: Absence of physical injury  8/3/2024 1800 by Peri Felder RN  Outcome: Progressing     Problem: Pain  Goal: Verbalizes/displays adequate comfort level or baseline comfort level  8/3/2024 1800 by Peri Felder RN  Outcome: Progressing

## 2024-08-04 PROCEDURE — 6360000002 HC RX W HCPCS

## 2024-08-04 PROCEDURE — 2580000003 HC RX 258

## 2024-08-04 PROCEDURE — 6370000000 HC RX 637 (ALT 250 FOR IP)

## 2024-08-04 PROCEDURE — 1200000000 HC SEMI PRIVATE

## 2024-08-04 RX ADMIN — ACETAMINOPHEN 1000 MG: 500 TABLET ORAL at 17:43

## 2024-08-04 RX ADMIN — METHOCARBAMOL 750 MG: 750 TABLET ORAL at 14:04

## 2024-08-04 RX ADMIN — GABAPENTIN 300 MG: 300 CAPSULE ORAL at 08:32

## 2024-08-04 RX ADMIN — SERTRALINE HYDROCHLORIDE 100 MG: 50 TABLET ORAL at 08:32

## 2024-08-04 RX ADMIN — METHOCARBAMOL 750 MG: 750 TABLET ORAL at 20:23

## 2024-08-04 RX ADMIN — METHOCARBAMOL 750 MG: 750 TABLET ORAL at 08:32

## 2024-08-04 RX ADMIN — IBUPROFEN 600 MG: 600 TABLET, FILM COATED ORAL at 04:28

## 2024-08-04 RX ADMIN — SODIUM CHLORIDE, PRESERVATIVE FREE 10 ML: 5 INJECTION INTRAVENOUS at 08:33

## 2024-08-04 RX ADMIN — ATORVASTATIN CALCIUM 10 MG: 10 TABLET, FILM COATED ORAL at 20:23

## 2024-08-04 RX ADMIN — IBUPROFEN 600 MG: 600 TABLET, FILM COATED ORAL at 20:23

## 2024-08-04 RX ADMIN — GABAPENTIN 300 MG: 300 CAPSULE ORAL at 14:04

## 2024-08-04 RX ADMIN — IBUPROFEN 600 MG: 600 TABLET, FILM COATED ORAL at 14:04

## 2024-08-04 RX ADMIN — OXYCODONE HYDROCHLORIDE 5 MG: 5 TABLET ORAL at 04:29

## 2024-08-04 RX ADMIN — ACETAMINOPHEN 1000 MG: 500 TABLET ORAL at 11:58

## 2024-08-04 RX ADMIN — ACETAMINOPHEN 1000 MG: 500 TABLET ORAL at 23:43

## 2024-08-04 RX ADMIN — ENOXAPARIN SODIUM 40 MG: 100 INJECTION SUBCUTANEOUS at 08:32

## 2024-08-04 RX ADMIN — ASPIRIN 81 MG: 81 TABLET, COATED ORAL at 08:32

## 2024-08-04 RX ADMIN — ACETAMINOPHEN 1000 MG: 500 TABLET ORAL at 04:29

## 2024-08-04 RX ADMIN — IBUPROFEN 600 MG: 600 TABLET, FILM COATED ORAL at 08:32

## 2024-08-04 RX ADMIN — SODIUM CHLORIDE, PRESERVATIVE FREE 10 ML: 5 INJECTION INTRAVENOUS at 20:23

## 2024-08-04 RX ADMIN — OXYCODONE HYDROCHLORIDE 5 MG: 5 TABLET ORAL at 19:06

## 2024-08-04 RX ADMIN — OXYCODONE HYDROCHLORIDE 5 MG: 5 TABLET ORAL at 23:42

## 2024-08-04 RX ADMIN — GABAPENTIN 300 MG: 300 CAPSULE ORAL at 20:23

## 2024-08-04 RX ADMIN — AMLODIPINE BESYLATE 10 MG: 10 TABLET ORAL at 14:09

## 2024-08-04 RX ADMIN — PANTOPRAZOLE SODIUM 40 MG: 40 TABLET, DELAYED RELEASE ORAL at 06:01

## 2024-08-04 ASSESSMENT — PAIN SCALES - GENERAL
PAINLEVEL_OUTOF10: 10
PAINLEVEL_OUTOF10: 3
PAINLEVEL_OUTOF10: 8
PAINLEVEL_OUTOF10: 7
PAINLEVEL_OUTOF10: 4
PAINLEVEL_OUTOF10: 0
PAINLEVEL_OUTOF10: 3
PAINLEVEL_OUTOF10: 4
PAINLEVEL_OUTOF10: 2
PAINLEVEL_OUTOF10: 0

## 2024-08-04 ASSESSMENT — PAIN SCALES - WONG BAKER
WONGBAKER_NUMERICALRESPONSE: HURTS A LITTLE BIT
WONGBAKER_NUMERICALRESPONSE: NO HURT
WONGBAKER_NUMERICALRESPONSE: HURTS A LITTLE BIT

## 2024-08-04 ASSESSMENT — PAIN DESCRIPTION - LOCATION
LOCATION: FOOT
LOCATION: LEG

## 2024-08-04 ASSESSMENT — PAIN DESCRIPTION - DESCRIPTORS
DESCRIPTORS: TINGLING
DESCRIPTORS: ACHING
DESCRIPTORS: ACHING
DESCRIPTORS: ACHING;THROBBING
DESCRIPTORS: ACHING

## 2024-08-04 ASSESSMENT — PAIN DESCRIPTION - ORIENTATION
ORIENTATION: LEFT
ORIENTATION: LOWER

## 2024-08-04 NOTE — PROGRESS NOTES
Division of Vascular Surgery             Progress Note      Name: Silas Shrestha  MRN: [id number]     8/4/2024  8:03 AM    Overnight Events:     No acute overnight events      Subjective:     Patient is POD #4 after left hallux ray amputation with wound VAC application. Pain is manageable with PO meds. Tolerating regular diet. Has been up the chair and ambulates with assistance.    Physical Exam:     Vitals:  /76   Pulse 73   Temp 98.4 °F (36.9 °C) (Oral)   Resp 18   Ht 1.702 m (5' 7\")   Wt 69.9 kg (154 lb)   SpO2 99%   BMI 24.12 kg/m²       General appearance - alert, well appearing and in no acute distress  Mental status - oriented to person, place and time with normal affect  Head - normocephalic and atraumatic  Chest - symmetric chest wall expansion, no accessory muscle use  Neurological - normal speech, no focal findings or movement disorder noted, cranial nerves II through XII grossly intact  Skin - no gross lesions, rashes, or induration noted  Foot Exam -wound VAC in situ.  Adequate suction maintained.  Vascular Exam -has biphasic signal in left DP/AT and PT      Imaging:   No pertinent imaging required      Assessment:     Patient is POD #4  after ray amputation of left hallux.  Pain is controlled with p.o. and IV pain medications.      Plan:   As discussed with attending, Dr. Delos Reyes Cont current pain regimen  Monitor vitals and urine output  Wound VAC @ 125 mmHg suction to continue.  Continue DVT prophylaxis  Home health consult for assistance with wound vac changes at home  PT/OT  Patient is medically stable for discharge once home wound vac care is established     Sosa Ritchie DO  General Surgery PGY-2  8/4/24 8:05 AM        [facility] [place]

## 2024-08-04 NOTE — PLAN OF CARE
Problem: Discharge Planning  Goal: Discharge to home or other facility with appropriate resources  8/4/2024 1650 by Sera Akhtar RN  Outcome: Progressing  8/4/2024 0500 by Heaven Berg RN  Outcome: Progressing     Problem: Safety - Adult  Goal: Free from fall injury  8/4/2024 1650 by Sera Akhtar RN  Outcome: Progressing  8/4/2024 0500 by Heaven Berg RN  Outcome: Progressing     Problem: ABCDS Injury Assessment  Goal: Absence of physical injury  8/4/2024 1650 by Sera Akhtar RN  Outcome: Progressing  8/4/2024 0500 by Heaven Berg RN  Outcome: Progressing     Problem: Pain  Goal: Verbalizes/displays adequate comfort level or baseline comfort level  8/4/2024 1650 by Sera Akhtar RN  Outcome: Progressing  8/4/2024 0500 by Heaven Berg RN  Outcome: Progressing

## 2024-08-04 NOTE — PLAN OF CARE
Problem: Discharge Planning  Goal: Discharge to home or other facility with appropriate resources  8/4/2024 0500 by Heaven Berg RN  Outcome: Progressing  8/3/2024 1800 by Peri Felder RN  Outcome: Progressing     Problem: Safety - Adult  Goal: Free from fall injury  8/4/2024 0500 by Heaven Berg RN  Outcome: Progressing  8/3/2024 1800 by Peri Felder RN  Outcome: Progressing     Problem: ABCDS Injury Assessment  Goal: Absence of physical injury  8/4/2024 0500 by Heaven Berg RN  Outcome: Progressing  8/3/2024 1800 by Peri Felder RN  Outcome: Progressing     Problem: Pain  Goal: Verbalizes/displays adequate comfort level or baseline comfort level  8/4/2024 0500 by Heaven Berg RN  Outcome: Progressing  8/3/2024 1800 by Peri Felder RN  Outcome: Progressing

## 2024-08-05 PROCEDURE — 6370000000 HC RX 637 (ALT 250 FOR IP)

## 2024-08-05 PROCEDURE — 6360000002 HC RX W HCPCS

## 2024-08-05 PROCEDURE — 99212 OFFICE O/P EST SF 10 MIN: CPT

## 2024-08-05 PROCEDURE — 97162 PT EVAL MOD COMPLEX 30 MIN: CPT

## 2024-08-05 PROCEDURE — 2580000003 HC RX 258

## 2024-08-05 PROCEDURE — 97166 OT EVAL MOD COMPLEX 45 MIN: CPT

## 2024-08-05 PROCEDURE — 1200000000 HC SEMI PRIVATE

## 2024-08-05 PROCEDURE — 97535 SELF CARE MNGMENT TRAINING: CPT

## 2024-08-05 PROCEDURE — 97530 THERAPEUTIC ACTIVITIES: CPT

## 2024-08-05 PROCEDURE — 97605 NEG PRS WND THER DME<=50SQCM: CPT

## 2024-08-05 RX ORDER — OXYCODONE HYDROCHLORIDE 5 MG/1
5 TABLET ORAL ONCE
Status: COMPLETED | OUTPATIENT
Start: 2024-08-05 | End: 2024-08-05

## 2024-08-05 RX ORDER — FENTANYL CITRATE 50 UG/ML
25 INJECTION, SOLUTION INTRAMUSCULAR; INTRAVENOUS ONCE
Status: COMPLETED | OUTPATIENT
Start: 2024-08-05 | End: 2024-08-05

## 2024-08-05 RX ADMIN — ATORVASTATIN CALCIUM 10 MG: 10 TABLET, FILM COATED ORAL at 19:46

## 2024-08-05 RX ADMIN — OXYCODONE HYDROCHLORIDE 5 MG: 5 TABLET ORAL at 06:31

## 2024-08-05 RX ADMIN — OXYCODONE HYDROCHLORIDE 5 MG: 5 TABLET ORAL at 03:00

## 2024-08-05 RX ADMIN — SERTRALINE HYDROCHLORIDE 100 MG: 50 TABLET ORAL at 09:22

## 2024-08-05 RX ADMIN — IBUPROFEN 600 MG: 600 TABLET, FILM COATED ORAL at 09:22

## 2024-08-05 RX ADMIN — ENOXAPARIN SODIUM 40 MG: 100 INJECTION SUBCUTANEOUS at 09:22

## 2024-08-05 RX ADMIN — METHOCARBAMOL 750 MG: 750 TABLET ORAL at 19:47

## 2024-08-05 RX ADMIN — GABAPENTIN 300 MG: 300 CAPSULE ORAL at 13:54

## 2024-08-05 RX ADMIN — METHOCARBAMOL 750 MG: 750 TABLET ORAL at 13:54

## 2024-08-05 RX ADMIN — METHOCARBAMOL 750 MG: 750 TABLET ORAL at 03:23

## 2024-08-05 RX ADMIN — IBUPROFEN 600 MG: 600 TABLET, FILM COATED ORAL at 14:39

## 2024-08-05 RX ADMIN — AMLODIPINE BESYLATE 10 MG: 10 TABLET ORAL at 09:22

## 2024-08-05 RX ADMIN — PANTOPRAZOLE SODIUM 40 MG: 40 TABLET, DELAYED RELEASE ORAL at 06:31

## 2024-08-05 RX ADMIN — GABAPENTIN 300 MG: 300 CAPSULE ORAL at 09:22

## 2024-08-05 RX ADMIN — ACETAMINOPHEN 1000 MG: 500 TABLET ORAL at 13:54

## 2024-08-05 RX ADMIN — METHOCARBAMOL 750 MG: 750 TABLET ORAL at 09:22

## 2024-08-05 RX ADMIN — FENTANYL CITRATE 25 MCG: 50 INJECTION, SOLUTION INTRAMUSCULAR; INTRAVENOUS at 16:38

## 2024-08-05 RX ADMIN — ACETAMINOPHEN 1000 MG: 500 TABLET ORAL at 18:50

## 2024-08-05 RX ADMIN — OXYCODONE HYDROCHLORIDE 5 MG: 5 TABLET ORAL at 10:33

## 2024-08-05 RX ADMIN — SODIUM CHLORIDE, PRESERVATIVE FREE 10 ML: 5 INJECTION INTRAVENOUS at 03:23

## 2024-08-05 RX ADMIN — OXYCODONE HYDROCHLORIDE 5 MG: 5 TABLET ORAL at 22:56

## 2024-08-05 RX ADMIN — IBUPROFEN 600 MG: 600 TABLET, FILM COATED ORAL at 19:47

## 2024-08-05 RX ADMIN — GABAPENTIN 300 MG: 300 CAPSULE ORAL at 19:46

## 2024-08-05 RX ADMIN — SODIUM CHLORIDE, PRESERVATIVE FREE 10 ML: 5 INJECTION INTRAVENOUS at 19:47

## 2024-08-05 RX ADMIN — OXYCODONE HYDROCHLORIDE 5 MG: 5 TABLET ORAL at 19:47

## 2024-08-05 RX ADMIN — IBUPROFEN 600 MG: 600 TABLET, FILM COATED ORAL at 03:23

## 2024-08-05 RX ADMIN — ACETAMINOPHEN 1000 MG: 500 TABLET ORAL at 06:31

## 2024-08-05 RX ADMIN — ACETAMINOPHEN 1000 MG: 500 TABLET ORAL at 22:56

## 2024-08-05 RX ADMIN — OXYCODONE HYDROCHLORIDE 5 MG: 5 TABLET ORAL at 14:39

## 2024-08-05 RX ADMIN — OXYCODONE HYDROCHLORIDE 5 MG: 5 TABLET ORAL at 23:53

## 2024-08-05 RX ADMIN — ASPIRIN 81 MG: 81 TABLET, COATED ORAL at 09:22

## 2024-08-05 RX ADMIN — Medication 5 MG: at 19:47

## 2024-08-05 ASSESSMENT — PAIN SCALES - GENERAL
PAINLEVEL_OUTOF10: 4
PAINLEVEL_OUTOF10: 6
PAINLEVEL_OUTOF10: 4
PAINLEVEL_OUTOF10: 6
PAINLEVEL_OUTOF10: 8
PAINLEVEL_OUTOF10: 2
PAINLEVEL_OUTOF10: 3
PAINLEVEL_OUTOF10: 7
PAINLEVEL_OUTOF10: 4
PAINLEVEL_OUTOF10: 10
PAINLEVEL_OUTOF10: 9
PAINLEVEL_OUTOF10: 7
PAINLEVEL_OUTOF10: 8
PAINLEVEL_OUTOF10: 6

## 2024-08-05 ASSESSMENT — PAIN SCALES - WONG BAKER
WONGBAKER_NUMERICALRESPONSE: HURTS A LITTLE BIT
WONGBAKER_NUMERICALRESPONSE: NO HURT
WONGBAKER_NUMERICALRESPONSE: HURTS WHOLE LOT
WONGBAKER_NUMERICALRESPONSE: HURTS A LITTLE BIT

## 2024-08-05 NOTE — CARE COORDINATION
Called Krystal at [facility] to inquire about the wound vac.  Left voicemail asking for a return call.

## 2024-08-05 NOTE — PROGRESS NOTES
[facility] Wound Ostomy  Nurse  Follow up Note       NAME:  Silas Shrestha  MEDICAL RECORD NUMBER:  [id number]  AGE: 69 y.o.   GENDER: male  : 1955  TODAY'S DATE:  2024    Subjective   Reason for [facility] Nurse Evaluation and Assessment:  \"Wound vac s/p Left great toe amputation\"     Wound History:   :  RAY AMPUTATION OF HALLUX, APPLICATION OF WOUND VAC  with Dr. 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    Objective    /70   Pulse 64   Temp 98 °F (36.7 °C)   Resp 18   Ht 1.702 m (5' 7\")   Wt 69.9 kg (154 lb)   SpO2 96%   BMI 24.12 kg/m²     LABS:  WBC:    Lab Results   Component Value Date/Time    WBC 10.2 2024 05:11 PM     H/H:    Lab Results   Component Value Date/Time    HGB 13.0 2024 05:11 PM    HCT 40.2 2024 05:11 PM       Assessment   Thong Risk Score: Thong Scale Score: 20    Patient Active Problem List   Diagnosis Code    Hx of tuberculosis Z86.11    Alcohol induced acute pancreatitis K85.20    HTN (hypertension) I10    Alcohol abuse F10.10    Tobacco abuse Z72.0    Hypertriglyceridemia E78.1    Macrocytic anemia D53.9    Cirrhosis, alcoholic (HCC) K70.30    Diverticulosis K57.90    Internal hemorrhoids K64.8    Hypercholesteremia E78.00    Amputation finger S68.119A    Claudication of left lower extremity ([facility]) I73.9    Chronic obstructive pulmonary disease, unspecified COPD type ([facility]) J44.9    Major depressive disorder, recurrent, mild F33.0    Atherosclerosis of native arteries of extremities with rest pain, left leg ([facility]) I70.222    Great toe pain, left M79.675    Ulcer of left lower leg, with fat layer exposed ([facility]) L97.922    Nonhealing surgical wound, subsequent encounter T81.89XD    S/P amputation of lesser toe, left ([facility]) Z89.422    S/P amputation Z89.9       Measurements:       24 1148   Negative Pressure Wound Therapy Foot  Left;Anterior   Placement Date/Time: 07/31/24 1321   Present on Admission/Arrival: No  Location: Foot  Wound Location Orientation: Left;Anterior   Cycle Off   Incision 07/31/24 Foot Left   Date First Assessed/Time First Assessed: 07/31/24 1300   Location: Foot  Incision Location Orientation: Left  Incision Description (Comments): hallux amputation   Wound Image    Dressing Status New dressing applied   Incision Cleansed Cleansed with saline   Dressing/Treatment Moist to moist;Moisten with saline;ABD pad;Roll gauze   Incision Assessment Devitalized tissue;Dusky;Dry   Drainage Amount Scant (moist but unmeasurable)   Dione-incision Assessment Intact  (painful)      NPWT dressing carefully removed using saline liberally to loosen the sponge; allowed patient to pull dressing off as writer flushed with saline.  Saline moistened gauze applied.    Response to treatment:  Poorly tolerated by patient., With complaints of pain.     Pain Assessment:  Premedicated: Yes  45 minutes prior.  Vacuum off for 15 minutes prior.    Plan   Plan of Care:      Saline-moistened gauze was applied.   was messaged via Perfect Serve to report wound appearance,  Will await further instructions before resuming NPWT      Current Diet: ADULT DIET; Regular    Discharge Plan:  Placement for patient upon discharge: home with support    Patient appropriate for Outpatient Wound Care Center: N/A         CORNELIO DEGROOT RN BSN CWON

## 2024-08-05 NOTE — PROGRESS NOTES
(HCC), PVD (peripheral vascular disease) (HCC), Swelling, Ulcer of foot (HCC), Under care of service provider, Wears dentures, Wears dentures, Wears glasses, and Wears glasses.  Past Surgical History:  has a past surgical history that includes Upper gastrointestinal endoscopy; liver biopsy; vascular surgery; Finger amputation (Left, 11/28/2016); other surgical history (02/14/2017); other surgical history (04/21/2023); vascular surgery (Left, 04/21/2023); vascular surgery (Left, 03/21/2024); Colonoscopy; femoral bypass (04/08/2024); femoral bypass (Left, 04/08/2024); vascular surgery (Left, 06/21/2024); Toe amputation (Left, 6/21/2024); and Toe amputation (Left, 7/31/2024).    Assessment   Assessment: Pt is able to manage mobility with assistance. CGA for all transfers, Darvin x1 for ambulatin with rwalker NWB on left LE. The patient is impulsive and unaware of his current deficits in mobility. He is currently benefitting from 24/7 support and would contiue to benefit from 24/7 support at home. His son does not work and is able to assist at home. Pt will benefit from skilled PT services in order to increase strength, balance, safety awareness, mobility, and tolerance to activity in order to return home safely.  Body Structures, Functions, Activity Limitations Requiring Skilled Therapeutic Intervention: Decreased functional mobility ;Decreased tolerance to work activity;Decreased strength;Decreased safe awareness;Decreased endurance;Decreased balance  Therapy Prognosis: Good  Decision Making: Medium Complexity  Requires PT Follow-Up: Yes  Activity Tolerance  Activity Tolerance: Patient tolerated evaluation without incident     Plan   Physical Therapy Plan  General Plan:  (5-6 times per week.)  Current Treatment Recommendations: Strengthening, Balance training, Functional mobility training, Transfer training, Endurance training, Gait training, Stair training, Home exercise program, Safety education & training,  assistive device,with NWB to L forefoot  Short Term Goal 3: SBA for navigating 7 stairs with bilateral handrails, with NWB to L forefoot  Patient Goals   Patient Goals : return home to son's house       Education  Patient Education  Education Given To: Patient  Education Provided: Role of Therapy;Plan of Care;Precautions;Transfer Training;Fall Prevention Strategies;Equipment  Education Method: Demonstration;Verbal  Barriers to Learning: None  Education Outcome: Verbalized understanding;Demonstrated understanding      Therapy Time   Individual Concurrent Group Co-treatment   Time In 0842         Time Out 0925         Minutes 43         Timed Code Treatment Minutes: 23 Minutes       Oc Farmer, ALFREDA  This evaluation was performed by student physical therapist Wally Farmer under the supervision of co-signing PT who has read and agrees with all documentation.

## 2024-08-05 NOTE — PLAN OF CARE
Problem: Discharge Planning  Goal: Discharge to home or other facility with appropriate resources  8/5/2024 0130 by Ben Lopez RN  Outcome: Progressing  8/4/2024 1650 by Sera Akhtar RN  Outcome: Progressing     Problem: Safety - Adult  Goal: Free from fall injury  8/5/2024 0130 by Ben Lopez RN  Outcome: Progressing  8/4/2024 1650 by Sera Akhtar RN  Outcome: Progressing     Problem: ABCDS Injury Assessment  Goal: Absence of physical injury  8/5/2024 0130 by Ben Lopez RN  Outcome: Progressing  8/4/2024 1650 by Sera Akhtar RN  Outcome: Progressing     Problem: Pain  Goal: Verbalizes/displays adequate comfort level or baseline comfort level  8/5/2024 0130 by Ben Lopez RN  Outcome: Progressing  8/4/2024 1650 by Sera Akhtar RN  Outcome: Progressing

## 2024-08-05 NOTE — CARE COORDINATION
Called [facility] to check on admission with Wound Vac.  Spoke to Bridgette, she tells me that they are aware of the Vac.

## 2024-08-05 NOTE — PROGRESS NOTES
Occupational Therapy  Facility/Department: [facility] RENAL//MED SURG  Occupational Therapy Initial Assessment    Name: Silas Shrestha  : 1955  MRN: [id number]  Date of Service: 2024    Discharge Recommendations:   No therapy recommended at discharge.     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  Past Medical History:  has a past medical history of Alcoholism (HCC), Cirrhosis of liver (HCC), Claudication (HCC), COPD (chronic obstructive pulmonary disease) (HCC), COVID-19 vaccine administered, Depression, Finger fracture, left, Foot pain, left, Gangrene (HCC), Hx of blood clots, Hx of hepatitis C, Hx of pancreatitis, Hx of tuberculosis, Hyperlipidemia, Hypertension, Impaired mobility, Intermittent claudication (HCC), Liver disease, PAD (peripheral artery disease) (HCC), PVD (peripheral vascular disease) (HCC), Swelling, Ulcer of foot (HCC), Under care of service provider, Wears dentures, Wears dentures, Wears glasses, and Wears glasses.  Past Surgical History:  has a past surgical history that includes Upper gastrointestinal endoscopy; liver biopsy; vascular surgery; Finger amputation (Left, 2016); other surgical history (2017); other surgical history (2023); vascular surgery (Left, 2023); vascular surgery (Left, 2024); Colonoscopy; femoral bypass (2024); femoral bypass (Left, 2024); vascular surgery (Left, 2024); Toe amputation (Left, 2024); and Toe amputation (Left, 2024).    Copied from Vascular: \"Pt is here today for postoperative follow-up after left femoral to tibial bypass with graft.  This is a 69-year-old male who developed gangrene of the left great toe after clipping his toenail.  He underwent a foot tibial bypass but still  ADLs and IADLs with good voiced understanding. Pt demonstrates decreased standing balance and tolerance, and decreased insight to safety and RW management negatively influencing ADL routine warranting continued OT services to return to PLOF.  Prognosis: Good  Decision Making: Medium Complexity  REQUIRES OT FOLLOW-UP: Yes  Activity Tolerance  Activity Tolerance: Patient Tolerated treatment well        Plan   Occupational Therapy Plan  Times Per Week: 3-5x/week  Current Treatment Recommendations: Balance training, Functional mobility training, Endurance training, Pain management, Safety education & training, Patient/Caregiver education & training, Equipment evaluation, education, & procurement, Positioning, Self-Care / ADL, Home management training     Restrictions  Restrictions/Precautions  Restrictions/Precautions: Up as Tolerated, Fall Risk, Weight Bearing  Required Braces or Orthoses?: No  Lower Extremity Weight Bearing Restrictions  Left Lower Extremity Weight Bearing: Non Weight Bearing  Position Activity Restriction  Other position/activity restrictions: ambulate patient; NWB LLE per Perfect Serve from Carlos Gonzales MD; 7/31 L ray amp of hallux; wound vac placement    Subjective   General  Patient assessed for rehabilitation services?: Yes  Family / Caregiver Present: No  Diagnosis: S/P L Ray Amputation of Hallux  General Comment  Comments: RN okayd session. Pt agreeable and cooperative. Pt is pleasant and cooperative. Pt is impulsive with mobility and requires cuing for waiting for writers readiness. Pt reports pain 4/10 pain in LLE; writer provided therapeutic distraction and repositioning with pain remaining present but tolerable.     Social/Functional History  Social/Functional History  Lives With: Son  Type of Home: House  Home Layout: Two level, Laundry in basement, Able to Live on Main level with bedroom/bathroom, Performs ADL's on one level, Bed/Bath upstairs  Home Access: Stairs to enter with

## 2024-08-05 NOTE — PROGRESS NOTES
[facility] Wound Ostomy  Nurse  Follow up  Note       NAME:  Silas Shretsha  MEDICAL RECORD NUMBER:  [id number]  AGE: 69 y.o.   GENDER: male  : 1955  TODAY'S DATE:  2024    Subjective   Reason for [facility] Nurse Evaluation and Assessment: \"Wound vac s/p Left great toe amputation\"      Wound History:   :  RAY AMPUTATION OF HALLUX, APPLICATION OF WOUND VAC  with Dr. 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      Silas Shrestha is a 69 y.o. male referred by:   [x] Physician  [] Nursing  [] Other:     Dr. Gonzalez returned message after evaluation of the wound with Dr. DelosReyes and replied \"OK to apply wound vac.  Waiting for the wound to declare itself\"      Assessment   Thong Risk Score: Thong Scale Score: 19    Patient Active Problem List   Diagnosis Code    Hx of tuberculosis Z86.11    Alcohol induced acute pancreatitis K85.20    HTN (hypertension) I10    Alcohol abuse F10.10    Tobacco abuse Z72.0    Hypertriglyceridemia E78.1    Macrocytic anemia D53.9    Cirrhosis, alcoholic ([facility]) K70.30    Diverticulosis K57.90    Internal hemorrhoids K64.8    Hypercholesteremia E78.00    Amputation finger S68.119A    Claudication of left lower extremity ([facility]) I73.9    Chronic obstructive pulmonary disease, unspecified COPD type ([facility]) J44.9    Major depressive disorder, recurrent, mild F33.0    Atherosclerosis of native arteries of extremities with rest pain, left leg ([facility]) I70.222    Great toe pain, left M79.675    Ulcer of left lower leg, with fat layer exposed ([facility]) L97.922    Nonhealing surgical wound, subsequent encounter T81.89XD    S/P amputation of lesser toe, left ([facility]) Z89.422    S/P amputation Z89.9       Measurements:       24 1540   Negative Pressure Wound Therapy Foot Left;Anterior   Placement Date/Time: 24 1321   Present on Admission/Arrival: No  Location:  discharge to home.    Discharging to home with home care for VAC changed 3 times/week    Current Diet: ADULT DIET; Regular    Discharge Plan:  Placement for patient upon discharge: home with support    Patient appropriate for Outpatient Wound Care Center: N/A      Patient/Caregiver Teaching:  Level of patient/caregiver understanding able to:   [] Indicates understanding       [] Needs reinforcement  [] Unsuccessful      [x] Verbal Understanding  [] Demonstrated understanding       [] No evidence of learning  [] Refused teaching         [] N/A     CORNELIO DEGROOT RN BSN CWON

## 2024-08-06 VITALS
OXYGEN SATURATION: 99 % | HEART RATE: 68 BPM | DIASTOLIC BLOOD PRESSURE: 72 MMHG | RESPIRATION RATE: 18 BRPM | HEIGHT: 67 IN | WEIGHT: 154 LBS | TEMPERATURE: 98.5 F | SYSTOLIC BLOOD PRESSURE: 156 MMHG | BODY MASS INDEX: 24.17 KG/M2

## 2024-08-06 PROCEDURE — 2580000003 HC RX 258

## 2024-08-06 PROCEDURE — 6370000000 HC RX 637 (ALT 250 FOR IP)

## 2024-08-06 PROCEDURE — 6360000002 HC RX W HCPCS

## 2024-08-06 PROCEDURE — 97530 THERAPEUTIC ACTIVITIES: CPT

## 2024-08-06 RX ORDER — ACETAMINOPHEN 500 MG
500 TABLET ORAL EVERY 6 HOURS PRN
Qty: 56 TABLET | Refills: 0 | Status: ON HOLD | OUTPATIENT
Start: 2024-08-06 | End: 2024-08-20

## 2024-08-06 RX ORDER — DOCUSATE SODIUM 100 MG/1
100 CAPSULE, LIQUID FILLED ORAL 2 TIMES DAILY
Qty: 60 CAPSULE | Refills: 0 | Status: ON HOLD | OUTPATIENT
Start: 2024-08-06 | End: 2024-09-05

## 2024-08-06 RX ORDER — OXYCODONE HYDROCHLORIDE 5 MG/1
5 TABLET ORAL EVERY 6 HOURS PRN
Qty: 28 TABLET | Refills: 0 | Status: SHIPPED | OUTPATIENT
Start: 2024-08-06 | End: 2024-08-13

## 2024-08-06 RX ORDER — GABAPENTIN 300 MG/1
300 CAPSULE ORAL 3 TIMES DAILY
Qty: 15 CAPSULE | Refills: 0 | Status: ON HOLD | OUTPATIENT
Start: 2024-08-06 | End: 2024-08-11

## 2024-08-06 RX ADMIN — OXYCODONE HYDROCHLORIDE 5 MG: 5 TABLET ORAL at 12:09

## 2024-08-06 RX ADMIN — ASPIRIN 81 MG: 81 TABLET, COATED ORAL at 09:10

## 2024-08-06 RX ADMIN — METHOCARBAMOL 750 MG: 750 TABLET ORAL at 14:50

## 2024-08-06 RX ADMIN — AMLODIPINE BESYLATE 10 MG: 10 TABLET ORAL at 09:10

## 2024-08-06 RX ADMIN — SODIUM CHLORIDE, PRESERVATIVE FREE 10 ML: 5 INJECTION INTRAVENOUS at 09:10

## 2024-08-06 RX ADMIN — OXYCODONE HYDROCHLORIDE 5 MG: 5 TABLET ORAL at 06:39

## 2024-08-06 RX ADMIN — IBUPROFEN 600 MG: 600 TABLET, FILM COATED ORAL at 09:10

## 2024-08-06 RX ADMIN — ACETAMINOPHEN 1000 MG: 500 TABLET ORAL at 06:39

## 2024-08-06 RX ADMIN — SERTRALINE HYDROCHLORIDE 100 MG: 50 TABLET ORAL at 09:10

## 2024-08-06 RX ADMIN — ENOXAPARIN SODIUM 40 MG: 100 INJECTION SUBCUTANEOUS at 09:10

## 2024-08-06 RX ADMIN — IBUPROFEN 600 MG: 600 TABLET, FILM COATED ORAL at 02:41

## 2024-08-06 RX ADMIN — GABAPENTIN 300 MG: 300 CAPSULE ORAL at 09:10

## 2024-08-06 RX ADMIN — METHOCARBAMOL 750 MG: 750 TABLET ORAL at 09:10

## 2024-08-06 RX ADMIN — OXYCODONE HYDROCHLORIDE 5 MG: 5 TABLET ORAL at 03:14

## 2024-08-06 RX ADMIN — PANTOPRAZOLE SODIUM 40 MG: 40 TABLET, DELAYED RELEASE ORAL at 06:39

## 2024-08-06 RX ADMIN — IBUPROFEN 600 MG: 600 TABLET, FILM COATED ORAL at 14:50

## 2024-08-06 RX ADMIN — METHOCARBAMOL 750 MG: 750 TABLET ORAL at 02:41

## 2024-08-06 RX ADMIN — GABAPENTIN 300 MG: 300 CAPSULE ORAL at 14:50

## 2024-08-06 ASSESSMENT — PAIN SCALES - WONG BAKER
WONGBAKER_NUMERICALRESPONSE: NO HURT
WONGBAKER_NUMERICALRESPONSE: NO HURT

## 2024-08-06 ASSESSMENT — PAIN SCALES - GENERAL
PAINLEVEL_OUTOF10: 3
PAINLEVEL_OUTOF10: 7
PAINLEVEL_OUTOF10: 4
PAINLEVEL_OUTOF10: 6
PAINLEVEL_OUTOF10: 5
PAINLEVEL_OUTOF10: 6
PAINLEVEL_OUTOF10: 4

## 2024-08-06 NOTE — PLAN OF CARE
Problem: Discharge Planning  Goal: Discharge to home or other facility with appropriate resources  8/6/2024 1523 by Melba Hunter RN  Outcome: Completed  8/6/2024 0204 by Ben Lopez RN  Outcome: Progressing     Problem: Safety - Adult  Goal: Free from fall injury  8/6/2024 1523 by Melba Hunter RN  Outcome: Completed  8/6/2024 0204 by Ben Lopez RN  Outcome: Progressing     Problem: ABCDS Injury Assessment  Goal: Absence of physical injury  8/6/2024 1523 by Melba Hunter RN  Outcome: Completed  8/6/2024 0204 by Ben Lopez RN  Outcome: Progressing     Problem: Pain  Goal: Verbalizes/displays adequate comfort level or baseline comfort level  8/6/2024 1523 by Melba Hunter RN  Outcome: Completed  8/6/2024 0204 by Ben Lopez RN  Outcome: Progressing

## 2024-08-06 NOTE — PROGRESS NOTES
CLINICAL PHARMACY NOTE: MEDS TO BEDS    Total # of Prescriptions Filled: 4   The following medications were delivered to the patient:  Oxycodone 5 mg  Gabapentin 300 mg  Acetaminophen 500 mg  Docusate 100 mg    Additional Documentation:   Delivered to pt on 8/6 at 11:46 am. Pt had copay of $3.62 and paid with cash.

## 2024-08-06 NOTE — DISCHARGE INSTRUCTIONS
Discharge Instructions    My Hospital Stay and Summary    This is a summary of your hospital stay.  Please read it carefully and share it with your family and healthcare providers.    Date of Admission: 7/31/2024     Date of Discharge: 8/6/2024  Where I Will be Going after Discharge: Home    My Doctors and Medical Team:   My Main Hospital Doctor: Dr. Delos Reyes      Summary of what happened when in the hospital:  You were admitted to [place] [facility] on 7/31/2024 to undergo left great toe amputation. You tolerated the procedure well and were transferred to the recovery area and then the regular nursing floor for routine post operative care.  Your pain was initially managed with intravenous pain medication. Your diet was started with clear liquids and advanced to soft solids as you started to regain bowel function (presence of gas and bowel movements).  As you tolerated oral intake, your pain medication was transitioned to an oral pain medication regimen.  On the 7 day after surgery, you were tolerating an oral diet, ambulating well, urinating independently, and your pain was controlled on oral medication.  You were deemed stable for discharge home with instructions to schedule outpatient follow up.     Other problem(s)/diagnosis:  Patient Active Problem List:     Hx of tuberculosis     Alcohol induced acute pancreatitis     HTN (hypertension)     Alcohol abuse     Tobacco abuse     Hypertriglyceridemia     Macrocytic anemia     Cirrhosis, alcoholic (HCC)     Diverticulosis     Internal hemorrhoids     Hypercholesteremia     Amputation finger     Claudication of left lower extremity (HCC)     Chronic obstructive pulmonary disease, unspecified COPD type (HCC)     Major depressive disorder, recurrent, mild     Atherosclerosis of native arteries of extremities with rest pain, left leg (HCC)     Great toe pain, left     Ulcer of left lower leg, with fat layer exposed (HCC)      Nonhealing surgical wound, subsequent encounter     S/P amputation of lesser toe, left (HCC)     S/P amputation        Surgeries:        Left great toe amputation 7/31/2024    Instructions for My Care at Home or Healthcare Facility    These instructions explain what you or your care partner need to do to continue your care at home or at another healthcare facility    Please go over these instructions with your nurse and care partner.   If you are not sure about something, please ask.      Symptoms or health problems to watch for after I leave the hospital:  Signs of infection (fever, swelling, increase in redness or pus from incision)   Symptoms of dehydration (weakness, fatigue, dizziness when standing or walking, high heart rate, low urine output or very dark urine).  Severe abdominal pain  Wound vac loses suction  Persistent nausea/vomiting   If you experience chest pain or shortness of breath, go to the nearest emergency department.    Call the office with questions or concerns.     Pain management:  Take tylenol as needed for pain according to the instructions on the bottle. You can also use Advil also for pain. If the pain continues despite tylenol, take pain meds as prescribed.     1. It is expected to have pain after surgery. This should improve over the next several weeks.   2. Take Roxicodone only during dressing changes  3. While you are taking a narcotic pain medication, please make sure to take a stool softener such as colace to prevent constipation.  4. Narcotic pain medications should not be taken on an empty stomach as they can cause nausea and vomiting.  5. Please remember to try and attempt other methods of pain management including heat or ice packs, meditation, massage, or music therapy.     Please be aware that in accordance with [place] law we can only provide you with a 7 day supply of pain medication (narcotics). If you need more pain medications than what is prescribed you need to be evaluated

## 2024-08-06 NOTE — CARE COORDINATION
Called Sharon at [facility].  Informed her that the patient will be going home today.  She is able to pull all the information

## 2024-08-06 NOTE — PROGRESS NOTES
Physical Therapy  Facility/Department: [facility] RENAL//MED SURG  Physical Therapy Daily Treatment Note    Name: Silas Shrestah  : 1955  MRN: [id number]  Date of Service: 2024    Discharge Recommendations:    Further therapy recommended at discharge.    PT Equipment Recommendations  Equipment Needed: No      Patient Diagnosis(es): The primary encounter diagnosis was Acute postoperative pain. 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  Past Medical History:  has a past medical history of Alcoholism (HCC), Cirrhosis of liver (HCC), Claudication (HCC), COPD (chronic obstructive pulmonary disease) (HCC), COVID-19 vaccine administered, Depression, Finger fracture, left, Foot pain, left, Gangrene (HCC), Hx of blood clots, Hx of hepatitis C, Hx of pancreatitis, Hx of tuberculosis, Hyperlipidemia, Hypertension, Impaired mobility, Intermittent claudication (HCC), Liver disease, PAD (peripheral artery disease) (HCC), PVD (peripheral vascular disease) (HCC), Swelling, Ulcer of foot (HCC), Under care of service provider, Wears dentures, Wears dentures, Wears glasses, and Wears glasses.  Past Surgical History:  has a past surgical history that includes Upper gastrointestinal endoscopy; liver biopsy; vascular surgery; Finger amputation (Left, 2016); other surgical history (2017); other surgical history (2023); vascular surgery (Left, 2023); vascular surgery (Left, 2024); Colonoscopy; femoral bypass (2024); femoral bypass (Left, 2024); vascular surgery (Left, 2024); Toe amputation (Left, 2024); and Toe amputation (Left, 2024).    Assessment   Body Structures, Functions, Activity Limitations Requiring Skilled Therapeutic Intervention: Decreased functional mobility ;Decreased tolerance to work activity;Decreased strength;Decreased safe awareness;Decreased endurance;Decreased  Commands: Within Functional Limits  General Comment  Comments: RN and pt agreeable to PT. pt alert in bed upon arrival.  Subjective  Subjective: Pt denies pain, n/t at rest.       Cognition   Orientation  Overall Orientation Status: Within Normal Limits  Cognition  Overall Cognitive Status: WFL     Objective   Bed mobility  Supine to Sit: Stand by assistance  Sit to Supine: Stand by assistance  Bed Mobility Comments: HOB elevated ~35 degrees.  Transfers  Sit to Stand: Contact guard assistance  Stand to Sit: Contact guard assistance  Comment: Performed 1x from bed w/ RW, 1x from bed w/ axillary crutches.  Ambulation  Surface: Level tile  Device: Rolling Walker  Other Apparatus: Left (Toe Offloading shoe)  Assistance: Contact guard assistance  Quality of Gait: Pt amb w/ greatly reduced gait speed, near step-to gait pattern, mild anterior trunk lean, increased reliance on RW, no LOB.  Distance: 90'  Comments: Pt carried wound vac w/ carrying strap to reduce external reliance.  More Ambulation?: Yes  Ambulation 2  Surface - 2: level tile  Device 2: Axillary Crutches  Other Apparatus 2: Left (Toe Offloading shoe)  Assistance 2: Contact guard assistance  Quality of Gait 2: Similar mechanics to RW.  Distance: 90'  Comments: [place]air[place] and back.  Stairs/Curb  Stairs?: Yes  Stairs  # Steps : 8  Rails: Right ascending  Curbs: 6\"  Device: Crutches (Single Axillary crutch)  Assistance: Contact guard assistance  Comment: Pt attempted steps w/ MARIZOL axillary crutches, unable to progress RLE to initiate. Pt reported increased stability w/ L axillary crutch and R rail use. Pt reports having \"sturdy\" R rail at home. Pt negotiated w/ marked time negotiation pattern w/ RLE leading in ascent and LLE leading in descent. Pt advised to negoitate steps w/ support, pt states son will be present upon discharge to assist.     Balance  Posture: Fair  Sitting - Static: Good  Sitting - Dynamic: Good;-  Standing - Static: Fair;+  Standing - Dynamic:

## 2024-08-06 NOTE — PROGRESS NOTES
Division of Vascular Surgery             Progress Note      Name: Silas Shrestha  MRN: [id number]     8/6/2024  9:45 AM    Overnight Events:     No acute overnight events      Subjective:     Patient is POD #6 after left hallux ray amputation with wound VAC application. Endorsing severe foot pain with wound vac changes. Portable home wound vac placed by wound ostomy team yesterday. Tolerating regular diet. Has been working with PT/OT.    Physical Exam:     Vitals:  BP (!) 156/72   Pulse 68   Temp 98.5 °F (36.9 °C)   Resp 18   Ht 1.702 m (5' 7\")   Wt 69.9 kg (154 lb)   SpO2 99%   BMI 24.12 kg/m²       General appearance - alert, well appearing and in no acute distress  Mental status - oriented to person, place and time with normal affect  Head - normocephalic and atraumatic  Chest - symmetric chest wall expansion, no accessory muscle use  Neurological - normal speech, no focal findings or movement disorder noted, cranial nerves II through XII grossly intact  Skin - no gross lesions, rashes, or induration noted  Foot Exam -wound VAC in situ.  Adequate suction maintained.  Vascular Exam -has biphasic signal in left DP/AT and PT      Imaging:   No pertinent imaging required      Assessment:     Patient is POD #6  after ray amputation of left hallux.  Pain is controlled with p.o. and IV pain medications.      Plan:   As discussed with attending, Dr. Delos Reyes Cont current pain regimen  Monitor vitals and urine output  Wound VAC @ 125 mmHg suction to continue  Continue DVT prophylaxis  Home health consult for assistance with wound vac changes at home  PT/OT  Discharge to home today. Will prescribe pain medications for wound vac changes.    Sosa Ritchie DO  General Surgery PGY-2  8/4/24 9:45 AM        [facility] [place]

## 2024-08-06 NOTE — PLAN OF CARE
Problem: Discharge Planning  Goal: Discharge to home or other facility with appropriate resources  8/6/2024 0204 by Ben Lopez RN  Outcome: Progressing  8/5/2024 1810 by Melba Hunter RN  Outcome: Progressing     Problem: Safety - Adult  Goal: Free from fall injury  8/6/2024 0204 by Ben Lopez RN  Outcome: Progressing  8/5/2024 1810 by Melba Hunter RN  Outcome: Progressing     Problem: ABCDS Injury Assessment  Goal: Absence of physical injury  8/6/2024 0204 by Ben Lopez RN  Outcome: Progressing  8/5/2024 1810 by Melba Hunter RN  Outcome: Progressing     Problem: Pain  Goal: Verbalizes/displays adequate comfort level or baseline comfort level  8/6/2024 0204 by Ben Lopez RN  Outcome: Progressing  8/5/2024 1810 by Melba Hunter RN  Outcome: Progressing

## 2024-08-07 ENCOUNTER — CARE COORDINATION (OUTPATIENT)
Dept: CARE COORDINATION | Age: 69
End: 2024-08-07

## 2024-08-07 DIAGNOSIS — Z89.9 S/P AMPUTATION: Primary | ICD-10-CM

## 2024-08-07 PROCEDURE — 1111F DSCHRG MED/CURRENT MED MERGE: CPT | Performed by: INTERNAL MEDICINE

## 2024-08-07 NOTE — CARE COORDINATION
Care Transitions Note    Initial Call - Call within 2 business days of discharge: Yes    Patient Current Location:  Home: [address] [place] [address]    Care Transition Nurse contacted the patient by telephone to perform post hospital discharge assessment, verified name and  as identifiers. Provided introduction to self, and explanation of the Care Transition Nurse role.     Patient: Silas Shrestha    Patient : 1955   MRN: [id number]    Reason for Admission: RAY AMPUTATION OF HALLUX, APPLICATION OF WOUND VAC   Discharge Date: 24  RURS: Readmission Risk Score: 8.6      Last Discharge Facility       Date Complaint Diagnosis Description Type Department Provider    24  Acute postoperative pain ... Admission (Discharged) [facility] Delos Reyes, Arthur, MD            Was this an external facility discharge? No    Additional needs identified to be addressed with provider   No needs identified             Method of communication with provider: none.    Patients top risk factors for readmission: medical condition-     Interventions to address risk factors:   Education: Monitor for s/s of infection    Care Summary Note: Spoke with patient for initial 24 hour follow up call.  Patient was in care transitions last month post toe amputation.  During his transitional period he was having quite a bit of pain, had a hard time walking and laid most the time in bed as his foot wood throb and more painful when standing.  Patient had his follow up with the surgeon and was decided he needed to come back for further surgery.   Patient had surgery to left foot, had wound vac placed.  He did well post operatively and discharged to home with home care.  Today, patient said he has wound vac in place to left foot with no drainage in cannister.  He states it is working, rating his pain to the left foot today at 6/10.  He has a walker, cane and crutches at home, uses walker to get around home but has to use cane or

## 2024-08-13 ENCOUNTER — CARE COORDINATION (OUTPATIENT)
Dept: CARE COORDINATION | Age: 69
End: 2024-08-13

## 2024-08-13 NOTE — CARE COORDINATION
Care Transitions Note    Follow Up Call     Attempted to reach patient for transitions of care follow up.  Unable to reach patient.      Outreach Attempts:   Unable to leave message.     Care Summary Note: 1st attempt-unable to leave message due to VM being full.     Follow Up Appointment:   Future Appointments         Provider Specialty Dept Phone    8/28/2024 2:00 PM Brina Rizo MD Internal Medicine [phone]            Plan for follow-up call in 2-5 days based on severity of symptoms and risk factors. Plan for next call:       Alissa Woods LPN

## 2024-08-14 ENCOUNTER — APPOINTMENT (OUTPATIENT)
Dept: GENERAL RADIOLOGY | Age: 69
DRG: 240 | End: 2024-08-14
Payer: MEDICARE

## 2024-08-14 ENCOUNTER — HOSPITAL ENCOUNTER (INPATIENT)
Age: 69
LOS: 7 days | Discharge: NURSING, REHAB OR LONG-TERM CARE | DRG: 240 | End: 2024-08-21
Attending: EMERGENCY MEDICINE | Admitting: INTERNAL MEDICINE
Payer: MEDICARE

## 2024-08-14 DIAGNOSIS — Z89.412: Primary | ICD-10-CM

## 2024-08-14 DIAGNOSIS — T81.89XD NONHEALING SURGICAL WOUND, SUBSEQUENT ENCOUNTER: ICD-10-CM

## 2024-08-14 DIAGNOSIS — Z89.9 S/P AMPUTATION: ICD-10-CM

## 2024-08-14 DIAGNOSIS — I99.8 ISCHEMIA OF LEFT LOWER EXTREMITY: ICD-10-CM

## 2024-08-14 DIAGNOSIS — I96 GANGRENE OF LEFT FOOT (HCC): ICD-10-CM

## 2024-08-14 LAB
ALBUMIN SERPL-MCNC: 3.4 G/DL (ref 3.5–5.2)
ALBUMIN/GLOB SERPL: 1 {RATIO} (ref 1–2.5)
ALP SERPL-CCNC: 118 U/L (ref 40–129)
ALT SERPL-CCNC: 12 U/L (ref 10–50)
ANION GAP SERPL CALCULATED.3IONS-SCNC: 11 MMOL/L (ref 9–16)
AST SERPL-CCNC: 27 U/L (ref 10–50)
BACTERIA URNS QL MICRO: ABNORMAL
BASOPHILS # BLD: <0.03 K/UL (ref 0–0.2)
BASOPHILS NFR BLD: 0 % (ref 0–2)
BILIRUB SERPL-MCNC: 0.7 MG/DL (ref 0–1.2)
BILIRUB UR QL STRIP: NEGATIVE
BUN SERPL-MCNC: 8 MG/DL (ref 8–23)
CALCIUM SERPL-MCNC: 8.8 MG/DL (ref 8.6–10.4)
CASTS #/AREA URNS LPF: ABNORMAL /LPF (ref 0–8)
CHLORIDE SERPL-SCNC: 105 MMOL/L (ref 98–107)
CLARITY UR: ABNORMAL
CO2 SERPL-SCNC: 21 MMOL/L (ref 20–31)
COLOR UR: YELLOW
CREAT SERPL-MCNC: 0.8 MG/DL (ref 0.7–1.2)
CRP SERPL HS-MCNC: <3 MG/L (ref 0–5)
EOSINOPHIL # BLD: 0.08 K/UL (ref 0–0.44)
EOSINOPHILS RELATIVE PERCENT: 1 % (ref 1–4)
EPI CELLS #/AREA URNS HPF: ABNORMAL /HPF (ref 0–5)
ERYTHROCYTE [DISTWIDTH] IN BLOOD BY AUTOMATED COUNT: 13.5 % (ref 11.8–14.4)
ERYTHROCYTE [SEDIMENTATION RATE] IN BLOOD BY PHOTOMETRIC METHOD: 96 MM/HR (ref 0–20)
GFR, ESTIMATED: >90 ML/MIN/1.73M2
GLUCOSE SERPL-MCNC: 134 MG/DL (ref 74–99)
GLUCOSE UR STRIP-MCNC: NEGATIVE MG/DL
HCT VFR BLD AUTO: 38.6 % (ref 40.7–50.3)
HGB BLD-MCNC: 12.4 G/DL (ref 13–17)
HGB UR QL STRIP.AUTO: NEGATIVE
IMM GRANULOCYTES # BLD AUTO: 0.04 K/UL (ref 0–0.3)
IMM GRANULOCYTES NFR BLD: 0 %
INR PPP: 1.1
KETONES UR STRIP-MCNC: NEGATIVE MG/DL
LACTIC ACID, SEPSIS WHOLE BLOOD: 1.1 MMOL/L (ref 0.5–1.9)
LACTIC ACID, SEPSIS WHOLE BLOOD: 2.6 MMOL/L (ref 0.5–1.9)
LEUKOCYTE ESTERASE UR QL STRIP: NEGATIVE
LYMPHOCYTES NFR BLD: 2.11 K/UL (ref 1.1–3.7)
LYMPHOCYTES RELATIVE PERCENT: 21 % (ref 24–43)
MCH RBC QN AUTO: 34.6 PG (ref 25.2–33.5)
MCHC RBC AUTO-ENTMCNC: 32.1 G/DL (ref 28.4–34.8)
MCV RBC AUTO: 107.8 FL (ref 82.6–102.9)
MONOCYTES NFR BLD: 0.66 K/UL (ref 0.1–1.2)
MONOCYTES NFR BLD: 7 % (ref 3–12)
NEUTROPHILS NFR BLD: 71 % (ref 36–65)
NEUTS SEG NFR BLD: 7.08 K/UL (ref 1.5–8.1)
NITRITE UR QL STRIP: NEGATIVE
NRBC BLD-RTO: 0 PER 100 WBC
PARTIAL THROMBOPLASTIN TIME: 31.3 SEC (ref 23–36.5)
PH UR STRIP: 8 [PH] (ref 5–8)
PLATELET # BLD AUTO: 349 K/UL (ref 138–453)
PMV BLD AUTO: 9.6 FL (ref 8.1–13.5)
POTASSIUM SERPL-SCNC: 3.7 MMOL/L (ref 3.7–5.3)
PROT SERPL-MCNC: 7.3 G/DL (ref 6.6–8.7)
PROT UR STRIP-MCNC: ABNORMAL MG/DL
PROTHROMBIN TIME: 14.1 SEC (ref 11.7–14.9)
RBC # BLD AUTO: 3.58 M/UL (ref 4.21–5.77)
RBC # BLD: ABNORMAL 10*6/UL
RBC #/AREA URNS HPF: ABNORMAL /HPF (ref 0–4)
SODIUM SERPL-SCNC: 137 MMOL/L (ref 136–145)
SP GR UR STRIP: 1.01 (ref 1–1.03)
UROBILINOGEN UR STRIP-ACNC: ABNORMAL EU/DL (ref 0–1)
WBC #/AREA URNS HPF: ABNORMAL /HPF (ref 0–5)
WBC OTHER # BLD: 10 K/UL (ref 3.5–11.3)

## 2024-08-14 PROCEDURE — 1200000000 HC SEMI PRIVATE

## 2024-08-14 PROCEDURE — 85730 THROMBOPLASTIN TIME PARTIAL: CPT

## 2024-08-14 PROCEDURE — 96374 THER/PROPH/DIAG INJ IV PUSH: CPT

## 2024-08-14 PROCEDURE — 86140 C-REACTIVE PROTEIN: CPT

## 2024-08-14 PROCEDURE — 81001 URINALYSIS AUTO W/SCOPE: CPT

## 2024-08-14 PROCEDURE — 85025 COMPLETE CBC W/AUTO DIFF WBC: CPT

## 2024-08-14 PROCEDURE — 6370000000 HC RX 637 (ALT 250 FOR IP): Performed by: INTERNAL MEDICINE

## 2024-08-14 PROCEDURE — 83605 ASSAY OF LACTIC ACID: CPT

## 2024-08-14 PROCEDURE — 2580000003 HC RX 258: Performed by: INTERNAL MEDICINE

## 2024-08-14 PROCEDURE — 99285 EMERGENCY DEPT VISIT HI MDM: CPT

## 2024-08-14 PROCEDURE — 85610 PROTHROMBIN TIME: CPT

## 2024-08-14 PROCEDURE — 6370000000 HC RX 637 (ALT 250 FOR IP): Performed by: NURSE PRACTITIONER

## 2024-08-14 PROCEDURE — 2580000003 HC RX 258

## 2024-08-14 PROCEDURE — 6360000002 HC RX W HCPCS: Performed by: NURSE PRACTITIONER

## 2024-08-14 PROCEDURE — 99222 1ST HOSP IP/OBS MODERATE 55: CPT | Performed by: NURSE PRACTITIONER

## 2024-08-14 PROCEDURE — 80053 COMPREHEN METABOLIC PANEL: CPT

## 2024-08-14 PROCEDURE — 6360000002 HC RX W HCPCS

## 2024-08-14 PROCEDURE — 6360000002 HC RX W HCPCS: Performed by: INTERNAL MEDICINE

## 2024-08-14 PROCEDURE — 36415 COLL VENOUS BLD VENIPUNCTURE: CPT

## 2024-08-14 PROCEDURE — 85652 RBC SED RATE AUTOMATED: CPT

## 2024-08-14 PROCEDURE — 87040 BLOOD CULTURE FOR BACTERIA: CPT

## 2024-08-14 PROCEDURE — 73630 X-RAY EXAM OF FOOT: CPT

## 2024-08-14 RX ORDER — GABAPENTIN 100 MG/1
100 CAPSULE ORAL 3 TIMES DAILY
Status: DISCONTINUED | OUTPATIENT
Start: 2024-08-15 | End: 2024-08-21 | Stop reason: HOSPADM

## 2024-08-14 RX ORDER — ACETAMINOPHEN 325 MG/1
650 TABLET ORAL EVERY 6 HOURS PRN
Status: DISCONTINUED | OUTPATIENT
Start: 2024-08-14 | End: 2024-08-16

## 2024-08-14 RX ORDER — ONDANSETRON 4 MG/1
4 TABLET, ORALLY DISINTEGRATING ORAL EVERY 8 HOURS PRN
Status: DISCONTINUED | OUTPATIENT
Start: 2024-08-14 | End: 2024-08-21 | Stop reason: HOSPADM

## 2024-08-14 RX ORDER — 0.9 % SODIUM CHLORIDE 0.9 %
1000 INTRAVENOUS SOLUTION INTRAVENOUS ONCE
Status: COMPLETED | OUTPATIENT
Start: 2024-08-14 | End: 2024-08-14

## 2024-08-14 RX ORDER — CILOSTAZOL 100 MG/1
100 TABLET ORAL 2 TIMES DAILY
Status: DISCONTINUED | OUTPATIENT
Start: 2024-08-14 | End: 2024-08-21 | Stop reason: HOSPADM

## 2024-08-14 RX ORDER — SODIUM CHLORIDE 9 MG/ML
INJECTION, SOLUTION INTRAVENOUS CONTINUOUS
Status: DISCONTINUED | OUTPATIENT
Start: 2024-08-14 | End: 2024-08-14

## 2024-08-14 RX ORDER — BISACODYL 10 MG
10 SUPPOSITORY, RECTAL RECTAL DAILY PRN
Status: DISCONTINUED | OUTPATIENT
Start: 2024-08-14 | End: 2024-08-21 | Stop reason: HOSPADM

## 2024-08-14 RX ORDER — MORPHINE SULFATE 4 MG/ML
4 INJECTION, SOLUTION INTRAMUSCULAR; INTRAVENOUS ONCE
Status: COMPLETED | OUTPATIENT
Start: 2024-08-14 | End: 2024-08-14

## 2024-08-14 RX ORDER — SODIUM CHLORIDE 0.9 % (FLUSH) 0.9 %
5-40 SYRINGE (ML) INJECTION PRN
Status: DISCONTINUED | OUTPATIENT
Start: 2024-08-14 | End: 2024-08-21 | Stop reason: HOSPADM

## 2024-08-14 RX ORDER — ENOXAPARIN SODIUM 100 MG/ML
40 INJECTION SUBCUTANEOUS DAILY
Status: DISCONTINUED | OUTPATIENT
Start: 2024-08-16 | End: 2024-08-21 | Stop reason: HOSPADM

## 2024-08-14 RX ORDER — ENOXAPARIN SODIUM 100 MG/ML
40 INJECTION SUBCUTANEOUS DAILY
Status: DISCONTINUED | OUTPATIENT
Start: 2024-08-14 | End: 2024-08-14

## 2024-08-14 RX ORDER — ONDANSETRON 2 MG/ML
4 INJECTION INTRAMUSCULAR; INTRAVENOUS EVERY 6 HOURS PRN
Status: DISCONTINUED | OUTPATIENT
Start: 2024-08-14 | End: 2024-08-21 | Stop reason: HOSPADM

## 2024-08-14 RX ORDER — PANTOPRAZOLE SODIUM 40 MG/1
40 TABLET, DELAYED RELEASE ORAL
Status: DISCONTINUED | OUTPATIENT
Start: 2024-08-15 | End: 2024-08-21 | Stop reason: HOSPADM

## 2024-08-14 RX ORDER — ASPIRIN 81 MG/1
81 TABLET ORAL DAILY
Status: DISCONTINUED | OUTPATIENT
Start: 2024-08-14 | End: 2024-08-21 | Stop reason: HOSPADM

## 2024-08-14 RX ORDER — POTASSIUM CHLORIDE 7.45 MG/ML
10 INJECTION INTRAVENOUS PRN
Status: DISCONTINUED | OUTPATIENT
Start: 2024-08-14 | End: 2024-08-21 | Stop reason: HOSPADM

## 2024-08-14 RX ORDER — SODIUM CHLORIDE 9 MG/ML
INJECTION, SOLUTION INTRAVENOUS PRN
Status: DISCONTINUED | OUTPATIENT
Start: 2024-08-14 | End: 2024-08-21 | Stop reason: HOSPADM

## 2024-08-14 RX ORDER — AMLODIPINE BESYLATE 10 MG/1
10 TABLET ORAL DAILY
Status: DISCONTINUED | OUTPATIENT
Start: 2024-08-14 | End: 2024-08-21 | Stop reason: HOSPADM

## 2024-08-14 RX ORDER — DOCUSATE SODIUM 100 MG/1
100 CAPSULE, LIQUID FILLED ORAL 2 TIMES DAILY
Status: DISCONTINUED | OUTPATIENT
Start: 2024-08-14 | End: 2024-08-21 | Stop reason: HOSPADM

## 2024-08-14 RX ORDER — SODIUM CHLORIDE 0.9 % (FLUSH) 0.9 %
5-40 SYRINGE (ML) INJECTION EVERY 12 HOURS SCHEDULED
Status: DISCONTINUED | OUTPATIENT
Start: 2024-08-14 | End: 2024-08-21 | Stop reason: HOSPADM

## 2024-08-14 RX ORDER — GABAPENTIN 100 MG/1
100 CAPSULE ORAL 2 TIMES DAILY
Status: DISCONTINUED | OUTPATIENT
Start: 2024-08-14 | End: 2024-08-14

## 2024-08-14 RX ORDER — ACETAMINOPHEN 650 MG/1
650 SUPPOSITORY RECTAL EVERY 6 HOURS PRN
Status: DISCONTINUED | OUTPATIENT
Start: 2024-08-14 | End: 2024-08-16

## 2024-08-14 RX ORDER — SODIUM CHLORIDE 9 MG/ML
INJECTION, SOLUTION INTRAVENOUS CONTINUOUS
Status: DISCONTINUED | OUTPATIENT
Start: 2024-08-15 | End: 2024-08-15

## 2024-08-14 RX ORDER — ATORVASTATIN CALCIUM 20 MG/1
20 TABLET, FILM COATED ORAL NIGHTLY
Status: DISCONTINUED | OUTPATIENT
Start: 2024-08-14 | End: 2024-08-21 | Stop reason: HOSPADM

## 2024-08-14 RX ORDER — POTASSIUM CHLORIDE 1500 MG/1
40 TABLET, EXTENDED RELEASE ORAL PRN
Status: DISCONTINUED | OUTPATIENT
Start: 2024-08-14 | End: 2024-08-21 | Stop reason: HOSPADM

## 2024-08-14 RX ORDER — POLYETHYLENE GLYCOL 3350 17 G/17G
17 POWDER, FOR SOLUTION ORAL DAILY PRN
Status: DISCONTINUED | OUTPATIENT
Start: 2024-08-14 | End: 2024-08-21 | Stop reason: HOSPADM

## 2024-08-14 RX ORDER — IPRATROPIUM BROMIDE AND ALBUTEROL SULFATE 2.5; .5 MG/3ML; MG/3ML
1 SOLUTION RESPIRATORY (INHALATION) EVERY 4 HOURS PRN
Status: DISCONTINUED | OUTPATIENT
Start: 2024-08-14 | End: 2024-08-21 | Stop reason: HOSPADM

## 2024-08-14 RX ADMIN — ASPIRIN 81 MG: 81 TABLET, COATED ORAL at 19:08

## 2024-08-14 RX ADMIN — MORPHINE SULFATE 4 MG: 4 INJECTION, SOLUTION INTRAMUSCULAR; INTRAVENOUS at 13:23

## 2024-08-14 RX ADMIN — SODIUM CHLORIDE: 9 INJECTION, SOLUTION INTRAVENOUS at 23:01

## 2024-08-14 RX ADMIN — DOCUSATE SODIUM 100 MG: 100 CAPSULE, LIQUID FILLED ORAL at 20:52

## 2024-08-14 RX ADMIN — SODIUM CHLORIDE 1000 ML: 9 INJECTION, SOLUTION INTRAVENOUS at 13:24

## 2024-08-14 RX ADMIN — AMLODIPINE BESYLATE 10 MG: 10 TABLET ORAL at 19:08

## 2024-08-14 RX ADMIN — HYDROMORPHONE HYDROCHLORIDE 0.5 MG: 1 INJECTION, SOLUTION INTRAMUSCULAR; INTRAVENOUS; SUBCUTANEOUS at 22:27

## 2024-08-14 RX ADMIN — HYDROMORPHONE HYDROCHLORIDE 1 MG: 1 INJECTION, SOLUTION INTRAMUSCULAR; INTRAVENOUS; SUBCUTANEOUS at 15:17

## 2024-08-14 RX ADMIN — GABAPENTIN 100 MG: 100 CAPSULE ORAL at 20:52

## 2024-08-14 RX ADMIN — CILOSTAZOL 100 MG: 100 TABLET ORAL at 22:59

## 2024-08-14 RX ADMIN — SODIUM CHLORIDE: 9 INJECTION, SOLUTION INTRAVENOUS at 19:07

## 2024-08-14 RX ADMIN — SODIUM CHLORIDE, PRESERVATIVE FREE 10 ML: 5 INJECTION INTRAVENOUS at 20:51

## 2024-08-14 RX ADMIN — Medication 2000 MG: at 22:59

## 2024-08-14 RX ADMIN — ATORVASTATIN CALCIUM 20 MG: 20 TABLET, FILM COATED ORAL at 20:52

## 2024-08-14 RX ADMIN — ENOXAPARIN SODIUM 40 MG: 100 INJECTION SUBCUTANEOUS at 19:08

## 2024-08-14 ASSESSMENT — PAIN SCALES - GENERAL
PAINLEVEL_OUTOF10: 10
PAINLEVEL_OUTOF10: 6
PAINLEVEL_OUTOF10: 10
PAINLEVEL_OUTOF10: 7
PAINLEVEL_OUTOF10: 9
PAINLEVEL_OUTOF10: 10
PAINLEVEL_OUTOF10: 8

## 2024-08-14 ASSESSMENT — PAIN DESCRIPTION - ORIENTATION
ORIENTATION: LEFT

## 2024-08-14 ASSESSMENT — PAIN DESCRIPTION - DESCRIPTORS
DESCRIPTORS: ACHING;DISCOMFORT
DESCRIPTORS: ACHING;CRUSHING;DISCOMFORT
DESCRIPTORS: ACHING;DISCOMFORT
DESCRIPTORS: ACHING;DISCOMFORT
DESCRIPTORS: BURNING;PRESSURE;SHARP

## 2024-08-14 ASSESSMENT — PAIN DESCRIPTION - LOCATION
LOCATION: FOOT

## 2024-08-14 ASSESSMENT — PAIN DESCRIPTION - PAIN TYPE: TYPE: ACUTE PAIN

## 2024-08-14 ASSESSMENT — PAIN - FUNCTIONAL ASSESSMENT
PAIN_FUNCTIONAL_ASSESSMENT: 0-10
PAIN_FUNCTIONAL_ASSESSMENT: PREVENTS OR INTERFERES WITH MANY ACTIVE NOT PASSIVE ACTIVITIES
PAIN_FUNCTIONAL_ASSESSMENT: 0-10

## 2024-08-14 NOTE — ED PROVIDER NOTES
[facility]     Emergency Department     Faculty Attestation  1:03 PM EDT      I performed a history and physical examination of the patient and discussed management with the resident. I have reviewed and agree with the resident’s findings including all diagnostic interpretations, and treatment plans as written. Any areas of disagreement are noted on the chart. I was personally present for the key portions of any procedures. I have documented in the chart those procedures where I was not present during the key portions. I have reviewed the emergency nurses triage note. I agree with the chief complaint, past medical history, past surgical history, allergies, medications, social and family history as documented unless otherwise noted below. Documentation of the HPI, Physical Exam and Medical Decision Making performed by scribes is based on my personal performance of the HPI, PE and MDM. For Physician Assistant/ Nurse Practitioner cases/documentation I have personally evaluated this patient and have completed at least one if not all key elements of the E/M (history, physical exam, and MDM). Additional findings are as noted.    Patient with left hallux amputation on 7/31, with continued pain, and foul smell to wound, patient comes in    On exam amputation site, with foul smelling greyish and dark colored layer over the wound, proximal ulceration that extends deeper.  Very tender to palpation, erythema noted on the 2nd and 3rd tow with tender to this region as well      ?infection, may need additional debridement, will check labs, and inflammatory markers, picture to be added to chart. And will consult with vascular team       Hui Vidal D.O, M.P.H  Attending Emergency Medicine Physician         Hui Vidal, DO  08/14/24 4788

## 2024-08-14 NOTE — ED NOTES
Pt came into the ed via triage due to having left foot pain  Left foot has a wound   PT Is Aox4 and ambulatory   Pt has no other C/O but stated he thinks he needs to talk to social work to go to a rehab facility   RR are equal and regular   Pt can move both feet and has feeling in both feet

## 2024-08-14 NOTE — CONSULTS
Division of Vascular Surgery          Vascular Consult      Name: Silas Shrestha  MRN: [id number]     8/14/2024  5:36 PM    Physician Requesting Consult: Dr. Whitaker    Reason for Consult:   Gangrene of previous amputation wound and rest pain left foot    Chief Complaint:     Rest pain left foot s/p bypass surgery and left hallux ray amputation    History of Present Illness:      Silas Shrestha is a 69 y.o.  male with medical history of cirrhosis of liver, PAD s/p left femoral to posterior tibial bypass in April 2024 followed by rest pain in left foot and gangrene of left hallux for which patient received left hallux ray amputation.  Who presents with complaint of rest pain in left foot along with nonhealing of left foot wound since the surgery.  Patient was given a wound VAC application with home care which he has been following religiously.  His restless is persistent and gets relieved with some medication however it re appears after some time.  No complaint of any discharge from the wound, fever, chills, shortness of breath, chest pain.    Past Medical History:     Past Medical History:   Diagnosis Date    Alcoholism (HCC)     Cirrhosis of liver (HCC) 03/2015    Claudication (HCC) 06/2015    left leg    COPD (chronic obstructive pulmonary disease) (HCC) 08/2022    COVID-19 vaccine administered     Depression     Finger fracture, left     LONG FINGER    Foot pain, left     Gangrene (HCC)     left great toe after clipping toenails    Hx of blood clots     LEFT LOWER LEG UNSURE OF THIS AT THIS TIME, IS TO BE SEEING VASCULAR DR FOR THIS.     Hx of hepatitis C     Hx of pancreatitis     6/2013    Hx of tuberculosis     Hyperlipidemia     Hypertension     Impaired mobility     uses cane and walker    Intermittent claudication (HCC)     Liver disease     PAD (peripheral artery disease) (HCC)     PVD (peripheral vascular disease) (HCC)     Swelling     tadeo feet    Ulcer of foot (HCC)     left great toe    Under

## 2024-08-14 NOTE — PROGRESS NOTES
Physician Progress Note      PATIENT:               ASHANTI MELGOZA  CSN #:                  [id number]  :                       1955  ADMIT DATE:       2024 10:56 AM  DISCH DATE:        2024 3:59 PM  RESPONDING  PROVIDER #:        Arthur DELOS REYES MD          QUERY TEXT:    Pt admitted with LLE gangrene and underwent left femoral to tibial bypass   2024 with graft and amputation of the left great toe 2024, noted   documentation of dry gangrene. Packing again for amputation of the left great   toe and this area appears to be dry gangrene. Per H and P on 7/10/2024, Ray   amputation and possibly TMA. If that fails as well he will need a below-knee   amputation and this is mostly because of small vessel disease. If possible,   please document in progress notes and discharge summary the relationship if   any between the gangrene and the surgery:  ?  The medical record reflects the following:  Risk Factors: PVD, PAD, HTN  Clinical Indicators: underwent left femoral to tibial bypass 2024 with graft   and amputation of the left great toe 2024, noted documentation of dry   gangrene. Packing again for amputation of the left great toe and this area   appears to be dry gangrene. Per H & P on 7/10/2024, Ray amputation and   possibly TMA. If that fails as well he will need a below-knee amputation and   this is mostly because of small vessel disease.  Treatment: Ray amputation of hallux. Pain medications, Negative pressure wound   therapy to continue after discharge.    Thank you for your time, Soledad PADRON, RN CDS. If you have questions, please   call [phone] (M-F 7a-3p).  Options provided:  -- Gangrene is a complication due to the Femoral-tibial bypass  -- Gangrene is a complication due to the left great toe amputation  -- Gangrene is not related to either Femoral-tibial bypass or left great toe   amputation  -- Other - I will add my own diagnosis  -- Disagree - Not applicable / Not valid  --

## 2024-08-14 NOTE — ED PROVIDER NOTES
and Toe amputation (Left, 2024).      Social History     Socioeconomic History    Marital status:      Spouse name: Not on file    Number of children: Not on file    Years of education: Not on file    Highest education level: Not on file   Occupational History    Not on file   Tobacco Use    Smoking status: Former     Current packs/day: 0.00     Average packs/day: 0.5 packs/day for 35.0 years (17.5 ttl pk-yrs)     Types: Cigarettes     Start date: 1980     Quit date: 2015     Years since quittin.7    Smokeless tobacco: Never   Vaping Use    Vaping status: Never Used   Substance and Sexual Activity    Alcohol use: No     Alcohol/week: 0.0 standard drinks of alcohol     Comment: QUIT 2016    Drug use: Yes     Frequency: 7.0 times per week     Types: Marijuana (Weed)     Comment: last smoked 2024    Sexual activity: Not Currently   Other Topics Concern    Not on file   Social History Narrative    Not on file     Social Determinants of Health     Financial Resource Strain: Low Risk  (2024)    Overall Financial Resource Strain (CARDIA)     Difficulty of Paying Living Expenses: Not hard at all   Food Insecurity: No Food Insecurity (2024)    Hunger Vital Sign     Worried About Running Out of Food in the Last Year: Never true     Ran Out of Food in the Last Year: Never true   Transportation Needs: Unmet Transportation Needs (2024)    PRAPARE - Transportation     Lack of Transportation (Medical): Yes     Lack of Transportation (Non-Medical): No   Physical Activity: Inactive (2024)    Exercise Vital Sign     Days of Exercise per Week: 0 days     Minutes of Exercise per Session: 0 min   Stress: Not on file   Social Connections: Not on file   Intimate Partner Violence: Not on file   Housing Stability: Low Risk  (2024)    Housing Stability Vital Sign     Unable to Pay for Housing in the Last Year: No     Number of Places Lived in the Last Year: 1     Unstable Housing in the  and oriented to person, place, and time.           DDX/DIAGNOSTIC RESULTS / EMERGENCY DEPARTMENT COURSE / MDM     Medical Decision Making  Amount and/or Complexity of Data Reviewed  Labs: ordered. Decision-making details documented in ED Course.  Radiology: ordered.    Risk  Prescription drug management.  Decision regarding hospitalization.    69-year-old male presenting with foot pain secondary to gangrenous after his recent left hallux amputation on 7/31/2024.  Patient [place] any fever/chills, has been having increase in pain.  Will obtain septic lab workup-CBC, CMP, ESR/CRP, 1 L normal saline, foot x-ray to rule out osteomyelitis followed by vascular surgery consult.  Anticipate admission  EKG      All EKG's are interpreted by the Emergency Department Physician who either signs or Co-signs this chart in the absence of a cardiologist.    EMERGENCY DEPARTMENT COURSE:      ED Course as of 08/14/24 1943   Wed Aug 14, 2024   1348 CBC with Auto Differential(!):    WBC 10.0   RBC 3.58(!)   Hemoglobin Quant 12.4(!)   Hematocrit 38.6(!)   .8(!)   MCH 34.6(!)   MCHC 32.1   RDW 13.5   Platelet Count 349   MPV 9.6   NRBC Automated 0.0   Neutrophils % 71(!)   Lymphocyte % 21(!)   Monocytes % 7   Eosinophils % 1   Basophils % 0   Immature Granulocytes % 0   Neutrophils Absolute 7.08   Lymphocytes Absolute 2.11   Monocytes Absolute 0.66   Eosinophils Absolute 0.08   Basophils Absolute <0.03   Immature Granulocytes Absolute 0.04   RBC Morphology MACROCYTOSIS PRESENT [AS]   1348 Lactate, Sepsis(!):    Lactic Acid, Sepsis, Whole Blood 2.6(!) [AS]   1517 XR FOOT LEFT (MIN 3 VIEWS)  Status post 1st transmetatarsal amputation     No acute erosive changes identified   [AS]   1525 [facility] agreed to admit patient as primary.  Vascular surgery to plan left foot amputation tomorrow. [AS]      ED Course User Index  [AS] Richard Tam MD       PROCEDURES:      CONSULTS:  IP CONSULT TO VASCULAR SURGERY  IP CONSULT TO

## 2024-08-14 NOTE — ED NOTES
ED to inpatient nurses report      Chief Complaint:  Chief Complaint   Patient presents with    Foot Pain    Wound Check     Present to ED from: home     MOA:     LOC: alert and orientated to name, place, date  Mobility: Requires assistance * 1  Oxygen Baseline: room air     Current needs required: pain management    Pending ED orders: none   Present condition: stable     Why did the patient come to the ED? Pt came into the ed via triage due to having left foot pain  Left foot has a wound   PT Is Aox4 and ambulatory   Pt has no other C/O but stated he thinks he needs to talk to social work to go to a rehab facility   RR are equal and regular   Pt can move both feet and has feeling in both feet   What is the plan? Admitted   Any procedures or intervention occur? Pain meds labs   Any safety concerns??no     Mental Status:  Level of Consciousness: Alert (0)    Psych Assessment:   Psychosocial  Psychosocial (WDL): Within Defined Limits  Vital signs   Vitals:    08/14/24 1251 08/14/24 1616   BP: (!) 154/95 (!) 155/83   Pulse: 90 66   Resp: 17 18   Temp: 98.2 °F (36.8 °C)    TempSrc: Oral    SpO2: 98% 96%   Weight: 69.4 kg (153 lb)    Height: 1.778 m (5' 10\")         Vitals:  Patient Vitals for the past 24 hrs:   BP Temp Temp src Pulse Resp SpO2 Height Weight   08/14/24 1616 (!) 155/83 -- -- 66 18 96 % -- --   08/14/24 1251 (!) 154/95 98.2 °F (36.8 °C) Oral 90 17 98 % 1.778 m (5' 10\") 69.4 kg (153 lb)      Visit Vitals  BP (!) 155/83   Pulse 66   Temp 98.2 °F (36.8 °C) (Oral)   Resp 18   Ht 1.778 m (5' 10\")   Wt 69.4 kg (153 lb)   SpO2 96%   BMI 21.95 kg/m²        LDAs:   Peripheral IV 08/14/24 Left Antecubital (Active)   Site Assessment Clean, dry & intact 08/14/24 1305   Phlebitis Assessment No symptoms 08/14/24 1305   Infiltration Assessment 0 08/14/24 1305       Ambulatory Status:  No data recorded    Diagnosis:  DISPOSITION Admitted 08/14/2024 03:10:36 PM   Final diagnoses:   None        Consults:  IP CONSULT TO  FEMORAL BYPASS  04/08/2024    FEMORAL BYPASS Left 04/08/2024    LOWER EXTREMITY FEMORAL TO TIBIAL BYPASS WITH PROPATIN GRAFT, IPSILATERAL SUPERIFICAL SAPHENOUS VEIN HARVEST, LOWER EXTREMITY ANGIOGRAM COMPLETION performed by Delos Reyes, Arthur, MD at [facility]    FINGER AMPUTATION Left 11/28/2016    revision long finger    LIVER BIOPSY      OTHER SURGICAL HISTORY  02/14/2017    Abdomen with run off with Dr. Smith - could not pass left left pop; # 7 FR manual pull right groin    OTHER SURGICAL HISTORY  04/21/2023    LE Angiogram    TOE AMPUTATION Left 6/21/2024    LEFT GREAT TOE AMPUTATION performed by Delos Reyes, Arthur, MD at [facility]    TOE AMPUTATION Left 7/31/2024    RAY AMPUTATION OF HALLUX, APPLICATION OF WOUND VAC performed by Delos Reyes, Arthur, MD at [facility]    UPPER GASTROINTESTINAL ENDOSCOPY      VASCULAR SURGERY      abdominal aortogram    VASCULAR SURGERY Left 04/21/2023    LEFT LOWER EXTREMITY ANGIOGRAM WITH LEFT DISTAL SFA  AND ABOVE KNEE POPLITEAL ARTERY KAYLYN  STENT 6MM X 120MM X 130CM performed by Arthur Delos Reyes, MD at [facility]    VASCULAR SURGERY Left 03/21/2024    LEFT LOWER EXTREMITY ANGIOGRAM performed by Delos Reyes, Arthur, MD at [facility]    VASCULAR SURGERY Left 06/21/2024    Great toe amputation       PAST MEDICAL HISTORY       Past Medical History:   Diagnosis Date    Alcoholism (HCC)     Cirrhosis of liver (HCC) 03/2015    Claudication (HCC) 06/2015    left leg    COPD (chronic obstructive pulmonary disease) (HCC) 08/2022    COVID-19 vaccine administered     Depression     Finger fracture, left     LONG FINGER    Foot pain, left     Gangrene (HCC)     left great toe after clipping toenails    Hx of blood clots     LEFT LOWER LEG UNSURE OF THIS AT THIS TIME, IS TO BE SEEING VASCULAR DR FOR THIS.     Hx of hepatitis C     Hx of pancreatitis     6/2013    Hx of tuberculosis     Hyperlipidemia     Hypertension     Impaired mobility     uses cane and walker    Intermittent

## 2024-08-15 ENCOUNTER — ANESTHESIA EVENT (OUTPATIENT)
Dept: OPERATING ROOM | Age: 69
End: 2024-08-15
Payer: MEDICARE

## 2024-08-15 ENCOUNTER — ANESTHESIA (OUTPATIENT)
Dept: OPERATING ROOM | Age: 69
End: 2024-08-15
Payer: MEDICARE

## 2024-08-15 ENCOUNTER — CARE COORDINATION (OUTPATIENT)
Dept: CARE COORDINATION | Age: 69
End: 2024-08-15

## 2024-08-15 PROBLEM — Z89.412: Status: ACTIVE | Noted: 2024-08-15

## 2024-08-15 LAB
ANION GAP SERPL CALCULATED.3IONS-SCNC: 9 MMOL/L (ref 9–16)
BASOPHILS # BLD: <0.03 K/UL (ref 0–0.2)
BASOPHILS NFR BLD: 0 % (ref 0–2)
BUN SERPL-MCNC: 10 MG/DL (ref 8–23)
CALCIUM SERPL-MCNC: 9.2 MG/DL (ref 8.6–10.4)
CHLORIDE SERPL-SCNC: 107 MMOL/L (ref 98–107)
CO2 SERPL-SCNC: 22 MMOL/L (ref 20–31)
CREAT SERPL-MCNC: 0.9 MG/DL (ref 0.7–1.2)
EOSINOPHIL # BLD: 0.32 K/UL (ref 0–0.44)
EOSINOPHILS RELATIVE PERCENT: 3 % (ref 1–4)
ERYTHROCYTE [DISTWIDTH] IN BLOOD BY AUTOMATED COUNT: 13.7 % (ref 11.8–14.4)
ERYTHROCYTE [SEDIMENTATION RATE] IN BLOOD BY PHOTOMETRIC METHOD: 44 MM/HR (ref 0–20)
GFR, ESTIMATED: >90 ML/MIN/1.73M2
GLUCOSE SERPL-MCNC: 112 MG/DL (ref 74–99)
HCT VFR BLD AUTO: 32 % (ref 40.7–50.3)
HGB BLD-MCNC: 10.3 G/DL (ref 13–17)
IMM GRANULOCYTES # BLD AUTO: 0.04 K/UL (ref 0–0.3)
IMM GRANULOCYTES NFR BLD: 0 %
LYMPHOCYTES NFR BLD: 2.25 K/UL (ref 1.1–3.7)
LYMPHOCYTES RELATIVE PERCENT: 24 % (ref 24–43)
MCH RBC QN AUTO: 35.3 PG (ref 25.2–33.5)
MCHC RBC AUTO-ENTMCNC: 32.2 G/DL (ref 28.4–34.8)
MCV RBC AUTO: 109.6 FL (ref 82.6–102.9)
MONOCYTES NFR BLD: 0.72 K/UL (ref 0.1–1.2)
MONOCYTES NFR BLD: 8 % (ref 3–12)
NEUTROPHILS NFR BLD: 65 % (ref 36–65)
NEUTS SEG NFR BLD: 6.1 K/UL (ref 1.5–8.1)
NRBC BLD-RTO: 0 PER 100 WBC
PLATELET # BLD AUTO: 358 K/UL (ref 138–453)
PMV BLD AUTO: 9.2 FL (ref 8.1–13.5)
POTASSIUM SERPL-SCNC: 4.1 MMOL/L (ref 3.7–5.3)
PROCALCITONIN SERPL-MCNC: 0.1 NG/ML (ref 0–0.09)
RBC # BLD AUTO: 2.92 M/UL (ref 4.21–5.77)
RBC # BLD: ABNORMAL 10*6/UL
SODIUM SERPL-SCNC: 138 MMOL/L (ref 136–145)
WBC OTHER # BLD: 9.4 K/UL (ref 3.5–11.3)

## 2024-08-15 PROCEDURE — 80048 BASIC METABOLIC PNL TOTAL CA: CPT

## 2024-08-15 PROCEDURE — 6360000002 HC RX W HCPCS: Performed by: NURSE PRACTITIONER

## 2024-08-15 PROCEDURE — 99222 1ST HOSP IP/OBS MODERATE 55: CPT | Performed by: INTERNAL MEDICINE

## 2024-08-15 PROCEDURE — 6370000000 HC RX 637 (ALT 250 FOR IP): Performed by: INTERNAL MEDICINE

## 2024-08-15 PROCEDURE — 99211 OFF/OP EST MAY X REQ PHY/QHP: CPT

## 2024-08-15 PROCEDURE — 85025 COMPLETE CBC W/AUTO DIFF WBC: CPT

## 2024-08-15 PROCEDURE — 99223 1ST HOSP IP/OBS HIGH 75: CPT | Performed by: SURGERY

## 2024-08-15 PROCEDURE — 6370000000 HC RX 637 (ALT 250 FOR IP): Performed by: NURSE PRACTITIONER

## 2024-08-15 PROCEDURE — 6360000002 HC RX W HCPCS: Performed by: INTERNAL MEDICINE

## 2024-08-15 PROCEDURE — 84145 PROCALCITONIN (PCT): CPT

## 2024-08-15 PROCEDURE — 85652 RBC SED RATE AUTOMATED: CPT

## 2024-08-15 PROCEDURE — 1200000000 HC SEMI PRIVATE

## 2024-08-15 PROCEDURE — 99232 SBSQ HOSP IP/OBS MODERATE 35: CPT | Performed by: INTERNAL MEDICINE

## 2024-08-15 PROCEDURE — 2580000003 HC RX 258: Performed by: INTERNAL MEDICINE

## 2024-08-15 PROCEDURE — 36415 COLL VENOUS BLD VENIPUNCTURE: CPT

## 2024-08-15 RX ORDER — OXYCODONE HYDROCHLORIDE 5 MG/1
10 TABLET ORAL EVERY 4 HOURS PRN
Status: DISCONTINUED | OUTPATIENT
Start: 2024-08-15 | End: 2024-08-21 | Stop reason: HOSPADM

## 2024-08-15 RX ADMIN — ATORVASTATIN CALCIUM 20 MG: 20 TABLET, FILM COATED ORAL at 20:50

## 2024-08-15 RX ADMIN — ACETAMINOPHEN 650 MG: 325 TABLET ORAL at 20:50

## 2024-08-15 RX ADMIN — SODIUM CHLORIDE, PRESERVATIVE FREE 10 ML: 5 INJECTION INTRAVENOUS at 20:50

## 2024-08-15 RX ADMIN — HYDROMORPHONE HYDROCHLORIDE 0.5 MG: 1 INJECTION, SOLUTION INTRAMUSCULAR; INTRAVENOUS; SUBCUTANEOUS at 08:16

## 2024-08-15 RX ADMIN — CEFEPIME 2000 MG: 2 INJECTION, POWDER, FOR SOLUTION INTRAVENOUS at 12:06

## 2024-08-15 RX ADMIN — CILOSTAZOL 100 MG: 100 TABLET ORAL at 08:19

## 2024-08-15 RX ADMIN — GABAPENTIN 100 MG: 100 CAPSULE ORAL at 08:16

## 2024-08-15 RX ADMIN — AMLODIPINE BESYLATE 10 MG: 10 TABLET ORAL at 08:16

## 2024-08-15 RX ADMIN — SERTRALINE HYDROCHLORIDE 100 MG: 50 TABLET ORAL at 08:16

## 2024-08-15 RX ADMIN — GABAPENTIN 100 MG: 100 CAPSULE ORAL at 20:50

## 2024-08-15 RX ADMIN — ASPIRIN 81 MG: 81 TABLET, COATED ORAL at 08:16

## 2024-08-15 RX ADMIN — GABAPENTIN 100 MG: 100 CAPSULE ORAL at 13:07

## 2024-08-15 RX ADMIN — SODIUM CHLORIDE 1500 MG: 9 INJECTION, SOLUTION INTRAVENOUS at 13:05

## 2024-08-15 RX ADMIN — HYDROMORPHONE HYDROCHLORIDE 0.5 MG: 1 INJECTION, SOLUTION INTRAMUSCULAR; INTRAVENOUS; SUBCUTANEOUS at 02:46

## 2024-08-15 RX ADMIN — PANTOPRAZOLE SODIUM 40 MG: 40 TABLET, DELAYED RELEASE ORAL at 05:30

## 2024-08-15 RX ADMIN — Medication 2000 MG: at 05:30

## 2024-08-15 RX ADMIN — OXYCODONE HYDROCHLORIDE 10 MG: 5 TABLET ORAL at 13:07

## 2024-08-15 RX ADMIN — DOCUSATE SODIUM 100 MG: 100 CAPSULE, LIQUID FILLED ORAL at 08:16

## 2024-08-15 RX ADMIN — CILOSTAZOL 100 MG: 100 TABLET ORAL at 20:50

## 2024-08-15 RX ADMIN — DOCUSATE SODIUM 100 MG: 100 CAPSULE, LIQUID FILLED ORAL at 20:50

## 2024-08-15 RX ADMIN — VANCOMYCIN HYDROCHLORIDE 1000 MG: 1 INJECTION, POWDER, LYOPHILIZED, FOR SOLUTION INTRAVENOUS at 22:24

## 2024-08-15 ASSESSMENT — PAIN SCALES - GENERAL
PAINLEVEL_OUTOF10: 10
PAINLEVEL_OUTOF10: 5
PAINLEVEL_OUTOF10: 9
PAINLEVEL_OUTOF10: 7
PAINLEVEL_OUTOF10: 7
PAINLEVEL_OUTOF10: 3
PAINLEVEL_OUTOF10: 7

## 2024-08-15 ASSESSMENT — PAIN DESCRIPTION - ORIENTATION
ORIENTATION: LEFT

## 2024-08-15 ASSESSMENT — PAIN DESCRIPTION - DESCRIPTORS
DESCRIPTORS: ACHING;DISCOMFORT
DESCRIPTORS: DISCOMFORT

## 2024-08-15 ASSESSMENT — PAIN DESCRIPTION - LOCATION
LOCATION: FOOT

## 2024-08-15 ASSESSMENT — COPD QUESTIONNAIRES: CAT_SEVERITY: NO INTERVAL CHANGE

## 2024-08-15 ASSESSMENT — PAIN SCALES - WONG BAKER
WONGBAKER_NUMERICALRESPONSE: NO HURT
WONGBAKER_NUMERICALRESPONSE: NO HURT

## 2024-08-15 NOTE — PROGRESS NOTES
Wound ostomy eval and treat order noted. Discussed with primary RN. Patient is scheduled for amputation tomorrow. The foot wound is not salvageable per the vascular surgeon. Dry gauze dressing in place appropriate pending OR tomorrow.

## 2024-08-15 NOTE — PROGRESS NOTES
[facility]  Office: [phone]  Abdon Gonzalez DO, Kwame Lin DO, Vince Whitaker DO, Gabo Whitaker DO, Tawanna Orozco MD, Lashon Carcamo MD, Bulmaro Rodriguez MD, nAisa Winkler MD,  Yrn Leija MD, Ana Devlin MD, Jorge Alberto Clinton MD,  Armando Chandler DO, Jesusita eBatty MD, Marcel Lim MD, Ronnie Gonzalez DO, Renuka Aparicio MD,  Trevor Barlow DO, Mary Weeks MD, Jaye Bragg MD, Vicky Foster MD, Malia Weems MD,  Jeff Llamas MD, Hernandez Douglas MD, Vidya Gavin MD, Yonatan Arias MD, Lemuel Leal MD, Rancho Vega MD, Jose Roberto Jacobo DO, Christopher Omer DO, Zach Camacho MD,  Ang Gordon MD, Shirley Waterhouse, CNP,  Hattie Grissom, CNP, Alex Meade, CNP,  Lupe Burger, DNP, Gris Ley, CNP, Gem Garza, CNP, Minoo Nicole CNP, Fatou Pacheco, CNP, Kayleen Avila, PA-C, Marisela Ocampo PA-C, Emily Martinez, CNP, Ori Cameron, CNP, Filomena Murphy, CNP, Tabatha Santoyo, CNP, Marine Harmon, CNS, Isabella Claros, CNP, Gabriela Pan CNP, Tracy Schwab, CNP         [facility]   IN-PATIENT SERVICE   [facility]    Progress Note    8/15/2024    2:59 PM    Name:   Silas Shrestha  MRN:     [id number]     Acct:      [id number]   Room:   0233/0233-01   Day:  1  Admit Date:  8/14/2024 12:52 PM    PCP:   Brina Rizo MD  Code Status:  Full Code    Subjective:     No acute events overnight. Surgery canceled this morning because patient asked for second opinion prior to amputation. He has noticed increase in odor as well as purulent discharge and pain in the foot.      Brief History:     61-year-old male who presented to the hospital for evaluation of nonhealing foot wound with gangrene    Medications:     Allergies:  No Known Allergies    Current Meds:   Scheduled Meds:    cefepime  2,000 mg IntraVENous Q12H    vancomycin (VANCOCIN) intermittent dosing (placeholder)   Other RX Placeholder    vancomycin  1,000 mg IntraVENous Q12H

## 2024-08-15 NOTE — PLAN OF CARE
Problem: Discharge Planning  Goal: Discharge to home or other facility with appropriate resources  Outcome: Progressing  Flowsheets (Taken 8/14/2024 2120)  Discharge to home or other facility with appropriate resources: Identify barriers to discharge with patient and caregiver     Problem: Pain  Goal: Verbalizes/displays adequate comfort level or baseline comfort level  Outcome: Progressing  Flowsheets (Taken 8/14/2024 2120)  Verbalizes/displays adequate comfort level or baseline comfort level: Encourage patient to monitor pain and request assistance     Problem: Safety - Adult  Goal: Free from fall injury  Outcome: Progressing  Flowsheets (Taken 8/14/2024 2120)  Free From Fall Injury: Instruct family/caregiver on patient safety

## 2024-08-15 NOTE — ANESTHESIA PRE PROCEDURE
disease: portal hypertension          Endo/Other:    (+) blood dyscrasia: anemia:..                 Abdominal: normal exam            Vascular: negative vascular ROS.         Other Findings:       Anesthesia Plan      general and regional     ASA 3       Induction: intravenous.      Anesthetic plan and risks discussed with patient.    Use of blood products discussed with patient whom consented to blood products.    Plan discussed with CRNA.                Cindi Wiseman MD   8/15/2024

## 2024-08-15 NOTE — CONSULTS
[facility] -   Infectious diseases evaluation  admission date 8/14/2024    reason for consultation:   Gangrene     Impression :   Current:  Left foot non-healing gangrenous  surgical wound   S/p Left femoral to post tibial bypass - April 2024 - followed by resting pain and gangrene off the left hallux   Amputation of left hallux - 21 June 2024   Ray amputation in July 2024   Wound managed with VAC application at home - no improvement and ongoing pain at rest   Vascular on board - planning BKA today     Peripheral vascular disease     Other:  HTN, Alcoholic cirrhosis, COPD   Discussion / summary of stay / plan of care/ Recommendations:   No fever. WBC, CRP and PCT unremarkable   BC - 8/14 - no growth so far     HENCE:   Keep cefepime and vanco till after surg and till closure of the guillotine      Infection Control Recommendations   [place] Precautions  Contact Isolation       Antimicrobial Stewardship Recommendations   Simplification of therapy  Targeted therapy      History of Present Illness:   Initial history:  Silas Shrestha is a 69 y.o.-year-old male who developed gangrene of his left big toe in April 2024 after clipping his toenail.  He underwent femoral to posterior tibial bypass in April 2024.  He continued to have rest pain over the area and needed amputation of the big headaches in June 2024.  Pain in the gangrene continued and therefore he had reimplantation in July 2024.    The surgical wound area has not healed despite dressing with VAC regularly at home.  There is no erythema or inflammation surrounding the wound.  There is granulation tissue and some discharge from the wound.    Vascular surgery is on board and is pending below-knee amputation.    Interval changes  8/15/2024   Patient Vitals for the past 8 hrs:   BP Temp Temp src Pulse Resp SpO2   08/15/24 0846 -- -- -- -- 16 --   08/15/24 0708 (!) 164/76 98.5 °F (36.9 °C) Oral 67 11 98 %  packs/day: 0.00     Average packs/day: 0.5 packs/day for 35.0 years (17.5 ttl pk-yrs)     Types: Cigarettes     Start date: 1980     Quit date: 2015     Years since quittin.7    Smokeless tobacco: Never   Vaping Use    Vaping status: Never Used   Substance and Sexual Activity    Alcohol use: No     Alcohol/week: 0.0 standard drinks of alcohol     Comment: QUIT 2016    Drug use: Yes     Frequency: 7.0 times per week     Types: Marijuana (Weed)     Comment: last smoked 2024    Sexual activity: Not Currently   Other Topics Concern    Not on file   Social History Narrative    Not on file     Social Determinants of Health     Financial Resource Strain: Low Risk  (2024)    Overall Financial Resource Strain (CARDIA)     Difficulty of Paying Living Expenses: Not hard at all   Food Insecurity: No Food Insecurity (2024)    Hunger Vital Sign     Worried About Running Out of Food in the Last Year: Never true     Ran Out of Food in the Last Year: Never true   Transportation Needs: Unmet Transportation Needs (2024)    PRAPARE - Transportation     Lack of Transportation (Medical): Yes     Lack of Transportation (Non-Medical): No   Physical Activity: Inactive (2024)    Exercise Vital Sign     Days of Exercise per Week: 0 days     Minutes of Exercise per Session: 0 min   Stress: Not on file   Social Connections: Not on file   Intimate Partner Violence: Not on file   Housing Stability: Low Risk  (2024)    Housing Stability Vital Sign     Unable to Pay for Housing in the Last Year: No     Number of Times Moved in the Last Year: 1     Homeless in the Last Year: No       Family History:     Family History   Problem Relation Age of Onset    High Blood Pressure Mother     Diabetes Father     Cancer Father     Heart Disease Father     Cancer Sister     High Blood Pressure Sister     Heart Disease Sister     Diabetes Brother       Medical Decision Making:   I have independently reviewed/ordered the

## 2024-08-15 NOTE — H&P
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        #JOCE - Baseline Cr ~0.9 mg/dL.  Cr peak to 5.2 mg/dL but has improved to 3.44 mg/dL.  Etiology of JOCE remains unclear.  Patient was exposed to a number of nephrotoxins (e.g. NSAID, contract, vanco) that could have caused tubular injury especially in the setting of hypotension and cytokine release.  Having said that, ICI-associated JOCE due to pembrolizumab, particularly AIN, remains a concern especially since given that the etiology of the his fevers and elevated inflammatory markers remain unclear.    #Hyponatremia - Mild and largely due to free water intake in the setting of JOCE.   #Fever & elevated inflammatory markers - Etiology remains unclear.  Infectious work-up unremarkable thus far.  CRP and fever now seem to be on a downward trend.    #Nasopharyngeal sqauamous cell carcinoma - Treated with chemotherapy (carboplatin, gemcitabine, radiation and modified lef nect dissection in 2022.  Due to increasing tumor burden, pembrolizumab initiated in Feb, 2023.  Cycle 10 completed on 10/11/23.  Cycle 11 planned for 11/1/2023 now on hold).   #Pancytopenia - Etiology remains unclear.  Perhaps related to pembrolizumab.     #Positive HIRAM - Titer 1:160.  C3 and C4 normal.  ANCA negative.  Positive HIRAM of unclear significance.     Plan:  -would continue with the renal biopsy regardless of whether kidney function improves as biopsy findings may affect management of pembrolizumab.  Even if AIN is present, patient could be rechallenged with pembrolizumab especially if undergoing a course of steroid therapy.   Having said that, rapid improvement in renal  [facility]  Office: [phone]  Abdon Gonzalez DO, Kwame Lin DO, Vince Whitaker DO, Gabo Whitaker DO, Tawanna Orozco MD, Lashon Carcamo MD, Bulmaro Rodriguez MD, Anisa Winkler MD,  Yrn Leija MD, Ana Devlin MD, Jorge Alberto Clinton MD,  Armando Chandler DO, Jesusita Beatty MD, Marcel Lim MD, Ronnie Gonzalez DO, Renuka Aparicio MD,  Trevor Barlow DO, Mary Weeks MD, Jaye Bragg MD, Vicky Foster MD, Malia Weems MD,  Jeff Llamas MD, Hernandez Douglas MD, Vidya Gavin MD, Yonatan Arias MD, Lemuel Leal MD, Rancho Vega MD, Jose Roberto Jacobo DO, Christopher Omer DO, Zach Camacho MD,  Ang Gordon MD, Shirley Waterhouse, CNP,  Hattie Grissom CNP, Alex Meade, CNP,  Lupe Burger, JAMI, Gris Ley, CNP, Gem Garza, CNP, Minoo Nicole CNP, Fatou Pacheco, CNP, Kayleen Avial, PA-C, Marisela Ocampo PA-C, Emily Martinez, CNP, Ori Cameron, CNP, Filomena Murphy, CNP, Tabatha Santoyo, CNP, Marine Harmon, CNS, Isabella Claros, CNP, Gabriela Pan CNP, Tracy Schwab, CNP         [facility]   IN-PATIENT SERVICE   [facility]    HISTORY AND PHYSICAL EXAMINATION            Date:   8/14/2024  Patient name:  Silas Shrestha  Date of admission:  8/14/2024 12:52 PM  MRN:   [id number]  Account:  [id number]  YOB: 1955  PCP:    Brina Rizo MD  Room:   [address] [place] [address]  Code Status:    Full Code    Chief Complaint:     Chief Complaint   Patient presents with    Foot Pain    Wound Check       History Obtained From:     Patient and medical records     History of Present Illness:     Silas Shrestha is a 69 y.o. AA male with pmh HTN, alcoholic cirrhosis , history of depression , history of PVD/PAD , history of COPD who presents with Foot Pain and Wound Check   and is admitted to the hospital for the management of evaluation of  left lower foot nonhealing wound. Mr Shrestha  s/p left femoral to posterior tibial bypass in April 2024 followed by  "function without steroids does suggest against AIN and favors ischemic/nephrotoxic ATN.    -currently steroids on hold per heme/onc  -infectious work-up underway but remarkable thus far  -consider obtaining dsDNA to f/u positive HIRAM though this not necessary from a renal standpoint  -continue to hold NSAID or other agents such as PPI that may also cause AIN especially in the setting of use with check point inhibitors  -no acute indications for dialysis.  Fortunately, kidney function appears to be improving.  Will continue to follow closely.        aTy Eastman MD   Division of Renal Disease and Hypertension  Pager: [phone]    Interval History :   Nursing and provider notes from last 24 hours reviewed.  Cr and inflammatory markers seem to be on a downward trend.  Fever curve also improving.  Patient attempt to drink a lot.  He reports decent urine output.  He denies chest pain, dyspnea, abdominal pain, nausea and vomiting.  He continues to be agreeable to undergoing a kidney biopsy.          Review of Systems:   A 10 point ROS was obtained and unremarkable other than that mentioned in the interval history.      Physical Exam:   I/O last 3 completed shifts:  In: 1660 [P.O.:1660]  Out: 1050 [Urine:1050]   BP 99/63 (BP Location: Left arm, Patient Position: Sitting, Cuff Size: Adult Large)   Pulse 88   Temp 98  F (36.7  C) (Oral)   Resp 16   Ht 1.778 m (5' 10\")   Wt 112.8 kg (248 lb 9.6 oz)   SpO2 95%   BMI 35.67 kg/m       GENERAL APPEARANCE: NAD  EYES:  no scleral icterus, pupils equal  PULM: lungs clear to auscultation bilaterally  CV: regular rhythm, normal rate, no rub     -No JVD appreciated     -trace-1+ LE edema   GI: soft, nontender, nondistended  : No CVA tenderness  INTEGUMENT: no concerning rash  NEURO:  no asterixis       Labs:   All labs reviewed by me  Electrolytes/Renal -   Recent Labs   Lab Test 11/05/23  1142 11/05/23  0731 11/05/23  0647 11/04/23  1130 11/04/23  0929 " 11/03/23 2018 11/03/23  1038 11/02/23  0645 11/01/23  0612 02/22/23  1333 01/30/23  1401 12/06/22  1154 12/06/22  0851 12/05/22  0714 11/16/22  1334   NA  --   --  132*  --  131*  --  133*   < > 133*   < > 142  --  138  --  137   POTASSIUM  --   --  3.9  --  3.6  --  3.9   < > 3.8   < > 3.6  --  3.8  --  3.8   CHLORIDE  --   --  100  --  99  --  101   < > 99   < > 101  --  103  --  96*   CO2  --   --  26  --  22  --  22   < > 23   < > 33*  --  27  --  31*   BUN  --   --  51.2*  --  51.2*  --  50.9*   < > 46.2*   < > 12.0  --  12.7  --  14.9   CR  --   --  3.44*  --  3.70*  --  4.20*   < > 5.19*   < > 0.81  --  0.76   < > 0.88   * 112* 108*   < > 188*   < > 140*   < > 115*   < > 71   < > 102*   < > 125*   LAI  --   --  8.0*  --  8.1*  --  8.4*   < > 8.2*   < > 9.2  --  9.0  --  9.3   MAG  --   --   --   --   --   --   --   --   --   --  1.8  --  1.9  --  1.8   PHOS  --   --   --   --   --   --  4.1  --  3.9  --   --   --  3.7  --   --     < > = values in this interval not displayed.       CBC -   Recent Labs   Lab Test 11/05/23  1214 11/05/23 0647 11/04/23 0929   WBC 4.9 4.6 4.6   HGB 8.3* 7.2* 8.1*   * 139* 132*       LFTs -   Recent Labs   Lab Test 11/05/23  0647 11/04/23  0929 11/03/23  1038 11/02/23  0645   ALKPHOS 86 84  --  81   BILITOTAL 0.3 0.3  --  0.4   ALT 16 13  --  17   AST 33 36  --  40   PROTTOTAL 6.0* 6.4  --  6.5   ALBUMIN 2.3* 2.3* 2.4* 2.6*       Iron Panel -   Recent Labs   Lab Test 11/02/23  0645 11/01/23  0743   IRON  --  14*   IRONSAT  --  11*   MARIA L 1,344* 1,722*         Imaging:  Reviewed by me.   10/31/2023 Renal U/S:  MEASUREMENTS:     Right renal length: 14 cm  Left renal length: 13 cm     RENAL FINDINGS: The kidneys are normal in size and echogenicity. There  is no hydronephrosis. No shadowing stones are seen.     BLADDER: The bladder is mildly distended and grossly unremarkable.                                                                      IMPRESSION:        Unremarkable 2-ultrasound of the kidneys.      Current Medications:   atorvastatin  20 mg Oral Daily    fluticasone-vilanterol  1 puff Inhalation Daily    heparin  5-10 mL Intracatheter Q28 Days    heparin lock flush  5-10 mL Intracatheter Q24H    insulin aspart  1-7 Units Subcutaneous TID AC    insulin aspart  1-5 Units Subcutaneous At Bedtime    levothyroxine  112 mcg Oral Daily    sodium chloride (PF)  10-20 mL Intracatheter Q28 Days       aTy Eastman MD   Pager: [phone]

## 2024-08-15 NOTE — PROGRESS NOTES
Occupational Therapy    [facility]  Occupational Therapy Not Seen Note    DATE: 8/15/2024    NAME: Silas Shrestha  MRN: [id number]   : 1955      Patient not seen this date for Occupational Therapy due to:    Surgery/Procedure: Pt awaiting L BKA 2024.     Next Scheduled Treatment: 2024    Electronically signed by LINDSEY Frazier on 8/15/2024 at 9:59 AM

## 2024-08-15 NOTE — PROGRESS NOTES
[facility]   Pharmacy Pharmacokinetic Monitoring Service - Vancomycin     Silas Shrestha is a 69 y.o. male starting on vancomycin therapy for gangrene of left foot. Pharmacy consulted by Armando Chandler  for monitoring and adjustment.    Target Concentration: Goal AUC/LANEY 400-600 mg*hr/L    Additional Antimicrobials: cefepime    Pertinent Laboratory Values:   Wt Readings from Last 1 Encounters:   08/14/24 69.4 kg (153 lb)     Temp Readings from Last 1 Encounters:   08/15/24 98.5 °F (36.9 °C) (Oral)     Estimated Creatinine Clearance: 87 mL/min (based on SCr of 0.8 mg/dL).  Recent Labs     08/14/24  1317 08/14/24  2221   CREATININE  --  0.8   BUN  --  8   WBC 10.0  --      Procalcitonin:     Pertinent Cultures:  Culture Date Source Results        MRSA Nasal Swab: N/A. Non-respiratory infection.    Plan:  Dosing recommendations based on Bayesian software  Start vancomycin 1000mg q12  Anticipated AUC of 484 and trough concentration of 12.9 at steady state  Renal labs as indicated   Pharmacy will continue to monitor patient and adjust therapy as indicated    Thank you for the consult,  Julio Cesar Machado [facility]  8/15/2024 10:19 AM

## 2024-08-16 PROCEDURE — 6370000000 HC RX 637 (ALT 250 FOR IP)

## 2024-08-16 PROCEDURE — 2709999900 HC NON-CHARGEABLE SUPPLY: Performed by: SURGERY

## 2024-08-16 PROCEDURE — 0Y6J0Z1 DETACHMENT AT LEFT LOWER LEG, HIGH, OPEN APPROACH: ICD-10-PCS | Performed by: SURGERY

## 2024-08-16 PROCEDURE — 6370000000 HC RX 637 (ALT 250 FOR IP): Performed by: INTERNAL MEDICINE

## 2024-08-16 PROCEDURE — 1200000000 HC SEMI PRIVATE

## 2024-08-16 PROCEDURE — 3600000004 HC SURGERY LEVEL 4 BASE: Performed by: SURGERY

## 2024-08-16 PROCEDURE — 6360000002 HC RX W HCPCS: Performed by: INTERNAL MEDICINE

## 2024-08-16 PROCEDURE — 2580000003 HC RX 258

## 2024-08-16 PROCEDURE — 2580000003 HC RX 258: Performed by: SURGERY

## 2024-08-16 PROCEDURE — 99232 SBSQ HOSP IP/OBS MODERATE 35: CPT | Performed by: INTERNAL MEDICINE

## 2024-08-16 PROCEDURE — 6370000000 HC RX 637 (ALT 250 FOR IP): Performed by: NURSE PRACTITIONER

## 2024-08-16 PROCEDURE — 6360000002 HC RX W HCPCS: Performed by: ANESTHESIOLOGY

## 2024-08-16 PROCEDURE — 3600000014 HC SURGERY LEVEL 4 ADDTL 15MIN: Performed by: SURGERY

## 2024-08-16 PROCEDURE — 2580000003 HC RX 258: Performed by: INTERNAL MEDICINE

## 2024-08-16 PROCEDURE — 6360000002 HC RX W HCPCS: Performed by: NURSE ANESTHETIST, CERTIFIED REGISTERED

## 2024-08-16 PROCEDURE — 6360000002 HC RX W HCPCS: Performed by: NURSE PRACTITIONER

## 2024-08-16 PROCEDURE — 2580000003 HC RX 258: Performed by: NURSE ANESTHETIST, CERTIFIED REGISTERED

## 2024-08-16 PROCEDURE — 64445 NJX AA&/STRD SCIATIC NRV IMG: CPT | Performed by: ANESTHESIOLOGY

## 2024-08-16 PROCEDURE — 7100000010 HC PHASE II RECOVERY - FIRST 15 MIN: Performed by: SURGERY

## 2024-08-16 PROCEDURE — 6360000002 HC RX W HCPCS

## 2024-08-16 PROCEDURE — 27880 AMPUTATION OF LOWER LEG: CPT | Performed by: SURGERY

## 2024-08-16 PROCEDURE — 3700000001 HC ADD 15 MINUTES (ANESTHESIA): Performed by: SURGERY

## 2024-08-16 PROCEDURE — 2500000003 HC RX 250 WO HCPCS: Performed by: NURSE ANESTHETIST, CERTIFIED REGISTERED

## 2024-08-16 PROCEDURE — 7100000000 HC PACU RECOVERY - FIRST 15 MIN: Performed by: SURGERY

## 2024-08-16 PROCEDURE — 3700000000 HC ANESTHESIA ATTENDED CARE: Performed by: SURGERY

## 2024-08-16 PROCEDURE — 7100000001 HC PACU RECOVERY - ADDTL 15 MIN: Performed by: SURGERY

## 2024-08-16 PROCEDURE — 88307 TISSUE EXAM BY PATHOLOGIST: CPT

## 2024-08-16 PROCEDURE — 64447 NJX AA&/STRD FEMORAL NRV IMG: CPT | Performed by: ANESTHESIOLOGY

## 2024-08-16 RX ORDER — LABETALOL HYDROCHLORIDE 5 MG/ML
INJECTION, SOLUTION INTRAVENOUS PRN
Status: DISCONTINUED | OUTPATIENT
Start: 2024-08-16 | End: 2024-08-16 | Stop reason: SDUPTHER

## 2024-08-16 RX ORDER — MORPHINE SULFATE 10 MG/ML
INJECTION, SOLUTION INTRAMUSCULAR; INTRAVENOUS PRN
Status: DISCONTINUED | OUTPATIENT
Start: 2024-08-16 | End: 2024-08-16 | Stop reason: SDUPTHER

## 2024-08-16 RX ORDER — ONDANSETRON 2 MG/ML
INJECTION INTRAMUSCULAR; INTRAVENOUS PRN
Status: DISCONTINUED | OUTPATIENT
Start: 2024-08-16 | End: 2024-08-16 | Stop reason: SDUPTHER

## 2024-08-16 RX ORDER — BUPIVACAINE HYDROCHLORIDE 5 MG/ML
INJECTION, SOLUTION EPIDURAL; INTRACAUDAL
Status: COMPLETED | OUTPATIENT
Start: 2024-08-16 | End: 2024-08-16

## 2024-08-16 RX ORDER — SODIUM CHLORIDE 9 MG/ML
INJECTION, SOLUTION INTRAVENOUS PRN
Status: DISCONTINUED | OUTPATIENT
Start: 2024-08-16 | End: 2024-08-16 | Stop reason: HOSPADM

## 2024-08-16 RX ORDER — PROPOFOL 10 MG/ML
INJECTION, EMULSION INTRAVENOUS PRN
Status: DISCONTINUED | OUTPATIENT
Start: 2024-08-16 | End: 2024-08-16 | Stop reason: SDUPTHER

## 2024-08-16 RX ORDER — ONDANSETRON 2 MG/ML
4 INJECTION INTRAMUSCULAR; INTRAVENOUS
Status: DISCONTINUED | OUTPATIENT
Start: 2024-08-16 | End: 2024-08-16 | Stop reason: HOSPADM

## 2024-08-16 RX ORDER — BUPIVACAINE HYDROCHLORIDE 5 MG/ML
INJECTION, SOLUTION PERINEURAL
Status: DISCONTINUED
Start: 2024-08-16 | End: 2024-08-16

## 2024-08-16 RX ORDER — LABETALOL HYDROCHLORIDE 5 MG/ML
5 INJECTION, SOLUTION INTRAVENOUS ONCE
Status: COMPLETED | OUTPATIENT
Start: 2024-08-16 | End: 2024-08-16

## 2024-08-16 RX ORDER — SODIUM CHLORIDE 0.9 % (FLUSH) 0.9 %
5-40 SYRINGE (ML) INJECTION EVERY 12 HOURS SCHEDULED
Status: DISCONTINUED | OUTPATIENT
Start: 2024-08-16 | End: 2024-08-16 | Stop reason: HOSPADM

## 2024-08-16 RX ORDER — NALOXONE HYDROCHLORIDE 0.4 MG/ML
INJECTION, SOLUTION INTRAMUSCULAR; INTRAVENOUS; SUBCUTANEOUS PRN
Status: DISCONTINUED | OUTPATIENT
Start: 2024-08-16 | End: 2024-08-16 | Stop reason: HOSPADM

## 2024-08-16 RX ORDER — MAGNESIUM HYDROXIDE 1200 MG/15ML
LIQUID ORAL CONTINUOUS PRN
Status: COMPLETED | OUTPATIENT
Start: 2024-08-16 | End: 2024-08-16

## 2024-08-16 RX ORDER — SODIUM CHLORIDE 0.9 % (FLUSH) 0.9 %
5-40 SYRINGE (ML) INJECTION PRN
Status: DISCONTINUED | OUTPATIENT
Start: 2024-08-16 | End: 2024-08-16 | Stop reason: HOSPADM

## 2024-08-16 RX ORDER — CEFAZOLIN SODIUM 1 G/3ML
INJECTION, POWDER, FOR SOLUTION INTRAMUSCULAR; INTRAVENOUS PRN
Status: DISCONTINUED | OUTPATIENT
Start: 2024-08-16 | End: 2024-08-16 | Stop reason: SDUPTHER

## 2024-08-16 RX ORDER — FENTANYL CITRATE 50 UG/ML
INJECTION, SOLUTION INTRAMUSCULAR; INTRAVENOUS PRN
Status: DISCONTINUED | OUTPATIENT
Start: 2024-08-16 | End: 2024-08-16 | Stop reason: SDUPTHER

## 2024-08-16 RX ORDER — SODIUM CHLORIDE 9 MG/ML
INJECTION, SOLUTION INTRAVENOUS CONTINUOUS PRN
Status: DISCONTINUED | OUTPATIENT
Start: 2024-08-16 | End: 2024-08-16 | Stop reason: SDUPTHER

## 2024-08-16 RX ORDER — ACETAMINOPHEN 325 MG/1
650 TABLET ORAL EVERY 6 HOURS SCHEDULED
Status: DISCONTINUED | OUTPATIENT
Start: 2024-08-16 | End: 2024-08-21 | Stop reason: HOSPADM

## 2024-08-16 RX ORDER — LIDOCAINE HYDROCHLORIDE 10 MG/ML
INJECTION, SOLUTION EPIDURAL; INFILTRATION; INTRACAUDAL; PERINEURAL PRN
Status: DISCONTINUED | OUTPATIENT
Start: 2024-08-16 | End: 2024-08-16 | Stop reason: SDUPTHER

## 2024-08-16 RX ORDER — DEXAMETHASONE SODIUM PHOSPHATE 10 MG/ML
INJECTION, SOLUTION INTRAMUSCULAR; INTRAVENOUS PRN
Status: DISCONTINUED | OUTPATIENT
Start: 2024-08-16 | End: 2024-08-16 | Stop reason: SDUPTHER

## 2024-08-16 RX ADMIN — SERTRALINE HYDROCHLORIDE 100 MG: 50 TABLET ORAL at 09:15

## 2024-08-16 RX ADMIN — CILOSTAZOL 100 MG: 100 TABLET ORAL at 22:18

## 2024-08-16 RX ADMIN — DEXAMETHASONE SODIUM PHOSPHATE 10 MG: 10 INJECTION, SOLUTION INTRAMUSCULAR; INTRAVENOUS at 09:13

## 2024-08-16 RX ADMIN — CILOSTAZOL 100 MG: 100 TABLET ORAL at 09:15

## 2024-08-16 RX ADMIN — HYDROMORPHONE HYDROCHLORIDE 0.5 MG: 1 INJECTION, SOLUTION INTRAMUSCULAR; INTRAVENOUS; SUBCUTANEOUS at 12:15

## 2024-08-16 RX ADMIN — Medication 5 MG: at 11:31

## 2024-08-16 RX ADMIN — SODIUM CHLORIDE: 9 INJECTION, SOLUTION INTRAVENOUS at 14:38

## 2024-08-16 RX ADMIN — SODIUM CHLORIDE, PRESERVATIVE FREE 10 ML: 5 INJECTION INTRAVENOUS at 22:18

## 2024-08-16 RX ADMIN — BUPIVACAINE HYDROCHLORIDE 10 ML: 5 INJECTION, SOLUTION EPIDURAL; INTRACAUDAL; PERINEURAL at 11:40

## 2024-08-16 RX ADMIN — FENTANYL CITRATE 75 MCG: 50 INJECTION, SOLUTION INTRAMUSCULAR; INTRAVENOUS at 10:21

## 2024-08-16 RX ADMIN — SODIUM CHLORIDE, PRESERVATIVE FREE 10 ML: 5 INJECTION INTRAVENOUS at 09:16

## 2024-08-16 RX ADMIN — DOCUSATE SODIUM 100 MG: 100 CAPSULE, LIQUID FILLED ORAL at 09:15

## 2024-08-16 RX ADMIN — DOCUSATE SODIUM 100 MG: 100 CAPSULE, LIQUID FILLED ORAL at 22:18

## 2024-08-16 RX ADMIN — ONDANSETRON 4 MG: 2 INJECTION INTRAMUSCULAR; INTRAVENOUS at 09:13

## 2024-08-16 RX ADMIN — ATORVASTATIN CALCIUM 20 MG: 20 TABLET, FILM COATED ORAL at 22:18

## 2024-08-16 RX ADMIN — ACETAMINOPHEN 650 MG: 325 TABLET ORAL at 14:52

## 2024-08-16 RX ADMIN — Medication 7.5 MG: at 10:50

## 2024-08-16 RX ADMIN — CEFAZOLIN 2 G: 1 INJECTION, POWDER, FOR SOLUTION INTRAMUSCULAR; INTRAVENOUS at 10:33

## 2024-08-16 RX ADMIN — OXYCODONE HYDROCHLORIDE 10 MG: 5 TABLET ORAL at 00:15

## 2024-08-16 RX ADMIN — ACETAMINOPHEN 650 MG: 325 TABLET ORAL at 22:17

## 2024-08-16 RX ADMIN — CEFEPIME 2000 MG: 2 INJECTION, POWDER, FOR SOLUTION INTRAVENOUS at 00:14

## 2024-08-16 RX ADMIN — LIDOCAINE HYDROCHLORIDE 50 MG: 10 INJECTION, SOLUTION EPIDURAL; INFILTRATION; INTRACAUDAL; PERINEURAL at 10:21

## 2024-08-16 RX ADMIN — SODIUM CHLORIDE: 9 INJECTION, SOLUTION INTRAVENOUS at 10:13

## 2024-08-16 RX ADMIN — FENTANYL CITRATE 25 MCG: 50 INJECTION, SOLUTION INTRAMUSCULAR; INTRAVENOUS at 10:39

## 2024-08-16 RX ADMIN — AMLODIPINE BESYLATE 10 MG: 10 TABLET ORAL at 09:16

## 2024-08-16 RX ADMIN — ASPIRIN 81 MG: 81 TABLET, COATED ORAL at 09:15

## 2024-08-16 RX ADMIN — ENOXAPARIN SODIUM 40 MG: 100 INJECTION SUBCUTANEOUS at 09:14

## 2024-08-16 RX ADMIN — GABAPENTIN 100 MG: 100 CAPSULE ORAL at 22:18

## 2024-08-16 RX ADMIN — HYDROMORPHONE HYDROCHLORIDE 0.5 MG: 1 INJECTION, SOLUTION INTRAMUSCULAR; INTRAVENOUS; SUBCUTANEOUS at 12:23

## 2024-08-16 RX ADMIN — OXYCODONE HYDROCHLORIDE 10 MG: 5 TABLET ORAL at 06:04

## 2024-08-16 RX ADMIN — OXYCODONE HYDROCHLORIDE 10 MG: 5 TABLET ORAL at 14:51

## 2024-08-16 RX ADMIN — VANCOMYCIN HYDROCHLORIDE 1000 MG: 1 INJECTION, POWDER, LYOPHILIZED, FOR SOLUTION INTRAVENOUS at 14:44

## 2024-08-16 RX ADMIN — PROPOFOL 200 MG: 10 INJECTION, EMULSION INTRAVENOUS at 10:21

## 2024-08-16 RX ADMIN — MORPHINE SULFATE 10 MG: 10 INJECTION, SOLUTION INTRAMUSCULAR; INTRAVENOUS at 10:40

## 2024-08-16 RX ADMIN — GABAPENTIN 100 MG: 100 CAPSULE ORAL at 14:52

## 2024-08-16 RX ADMIN — GABAPENTIN 100 MG: 100 CAPSULE ORAL at 09:15

## 2024-08-16 RX ADMIN — BUPIVACAINE HYDROCHLORIDE 25 ML: 5 INJECTION, SOLUTION EPIDURAL; INTRACAUDAL at 11:40

## 2024-08-16 RX ADMIN — LABETALOL HYDROCHLORIDE 5 MG: 5 INJECTION, SOLUTION INTRAVENOUS at 12:16

## 2024-08-16 ASSESSMENT — PAIN SCALES - GENERAL
PAINLEVEL_OUTOF10: 7
PAINLEVEL_OUTOF10: 7
PAINLEVEL_OUTOF10: 2
PAINLEVEL_OUTOF10: 7
PAINLEVEL_OUTOF10: 6

## 2024-08-16 ASSESSMENT — PAIN DESCRIPTION - DESCRIPTORS: DESCRIPTORS: ACHING

## 2024-08-16 ASSESSMENT — PAIN DESCRIPTION - LOCATION: LOCATION: LEG

## 2024-08-16 ASSESSMENT — PAIN DESCRIPTION - ORIENTATION: ORIENTATION: LEFT

## 2024-08-16 NOTE — PROGRESS NOTES
Pt returned from CVOR post BKA procedure. Pt arousable to verbal stimulation but falls back asleep quickly. VSS, hypertensive, but appears this is baseline. Anesthesia at bedside, report given. Phase 1 recovery phase initiated at 1157. Dressing to left BKA stump intact, no bleeding noted to dressing.

## 2024-08-16 NOTE — OP NOTE
Operative Note      Patient: Silas Shrestha  YOB: 1955  MRN: [id number]    Date of Procedure: 8/16/2024    Pre-Op Diagnosis Codes:      * Ischemia of left lower extremity [I99.8]    Post-Op Diagnosis: Same       Procedure(s):  LEFT LEG AMPUTATION BELOW KNEE / NERVE BLOCK PER ANESTHESIA    Surgeon(s):  Delos Reyes, Arthur, MD Sharma, Kartikeya, MD    Assistant:   * No surgical staff found *    Anesthesia: General    Estimated Blood Loss (mL): less than 50     Complications: None    Specimens:   ID Type Source Tests Collected by Time Destination   A : BELOW THE KNEE AMPUTATED LEG Tissue Leg SURGICAL PATHOLOGY Delos Reyes, Arthur, MD 8/16/2024 0753        Implants:  * No implants in log *      Drains:   Negative Pressure Wound Therapy Foot Left;Anterior (Active)   $ Standard NPWT <=50 sq cm PER TX $ Yes 08/05/24 1540   Wound Type Surgical 08/15/24 0800   Unit Type ActiVAC 08/05/24 2000   Dressing Type Black Foam 08/06/24 0910   Number of pieces used 2 08/05/24 1540   Number of pieces removed 1 08/04/24 1945   Cycle Continuous;On 08/06/24 0910   Target Pressure (mmHg) 125 08/06/24 0910   Dressing Status Clean, dry & intact 08/06/24 0910   Dressing Changed Changed/New 08/05/24 1540   Drainage Amount Scant 08/05/24 2000   Drainage Description Sanguinous 08/05/24 2000   Dressing Change Due 08/07/24 08/06/24 0910   Wound Assessment Dry;Dusky;Subcutaneous;Fibrinous 08/05/24 2000   Dione-wound Assessment Intact 08/05/24 2000       Findings:  Infection Present At Time Of Surgery (PATOS) (choose all levels that have infection present):  No infection present  Other Findings:     Detailed Description of Procedure:   This is a 69-year-old male with severe peripheral arterial disease.  Despite getting a bypass he has very poor distal runoff to the level of the foot.  He developed gangrene of the left great toe and despite transmetatarsal amputation feels that healed.  He is in constant ischemic rest pain and thus  Nava	 Normal

## 2024-08-16 NOTE — PROGRESS NOTES
RECEIVED INTO [facility] ROOM 9 PER [facility] FORM ROOM 233  ASSESSMENT AND VITALS AS CHARTED. ALL PROCEDURES EXPLAINED PRIOR TO IMPLEMENTATION.  ANESTHESIA AT BEDSIDE, CONSENT SIGNED AND WITNESSED.  PATIENT READY FOR PROCEDURE.

## 2024-08-16 NOTE — CARE COORDINATION
Case Management Assessment  Initial Evaluation    Date/Time of Evaluation: 8/16/2024 6:54 PM  Assessment Completed by: Kenisha Rai RN    If patient is discharged prior to next notation, then this note serves as note for discharge by case management.    Patient Name: Silas Shrestha                   YOB: 1955  Diagnosis: Gangrene of left foot (HCC) [I96]  History of complete ray amputation of left great toe (HCC) [Z89.412]                   Date / Time: 8/14/2024 12:52 PM    Patient Admission Status: Inpatient   Readmission Risk (Low < 19, Mod (19-27), High > 27): Readmission Risk Score: 21.6    Current PCP: Brina Rizo MD  PCP verified by ? Yes    Chart Reviewed: Yes      History Provided by: Patient  Patient Orientation: Alert and Oriented, Person, Place, Situation    Patient Cognition: Alert    Hospitalization in the last 30 days (Readmission):  No    If yes, Readmission Assessment in  Navigator will be completed.    Advance Directives:      Code Status: Full Code   Patient's Primary Decision Maker is: Legal Next of Kin      Discharge Planning:    Patient lives with: Children (son) Type of Home: House  Primary Care Giver: Self  Patient Support Systems include: Children, Family Members   Current Financial resources: Medicare, Medicaid  Current community resources:    Current services prior to admission: Durable Medical Equipment            Current DME:              Type of Home Care services:  None    ADLS  Prior functional level: Independent in ADLs/IADLs  Current functional level: Assistance with the following:, Dressing, Toileting, Mobility    PT AM-PAC:   /24  OT AM-PAC:   /24    Family can provide assistance at DC: No  Would you like Case Management to discuss the discharge plan with any other family members/significant others, and if so, who? Yes (kennether- Rey)  Plans to Return to Present Housing: No  Other Identified Issues/Barriers to RETURNING to current housing:

## 2024-08-16 NOTE — ANESTHESIA PROCEDURE NOTES
Peripheral Block    Patient location during procedure: pre-op  Reason for block: post-op pain management and at surgeon's request  Start time: 8/16/2024 11:40 AM  End time: 8/16/2024 11:50 AM  Staffing  Performed: anesthesiologist   Anesthesiologist: Cindi Wiseman MD  Performed by: Cindi Wiseman MD  Authorized by: Cindi Wiseman MD    Preanesthetic Checklist  Completed: patient identified, IV checked, site marked, risks and benefits discussed, surgical/procedural consents, equipment checked, pre-op evaluation, timeout performed, anesthesia consent given, oxygen available and monitors applied/VS acknowledged  Peripheral Block   Patient position: supine  Prep: ChloraPrep  Provider prep: mask and sterile gloves  Patient monitoring: cardiac monitor, continuous pulse ox, frequent blood pressure checks and IV access  Block type: Saphenous  Laterality: left  Injection technique: single-shot  Guidance: ultrasound guided  Local infiltration: lidocaine  Infiltration strength: 1 %  Local infiltration: lidocaine  Dose: 3 mL    Needle   Needle type: short-bevel   Needle gauge: 21 G  Needle localization: ultrasound guidance  Needle insertion depth: 3 cm  Catheter type: open end  Test dose: negative  Needle length: 10 cm  Assessment   Injection assessment: negative aspiration for heme, no paresthesia on injection and local visualized surrounding nerve on ultrasound  Paresthesia pain: none  Slow fractionated injection: yes  Hemodynamics: stable  Outcomes: uncomplicated    Additional Notes  U/S [id number].  (1) Under ultrasound guidance, a 21 gauge needle was inserted and placed in close proximity to the saphenous nerve.  (2) Ultrasound was also used to visualize the spread of the anesthetic in close proximity to the nerve being blocked. (3) The nerve appeared anatomically normal, and (4 there were no apparent abnormal pathological findings on the image that were readily visible and related to the nerve being blocked. (5) A

## 2024-08-16 NOTE — PROGRESS NOTES
[facility] -   Infectious diseases evaluation  admission date 8/14/2024    reason for consultation:   Gangrene     Impression :   Current:  Left foot non-healing gangrenous  surgical wound   S/p Left femoral to post tibial bypass - April 2024 - followed by resting pain and gangrene off the left hallux   Amputation of left hallux - 21 June 2024   Ray amputation in July 2024   Wound managed with VAC application at home - no improvement and ongoing pain at rest   Vascular on board - planning BKA today     Peripheral vascular disease     Other:  HTN, Alcoholic cirrhosis, COPD   Discussion / summary of stay / plan of care/ Recommendations:   No fever. WBC, CRP and PCT unremarkable   BC - 8/14 - no growth so far     HENCE:   Keep cefepime and vanco till after BKA stump is opened      Infection Control Recommendations   [place] Precautions  Contact Isolation       Antimicrobial Stewardship Recommendations   Simplification of therapy  Targeted therapy      History of Present Illness:   Initial history:  Silas Shrestha is a 69 y.o.-year-old male who developed gangrene of his left big toe in April 2024 after clipping his toenail.  He underwent femoral to posterior tibial bypass in April 2024.  He continued to have rest pain over the area and needed amputation of the big headaches in June 2024.  Pain in the gangrene continued and therefore he had reimplantation in July 2024.    The surgical wound area has not healed despite dressing with VAC regularly at home.  There is no erythema or inflammation surrounding the wound.  There is granulation tissue and some discharge from the wound.    Vascular surgery is on board and is pending below-knee amputation.    Interval changes  8/16/2024   Patient Vitals for the past 8 hrs:   BP Temp Temp src Pulse Resp SpO2   08/16/24 0916 (!) 162/78 -- -- -- -- --   08/16/24 0708 (!) 148/74 98.1 °F (36.7 °C) Oral 74 19 98 %   08/16/24 0634 --

## 2024-08-16 NOTE — PROGRESS NOTES
Report called to ALESSANDRA Tyson, all questions answered. Pt transferred to 233 with all belongings.

## 2024-08-16 NOTE — ANESTHESIA POSTPROCEDURE EVALUATION
Department of Anesthesiology  Postprocedure Note    Patient: Silas Shrestha  MRN: [id number]  YOB: 1955  Date of evaluation: 8/16/2024    Procedure Summary       Date: 08/16/24 Room / Location: [place] [address] / [facility]    Anesthesia Start: 1014 Anesthesia Stop: 1203    Procedure: LEFT LEG AMPUTATION BELOW KNEE / NERVE BLOCK PER ANESTHESIA (Left: Leg Lower) Diagnosis:       Ischemia of left lower extremity      (Ischemia of left lower extremity [I99.8])    Surgeons: Delos Reyes, Arthur, MD Responsible Provider: Cinid Wiseman MD    Anesthesia Type: General ASA Status: 3            Anesthesia Type: General    Ricardo Phase I: Ricardo Score: 10    Ricardo Phase II:      Anesthesia Post Evaluation    Patient location during evaluation: PACU  Patient participation: complete - patient participated  Level of consciousness: awake and alert  Pain score: 0  Airway patency: patent  Nausea & Vomiting: no vomiting and no nausea  Cardiovascular status: hemodynamically stable  Respiratory status: acceptable  Hydration status: stable  Pain management: adequate    No notable events documented.

## 2024-08-16 NOTE — PROGRESS NOTES
[facility]  Office: [phone]  Abdon Gonzalez DO, Kwame Lin DO, Vince Whitaker DO, Gabo Whitaker DO, Tawanna Orozco MD, Lashon Carcamo MD, Bulmaro Rodriguez MD, Anisa Winkler MD,  Yrn Leija MD, Ana Devlin MD, Jorge Alberto Clinton MD,  Armando Chandler DO, Jesusita Beatty MD, Marcel Lim MD, Ronnie Gonzalez DO, Renuka Aparicio MD,  Trevor Barlow DO, Mary Weeks MD, Jaye Bragg MD, Vicky Foster MD, Malia Weems MD,  Jeff Llamas MD, Hernandez Douglas MD, Vidya Gavin MD, Yonatan Arias MD, Lemuel Leal MD, Rancho Vega MD, Jose Roberto Jacobo DO, Christopher Omer DO, Zach Camacho MD,  Ang Gordon MD, Shirley Waterhouse, CNP,  Hattie Grissom CNP, Alex Meade, CNP,  Lupe Burger, DNP, Gris Ley, CNP, Gem Garza, CNP, Minoo Nicole CNP, Fatou Pacheco, CNP, Kayleen Avila, PA-C, Marisela Ocampo PA-C, Emily Martinez, CNP, Ori Cameron, CNP, Filomena Murphy, CNP, Tabatha Santoyo, CNP, Marine Harmon, CNS, Isabella Claros, CNP, Gabriela Pan CNP, Tracy Schwab, CNP         [facility]   IN-PATIENT SERVICE   [facility]    Progress Note    8/16/2024    6:00 PM    Name:   Silas Shrestha  MRN:     [id number]     Acct:      [id number]   Room:   0233/0233-01   Day:  2  Admit Date:  8/14/2024 12:52 PM    PCP:   Brina Rizo MD  Code Status:  Full Code    Subjective:     Patient seen and examined postoperatively.  No acute events overnight.  Feels comfortable.  Lower extremity BKA noted no pain      Brief History:     61-year-old male who presented to the hospital for evaluation of nonhealing foot wound with gangrene    Medications:     Allergies:  No Known Allergies    Current Meds:   Scheduled Meds:    acetaminophen  650 mg Oral 4 times per day    cefepime  2,000 mg IntraVENous Q12H    vancomycin (VANCOCIN) intermittent dosing (placeholder)   Other RX Placeholder    vancomycin  1,000 mg IntraVENous Q12H    amLODIPine  10 mg Oral Daily

## 2024-08-16 NOTE — PROGRESS NOTES
Division of Vascular Surgery             Progress Note      Name: Silas Shrestha  MRN: [id number]     8/16/2024  9:31 AM    Overnight Events:     No acute overnight events.      Subjective:     Complains of pain in the left foot which is managed with IV pain medications however does not get relieved completely.  No complaint of any fever, chills, shortness of breath  Patient is n.p.o. for anticipated procedure today.  Patient had some reservation regarding amputation yesterday however has stopped family and plan to undergo amputation to get relief from rest pain.    Physical Exam:     Vitals:  BP (!) 162/78   Pulse 74   Temp 98.1 °F (36.7 °C) (Oral)   Resp 19   Ht 1.778 m (5' 10\")   Wt 69.4 kg (153 lb)   SpO2 98%   BMI 21.95 kg/m²     General appearance - alert, well appearing and in no acute distress  Mental status - oriented to person, place and time with normal affect  Head - normocephalic and atraumatic  Neck - supple, no carotid bruits, thyroid not palpable, no JVD  Chest - clear to auscultation, normal effort  Heart - normal rate, regular rhythm, no murmurs  Abdomen - soft, non-tender, non-distended, bowel sounds present all four quadrants, no masses  Neurological - normal speech, no focal findings or movement disorder noted, cranial nerves II through XII grossly intact  Extremities - peripheral pulses palpable, no pedal edema or calf pain with palpation  Skin - no gross lesions except for necrosis of ischemic wound at amputation site., rashes, or induration noted  Foot Exam -tender to touch on left foot.  Vascular Exam -ischemic looking necrotic wound present over the ray amputation site over the left foot.  Monophasic signals present over PT and AT.      Imaging:   XR FOOT LEFT (MIN 3 VIEWS)    Result Date: 8/14/2024  EXAMINATION: THREE XRAY VIEWS OF THE LEFT FOOT 8/14/2024 1:18 pm COMPARISON: 05/21/2024 HISTORY: ORDERING SYSTEM PROVIDED HISTORY: rule out osteomylitis TECHNOLOGIST PROVIDED  HISTORY: rule out osteomylitis FINDINGS: Status post 1st metatarsal transmetatarsal amputation.  No other change identified.  Vascular calcifications.  No fracture or dislocation.     Status post 1st transmetatarsal amputation No acute erosive changes identified         Assessment:      69 y.o.  male with medical history of left foot PD AD s/p femoral to posterior tibial bypass in April 2024 followed by left hallux ray amputation on 7/31/2024 with Dr. Delos Reyes for gangrene of left great toe who presents with nonhealing left foot amputation site along with rest pain.  Patient had some reservation yesterday about the surgery however has agreed to undergo left BKA today.      Plan:   As discussed with attending, Dr. Delos Reyes  Patient has agreed for left BKA.  Overbooked for today, consent signed, site marked.  Keep n.p.o.  Pain control with multimodal pain therapy.  Rest of the care per primary    Janet Gonzalez MD  PGY 4  Surgery Resident    [facility] [place]

## 2024-08-16 NOTE — ANESTHESIA PROCEDURE NOTES
Peripheral Block    Patient location during procedure: pre-op  Reason for block: post-op pain management and at surgeon's request  Start time: 8/16/2024 11:40 AM  End time: 8/16/2024 11:50 AM  Staffing  Performed: anesthesiologist   Anesthesiologist: Cindi Wiseman MD  Performed by: Cindi Wiseman MD  Authorized by: Cindi Wiseman MD    Preanesthetic Checklist  Completed: patient identified, IV checked, site marked, risks and benefits discussed, surgical/procedural consents, equipment checked, pre-op evaluation, timeout performed, anesthesia consent given, oxygen available and monitors applied/VS acknowledged  Peripheral Block   Prep: ChloraPrep  Provider prep: mask and sterile gloves  Patient monitoring: cardiac monitor, continuous pulse ox, frequent blood pressure checks and IV access  Block type: Sciatic  Popliteal  Laterality: left  Injection technique: single-shot  Guidance: ultrasound guided  Local infiltration: lidocaine  Infiltration strength: 1 %  Local infiltration: lidocaine  Dose: 3 mL    Needle   Needle type: short-bevel   Needle gauge: 21 G  Needle localization: ultrasound guidance  Needle insertion depth: 3 cm  Catheter type: open end  Needle length: 10 cm  Assessment   Injection assessment: negative aspiration for heme, no paresthesia on injection and local visualized surrounding nerve on ultrasound  Paresthesia pain: none  Slow fractionated injection: yes  Hemodynamics: stable  Outcomes: uncomplicated and patient tolerated procedure well    Additional Notes  U/S [id number].  (1) Under ultrasound guidance, a 21 gauge needle was inserted and placed in close proximity to the popliteal nerve.  (2) Ultrasound was also used to visualize the spread of the anesthetic in close proximity to the nerve being blocked. (3) The nerve appeared anatomically normal, and (4 there were no apparent abnormal pathological findings on the image that were readily visible and related to the nerve being blocked. (5) A

## 2024-08-17 LAB
ANION GAP SERPL CALCULATED.3IONS-SCNC: 10 MMOL/L (ref 9–16)
BASOPHILS # BLD: 0 K/UL (ref 0–0.2)
BASOPHILS NFR BLD: 0 % (ref 0–2)
BUN SERPL-MCNC: 13 MG/DL (ref 8–23)
CALCIUM SERPL-MCNC: 8.6 MG/DL (ref 8.6–10.4)
CHLORIDE SERPL-SCNC: 108 MMOL/L (ref 98–107)
CO2 SERPL-SCNC: 20 MMOL/L (ref 20–31)
CREAT SERPL-MCNC: 0.9 MG/DL (ref 0.7–1.2)
EOSINOPHIL # BLD: 0 K/UL (ref 0–0.44)
EOSINOPHILS RELATIVE PERCENT: 0 % (ref 1–4)
ERYTHROCYTE [DISTWIDTH] IN BLOOD BY AUTOMATED COUNT: 13.4 % (ref 11.8–14.4)
GFR, ESTIMATED: >90 ML/MIN/1.73M2
GLUCOSE SERPL-MCNC: 141 MG/DL (ref 74–99)
HCT VFR BLD AUTO: 32.1 % (ref 40.7–50.3)
HGB BLD-MCNC: 10.1 G/DL (ref 13–17)
IMM GRANULOCYTES # BLD AUTO: 0.16 K/UL (ref 0–0.3)
IMM GRANULOCYTES NFR BLD: 1 %
LYMPHOCYTES NFR BLD: 1.3 K/UL (ref 1.1–3.7)
LYMPHOCYTES RELATIVE PERCENT: 8 % (ref 24–43)
MAGNESIUM SERPL-MCNC: 1.9 MG/DL (ref 1.6–2.4)
MCH RBC QN AUTO: 34.8 PG (ref 25.2–33.5)
MCHC RBC AUTO-ENTMCNC: 31.5 G/DL (ref 28.4–34.8)
MCV RBC AUTO: 110.7 FL (ref 82.6–102.9)
MONOCYTES NFR BLD: 0.65 K/UL (ref 0.1–1.2)
MONOCYTES NFR BLD: 4 % (ref 3–12)
MORPHOLOGY: ABNORMAL
NEUTROPHILS NFR BLD: 87 % (ref 36–65)
NEUTS SEG NFR BLD: 14.19 K/UL (ref 1.5–8.1)
NRBC BLD-RTO: 0 PER 100 WBC
PLATELET # BLD AUTO: 327 K/UL (ref 138–453)
PMV BLD AUTO: 9.1 FL (ref 8.1–13.5)
POTASSIUM SERPL-SCNC: 4.3 MMOL/L (ref 3.7–5.3)
RBC # BLD AUTO: 2.9 M/UL (ref 4.21–5.77)
SODIUM SERPL-SCNC: 138 MMOL/L (ref 136–145)
WBC OTHER # BLD: 16.3 K/UL (ref 3.5–11.3)

## 2024-08-17 PROCEDURE — 99232 SBSQ HOSP IP/OBS MODERATE 35: CPT | Performed by: INTERNAL MEDICINE

## 2024-08-17 PROCEDURE — 6370000000 HC RX 637 (ALT 250 FOR IP)

## 2024-08-17 PROCEDURE — 6360000002 HC RX W HCPCS

## 2024-08-17 PROCEDURE — 83735 ASSAY OF MAGNESIUM: CPT

## 2024-08-17 PROCEDURE — 97162 PT EVAL MOD COMPLEX 30 MIN: CPT

## 2024-08-17 PROCEDURE — 85025 COMPLETE CBC W/AUTO DIFF WBC: CPT

## 2024-08-17 PROCEDURE — 93005 ELECTROCARDIOGRAM TRACING: CPT | Performed by: NURSE PRACTITIONER

## 2024-08-17 PROCEDURE — 36415 COLL VENOUS BLD VENIPUNCTURE: CPT

## 2024-08-17 PROCEDURE — 80048 BASIC METABOLIC PNL TOTAL CA: CPT

## 2024-08-17 PROCEDURE — 2580000003 HC RX 258

## 2024-08-17 PROCEDURE — 1200000000 HC SEMI PRIVATE

## 2024-08-17 PROCEDURE — 2580000003 HC RX 258: Performed by: INTERNAL MEDICINE

## 2024-08-17 PROCEDURE — 97110 THERAPEUTIC EXERCISES: CPT

## 2024-08-17 PROCEDURE — APPSS30 APP SPLIT SHARED TIME 16-30 MINUTES: Performed by: NURSE PRACTITIONER

## 2024-08-17 RX ORDER — 0.9 % SODIUM CHLORIDE 0.9 %
250 INTRAVENOUS SOLUTION INTRAVENOUS ONCE
Status: COMPLETED | OUTPATIENT
Start: 2024-08-17 | End: 2024-08-17

## 2024-08-17 RX ADMIN — OXYCODONE HYDROCHLORIDE 10 MG: 5 TABLET ORAL at 20:13

## 2024-08-17 RX ADMIN — ACETAMINOPHEN 650 MG: 325 TABLET ORAL at 14:19

## 2024-08-17 RX ADMIN — OXYCODONE HYDROCHLORIDE 10 MG: 5 TABLET ORAL at 15:47

## 2024-08-17 RX ADMIN — ASPIRIN 81 MG: 81 TABLET, COATED ORAL at 08:14

## 2024-08-17 RX ADMIN — SERTRALINE HYDROCHLORIDE 100 MG: 50 TABLET ORAL at 08:18

## 2024-08-17 RX ADMIN — OXYCODONE HYDROCHLORIDE 10 MG: 5 TABLET ORAL at 05:26

## 2024-08-17 RX ADMIN — CILOSTAZOL 100 MG: 100 TABLET ORAL at 08:20

## 2024-08-17 RX ADMIN — PANTOPRAZOLE SODIUM 40 MG: 40 TABLET, DELAYED RELEASE ORAL at 05:26

## 2024-08-17 RX ADMIN — ACETAMINOPHEN 650 MG: 325 TABLET ORAL at 05:26

## 2024-08-17 RX ADMIN — OXYCODONE HYDROCHLORIDE 10 MG: 5 TABLET ORAL at 09:50

## 2024-08-17 RX ADMIN — AMLODIPINE BESYLATE 10 MG: 10 TABLET ORAL at 08:15

## 2024-08-17 RX ADMIN — HYDROMORPHONE HYDROCHLORIDE 0.5 MG: 1 INJECTION, SOLUTION INTRAMUSCULAR; INTRAVENOUS; SUBCUTANEOUS at 21:41

## 2024-08-17 RX ADMIN — ENOXAPARIN SODIUM 40 MG: 100 INJECTION SUBCUTANEOUS at 08:15

## 2024-08-17 RX ADMIN — HYDROMORPHONE HYDROCHLORIDE 0.5 MG: 1 INJECTION, SOLUTION INTRAMUSCULAR; INTRAVENOUS; SUBCUTANEOUS at 17:09

## 2024-08-17 RX ADMIN — OXYCODONE HYDROCHLORIDE 10 MG: 5 TABLET ORAL at 01:36

## 2024-08-17 RX ADMIN — ATORVASTATIN CALCIUM 20 MG: 20 TABLET, FILM COATED ORAL at 20:13

## 2024-08-17 RX ADMIN — DOCUSATE SODIUM 100 MG: 100 CAPSULE, LIQUID FILLED ORAL at 08:14

## 2024-08-17 RX ADMIN — DOCUSATE SODIUM 100 MG: 100 CAPSULE, LIQUID FILLED ORAL at 20:13

## 2024-08-17 RX ADMIN — SODIUM CHLORIDE, PRESERVATIVE FREE 10 ML: 5 INJECTION INTRAVENOUS at 20:12

## 2024-08-17 RX ADMIN — ACETAMINOPHEN 650 MG: 325 TABLET ORAL at 08:13

## 2024-08-17 RX ADMIN — GABAPENTIN 100 MG: 100 CAPSULE ORAL at 08:15

## 2024-08-17 RX ADMIN — GABAPENTIN 100 MG: 100 CAPSULE ORAL at 14:19

## 2024-08-17 RX ADMIN — SODIUM CHLORIDE 250 ML: 9 INJECTION, SOLUTION INTRAVENOUS at 10:05

## 2024-08-17 RX ADMIN — CILOSTAZOL 100 MG: 100 TABLET ORAL at 20:13

## 2024-08-17 RX ADMIN — ACETAMINOPHEN 650 MG: 325 TABLET ORAL at 20:13

## 2024-08-17 RX ADMIN — SODIUM CHLORIDE, PRESERVATIVE FREE 10 ML: 5 INJECTION INTRAVENOUS at 08:23

## 2024-08-17 RX ADMIN — GABAPENTIN 100 MG: 100 CAPSULE ORAL at 20:13

## 2024-08-17 ASSESSMENT — PAIN SCALES - GENERAL
PAINLEVEL_OUTOF10: 6
PAINLEVEL_OUTOF10: 2
PAINLEVEL_OUTOF10: 3
PAINLEVEL_OUTOF10: 5
PAINLEVEL_OUTOF10: 8
PAINLEVEL_OUTOF10: 9
PAINLEVEL_OUTOF10: 3
PAINLEVEL_OUTOF10: 5
PAINLEVEL_OUTOF10: 3
PAINLEVEL_OUTOF10: 5
PAINLEVEL_OUTOF10: 3
PAINLEVEL_OUTOF10: 7
PAINLEVEL_OUTOF10: 3
PAINLEVEL_OUTOF10: 7
PAINLEVEL_OUTOF10: 7
PAINLEVEL_OUTOF10: 3
PAINLEVEL_OUTOF10: 2
PAINLEVEL_OUTOF10: 4
PAINLEVEL_OUTOF10: 5
PAINLEVEL_OUTOF10: 8
PAINLEVEL_OUTOF10: 2
PAINLEVEL_OUTOF10: 7
PAINLEVEL_OUTOF10: 3
PAINLEVEL_OUTOF10: 8
PAINLEVEL_OUTOF10: 2
PAINLEVEL_OUTOF10: 10

## 2024-08-17 ASSESSMENT — PAIN DESCRIPTION - LOCATION
LOCATION: LEG
LOCATION: LEG

## 2024-08-17 NOTE — PLAN OF CARE
Problem: Discharge Planning  Goal: Discharge to home or other facility with appropriate resources  8/17/2024 1607 by Sondra Hamilton RN  Outcome: Progressing     Problem: Pain  Goal: Verbalizes/displays adequate comfort level or baseline comfort level  8/17/2024 1607 by Sondra Hamilton RN  Outcome: Progressing     Problem: ABCDS Injury Assessment  Goal: Absence of physical injury  8/17/2024 1607 by Sondra Hamilton RN  Outcome: Progressing  Flowsheets (Taken 8/17/2024 0745)  Absence of Physical Injury: Implement safety measures based on patient assessment     Problem: Pain  Goal: Verbalizes/displays adequate comfort level or baseline comfort level  8/17/2024 1607 by Sondra Hamilton RN  Outcome: Progressing     Problem: Discharge Planning  Goal: Discharge to home or other facility with appropriate resources  8/17/2024 1607 by Sondra Hamilton RN  Outcome: Progressing

## 2024-08-17 NOTE — PROGRESS NOTES
Physical Therapy  Facility/Department: [address] ORTHO/MED SURG  Physical Therapy Initial Assessment    Name: Silas Shrestha  : 1955  MRN: [id number]  Date of Service: 2024    Discharge Recommendations:  Continue to assess pending progress, Patient would benefit from continued therapy after discharge, Therapy recommended at discharge          Patient Diagnosis(es): The primary encounter diagnosis was History of complete ray amputation of left great toe (HCC). Diagnoses of Gangrene of left foot (HCC) and Ischemia of left lower extremity were also pertinent to this visit.  Past Medical History:  has a past medical history of Alcoholism (HCC), Cirrhosis of liver (HCC), Claudication (HCC), COPD (chronic obstructive pulmonary disease) (HCC), COVID-19 vaccine administered, Depression, Finger fracture, left, Foot pain, left, Gangrene (HCC), Hx of blood clots, Hx of hepatitis C, Hx of pancreatitis, Hx of tuberculosis, Hyperlipidemia, Hypertension, Impaired mobility, Intermittent claudication (HCC), Liver disease, PAD (peripheral artery disease) (HCC), PVD (peripheral vascular disease) (HCC), Swelling, Ulcer of foot (HCC), Under care of service provider, Wears dentures, Wears dentures, Wears glasses, and Wears glasses.  Past Surgical History:  has a past surgical history that includes Upper gastrointestinal endoscopy; liver biopsy; vascular surgery; Finger amputation (Left, 2016); other surgical history (2017); other surgical history (2023); vascular surgery (Left, 2023); vascular surgery (Left, 2024); Colonoscopy; femoral bypass (2024); femoral bypass (Left, 2024); vascular surgery (Left, 2024); Toe amputation (Left, 2024); and Toe amputation (Left, 2024).    Assessment   Assessment: Pt is 70 y/o male with recent left BKA. Pt able to ambulate with use of RW without significant LOB for approx 8ft. Lacks full left knee extension; will benefit from further  G-Code Modifier : CK      Goals  Short Term Goals  Time Frame for Short Term Goals: 10 days  Short Term Goal 1: Pt to ambulate 30ft with RW and no LOB.  Short Term Goal 2: Pt to perform all bed mobility and transfers independently.  Short Term Goal 3: Pt to tolerate 20-30 mins ther ex/act for improved strength and endurance.  Short Term Goal 4: Pt to demo good dynamic standing balance for decrease risk of falls.  Patient Goals   Patient Goals : none stated       Education  Patient Education  Education Given To: Patient  Education Provided: Role of Therapy  Education Method: Verbal  Barriers to Learning: None  Education Outcome: Verbalized understanding;Demonstrated understanding      Therapy Time   Individual Concurrent Group Co-treatment   Time In 0915         Time Out 0948         Minutes 33                 Sera Whitaker, PT, DPT, CMPT

## 2024-08-17 NOTE — CARE COORDINATION
Transitional planning: Per patient request referrals sent to [facility], [facility], [facility], [facility], [facility], [facility], [facility], and [facility].

## 2024-08-17 NOTE — PROGRESS NOTES
Division of Vascular Surgery             Progress Note      Name: Silas Shrestha  MRN: [id number]     8/17/2024  3:44 PM  POD#1 after Left BKA  Overnight Events:     No acute overnight events.      Subjective:     Complains of pain in the left foot which is managed with IV pain medications   Tolerating diet and passing flatus  Was able to sleep last night  Physical Exam:     Vitals:  BP (!) 116/90   Pulse (!) 104   Temp 98.1 °F (36.7 °C) (Oral)   Resp 14   Ht 1.778 m (5' 10\")   Wt 69.4 kg (153 lb)   SpO2 99%   BMI 21.95 kg/m²     General appearance - alert, well appearing and in no acute distress  Mental status - oriented to person, place and time with normal affect  Head - normocephalic and atraumatic  Neck - supple, no carotid bruits, thyroid not palpable, no JVD  Chest - clear to auscultation, normal effort  Heart - normal rate, regular rhythm, no murmurs  Abdomen - soft, non-tender, non-distended, bowel sounds present all four quadrants, no masses  Neurological - normal speech, no focal findings or movement disorder noted, cranial nerves II through XII grossly intact  Extremities - peripheral pulses palpable, no pedal edema or calf pain with palpation  Skin - no gross lesions . Dressing over stump is clean dry and intact  Foot Exam -tender to touch on left foot.  Vascular Exam -palpable bypass signal in calf.       Imaging:   XR FOOT LEFT (MIN 3 VIEWS)    Result Date: 8/14/2024  EXAMINATION: THREE XRAY VIEWS OF THE LEFT FOOT 8/14/2024 1:18 pm COMPARISON: 05/21/2024 HISTORY: ORDERING SYSTEM PROVIDED HISTORY: rule out osteomylitis TECHNOLOGIST PROVIDED HISTORY: rule out osteomylitis FINDINGS: Status post 1st metatarsal transmetatarsal amputation.  No other change identified.  Vascular calcifications.  No fracture or dislocation.     Status post 1st transmetatarsal amputation No acute erosive changes identified         Assessment:      69 y.o.  male with medical history of left foot PD AD s/p femoral

## 2024-08-17 NOTE — PROGRESS NOTES
[facility]  Office: [phone]  Abdon Gonzalez DO, Kwame Lin DO, Vince Whitaker DO, Gabo Whitaker DO, Tawanna Orozco MD, Lashon Carcamo MD, Bulmaro Rodriguez MD, Anisa Winkler MD,  Yrn Leija MD, Ana Devlin MD, Jorge Alberto Clinton MD,  Armando Chandler DO, Jesusita Beatty MD, Marcel Lim MD, Ronnie Gonzalez DO, Renuka Aparicio MD,  Trevor Barlow DO, Mary Weeks MD, Jaye Bragg MD, Vicky Foster MD, Malia Weems MD,  Jeff Llamas MD, Hernandez Douglas MD, Vidya Gavin MD, Yonatan Arias MD, Lemuel Leal MD, Rancho Vega MD, Jose Roberto Jacobo DO, Christopher Omer DO, Zach Camacho MD,  Ang Gordon MD, Shirley Waterhouse, CNP,  Hattie Grissom CNP, Alex Meade, CNP,  Lupe Burger, JAMI, Gris Ley, CNP, Gem Garza, CNP, Minoo Nicole CNP, Fatou Pacheco, CNP, Kayleen Avila, PA-C, Marisela Ocampo PA-C, Emily Martinez, CNP, Ori Cameron, CNP, Filomena Murphy, CNP, Tabatha Santoyo, CNP, Marine Harmon, CNS, Isabella Claros, CNP, Gabriela Pan CNP, Tracy Schwab, CNP         [facility]   IN-PATIENT SERVICE   [facility]    Progress Note    8/17/2024    7:46 AM    Name:   Silas Shrestha  MRN:     [id number]     Acct:      [id number]   Room:   0233/0233-01   Day:  3  Admit Date:  8/14/2024 12:52 PM    PCP:   Brina Rizo MD  Code Status:  Full Code    Subjective:     Patient feels well today. Was tachycardic today mainly and was associated with pain.  No chest pain, shortness of breath.  No fevers or chills.      Brief History:     61-year-old male who presented to the hospital for evaluation of nonhealing foot wound with gangrene    Medications:     Allergies:  No Known Allergies    Current Meds:   Scheduled Meds:    acetaminophen  650 mg Oral 4 times per day    amLODIPine  10 mg Oral Daily    aspirin  81 mg Oral Daily    atorvastatin  20 mg Oral Nightly    docusate sodium  100 mg Oral BID    sertraline  100 mg Oral Daily    sodium

## 2024-08-17 NOTE — PROGRESS NOTES
[facility] -   Infectious diseases evaluation    Physician Progress Note 8/17/2024     admission date 8/14/2024     reason for consultation:   Gangrene      Impression :   Current:  Left foot non-healing gangrenous  surgical wound   S/p Left femoral to post tibial bypass - April 2024 - followed by resting pain and gangrene off the left hallux   Amputation of left hallux - 21 June 2024   Ray amputation in July 2024   Wound managed with VAC application at home - no improvement and ongoing pain at rest   Vascular on board - planning BKA today      Peripheral vascular disease      Other:  HTN, Alcoholic cirrhosis, COPD   Discussion / summary of stay / plan of care/ Recommendations:   No fever. WBC, CRP and PCT unremarkable   BC - 8/14 - no growth so far      Recommendation:   Completed cefepime and vanco after 8-16-24 BKA surgery  Wound Care        Infection Control Recommendations   [place] Precautions  Contact Isolation         Antimicrobial Stewardship Recommendations   Simplification of therapy  Targeted therapy        History of Present Illness:   Initial history:  Silas Shrestha is a 69 y.o.-year-old male who developed gangrene of his left big toe in April 2024 after clipping his toenail.  He underwent femoral to posterior tibial bypass in April 2024.  He continued to have rest pain over the area and needed amputation of the big headaches in June 2024.  Pain in the gangrene continued and therefore he had reimplantation in July 2024.     The surgical wound area has not healed despite dressing with VAC regularly at home.  There is no erythema or inflammation surrounding the wound.  There is granulation tissue and some discharge from the wound.     Vascular surgery is on board and is pending below-knee amputation.     Interval changes      8/15  No fevers and vitals stable   Well in self other than ongoing foot pain  C/o fresh blood with bowel openings yesterday -

## 2024-08-18 LAB
EKG ATRIAL RATE: 125 BPM
EKG P-R INTERVAL: 152 MS
EKG Q-T INTERVAL: 308 MS
EKG QRS DURATION: 80 MS
EKG QTC CALCULATION (BAZETT): 444 MS
EKG R AXIS: -34 DEGREES
EKG T AXIS: 31 DEGREES
EKG VENTRICULAR RATE: 125 BPM

## 2024-08-18 PROCEDURE — 97166 OT EVAL MOD COMPLEX 45 MIN: CPT

## 2024-08-18 PROCEDURE — 99232 SBSQ HOSP IP/OBS MODERATE 35: CPT | Performed by: INTERNAL MEDICINE

## 2024-08-18 PROCEDURE — 6370000000 HC RX 637 (ALT 250 FOR IP)

## 2024-08-18 PROCEDURE — 1200000000 HC SEMI PRIVATE

## 2024-08-18 PROCEDURE — 2580000003 HC RX 258

## 2024-08-18 PROCEDURE — 6360000002 HC RX W HCPCS

## 2024-08-18 PROCEDURE — APPSS30 APP SPLIT SHARED TIME 16-30 MINUTES: Performed by: NURSE PRACTITIONER

## 2024-08-18 PROCEDURE — 97535 SELF CARE MNGMENT TRAINING: CPT

## 2024-08-18 RX ADMIN — ASPIRIN 81 MG: 81 TABLET, COATED ORAL at 08:48

## 2024-08-18 RX ADMIN — SODIUM CHLORIDE, PRESERVATIVE FREE 10 ML: 5 INJECTION INTRAVENOUS at 08:50

## 2024-08-18 RX ADMIN — ENOXAPARIN SODIUM 40 MG: 100 INJECTION SUBCUTANEOUS at 08:47

## 2024-08-18 RX ADMIN — OXYCODONE HYDROCHLORIDE 10 MG: 5 TABLET ORAL at 08:49

## 2024-08-18 RX ADMIN — CILOSTAZOL 100 MG: 100 TABLET ORAL at 20:16

## 2024-08-18 RX ADMIN — PANTOPRAZOLE SODIUM 40 MG: 40 TABLET, DELAYED RELEASE ORAL at 08:48

## 2024-08-18 RX ADMIN — GABAPENTIN 100 MG: 100 CAPSULE ORAL at 20:16

## 2024-08-18 RX ADMIN — ACETAMINOPHEN 650 MG: 325 TABLET ORAL at 01:43

## 2024-08-18 RX ADMIN — ACETAMINOPHEN 650 MG: 325 TABLET ORAL at 20:15

## 2024-08-18 RX ADMIN — DOCUSATE SODIUM 100 MG: 100 CAPSULE, LIQUID FILLED ORAL at 08:48

## 2024-08-18 RX ADMIN — GABAPENTIN 100 MG: 100 CAPSULE ORAL at 14:42

## 2024-08-18 RX ADMIN — DOCUSATE SODIUM 100 MG: 100 CAPSULE, LIQUID FILLED ORAL at 20:16

## 2024-08-18 RX ADMIN — ATORVASTATIN CALCIUM 20 MG: 20 TABLET, FILM COATED ORAL at 20:15

## 2024-08-18 RX ADMIN — HYDROMORPHONE HYDROCHLORIDE 0.5 MG: 1 INJECTION, SOLUTION INTRAMUSCULAR; INTRAVENOUS; SUBCUTANEOUS at 22:11

## 2024-08-18 RX ADMIN — ACETAMINOPHEN 650 MG: 325 TABLET ORAL at 14:42

## 2024-08-18 RX ADMIN — SERTRALINE HYDROCHLORIDE 100 MG: 50 TABLET ORAL at 08:48

## 2024-08-18 RX ADMIN — OXYCODONE HYDROCHLORIDE 10 MG: 5 TABLET ORAL at 14:41

## 2024-08-18 RX ADMIN — HYDROMORPHONE HYDROCHLORIDE 0.5 MG: 1 INJECTION, SOLUTION INTRAMUSCULAR; INTRAVENOUS; SUBCUTANEOUS at 01:44

## 2024-08-18 RX ADMIN — ACETAMINOPHEN 650 MG: 325 TABLET ORAL at 08:48

## 2024-08-18 RX ADMIN — SODIUM CHLORIDE, PRESERVATIVE FREE 10 ML: 5 INJECTION INTRAVENOUS at 20:16

## 2024-08-18 RX ADMIN — OXYCODONE HYDROCHLORIDE 10 MG: 5 TABLET ORAL at 00:26

## 2024-08-18 RX ADMIN — CILOSTAZOL 100 MG: 100 TABLET ORAL at 08:49

## 2024-08-18 RX ADMIN — GABAPENTIN 100 MG: 100 CAPSULE ORAL at 08:48

## 2024-08-18 RX ADMIN — AMLODIPINE BESYLATE 10 MG: 10 TABLET ORAL at 08:48

## 2024-08-18 RX ADMIN — OXYCODONE HYDROCHLORIDE 10 MG: 5 TABLET ORAL at 20:16

## 2024-08-18 ASSESSMENT — PAIN SCALES - GENERAL
PAINLEVEL_OUTOF10: 7
PAINLEVEL_OUTOF10: 8
PAINLEVEL_OUTOF10: 2
PAINLEVEL_OUTOF10: 3
PAINLEVEL_OUTOF10: 2
PAINLEVEL_OUTOF10: 3
PAINLEVEL_OUTOF10: 8
PAINLEVEL_OUTOF10: 3
PAINLEVEL_OUTOF10: 7
PAINLEVEL_OUTOF10: 6
PAINLEVEL_OUTOF10: 7
PAINLEVEL_OUTOF10: 3
PAINLEVEL_OUTOF10: 3
PAINLEVEL_OUTOF10: 7
PAINLEVEL_OUTOF10: 3
PAINLEVEL_OUTOF10: 7
PAINLEVEL_OUTOF10: 4
PAINLEVEL_OUTOF10: 3
PAINLEVEL_OUTOF10: 3
PAINLEVEL_OUTOF10: 2

## 2024-08-18 NOTE — PROGRESS NOTES
Occupational Therapy  Facility/Department: [address] ORTHO/MED SURG  Occupational Therapy Initial Assessment    Name: Silas Shrestha  : 1955  MRN: [id number]  Date of Service: 2024  Chief Complaint   Patient presents with    Foot Pain    Wound Check     Discharge Recommendations:  Patient would benefit from continued therapy after discharge  OT Equipment Recommendations  Equipment Needed: Yes  Mobility Devices: Walker;ADL Assistive Devices  Walker: Rolling  ADL Assistive Devices: Grab Bars - shower;Toileting - Raised Toilet Seat with arms     Patient Diagnosis(es): The primary encounter diagnosis was History of complete ray amputation of left great toe (HCC). Diagnoses of Gangrene of left foot (HCC) and Ischemia of left lower extremity were also pertinent to this visit.  Past Medical History:  has a past medical history of Alcoholism (HCC), Cirrhosis of liver (HCC), Claudication (HCC), COPD (chronic obstructive pulmonary disease) (HCC), COVID-19 vaccine administered, Depression, Finger fracture, left, Foot pain, left, Gangrene (HCC), Hx of blood clots, Hx of hepatitis C, Hx of pancreatitis, Hx of tuberculosis, Hyperlipidemia, Hypertension, Impaired mobility, Intermittent claudication (HCC), Liver disease, PAD (peripheral artery disease) (HCC), PVD (peripheral vascular disease) (HCC), Swelling, Ulcer of foot (HCC), Under care of service provider, Wears dentures, Wears dentures, Wears glasses, and Wears glasses.  Past Surgical History:  has a past surgical history that includes Upper gastrointestinal endoscopy; liver biopsy; vascular surgery; Finger amputation (Left, 2016); other surgical history (2017); other surgical history (2023); vascular surgery (Left, 2023); vascular surgery (Left, 2024); Colonoscopy; femoral bypass (2024); femoral bypass (Left, 2024); vascular surgery (Left, 2024); Toe amputation (Left, 2024); and Toe amputation (Left,

## 2024-08-18 NOTE — CARE COORDINATION
Transitional planning: VM received yesterday at 1925 from Kassandra with [facility] that they can accept. She is requesting a return call at [phone]. [phone] Notified patient of the above. Patient would like to see if any of the other facilities accept before committing to [facility] and would like to discuss with his daughter.    1036 Phone call from Justine with [facility], they are reviewing.    1123 Phone call from Justine with [facility], they are declining due to OON.

## 2024-08-18 NOTE — PROGRESS NOTES
[facility]  Office: [phone]  Abdon Gonzalez DO, Kwame Lin DO, Vince Whitaker DO, Gabo Whitaker DO, Tawanna Orozco MD, Lashon Carcamo MD, Bulmaro Rodriguez MD, Anisa Winkler MD,  Yrn Leija MD, Ana Devlin MD, Jorge Alberto Clinton MD,  Armando Chandler DO, Jesusita Beatty MD, Marcel Lim MD, Ronnie Gonzalez DO, Renuka Aparicio MD,  Trevor Barlow DO, Mary Weeks MD, Jaye Bragg MD, Vicky Foster MD, Malia Weems MD,  Jeff Llamas MD, Hernandez Douglas MD, Vidya Gavin MD, Yonatan Arias MD, Lemuel Leal MD, Rancho Vega MD, Jose Roberto Jacobo DO, Christopher Omer DO, Zach Camacho MD,  Ang Gordon MD, Shirley Waterhouse, CNP,  Hattie Grissom CNP, Alex Meade, CNP,  Lupe Burger, DNP, Gris Ley, CNP, Gem Garza, CNP, Minoo Nicole CNP, Fatou Pacheco, CNP, Kayleen Avila, PA-C, Marisela Ocampo PA-C, Emily Martinez, CNP, Ori Cameron, CNP, Filomean Murphy, CNP, Tabatha Santoyo, CNP, Marine Harmon, CNS, Isabella Claros, CNP, Gabriela Pan CNP, Tracy Schwab, CNP         [facility]   IN-PATIENT SERVICE   [facility]    Progress Note    8/18/2024    1:29 PM    Name:   Silas Shrestha  MRN:     [id number]     Acct:      [id number]   Room:   0233/0233-01   Day:  4  Admit Date:  8/14/2024 12:52 PM    PCP:   Brina Rizo MD  Code Status:  Full Code    Subjective:     Patient feels well today. HR has stablized pain is improving. He is on room air.       Brief History:     61-year-old male who presented to the hospital for evaluation of nonhealing foot wound with gangrene    Medications:     Allergies:  No Known Allergies    Current Meds:   Scheduled Meds:    acetaminophen  650 mg Oral 4 times per day    amLODIPine  10 mg Oral Daily    aspirin  81 mg Oral Daily    atorvastatin  20 mg Oral Nightly    docusate sodium  100 mg Oral BID    sertraline  100 mg Oral Daily    sodium chloride flush  5-40 mL IntraVENous 2 times per day    gabapentin   100 mg Oral TID    pantoprazole  40 mg Oral QAM AC    cilostazol  100 mg Oral BID    enoxaparin  40 mg SubCUTAneous Daily     Continuous Infusions:    sodium chloride 15 mL/hr at 24 1438     PRN Meds: oxyCODONE, sodium chloride flush, sodium chloride, potassium chloride **OR** potassium alternative oral replacement **OR** potassium chloride, ondansetron **OR** ondansetron, polyethylene glycol, bisacodyl, HYDROmorphone, ipratropium 0.5 mg-albuterol 2.5 mg    Data:     Vitals:  BP (!) 162/71   Pulse 87   Temp 99.6 °F (37.6 °C) (Temporal)   Resp 16   Ht 1.778 m (5' 10\")   Wt 69.4 kg (153 lb)   SpO2 98%   BMI 21.95 kg/m²   Temp (24hrs), Av.2 °F (37.3 °C), Min:98.6 °F (37 °C), Max:99.6 °F (37.6 °C)    No results for input(s): \"POCGLU\" in the last 72 hours.    I/O (24Hr):    Intake/Output Summary (Last 24 hours) at 2024 1329  Last data filed at 2024 0511  Gross per 24 hour   Intake --   Output 750 ml   Net -750 ml       Labs:  Hematology:  Recent Labs     24  0632   WBC 16.3*   RBC 2.90*   HGB 10.1*   HCT 32.1*   .7*   MCH 34.8*   MCHC 31.5   RDW 13.4      MPV 9.1     Chemistry:  Recent Labs     24  0632      K 4.3   *   CO2 20   GLUCOSE 141*   BUN 13   CREATININE 0.9   MG 1.9   ANIONGAP 10   LABGLOM >90   CALCIUM 8.6     No results for input(s): \"LABALBU\", \"LABA1C\", \"H8CTHTT\", \"FT4\", \"TSH\", \"AST\", \"ALT\", \"LDH\", \"GGT\", \"ALKPHOS\", \"BILITOT\", \"BILIDIR\", \"AMMONIA\", \"AMYLASE\", \"LIPASE\", \"LACTATE\", \"CHOL\", \"HDL\", \"CHOLHDLRATIO\", \"TRIG\", \"VLDL\", \"RAR54AQ\", \"PHENYTOIN\", \"PHENYF\", \"URICACID\", \"POCGLU\" in the last 72 hours.    Invalid input(s): \"PROT\", \"F4TFDOX\", \"LABGGT\", \"LDLCHOLESTEROL\"    ABG:No results found for: \"POCPH\", \"PHART\", \"PH\", \"POCPCO2\", \"QDM6DEO\", \"PCO2\", \"POCPO2\", \"PO2ART\", \"PO2\", \"POCHCO3\", \"OTS3SHW\", \"HCO3\", \"NBEA\", \"PBEA\", \"BEART\", \"BE\", \"THGBART\", \"THB\", \"WGM4SOB\", \"QWGZ2VEC\", \"T5HWVHKB\", \"O2SAT\", \"FIO2\"  Lab Results   Component Value Date/Time

## 2024-08-18 NOTE — PROGRESS NOTES
[facility] -   Infectious diseases evaluation    Physician Progress Note 8/18/2024     admission date 8/14/2024     reason for consultation:   Gangrene      Impression :   Current:  Left foot non-healing gangrenous  surgical wound   S/p Left femoral to post tibial bypass - April 2024 - followed by resting pain and gangrene off the left hallux   Amputation of left hallux - 21 June 2024   Ray amputation in July 2024   Wound managed with VAC application at home - no improvement and ongoing pain at rest   Vascular on board - planning BKA today      Peripheral vascular disease      Other:  HTN, Alcoholic cirrhosis, COPD   Discussion / summary of stay / plan of care/ Recommendations:   No fever. WBC, CRP and PCT unremarkable   BC - 8/14 - no growth so far      Recommendation:   Monitor off antibiotics   Completed cefepime and vanco after 8-16-24 BKA surgery  Wound Care        Infection Control Recommendations   [place] Precautions  Contact Isolation         Antimicrobial Stewardship Recommendations   Simplification of therapy  Targeted therapy        History of Present Illness:   Initial history:  Silas Shrestha is a 69 y.o.-year-old male who developed gangrene of his left big toe in April 2024 after clipping his toenail.  He underwent femoral to posterior tibial bypass in April 2024.  He continued to have rest pain over the area and needed amputation of the big headaches in June 2024.  Pain in the gangrene continued and therefore he had reimplantation in July 2024.     The surgical wound area has not healed despite dressing with VAC regularly at home.  There is no erythema or inflammation surrounding the wound.  There is granulation tissue and some discharge from the wound.     Vascular surgery is on board and is pending below-knee amputation.     Interval changes      8/15  No fevers and vitals stable   Well in self other than ongoing foot pain  C/o fresh blood  elements of the encounter have been performed by me. I have reviewed the laboratory data, other diagnostic studies and discussed them with the APRN. I have updated the medical record where necessary.    I agree with the assessment, plan and orders as documented by the APRN.    In addition diagnostic and decision making elements, performed by the Attending Physician, are included in the Diagnostic and Decision Making Section of the text.    Charles Britt MD     2024      [facility] - APRN Daily Progress Note  Today's Date and Time: 2024, 1:03 PM    Impression :   Current:  Left foot non-healing gangrenous  surgical wound   S/p Left femoral to post tibial bypass - 2024 - followed by resting pain and gangrene off the left hallux   Amputation of left hallux - 2024   Ray amputation in 2024   Wound managed with VAC application at home - no improvement and ongoing pain at rest   Vascular on board - planning BKA today      Peripheral vascular disease      Other:  HTN, Alcoholic cirrhosis, COPD     Recommendations:   Please follow suggestions as per Dr Britt's recommendations.     Interval History:     2024    Afebrile  VS stable on room air  HTN    The patient continues to do well with no new concerns.   His left stump pain is controlled and he denied fever or chills.     LLE stump dressing is CDI  Plan for surgical dressing change on Monday per Vascular Surgery    He is stable off antibiotics.     He is awaiting discharge.   No new issues per RN    Physical Examination :   Patient Vitals for the past 8 hrs:   BP Temp Temp src Pulse Resp SpO2   24 0919 -- -- -- -- 16 --   24 0848 (!) 162/71 99.6 °F (37.6 °C) Temporal 87 16 98 %     Temp (24hrs), Av.2 °F (37.3 °C), Min:98.6 °F (37 °C), Max:99.6 °F (37.6 °C)    General Appearance: Awake, alert, and in no apparent distress  Eyes: Sclera anicteric; conjunctivae pink No hemorhages, no

## 2024-08-18 NOTE — PLAN OF CARE
Problem: Discharge Planning  Goal: Discharge to home or other facility with appropriate resources  8/17/2024 2152 by Dolly Delgado RN  Outcome: Progressing  8/17/2024 1607 by Sondra Hamilton RN  Outcome: Progressing     Problem: Pain  Goal: Verbalizes/displays adequate comfort level or baseline comfort level  8/17/2024 2152 by Dolly Delgado RN  Outcome: Progressing  8/17/2024 1607 by Sondra Hamilton RN  Outcome: Progressing     Problem: ABCDS Injury Assessment  Goal: Absence of physical injury  8/17/2024 2152 by Dolly Delgado RN  Outcome: Progressing  8/17/2024 1607 by Sondra Hamilton RN  Outcome: Progressing  Flowsheets (Taken 8/17/2024 0745)  Absence of Physical Injury: Implement safety measures based on patient assessment     Problem: Safety - Adult  Goal: Free from fall injury  Outcome: Progressing

## 2024-08-18 NOTE — CARE COORDINATION
08/18/24 1530   Readmission Assessment   Number of Days since last admission? 8-30 days   Previous Disposition Home with Home Health   Who is being Interviewed Patient   What was the patient's/caregiver's perception as to why they think they needed to return back to the hospital? Other (Comment)  (increased symptoms)   Did you visit your Primary Care Physician after you left the hospital, before you returned this time? No   Why weren't you able to visit your PCP? Did not have an appointment   Did you see a specialist, such as Cardiac, Pulmonary, Orthopedic Physician, etc. after you left the hospital? No   Who advised the patient to return to the hospital? Self-referral   Does the patient report anything that got in the way of taking their medications? No   In our efforts to provide the best possible care to you and others like you, can you think of anything that we could have done to help you after you left the hospital the first time, so that you might not have needed to return so soon? Other (Comment)  (nothing)

## 2024-08-18 NOTE — PLAN OF CARE
Problem: Discharge Planning  Goal: Discharge to home or other facility with appropriate resources  Outcome: Progressing     Problem: Pain  Goal: Verbalizes/displays adequate comfort level or baseline comfort level  Outcome: Progressing     Problem: ABCDS Injury Assessment  Goal: Absence of physical injury  Outcome: Progressing  Flowsheets (Taken 8/18/2024 9461)  Absence of Physical Injury: Implement safety measures based on patient assessment     Problem: Safety - Adult  Goal: Free from fall injury  Outcome: Progressing

## 2024-08-19 LAB
MICROORGANISM SPEC CULT: NORMAL
MICROORGANISM SPEC CULT: NORMAL
SERVICE CMNT-IMP: NORMAL
SERVICE CMNT-IMP: NORMAL
SPECIMEN DESCRIPTION: NORMAL
SPECIMEN DESCRIPTION: NORMAL

## 2024-08-19 PROCEDURE — 6370000000 HC RX 637 (ALT 250 FOR IP)

## 2024-08-19 PROCEDURE — 6360000002 HC RX W HCPCS

## 2024-08-19 PROCEDURE — 99232 SBSQ HOSP IP/OBS MODERATE 35: CPT | Performed by: INTERNAL MEDICINE

## 2024-08-19 PROCEDURE — 1200000000 HC SEMI PRIVATE

## 2024-08-19 PROCEDURE — 2580000003 HC RX 258

## 2024-08-19 PROCEDURE — 99231 SBSQ HOSP IP/OBS SF/LOW 25: CPT | Performed by: INTERNAL MEDICINE

## 2024-08-19 RX ADMIN — SODIUM CHLORIDE, PRESERVATIVE FREE 10 ML: 5 INJECTION INTRAVENOUS at 20:31

## 2024-08-19 RX ADMIN — OXYCODONE HYDROCHLORIDE 10 MG: 5 TABLET ORAL at 00:37

## 2024-08-19 RX ADMIN — ENOXAPARIN SODIUM 40 MG: 100 INJECTION SUBCUTANEOUS at 08:54

## 2024-08-19 RX ADMIN — CILOSTAZOL 100 MG: 100 TABLET ORAL at 08:54

## 2024-08-19 RX ADMIN — SERTRALINE HYDROCHLORIDE 100 MG: 50 TABLET ORAL at 08:54

## 2024-08-19 RX ADMIN — CILOSTAZOL 100 MG: 100 TABLET ORAL at 20:31

## 2024-08-19 RX ADMIN — GABAPENTIN 100 MG: 100 CAPSULE ORAL at 20:29

## 2024-08-19 RX ADMIN — ACETAMINOPHEN 650 MG: 325 TABLET ORAL at 08:53

## 2024-08-19 RX ADMIN — ACETAMINOPHEN 650 MG: 325 TABLET ORAL at 20:29

## 2024-08-19 RX ADMIN — PANTOPRAZOLE SODIUM 40 MG: 40 TABLET, DELAYED RELEASE ORAL at 05:28

## 2024-08-19 RX ADMIN — GABAPENTIN 100 MG: 100 CAPSULE ORAL at 08:54

## 2024-08-19 RX ADMIN — AMLODIPINE BESYLATE 10 MG: 10 TABLET ORAL at 08:53

## 2024-08-19 RX ADMIN — ATORVASTATIN CALCIUM 20 MG: 20 TABLET, FILM COATED ORAL at 20:29

## 2024-08-19 RX ADMIN — HYDROMORPHONE HYDROCHLORIDE 0.5 MG: 1 INJECTION, SOLUTION INTRAMUSCULAR; INTRAVENOUS; SUBCUTANEOUS at 21:17

## 2024-08-19 RX ADMIN — ACETAMINOPHEN 650 MG: 325 TABLET ORAL at 02:14

## 2024-08-19 RX ADMIN — ACETAMINOPHEN 650 MG: 325 TABLET ORAL at 14:25

## 2024-08-19 RX ADMIN — OXYCODONE HYDROCHLORIDE 10 MG: 5 TABLET ORAL at 05:28

## 2024-08-19 RX ADMIN — DOCUSATE SODIUM 100 MG: 100 CAPSULE, LIQUID FILLED ORAL at 20:29

## 2024-08-19 RX ADMIN — GABAPENTIN 100 MG: 100 CAPSULE ORAL at 14:25

## 2024-08-19 RX ADMIN — ASPIRIN 81 MG: 81 TABLET, COATED ORAL at 08:53

## 2024-08-19 RX ADMIN — OXYCODONE HYDROCHLORIDE 10 MG: 5 TABLET ORAL at 20:14

## 2024-08-19 RX ADMIN — SODIUM CHLORIDE, PRESERVATIVE FREE 10 ML: 5 INJECTION INTRAVENOUS at 08:55

## 2024-08-19 RX ADMIN — DOCUSATE SODIUM 100 MG: 100 CAPSULE, LIQUID FILLED ORAL at 08:54

## 2024-08-19 ASSESSMENT — PAIN DESCRIPTION - ORIENTATION
ORIENTATION: LEFT

## 2024-08-19 ASSESSMENT — PAIN DESCRIPTION - LOCATION
LOCATION: LEG

## 2024-08-19 ASSESSMENT — PAIN SCALES - GENERAL
PAINLEVEL_OUTOF10: 9
PAINLEVEL_OUTOF10: 7
PAINLEVEL_OUTOF10: 6
PAINLEVEL_OUTOF10: 5
PAINLEVEL_OUTOF10: 9
PAINLEVEL_OUTOF10: 5
PAINLEVEL_OUTOF10: 2
PAINLEVEL_OUTOF10: 9
PAINLEVEL_OUTOF10: 9
PAINLEVEL_OUTOF10: 4

## 2024-08-19 ASSESSMENT — ENCOUNTER SYMPTOMS
RESPIRATORY NEGATIVE: 1
GASTROINTESTINAL NEGATIVE: 1

## 2024-08-19 ASSESSMENT — PAIN DESCRIPTION - DESCRIPTORS
DESCRIPTORS: ACHING
DESCRIPTORS: ACHING

## 2024-08-19 NOTE — PLAN OF CARE
Problem: Discharge Planning  Goal: Discharge to home or other facility with appropriate resources  8/18/2024 2215 by Dolly Delgado RN  Outcome: Progressing  8/18/2024 1806 by Sondra Hamilton RN  Outcome: Progressing     Problem: Pain  Goal: Verbalizes/displays adequate comfort level or baseline comfort level  8/18/2024 2215 by Dolly Delgado RN  Outcome: Progressing  8/18/2024 1806 by Sondra Hamilton RN  Outcome: Progressing     Problem: ABCDS Injury Assessment  Goal: Absence of physical injury  8/18/2024 2215 by Dolly Delgado RN  Outcome: Progressing  8/18/2024 1806 by Sondra Hamilton RN  Outcome: Progressing  Flowsheets (Taken 8/18/2024 0830)  Absence of Physical Injury: Implement safety measures based on patient assessment     Problem: Safety - Adult  Goal: Free from fall injury  8/18/2024 2215 by Dolly Delgado RN  Outcome: Progressing  8/18/2024 1806 by Sondra Hamilton RN  Outcome: Progressing

## 2024-08-19 NOTE — DISCHARGE INSTRUCTIONS
Wound care - Remove old dressing. Apply paraffin gauze over the stitch line. Cover with fluff gauze and then with Kerlix. Wrap with Ace wrap over it. Apply splint on the under-surface of the knee to keep it straight. Splint can be removed for PT/OT. Dressing change daily.  Follow up with Dr. Delos Reyes in 3 weeks.

## 2024-08-19 NOTE — PROGRESS NOTES
[facility]  Progress Note    Silas Shrestha  MRN: [id number]  Date: 8/19/2024  LOS: 5     Reason for F/U :   Left foot nonhealing gangrenous surgical wound    Impression :   Peripheral arterial disease status post left femoral to posterior tibial bypass April 2024  Left hallux gangrene status post amputation June 2024  Left foot nonhealing gangrenous surgical wound   status post left below the knee amputation 8/16/2024  Alcoholic cirrhosis  COPD  Essential hypertension    Recommendations:   The patient completed a course of antibiotics with cefepime and vancomycin  The patient has been off systemic antibiotic therapy as of 8/16/2024 after the BKA surgery  Continue local wound care    Infection Control Recommendations:   Universal precautions    Discharge Planning:   Patient will need Midline Catheter Insertion/ PICC line Insertion: No  Patient will need: Home IV , Infusion Center,  SNF,  LTAC: Undetermined  Patient willneed outpatient wound care: Yes    Medical Decision making / Summary of Stay:   Silas Shrestha is a 69 y.o.-year-old male who developed gangrene of his left big toe in April 2024 after clipping his toenail.  He underwent femoral to posterior tibial bypass in April 2024.  He continued to have rest pain over the area and needed amputation of the big headaches in June 2024.  Pain in the gangrene continued and therefore he had reimplantation in July 2024.     The surgical wound area has not healed despite dressing with VAC regularly at home.  There is no erythema or inflammation surrounding the wound.  There is granulation tissue and some discharge from the wound.     Vascular surgery is on board and is pending below-knee amputation.    8/15  No fevers and vitals stable   Well in self other than ongoing foot pain  C/o fresh blood with bowel openings yesterday - small amount - RN informed - to monitor and escalate to the primary team if recurring   No other concerns or symptoms         Afebrile and BP at 162/78.  WBC 9.4 and no growth on BC  Hgb trending down day at 10.3<12.4<13  Patient has no additional concerns    Underwent Lt BKA on 24     Current evaluation:2024    BP (!) 150/68   Pulse 85   Temp 98.6 °F (37 °C) (Oral)   Resp 14   Ht 1.778 m (5' 10\")   Wt 69.4 kg (153 lb)   SpO2 100%   BMI 21.95 kg/m²     Temperature Range: Temp: 98.6 °F (37 °C) Temp  Av.9 °F (37.2 °C)  Min: 98.6 °F (37 °C)  Max: 99.2 °F (37.3 °C)  The patient is seen and evaluated at bedside he is awake and alert in no acute distress.  No subjective fever or chills.  No abdominal pain nausea vomiting.  Does have pain in the left BKA stump.  He does report that the pain is controlled with meds.    Review of Systems   Constitutional: Negative.    HENT: Negative.     Respiratory: Negative.     Cardiovascular: Negative.    Gastrointestinal: Negative.    Genitourinary: Negative.    Musculoskeletal: Negative.         Left BKA stump pain   Neurological: Negative.    Psychiatric/Behavioral: Negative.         Physical Examination :     Physical Exam  Constitutional:       Appearance: He is well-developed.   HENT:      Head: Normocephalic and atraumatic.   Cardiovascular:      Rate and Rhythm: Normal rate.      Heart sounds: Normal heart sounds.      No friction rub. No gallop.   Pulmonary:      Effort: Pulmonary effort is normal.      Breath sounds: Normal breath sounds. No wheezing.   Abdominal:      General: Bowel sounds are normal.      Palpations: Abdomen is soft. There is no mass.      Tenderness: There is no abdominal tenderness.   Musculoskeletal:      Cervical back: Normal range of motion and neck supple.      Comments: There is a dressing in the left BKA stump which was not removed   Lymphadenopathy:      Cervical: No cervical adenopathy.   Skin:     General: Skin is warm and dry.   Neurological:      Mental Status: He is alert and oriented to person, place, and time.         Laboratory data:

## 2024-08-19 NOTE — PROGRESS NOTES
history of left foot PD AD s/p femoral to posterior tibial bypass in April 2024 followed by left hallux ray amputation on 7/31/2024 with Dr. Delos Reyes for gangrene of left great toe who presents with nonhealing left foot amputation site along with rest pain.  Underwent left BKA on 8/16/24. Pain controlled with medications      Plan:   As discussed with attending, Dr. Delos Reyes  Apply splint to keep knee straight  Multimodal pain control  Ok for PT/OT  Dressing changed on rounds  Medically stable for discharge to rehab  Rest of the care per primary    Janet Gonzalez MD  PGY 4  Surgery Resident    [facility] [place]                Yes

## 2024-08-19 NOTE — CARE COORDINATION
Transition planning  Spoke with patient at bedside, updated that [facility] declined as they are [facility], [facility] can accept and [facility] can accept, waiting to hear from other referrals. Patient states he is agreeable to [facility].    Called and spoke with April at [facility], she states they are able to accept patient, ok for CM to start precert.    CM unable to start precert, called and asked April to start precert for [facility].

## 2024-08-19 NOTE — PROGRESS NOTES
[facility]  Office: [phone]  Abdon Gonzalez DO, Kwame Lin DO, Vince Whitaker DO, Gabo Whitaker DO, Tawanna Orozco MD, Lashon Carcamo MD, Bulmaro Rodriguez MD, Anisa Winkler MD,  Yrn Leija MD, Ana Devlin MD, Jorge Alberto Clinton MD,  Armando Chandler DO, Jesusita Beatty MD, Marcel Lim MD, Ronnie Gonzalez DO, Renuka Aparicio MD,  Trevor Barlow DO, Mary Weeks MD, Jaye Bragg MD, Vicky Foster MD, Malia Weems MD,  Jeff Llamas MD, Hernandez Douglas MD, Vidya Gavin MD, Yonatan Arias MD, Lemuel Leal MD, Rancho Vega MD, Jose Roberto Jacobo DO, Christopher Omer DO, Zach Camacho MD,  Ang Gordon MD, Shirley Waterhouse, CNP,  Hattie Grissom CNP, Alex Meade, CNP,  Lupe Burger, JAMI, Gris Ley, CNP, Gem Garza, CNP, Minoo Nicole CNP, Fatou Pacheco, CNP, Kayleen Avila, PA-C, Marisela Ocampo PA-C, Emily Martinez, CNP, Ori Cameron, CNP, Filomena Murphy, CNP, Tabatha Santoyo, CNP, Marine Harmon, CNS, Isabella Claros, CNP, Gabriela Pan CNP, Tracy Schwab, CNP         [facility]   IN-PATIENT SERVICE   [facility]    Progress Note    8/19/2024    1:23 PM    Name:   Silas Sherstha  MRN:     [id number]     Acct:      [id number]   Room:   0233/0233-01   Day:  5  Admit Date:  8/14/2024 12:52 PM    PCP:   Brina Rizo MD  Code Status:  Full Code    Subjective:     Patient feels well today. No complaints. He is on room air.       Brief History:     61-year-old male who presented to the hospital for evaluation of nonhealing foot wound with gangrene    Medications:     Allergies:  No Known Allergies    Current Meds:   Scheduled Meds:    acetaminophen  650 mg Oral 4 times per day    amLODIPine  10 mg Oral Daily    aspirin  81 mg Oral Daily    atorvastatin  20 mg Oral Nightly    docusate sodium  100 mg Oral BID    sertraline  100 mg Oral Daily    sodium chloride flush  5-40 mL IntraVENous 2 times per day    gabapentin  100 mg Oral TID     pantoprazole  40 mg Oral QAM AC    cilostazol  100 mg Oral BID    enoxaparin  40 mg SubCUTAneous Daily     Continuous Infusions:    sodium chloride 15 mL/hr at 24 1438     PRN Meds: oxyCODONE, sodium chloride flush, sodium chloride, potassium chloride **OR** potassium alternative oral replacement **OR** potassium chloride, ondansetron **OR** ondansetron, polyethylene glycol, bisacodyl, HYDROmorphone, ipratropium 0.5 mg-albuterol 2.5 mg    Data:     Vitals:  BP (!) 150/68   Pulse 85   Temp 98.6 °F (37 °C) (Oral)   Resp 14   Ht 1.778 m (5' 10\")   Wt 69.4 kg (153 lb)   SpO2 100%   BMI 21.95 kg/m²   Temp (24hrs), Av.9 °F (37.2 °C), Min:98.6 °F (37 °C), Max:99.2 °F (37.3 °C)    No results for input(s): \"POCGLU\" in the last 72 hours.    I/O (24Hr):    Intake/Output Summary (Last 24 hours) at 2024 1323  Last data filed at 2024 0637  Gross per 24 hour   Intake 250 ml   Output 600 ml   Net -350 ml       Labs:  Hematology:  Recent Labs     24  0632   WBC 16.3*   RBC 2.90*   HGB 10.1*   HCT 32.1*   .7*   MCH 34.8*   MCHC 31.5   RDW 13.4      MPV 9.1     Chemistry:  Recent Labs     24  0632      K 4.3   *   CO2 20   GLUCOSE 141*   BUN 13   CREATININE 0.9   MG 1.9   ANIONGAP 10   LABGLOM >90   CALCIUM 8.6     No results for input(s): \"LABALBU\", \"LABA1C\", \"V4GGJVK\", \"FT4\", \"TSH\", \"AST\", \"ALT\", \"LDH\", \"GGT\", \"ALKPHOS\", \"BILITOT\", \"BILIDIR\", \"AMMONIA\", \"AMYLASE\", \"LIPASE\", \"LACTATE\", \"CHOL\", \"HDL\", \"CHOLHDLRATIO\", \"TRIG\", \"VLDL\", \"GGT82OJ\", \"PHENYTOIN\", \"PHENYF\", \"URICACID\", \"POCGLU\" in the last 72 hours.    Invalid input(s): \"PROT\", \"B9BOGSG\", \"LABGGT\", \"LDLCHOLESTEROL\"    ABG:No results found for: \"POCPH\", \"PHART\", \"PH\", \"POCPCO2\", \"QBV2LMQ\", \"PCO2\", \"POCPO2\", \"PO2ART\", \"PO2\", \"POCHCO3\", \"YGQ6SBA\", \"HCO3\", \"NBEA\", \"PBEA\", \"BEART\", \"BE\", \"THGBART\", \"THB\", \"UGN6YRC\", \"EIEN3POA\", \"X7NXVSOL\", \"O2SAT\", \"FIO2\"  Lab Results   Component Value Date/Time    SPECIAL R AC 10ML

## 2024-08-19 NOTE — DISCHARGE INSTR - COC
-Continuity of Care Form  -  Patient Name: Silas Shrestha   :  1955  MRN:  [id number]    Admit date:  2024  Discharge date:  2024    Code Status Order: Full Code   Advance Directives:   Advance Care Flowsheet Documentation        Date/Time Healthcare Directive Type of Healthcare Directive Copy in Chart Healthcare Agent Appointed Healthcare Agent's Name Healthcare Agent's Phone Number    [phone] No, patient does not have an advance directive for healthcare treatment  --  --  --  --  --                     Admitting Physician:  Armando SHULTZ DO  PCP: Brina Rizo MD    Discharging Nurse: ***  Discharging Hospital Unit/Room#: 0233/0233-01  Discharging Unit Phone Number: [phone]    Emergency Contact:   Extended Emergency Contact Information  Primary Emergency Contact: Rey Shrestha  Home Phone: [phone]  Relation: Brother/Sister   needed? No  Secondary Emergency Contact: Kailyn Batista  MRI Interventions Phone: [phone]  Relation: Niece/Nephew  Preferred language: English   needed? No    Past Surgical History:  Past Surgical History:   Procedure Laterality Date    COLONOSCOPY      around     FEMORAL BYPASS  2024    FEMORAL BYPASS Left 2024    LOWER EXTREMITY FEMORAL TO TIBIAL BYPASS WITH PROPATIN GRAFT, IPSILATERAL SUPERIFICAL SAPHENOUS VEIN HARVEST, LOWER EXTREMITY ANGIOGRAM COMPLETION performed by Delos Reyes, Arthur, MD at [facility]    FINGER AMPUTATION Left 2016    revision long finger    LIVER BIOPSY      OTHER SURGICAL HISTORY  2017    Abdomen with run off with Dr. Smith - could not pass left left pop; # 7 FR manual pull right groin    OTHER SURGICAL HISTORY  2023    LE Angiogram    TOE AMPUTATION Left 2024    LEFT GREAT TOE AMPUTATION performed by Delos Reyes, Arthur, MD at [facility]    TOE AMPUTATION Left 2024    RAY AMPUTATION OF HALLUX, APPLICATION OF WOUND VAC performed by Delos Reyes, Arthur, MD at [facility]

## 2024-08-20 ENCOUNTER — CARE COORDINATION (OUTPATIENT)
Dept: CARE COORDINATION | Age: 69
End: 2024-08-20

## 2024-08-20 LAB — SURGICAL PATHOLOGY REPORT: NORMAL

## 2024-08-20 PROCEDURE — 99231 SBSQ HOSP IP/OBS SF/LOW 25: CPT | Performed by: INTERNAL MEDICINE

## 2024-08-20 PROCEDURE — 6370000000 HC RX 637 (ALT 250 FOR IP)

## 2024-08-20 PROCEDURE — 97535 SELF CARE MNGMENT TRAINING: CPT

## 2024-08-20 PROCEDURE — 97530 THERAPEUTIC ACTIVITIES: CPT

## 2024-08-20 PROCEDURE — 1200000000 HC SEMI PRIVATE

## 2024-08-20 PROCEDURE — 99232 SBSQ HOSP IP/OBS MODERATE 35: CPT | Performed by: INTERNAL MEDICINE

## 2024-08-20 PROCEDURE — 6360000002 HC RX W HCPCS

## 2024-08-20 PROCEDURE — 2580000003 HC RX 258

## 2024-08-20 RX ADMIN — ASPIRIN 81 MG: 81 TABLET, COATED ORAL at 08:11

## 2024-08-20 RX ADMIN — ACETAMINOPHEN 650 MG: 325 TABLET ORAL at 14:15

## 2024-08-20 RX ADMIN — SERTRALINE HYDROCHLORIDE 100 MG: 50 TABLET ORAL at 08:11

## 2024-08-20 RX ADMIN — DOCUSATE SODIUM 100 MG: 100 CAPSULE, LIQUID FILLED ORAL at 08:11

## 2024-08-20 RX ADMIN — OXYCODONE HYDROCHLORIDE 10 MG: 5 TABLET ORAL at 20:50

## 2024-08-20 RX ADMIN — CILOSTAZOL 100 MG: 100 TABLET ORAL at 08:11

## 2024-08-20 RX ADMIN — ACETAMINOPHEN 650 MG: 325 TABLET ORAL at 20:50

## 2024-08-20 RX ADMIN — DOCUSATE SODIUM 100 MG: 100 CAPSULE, LIQUID FILLED ORAL at 20:50

## 2024-08-20 RX ADMIN — ATORVASTATIN CALCIUM 20 MG: 20 TABLET, FILM COATED ORAL at 20:51

## 2024-08-20 RX ADMIN — ENOXAPARIN SODIUM 40 MG: 100 INJECTION SUBCUTANEOUS at 08:12

## 2024-08-20 RX ADMIN — SODIUM CHLORIDE, PRESERVATIVE FREE 10 ML: 5 INJECTION INTRAVENOUS at 19:45

## 2024-08-20 RX ADMIN — CILOSTAZOL 100 MG: 100 TABLET ORAL at 20:50

## 2024-08-20 RX ADMIN — OXYCODONE HYDROCHLORIDE 10 MG: 5 TABLET ORAL at 02:49

## 2024-08-20 RX ADMIN — GABAPENTIN 100 MG: 100 CAPSULE ORAL at 20:51

## 2024-08-20 RX ADMIN — AMLODIPINE BESYLATE 10 MG: 10 TABLET ORAL at 08:11

## 2024-08-20 RX ADMIN — ACETAMINOPHEN 650 MG: 325 TABLET ORAL at 08:10

## 2024-08-20 RX ADMIN — OXYCODONE HYDROCHLORIDE 10 MG: 5 TABLET ORAL at 08:13

## 2024-08-20 RX ADMIN — SODIUM CHLORIDE, PRESERVATIVE FREE 10 ML: 5 INJECTION INTRAVENOUS at 08:13

## 2024-08-20 RX ADMIN — GABAPENTIN 100 MG: 100 CAPSULE ORAL at 14:15

## 2024-08-20 RX ADMIN — ACETAMINOPHEN 650 MG: 325 TABLET ORAL at 02:49

## 2024-08-20 RX ADMIN — GABAPENTIN 100 MG: 100 CAPSULE ORAL at 08:11

## 2024-08-20 RX ADMIN — PANTOPRAZOLE SODIUM 40 MG: 40 TABLET, DELAYED RELEASE ORAL at 05:57

## 2024-08-20 ASSESSMENT — PAIN SCALES - GENERAL
PAINLEVEL_OUTOF10: 7
PAINLEVEL_OUTOF10: 7
PAINLEVEL_OUTOF10: 0
PAINLEVEL_OUTOF10: 4
PAINLEVEL_OUTOF10: 9
PAINLEVEL_OUTOF10: 7
PAINLEVEL_OUTOF10: 4
PAINLEVEL_OUTOF10: 1
PAINLEVEL_OUTOF10: 7

## 2024-08-20 ASSESSMENT — ENCOUNTER SYMPTOMS
RHINORRHEA: 0
ABDOMINAL PAIN: 0
GASTROINTESTINAL NEGATIVE: 1
VOMITING: 0
RESPIRATORY NEGATIVE: 1
COUGH: 0
NAUSEA: 0
SHORTNESS OF BREATH: 0

## 2024-08-20 ASSESSMENT — PAIN DESCRIPTION - DESCRIPTORS
DESCRIPTORS: SORE;TENDER
DESCRIPTORS: ACHING

## 2024-08-20 ASSESSMENT — PAIN DESCRIPTION - ORIENTATION
ORIENTATION: LEFT

## 2024-08-20 ASSESSMENT — PAIN DESCRIPTION - LOCATION
LOCATION: LEG

## 2024-08-20 NOTE — PROGRESS NOTES
Occupational Therapy  Facility/Department: [address] ORTHO/MED SURG   Daily Treatment Note      Patient Name: Silas Shrestha        MRN: [id number]    : 1955    Date of Service: 2024    Discharge Recommendations  Discharge Recommendations: Patient would benefit from continued therapy after discharge    OT Equipment Recommendations  Mobility Devices: Walker  Walker: Rolling  ADL Assistive Devices: Grab Bars - shower;Toileting - Raised Toilet Seat with arms    Assessment  Performance deficits / Impairments: Decreased functional mobility ;Decreased endurance;Decreased ADL status;Decreased balance;Decreased high-level IADLs  Assessment: Pt making good progress towards goals this session. Pt was independent prior to admit. Pt will continue to benefit from further OT services following discharge from [facility].  Prognosis: Good  Activity Tolerance  Activity Tolerance: Patient Tolerated treatment well  Safety Devices  Type of Devices: Patient at risk for falls;Left in bed;Call light within reach;Nurse notified;Gait belt;Bed alarm in place  Restraints  Restraints Initially in Place: No    Restrictions/Precautions  Restrictions/Precautions  Restrictions/Precautions: Weight Bearing;Fall Risk;General Precautions;Up as Tolerated  Required Braces or Orthoses?: No  Lower Extremity Weight Bearing Restrictions  Left Lower Extremity Weight Bearing: Non Weight Bearing    Subjective  General  Patient assessed for rehabilitation services?: Yes  Response to previous treatment: Patient with no complaints from previous session  Family / Caregiver Present: No  Diagnosis: L BKA on , L foot gangrene  Subjective  Subjective: RN ok'd patient for OT treatment this date. Pt agreeable to session and reports only discomfort in LLE this session.    Objective  Orientation  Overall Orientation Status: Within Functional Limits  Orientation Level: Oriented X4  Cognition  Overall Cognitive Status: Exceptions  Safety Judgement:    Functional Mobility Skilled Clinical Factors: Pt stood 2x this session for clothing mgt SBA>CGA for safety and balance. Pt reports he went to bathroom on his own this morning. Pt left supine in bed with bed alarm on    Patient Education  Patient Education  Education Given To: Patient  Education Provided: Role of Therapy;Transfer Training;Equipment;Precautions;ADL Adaptive Strategies;Fall Prevention Strategies  Education Provided Comments: safety, body mechanics and desensitization techniques for LLE  Education Method: Demonstration;Verbal  Barriers to Learning: None  Education Outcome: Verbalized understanding;Continued education needed;Demonstrated understanding    Goals  Short Term Goals  Time Frame for Short Term Goals: Pt will by d/c  Short Term Goal 1: demo good safety awareness during func mob around room using LRAD PRN at mod I  Short Term Goal 2: demo standing during func activity using LRAD PRN and SUP for 8 min+ without a seated break break  Short Term Goal 3: demo ADL LB bathing/dressing activity at mod I and increased time  Short Term Goal 4: demo all bed mobility, including rolling, at (I)  Short Term Goal 5: identify/demo 2 non-pharm pain-relieving techniques and desensitization techniques with 1 cue only    Plan  Occupational Therapy Plan  Times Per Week: 4x/week    AM-[facility] Daily Activities Inpatient  AM-[facility] Daily Activity - Inpatient   How much help is needed for putting on and taking off regular lower body clothing?: A Little  How much help is needed for bathing (which includes washing, rinsing, drying)?: A Little  How much help is needed for toileting (which includes using toilet, bedpan, or urinal)?: A Little  How much help is needed for putting on and taking off regular upper body clothing?: None  How much help is needed for taking care of personal grooming?: None  How much help for eating meals?: None  AM-[facility] Inpatient Daily Activity Raw Score: 21  AM-[facility] Inpatient ADL T-Scale Score :

## 2024-08-20 NOTE — PROGRESS NOTES
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 Diagnoses of Gangrene of left foot (HCC) and Ischemia of left lower extremity were also pertinent to this visit.  Past Medical History:  has a past medical history of Alcoholism (HCC), Cirrhosis of liver (HCC), Claudication (HCC), COPD (chronic obstructive pulmonary disease) (HCC), COVID-19 vaccine administered, Depression, Finger fracture, left, Foot pain, left, Gangrene (HCC), Hx of blood clots, Hx of hepatitis C, Hx of pancreatitis, Hx of tuberculosis, Hyperlipidemia, Hypertension, Impaired mobility, Intermittent claudication (HCC), Liver disease, PAD (peripheral artery disease) (HCC), PVD (peripheral vascular disease) (HCC), Swelling, Ulcer of foot (HCC), Under care of service provider, Wears dentures, Wears dentures, Wears glasses, and Wears glasses.  Past Surgical History:  has a past surgical history that includes Upper gastrointestinal endoscopy; liver biopsy; vascular surgery; Finger amputation (Left, 2016); other surgical history (2017); other surgical history (2023); vascular surgery (Left, 2023); vascular surgery (Left, 2024); Colonoscopy; femoral bypass (2024); femoral bypass (Left, 2024); vascular surgery (Left, 2024); Toe amputation (Left, 2024); Toe amputation (Left, 2024); and Leg amputation below knee (Left, 2024).    Assessment   Body Structures, Functions, Activity Limitations Requiring Skilled Therapeutic Intervention: Decreased functional mobility ;Decreased  unit  Bathroom Toilet: Standard  Bathroom Equipment: Shower chair  Home Equipment: Crutches, Walker - Rolling, Walker - 4-Wheeled, Cane  Has the patient had two or more falls in the past year or any fall with injury in the past year?: No  Receives Help From: Family  ADL Assistance: Needs assistance  Homemaking Assistance: Needs assistance  Ambulation Assistance: Independent  Transfer Assistance: Independent  Active : Yes  Mode of Transportation: Car  Occupation: Unemployed  Leisure & Hobbies: billiards/bowling  Additional Comments: Pt states he occasionally needs help from his son to get in/out bathroom and other areas due to walker being too wide for doorways. States he has had to crawl to bathroom and/or up steps in the past. Son home nearly all the time  Vision/Hearing  Vision  Vision: Within Functional Limits  Hearing  Hearing: Within functional limits    Cognition   Orientation  Overall Orientation Status: Within Functional Limits  Orientation Level: Oriented X4  Cognition  Overall Cognitive Status: WFL     Objective   Temp: 98.5 °F (36.9 °C)  Pulse: 78  Heart Rate Source: Monitor  Respirations: 16  SpO2: 98 %  BP: (!) 159/67  MAP (Calculated): 98      Balance  Sitting: Intact (Pt sat EOB ~40 minutes for ADL routine supervision>independent with no LOB throughout)  Standing: With support (Pt stood 2 minutes x2 with RW SBA>CGA for balance and safety during dynamic tasks)  Bed mobility  Supine to Sit: Contact guard assistance  Sit to Supine: Contact guard assistance  Scooting: Contact guard assistance  Transfers  Sit to Stand: Contact guard assistance  Stand to Sit: Contact guard assistance  Ambulation  Surface: Level tile  Device: Rolling Walker  Assistance: Contact guard assistance  Comments: amb 35 ft x 2 with a RW x CGA     Balance  Posture: Good  Sitting - Static: Good  Sitting - Dynamic: Good  Standing - Static: Good  Standing - Dynamic: Fair         OutComes Score               AM-PAC -

## 2024-08-20 NOTE — PROGRESS NOTES
Division of Vascular Surgery      s     Progress Note      Name: Silas Shrestha  MRN: [id number]     8/20/2024  7:33 AM  POD#4 after Left BKA  Overnight Events:     No acute overnight events.      Subjective:     Pt seen and examined at bedside. Afebrile, vital signs within normal limits on room air. Pain is manageable with current regimen. No increased drainage from surgical site dressing. Passing flatus and having bowel movements. Tolerating regular diet.     Physical Exam:     Vitals:  BP (!) 159/67   Pulse 78   Temp 98.5 °F (36.9 °C) (Oral)   Resp 16   Ht 1.778 m (5' 10\")   Wt 69.4 kg (153 lb)   SpO2 98%   BMI 21.95 kg/m²     General appearance - alert, well appearing and in no acute distress  Mental status - oriented to person, place and time with normal affect  Head - normocephalic and atraumatic  Neck - supple, no carotid bruits, thyroid not palpable, no JVD  Chest - clear to auscultation, normal effort  Heart - normal rate, regular rhythm, no murmurs  Abdomen - soft, non-tender, non-distended, bowel sounds present all four quadrants, no masses  Neurological - normal speech, no focal findings or movement disorder noted, cranial nerves II through XII grossly intact  Extremities - peripheral pulses palpable, no pedal edema or calf pain with palpation  Skin - no gross lesions . Dressing over stump is clean dry and intact  Foot Exam -tender to touch on left foot.  Vascular Exam -palpable bypass signal in calf.       Imaging:   XR FOOT LEFT (MIN 3 VIEWS)    Result Date: 8/14/2024  EXAMINATION: THREE XRAY VIEWS OF THE LEFT FOOT 8/14/2024 1:18 pm COMPARISON: 05/21/2024 HISTORY: ORDERING SYSTEM PROVIDED HISTORY: rule out osteomylitis TECHNOLOGIST PROVIDED HISTORY: rule out osteomylitis FINDINGS: Status post 1st metatarsal transmetatarsal amputation.  No other change identified.  Vascular calcifications.  No fracture or dislocation.     Status post 1st transmetatarsal amputation No acute erosive changes

## 2024-08-20 NOTE — PROGRESS NOTES
[facility]  Progress Note    Silas Shrestha  MRN: [id number]  Date: 8/20/2024  LOS: 6     Reason for F/U :   Left foot nonhealing gangrenous surgical wound    Impression :   Peripheral arterial disease status post left femoral to posterior tibial bypass April 2024  Left hallux gangrene status post amputation June 2024  Left foot nonhealing gangrenous surgical wound   status post left below the knee amputation 8/16/2024  Alcoholic cirrhosis  COPD  Essential hypertension    Recommendations:   The patient completed a course of antibiotics with cefepime and vancomycin as of 8/16/2024 after the BKA surgery  Continue local wound care  Infectious disease wise the patient remains stable  The plan is for discharge to rehabilitation when arrangements have been made    Infection Control Recommendations:   Universal precautions    Discharge Planning:   Patient will need Midline Catheter Insertion/ PICC line Insertion: No  Patient will need: Home IV , Infusion Center,  SNF,  LTAC: Undetermined  Patient willneed outpatient wound care: Yes    Medical Decision making / Summary of Stay:   Silas Shrestha is a 69 y.o.-year-old male who developed gangrene of his left big toe in April 2024 after clipping his toenail.  He underwent femoral to posterior tibial bypass in April 2024.  He continued to have rest pain over the area and needed amputation of the big headaches in June 2024.  Pain in the gangrene continued and therefore he had reimplantation in July 2024.     The surgical wound area has not healed despite dressing with VAC regularly at home.  There is no erythema or inflammation surrounding the wound.  There is granulation tissue and some discharge from the wound.     Vascular surgery is on board and is pending below-knee amputation.    8/15  No fevers and vitals stable   Well in self other than ongoing foot pain  C/o fresh blood with bowel openings yesterday - small amount - RN informed - to monitor       Breath sounds: Normal breath sounds. No wheezing.   Abdominal:      General: Bowel sounds are normal. There is no distension.      Palpations: Abdomen is soft. There is no mass.      Tenderness: There is no abdominal tenderness.   Musculoskeletal:      Cervical back: Normal range of motion and neck supple.      Comments: There is a dressing in the left BKA stump which was not removed   Lymphadenopathy:      Cervical: No cervical adenopathy.   Skin:     General: Skin is warm and dry.   Neurological:      Mental Status: He is alert and oriented to person, place, and time.         Laboratory data:   I have independently reviewed the followinglabs:  CBC with Differential:   No results for input(s): \"WBC\", \"HGB\", \"HCT\", \"PLT\", \"BANDSPCT\", \"LYMPHOPCT\", \"MONOPCT\", \"EOSPCT\" in the last 72 hours.    Invalid input(s): \"SEGSPCT\"    BMP:   No results for input(s): \"NA\", \"K\", \"CL\", \"CO2\", \"BUN\", \"CREATININE\", \"MG\" in the last 72 hours.    Invalid input(s): \"CA\"    Hepatic Function Panel: No results for input(s): \"LABALBU\", \"BILIDIR\", \"IBILI\", \"BILITOT\", \"ALKPHOS\", \"ALT\", \"AST\" in the last 72 hours.    Invalid input(s): \"PROT\"      Lab Results   Component Value Date/Time    PROCAL 0.10 08/15/2024 09:11 AM    PROCAL 0.08 12/10/2020 07:09 PM     Lab Results   Component Value Date/Time    CRP <3.0 08/14/2024 10:21 PM    CRP <3.0 05/21/2024 03:50 PM    CRP <0.3 12/10/2020 09:34 PM     Lab Results   Component Value Date    SEDRATE 44 (H) 08/15/2024         Lab Results   Component Value Date/Time    DDIMER 0.44 12/10/2020 07:09 PM     Lab Results   Component Value Date/Time    FERRITIN 498 12/10/2020 09:34 PM     Lab Results   Component Value Date/Time     12/10/2020 09:34 PM     No results found for: \"FIBRINOGEN\"    No results found for requested labs within last 30 days.     Lab Results   Component Value Date/Time    COVID19 Not Detected 12/10/2020 10:22 PM       No results for input(s): \"VANCOTROUGH\" in the last 72

## 2024-08-20 NOTE — CARE COORDINATION
Transition planning    [facility] call from [facility] at [facility], she states they have auth for patient. CM will arrange transport tomorrow.

## 2024-08-20 NOTE — CARE COORDINATION
Attempted to reach patient for nutrition CC follow up.  Patient currently inpatient- 8/14/2024 - present (6 days)  [facility] [place]  Will reach out to patient upon discharge.  IZABELA Persaud

## 2024-08-21 VITALS
WEIGHT: 153 LBS | DIASTOLIC BLOOD PRESSURE: 69 MMHG | TEMPERATURE: 98.4 F | BODY MASS INDEX: 21.9 KG/M2 | OXYGEN SATURATION: 100 % | RESPIRATION RATE: 18 BRPM | SYSTOLIC BLOOD PRESSURE: 151 MMHG | HEART RATE: 82 BPM | HEIGHT: 70 IN

## 2024-08-21 PROCEDURE — 99239 HOSP IP/OBS DSCHRG MGMT >30: CPT | Performed by: STUDENT IN AN ORGANIZED HEALTH CARE EDUCATION/TRAINING PROGRAM

## 2024-08-21 PROCEDURE — 6360000002 HC RX W HCPCS

## 2024-08-21 PROCEDURE — 6370000000 HC RX 637 (ALT 250 FOR IP)

## 2024-08-21 PROCEDURE — 2580000003 HC RX 258

## 2024-08-21 RX ORDER — OXYCODONE HYDROCHLORIDE 10 MG/1
10 TABLET ORAL EVERY 6 HOURS PRN
Qty: 20 TABLET | Refills: 0 | Status: SHIPPED | OUTPATIENT
Start: 2024-08-21 | End: 2024-08-26

## 2024-08-21 RX ORDER — CILOSTAZOL 100 MG/1
100 TABLET ORAL 2 TIMES DAILY
Qty: 60 TABLET | Refills: 3 | Status: SHIPPED | OUTPATIENT
Start: 2024-08-21

## 2024-08-21 RX ORDER — POLYETHYLENE GLYCOL 3350 17 G/17G
17 POWDER, FOR SOLUTION ORAL DAILY PRN
Qty: 30 PACKET | Refills: 0 | Status: SHIPPED | OUTPATIENT
Start: 2024-08-21 | End: 2024-09-20

## 2024-08-21 RX ADMIN — SERTRALINE HYDROCHLORIDE 100 MG: 50 TABLET ORAL at 08:49

## 2024-08-21 RX ADMIN — OXYCODONE HYDROCHLORIDE 10 MG: 5 TABLET ORAL at 03:38

## 2024-08-21 RX ADMIN — SODIUM CHLORIDE, PRESERVATIVE FREE 10 ML: 5 INJECTION INTRAVENOUS at 08:48

## 2024-08-21 RX ADMIN — ENOXAPARIN SODIUM 40 MG: 100 INJECTION SUBCUTANEOUS at 08:48

## 2024-08-21 RX ADMIN — ACETAMINOPHEN 650 MG: 325 TABLET ORAL at 03:37

## 2024-08-21 RX ADMIN — ASPIRIN 81 MG: 81 TABLET, COATED ORAL at 08:49

## 2024-08-21 RX ADMIN — PANTOPRAZOLE SODIUM 40 MG: 40 TABLET, DELAYED RELEASE ORAL at 06:44

## 2024-08-21 RX ADMIN — GABAPENTIN 100 MG: 100 CAPSULE ORAL at 08:48

## 2024-08-21 RX ADMIN — AMLODIPINE BESYLATE 10 MG: 10 TABLET ORAL at 08:49

## 2024-08-21 RX ADMIN — CILOSTAZOL 100 MG: 100 TABLET ORAL at 08:51

## 2024-08-21 RX ADMIN — ACETAMINOPHEN 650 MG: 325 TABLET ORAL at 08:49

## 2024-08-21 RX ADMIN — DOCUSATE SODIUM 100 MG: 100 CAPSULE, LIQUID FILLED ORAL at 08:50

## 2024-08-21 ASSESSMENT — PAIN DESCRIPTION - LOCATION
LOCATION: LEG
LOCATION: LEG

## 2024-08-21 ASSESSMENT — PAIN SCALES - GENERAL
PAINLEVEL_OUTOF10: 1
PAINLEVEL_OUTOF10: 4
PAINLEVEL_OUTOF10: 10
PAINLEVEL_OUTOF10: 0

## 2024-08-21 ASSESSMENT — PAIN DESCRIPTION - DESCRIPTORS
DESCRIPTORS: SORE
DESCRIPTORS: SORE

## 2024-08-21 ASSESSMENT — PAIN DESCRIPTION - ORIENTATION
ORIENTATION: LEFT
ORIENTATION: LEFT

## 2024-08-21 NOTE — PLAN OF CARE
Problem: Discharge Planning  Goal: Discharge to home or other facility with appropriate resources  8/21/2024 0730 by Leigha Soler, RN  Outcome: Progressing     Problem: Pain  Goal: Verbalizes/displays adequate comfort level or baseline comfort level  8/21/2024 0730 by Leigha Soler, RN  Outcome: Progressing     Problem: ABCDS Injury Assessment  Goal: Absence of physical injury  8/21/2024 0730 by Leigha Soler, RN  Outcome: Progressing     Problem: Safety - Adult  Goal: Free from fall injury  8/21/2024 0730 by Leigha Soler, RN  Outcome: Progressing

## 2024-08-21 NOTE — DISCHARGE SUMMARY
TO CASE MANAGEMENT  INPATIENT CONSULT TO ORTHOTIST/PROSTHETIST      The patient was seen and examined on day of discharge and this discharge summary is in conjunction with any daily progress note from day of discharge.    Discharge plan:     Disposition: SNF    Physician Follow Up:     Delos Reyes, Arthur, MD  [address] [facility] [address]  [facility] [id number] [phone]    Follow up in 3 week(s)  For wound re-check    Brina Rizo MD  [address] [facility] [address] [phone]    Schedule an appointment as soon as possible for a visit in 1 week(s)         Requiring Further Evaluation/Follow Up POST HOSPITALIZATION/Incidental Findings:     Diet: cardiac diet    Activity: As tolerated    Instructions to Patient:   - Take all medications as prescribed  - Follow up with PCP in 1-2 weeks   - In case of any worsening condition please visit Emergency Room.      Discharge Medications:      Medication List        START taking these medications      cilostazol 100 MG tablet  Commonly known as: PLETAL  Take 1 tablet by mouth 2 times daily     oxyCODONE HCl 10 MG immediate release tablet  Commonly known as: OXY-IR  Take 1 tablet by mouth every 6 hours as needed for Pain for up to 5 days. Max Daily Amount: 40 mg     polyethylene glycol 17 g packet  Commonly known as: GLYCOLAX  Take 1 packet by mouth daily as needed for Constipation            CHANGE how you take these medications      acetaminophen 500 MG tablet  Commonly known as: TYLENOL  Take 2 tablets by mouth every 6 hours as needed for Pain  What changed: Another medication with the same name was removed. Continue taking this medication, and follow the directions you see here.     gabapentin 100 MG capsule  Commonly known as: NEURONTIN  Take 1 capsule by mouth 3 times daily for 30 days. Intended supply: 90 days  What changed:   when to take this  Another medication with the same name was removed. Continue taking this medication, and follow the directions you  see here.            CONTINUE taking these medications      amLODIPine 10 MG tablet  Commonly known as: NORVASC  TAKE ONE TABLET BY MOUTH DAILY AT 9AM     aspirin 81 MG EC tablet     atorvastatin 20 MG tablet  Commonly known as: LIPITOR  TAKE ONE TABLET BY MOUTH DAILY AT 5PM     clobetasol 0.05 % cream  Commonly known as: TEMOVATE  APPLY TOPICALLY TWICE DAILY TO ACTIVE RASH (BULK)     diclofenac sodium 1 % Gel  Commonly known as: VOLTAREN  Apply 2 g topically 4 times daily     docusate sodium 100 MG capsule  Commonly known as: COLACE  Take 1 capsule by mouth 2 times daily Take while on Roxicodone     Ensure Complete Shake Liqd  Drink 1 bottle with breakfast and dinner.     pantoprazole 20 MG tablet  Commonly known as: PROTONIX  TAKE ONE TABLET BY MOUTH DAILY AT 9AM     sertraline 100 MG tablet  Commonly known as: ZOLOFT  TAKE 1 TABLET BY MOUTH DAILY               Where to Get Your Medications        These medications were sent to [address] [phone] - F [phone]  [address] [place] [id number]      Phone: [phone]   cilostazol 100 MG tablet  polyethylene glycol 17 g packet       You can get these medications from any pharmacy    Bring a paper prescription for each of these medications  oxyCODONE HCl 10 MG immediate release tablet         No discharge procedures on file.    Time Spent on discharge is  37 mins in patient examination, evaluation, counseling as well as medication reconciliation, prescriptions for required medications, discharge plan and follow up.    Electronically signed by   Rancho Vega MD  8/21/2024  9:05 AM      Thank you Brina Tolentino MD for the opportunity to be involved in this patient's care.

## 2024-08-21 NOTE — CARE COORDINATION
Transitional planning    Called April at [facility] [phone] and she has auth for patient. Can accept anytime today. Fax [phone]. Report # [phone]    0843 Spoke to patient and he confirms plan is for discharge to [facility]. CM told him insurance has approved SNF. CM will update him when transportation arranged. He is agreeable to plan    0856 LAYLA completed    0945 Faxed aVS/WILLIAM and prescription for oxycodone to [facility] [phone]    1025 [facility] called and transportation is set for 12:45 today.     1026 Called April and told patient will be picked up at 12:45 today for transportation to SNF. AVS/WILLIAM faxed and LAYLA completed.     1030 Updated patient and his RN about time for discharge.     1122 [facility] called and they want to  patient sooner. Okay with RN, okay with patient,  called April and okay with her.

## 2024-08-21 NOTE — PLAN OF CARE
Problem: Discharge Planning  Goal: Discharge to home or other facility with appropriate resources  8/21/2024 1123 by Leigha Soler, RN  Outcome: Completed     Problem: Pain  Goal: Verbalizes/displays adequate comfort level or baseline comfort level  8/21/2024 1123 by Leigha Soler, RN  Outcome: Completed     Problem: ABCDS Injury Assessment  Goal: Absence of physical injury  8/21/2024 1123 by Leigha Soler, RN  Outcome: Completed     Problem: Safety - Adult  Goal: Free from fall injury  8/21/2024 1123 by Leigha Soler, RN  Outcome: Completed

## 2024-08-21 NOTE — FLOWSHEET NOTE
Communication with treatment team and patient about discharge plan and appropriate resources.      Patient IV removed, care plans completed, all belongings given back to the patient.     All consults aware of discharge.    WILLIAM completed and report called to facility to ALESSANDRA Sheets. All questions answered.

## 2024-08-22 ENCOUNTER — CARE COORDINATION (OUTPATIENT)
Dept: CARE COORDINATION | Age: 69
End: 2024-08-22

## 2024-08-22 NOTE — CARE COORDINATION
Ambulatory Care Coordination Note     8/22/2024 3:32 PM     ACM outreach attempt by this ACM today to offer care management services. ACM was unable to reach the patient by telephone today;  Patient discharged from hospital to SNF on 8/20     ACM: Jose Roberto Quinones, RN     Care Summary Note: Discharge summary shows that patient was discharged today to [facility]. Will remove from needs to enroll for the time being    PCP/Specialist follow up:   Future Appointments         Provider Specialty Dept Phone    8/28/2024 2:00 PM Brina Rizo MD Internal Medicine [phone]            Follow Up:   Plan for next ACM outreach in approximately  will not contact  to complete:  Will not contact .

## 2024-09-03 ENCOUNTER — CARE COORDINATION (OUTPATIENT)
Dept: CARE COORDINATION | Age: 69
End: 2024-09-03

## 2024-09-03 NOTE — CARE COORDINATION
Patient admitted to [facility] rehab.  Removed from panel.  IZABELA Persaud

## 2024-09-11 ENCOUNTER — OFFICE VISIT (OUTPATIENT)
Age: 69
End: 2024-09-11

## 2024-09-11 VITALS
SYSTOLIC BLOOD PRESSURE: 165 MMHG | DIASTOLIC BLOOD PRESSURE: 73 MMHG | BODY MASS INDEX: 21.47 KG/M2 | RESPIRATION RATE: 18 BRPM | HEART RATE: 73 BPM | OXYGEN SATURATION: 99 % | WEIGHT: 150 LBS | HEIGHT: 70 IN

## 2024-09-11 DIAGNOSIS — Z89.512 STATUS POST BELOW-KNEE AMPUTATION OF LEFT LOWER EXTREMITY (HCC): ICD-10-CM

## 2024-09-11 DIAGNOSIS — I70.262 ATHEROSCLEROSIS OF NATIVE ARTERY OF LEFT LOWER EXTREMITY WITH GANGRENE (HCC): Primary | ICD-10-CM

## 2024-09-11 PROCEDURE — 99024 POSTOP FOLLOW-UP VISIT: CPT | Performed by: SURGERY

## 2024-09-20 ENCOUNTER — HOSPITAL ENCOUNTER (OUTPATIENT)
Dept: WOUND CARE | Age: 69
Discharge: HOME | End: 2024-09-20
Payer: MEDICARE

## 2024-09-20 VITALS
DIASTOLIC BLOOD PRESSURE: 81 MMHG | SYSTOLIC BLOOD PRESSURE: 148 MMHG | WEIGHT: 139 LBS | TEMPERATURE: 96.1 F | HEIGHT: 70 IN | HEART RATE: 82 BPM | RESPIRATION RATE: 18 BRPM | BODY MASS INDEX: 19.9 KG/M2

## 2024-09-20 DIAGNOSIS — L97.922 ULCER OF LEFT LOWER LEG, WITH FAT LAYER EXPOSED (HCC): Primary | ICD-10-CM

## 2024-09-20 DIAGNOSIS — Z89.512 S/P BELOW KNEE AMPUTATION, LEFT (HCC): ICD-10-CM

## 2024-09-20 DIAGNOSIS — T81.89XD NONHEALING SURGICAL WOUND, SUBSEQUENT ENCOUNTER: ICD-10-CM

## 2024-09-20 PROCEDURE — 11042 DBRDMT SUBQ TIS 1ST 20SQCM/<: CPT | Performed by: NURSE PRACTITIONER

## 2024-09-20 PROCEDURE — 11042 DBRDMT SUBQ TIS 1ST 20SQCM/<: CPT

## 2024-09-20 PROCEDURE — 99213 OFFICE O/P EST LOW 20 MIN: CPT

## 2024-09-20 RX ORDER — LIDOCAINE HYDROCHLORIDE 20 MG/ML
JELLY TOPICAL ONCE
Status: COMPLETED | OUTPATIENT
Start: 2024-09-20 | End: 2024-09-20

## 2024-09-20 RX ORDER — LIDOCAINE HYDROCHLORIDE 20 MG/ML
JELLY TOPICAL ONCE
OUTPATIENT
Start: 2024-09-20 | End: 2024-09-20

## 2024-09-20 RX ORDER — LIDOCAINE HYDROCHLORIDE 40 MG/ML
SOLUTION TOPICAL ONCE
OUTPATIENT
Start: 2024-09-20 | End: 2024-09-20

## 2024-09-20 RX ADMIN — LIDOCAINE HYDROCHLORIDE 6 ML: 20 JELLY TOPICAL at 08:20

## 2024-09-20 ASSESSMENT — ENCOUNTER SYMPTOMS
DIARRHEA: 0
COUGH: 0
VOMITING: 0
SHORTNESS OF BREATH: 0
NAUSEA: 0
RHINORRHEA: 0

## 2024-09-20 ASSESSMENT — PAIN DESCRIPTION - DESCRIPTORS: DESCRIPTORS: ACHING

## 2024-09-20 ASSESSMENT — PAIN SCALES - GENERAL: PAINLEVEL_OUTOF10: 7

## 2024-09-20 ASSESSMENT — PAIN DESCRIPTION - LOCATION: LOCATION: LEG

## 2024-09-20 ASSESSMENT — PAIN DESCRIPTION - ORIENTATION: ORIENTATION: LEFT

## 2024-09-24 ENCOUNTER — HOSPITAL ENCOUNTER (OUTPATIENT)
Dept: WOUND CARE | Age: 69
Discharge: HOME | End: 2024-09-24
Payer: MEDICARE

## 2024-09-24 VITALS
RESPIRATION RATE: 16 BRPM | DIASTOLIC BLOOD PRESSURE: 72 MMHG | TEMPERATURE: 97.1 F | SYSTOLIC BLOOD PRESSURE: 158 MMHG | HEART RATE: 76 BPM

## 2024-09-24 DIAGNOSIS — T81.89XD NONHEALING SURGICAL WOUND, SUBSEQUENT ENCOUNTER: Primary | ICD-10-CM

## 2024-09-24 DIAGNOSIS — Z89.512 S/P BELOW KNEE AMPUTATION, LEFT (HCC): ICD-10-CM

## 2024-09-24 DIAGNOSIS — L97.922 ULCER OF LEFT LOWER LEG, WITH FAT LAYER EXPOSED (HCC): ICD-10-CM

## 2024-09-24 PROCEDURE — 11043 DBRDMT MUSC&/FSCA 1ST 20/<: CPT | Performed by: SURGERY

## 2024-09-24 PROCEDURE — 11043 DBRDMT MUSC&/FSCA 1ST 20/<: CPT

## 2024-09-24 RX ORDER — LIDOCAINE HYDROCHLORIDE 20 MG/ML
JELLY TOPICAL ONCE
Status: COMPLETED | OUTPATIENT
Start: 2024-09-24 | End: 2024-09-24

## 2024-09-24 RX ORDER — LIDOCAINE HYDROCHLORIDE 40 MG/ML
SOLUTION TOPICAL ONCE
OUTPATIENT
Start: 2024-09-24 | End: 2024-09-24

## 2024-09-24 RX ORDER — LIDOCAINE HYDROCHLORIDE 20 MG/ML
JELLY TOPICAL ONCE
OUTPATIENT
Start: 2024-09-24 | End: 2024-09-24

## 2024-09-24 RX ADMIN — LIDOCAINE HYDROCHLORIDE 6 ML: 20 JELLY TOPICAL at 08:50

## 2024-09-24 ASSESSMENT — PAIN DESCRIPTION - LOCATION: LOCATION: LEG

## 2024-09-24 ASSESSMENT — PAIN DESCRIPTION - ORIENTATION: ORIENTATION: LEFT

## 2024-09-24 ASSESSMENT — PAIN SCALES - GENERAL: PAINLEVEL_OUTOF10: 6

## 2024-09-24 ASSESSMENT — PAIN DESCRIPTION - DESCRIPTORS: DESCRIPTORS: SHARP

## 2024-09-25 NOTE — DISCHARGE INSTRUCTIONS
[facility] -Phone: [phone] Fax: [phone]    Visit  Discharge Instructions / Physician Orders     DATE: 10/1/2024     Facility: [facility]     SUPPLIES ORDERED THRU: Facility     Wound Location: Left Amputation Site     Cleanse with: Liquid antibacterial soap and water, rinse well      Dressing Orders: Aquacel Ag- moisten with saline, Dry Dressing, ACE Wrap,      Frequency: Daily     Additional Orders: Increase protein to diet (meat, cheese, eggs, fish, peanut butter, nuts and beans)  ELEVATE LEGS AS MUCH AS POSSIBLE     Your next appointment with Wound Care Center is in 1 week with Dr. Delos Reyes     (Please note your next appointment above and if you are unable to keep, kindly give a 24 hour notice. Thank you.)  If more than 15 min late we cannot guarantee you will be seen due to clinician schedule  Per Policy, Excessive cancellation will call for dismissal from program.     If you experience any of the following, please call the Wound Care Center during business hours:  [phone]     * Increase in Pain  * Temperature over 101  * Increase in drainage from your wound  * Drainage with a foul odor  * Bleeding  * Increase in swelling  * Need for compression bandage changes due to slippage, breakthrough drainage.     If you need medical attention outside of the business hours of the Wound Care Centers please contact your PCP or go to the an urgent care or emergency department     The information contained in the After Visit Summary has been reviewed with me, the patient and/or responsible adult, by my health care provider(s). I had the opportunity to ask questions regarding this information. I have elected to receive;      []After Visit Summary  [x]Comprehensive Discharge Instruction        Patient signature______________________________________Date:________  Electronically signed by Arthur Delos Reyes, MD on 10/1/2024 at 9:18 AM  Electronically signed by Mónica Albrecht RN

## 2024-10-01 ENCOUNTER — HOSPITAL ENCOUNTER (OUTPATIENT)
Dept: WOUND CARE | Age: 69
Discharge: HOME | End: 2024-10-01
Payer: MEDICARE

## 2024-10-01 DIAGNOSIS — L97.922 ULCER OF LEFT LOWER LEG, WITH FAT LAYER EXPOSED (HCC): ICD-10-CM

## 2024-10-01 DIAGNOSIS — Z89.512 S/P BELOW KNEE AMPUTATION, LEFT (HCC): ICD-10-CM

## 2024-10-01 DIAGNOSIS — T81.89XD NONHEALING SURGICAL WOUND, SUBSEQUENT ENCOUNTER: Primary | ICD-10-CM

## 2024-10-01 PROCEDURE — 11043 DBRDMT MUSC&/FSCA 1ST 20/<: CPT

## 2024-10-01 PROCEDURE — 11043 DBRDMT MUSC&/FSCA 1ST 20/<: CPT | Performed by: SURGERY

## 2024-10-01 RX ORDER — LIDOCAINE HYDROCHLORIDE 20 MG/ML
JELLY TOPICAL ONCE
OUTPATIENT
Start: 2024-10-01 | End: 2024-10-01

## 2024-10-01 RX ORDER — LIDOCAINE HYDROCHLORIDE 40 MG/ML
SOLUTION TOPICAL ONCE
OUTPATIENT
Start: 2024-10-01 | End: 2024-10-01

## 2024-10-01 RX ORDER — LIDOCAINE HYDROCHLORIDE 20 MG/ML
JELLY TOPICAL ONCE
Status: COMPLETED | OUTPATIENT
Start: 2024-10-01 | End: 2024-10-01

## 2024-10-01 RX ADMIN — LIDOCAINE HYDROCHLORIDE 6 ML: 20 JELLY TOPICAL at 09:25

## 2024-10-01 NOTE — PROGRESS NOTES
Wears dentures     Wears glasses     Wears glasses        PAST SURGICAL HISTORY    Past Surgical History:   Procedure Laterality Date    COLONOSCOPY      around 2014    FEMORAL BYPASS  04/08/2024    FEMORAL BYPASS Left 04/08/2024    LOWER EXTREMITY FEMORAL TO TIBIAL BYPASS WITH PROPATIN GRAFT, IPSILATERAL SUPERIFICAL SAPHENOUS VEIN HARVEST, LOWER EXTREMITY ANGIOGRAM COMPLETION performed by Delos Reyes, Arthur, MD at [facility]    FINGER AMPUTATION Left 11/28/2016    revision long finger    LEG AMPUTATION BELOW KNEE Left 8/16/2024    LEFT LEG AMPUTATION BELOW KNEE / NERVE BLOCK PER ANESTHESIA performed by Delos Reyes, Arthur, MD at [facility]    LIVER BIOPSY      OTHER SURGICAL HISTORY  02/14/2017    Abdomen with run off with Dr. Smith - could not pass left left pop; # 7 FR manual pull right groin    OTHER SURGICAL HISTORY  04/21/2023    LE Angiogram    TOE AMPUTATION Left 6/21/2024    LEFT GREAT TOE AMPUTATION performed by Delos Reyes, Arthur, MD at [facility]    TOE AMPUTATION Left 7/31/2024    RAY AMPUTATION OF HALLUX, APPLICATION OF WOUND VAC performed by Delos Reyes, Arthur, MD at [facility]    UPPER GASTROINTESTINAL ENDOSCOPY      VASCULAR SURGERY      abdominal aortogram    VASCULAR SURGERY Left 04/21/2023    LEFT LOWER EXTREMITY ANGIOGRAM WITH LEFT DISTAL SFA  AND ABOVE KNEE POPLITEAL ARTERY KAYLYN  STENT 6MM X 120MM X 130CM performed by Arthur Delos Reyes, MD at [facility]    VASCULAR SURGERY Left 03/21/2024    LEFT LOWER EXTREMITY ANGIOGRAM performed by Delos Reyes, Arthur, MD at [facility]    VASCULAR SURGERY Left 06/21/2024    Great toe amputation       FAMILY HISTORY    Family History   Problem Relation Age of Onset    High Blood Pressure Mother     Diabetes Father     Cancer Father     Heart Disease Father     Cancer Sister     High Blood Pressure Sister     Heart Disease Sister     Diabetes Brother        SOCIAL HISTORY    Social History     Tobacco Use    Smoking status: Former     Current packs/day: 0.00 
Bed/Stretcher in lowest position, wheels locked, appropriate side rails in place/Call bell, personal items and telephone in reach/Instruct patient to call for assistance before getting out of bed/chair/stretcher/Non-slip footwear applied when patient is off stretcher/[place] to call system/Physically safe environment - no spills, clutter or unnecessary equipment/Purposeful proactive rounding/Room/bathroom lighting operational, light cord in reach

## 2024-10-03 DIAGNOSIS — E78.1 HYPERTRIGLYCERIDEMIA: ICD-10-CM

## 2024-10-03 DIAGNOSIS — I10 PRIMARY HYPERTENSION: ICD-10-CM

## 2024-10-03 RX ORDER — AMLODIPINE BESYLATE 10 MG/1
TABLET ORAL
Qty: 30 TABLET | Refills: 11 | Status: SHIPPED | OUTPATIENT
Start: 2024-10-03

## 2024-10-03 RX ORDER — PANTOPRAZOLE SODIUM 20 MG/1
TABLET, DELAYED RELEASE ORAL
Qty: 30 TABLET | Refills: 11 | Status: SHIPPED | OUTPATIENT
Start: 2024-10-03

## 2024-10-03 RX ORDER — ATORVASTATIN CALCIUM 20 MG/1
TABLET, FILM COATED ORAL
Qty: 30 TABLET | Refills: 11 | Status: SHIPPED | OUTPATIENT
Start: 2024-10-03

## 2024-10-03 NOTE — TELEPHONE ENCOUNTER
..Request for   Requested Prescriptions     Pending Prescriptions Disp Refills    amLODIPine (NORVASC) 10 MG tablet [Pharmacy Med Name: amlodipine 10 mg tablet] 30 tablet 11     Sig: TAKE ONE TABLET BY MOUTH DAILY AT 9AM    pantoprazole (PROTONIX) 20 MG tablet [Pharmacy Med Name: pantoprazole 20 mg tablet,delayed release] 30 tablet 11     Sig: TAKE ONE TABLET BY MOUTH DAILY AT 9AM    atorvastatin (LIPITOR) 20 MG tablet [Pharmacy Med Name: atorvastatin 20 mg tablet] 30 tablet 11     Sig: TAKE ONE TABLET BY MOUTH DAILY AT 5PM    .      Please review and e-scribe to pharmacy listed in chart if appropriate. Thank you.      Last Visit Date: 6/19/2024  Next Visit Date: Visit date not found    Future Appointments   Date Time Provider Department Center   10/8/2024  9:15 AM Delos Reyes, Arthur, MD [facility]       Health Maintenance   Topic Date Due    Hepatitis B vaccine (1 of 3 - Risk 3-dose series) Never done    Respiratory Syncytial Virus (RSV) Pregnant or age 60 yrs+ (1 - 1-dose 60+ series) Never done    Pneumococcal 65+ years Vaccine (2 of 2 - PCV) 12/15/2017    Hepatitis A vaccine (2 of 2 - Risk 2-dose series) 11/14/2019    Flu vaccine (1) 08/01/2024    COVID-19 Vaccine (4 - 2023-24 season) 09/01/2024    Colorectal Cancer Screen  04/22/2025    Depression Monitoring  05/21/2025    Lipids  06/14/2025    DTaP/Tdap/Td vaccine (2 - Td or Tdap) 04/11/2029    Annual Wellness Visit (Medicare Advantage)  Completed    Shingles vaccine  Completed    AAA screen  Completed    Hepatitis C screen  Completed    Hib vaccine  Aged Out    Polio vaccine  Aged Out    Meningococcal (ACWY) vaccine  Aged Out    Depression Screen  Discontinued    Pneumococcal 0-64 years Vaccine  Discontinued    HIV screen  Discontinued    Prostate Specific Antigen (PSA) Screening or Monitoring  Discontinued       No results found for: \"LABA1C\"          ( goal A1C is < 7)   No components found for: \"LABMICR\"  No components found for: \"LDLCHOLESTEROL\",

## 2024-10-08 ENCOUNTER — HOSPITAL ENCOUNTER (OUTPATIENT)
Dept: WOUND CARE | Age: 69
Discharge: HOME | End: 2024-10-08
Payer: MEDICARE

## 2024-10-08 VITALS
HEART RATE: 75 BPM | SYSTOLIC BLOOD PRESSURE: 187 MMHG | TEMPERATURE: 97.9 F | BODY MASS INDEX: 19.9 KG/M2 | WEIGHT: 139 LBS | DIASTOLIC BLOOD PRESSURE: 93 MMHG | HEIGHT: 70 IN | RESPIRATION RATE: 18 BRPM

## 2024-10-08 DIAGNOSIS — L97.922 ULCER OF LEFT LOWER LEG, WITH FAT LAYER EXPOSED (HCC): ICD-10-CM

## 2024-10-08 DIAGNOSIS — Z89.512 S/P BELOW KNEE AMPUTATION, LEFT (HCC): ICD-10-CM

## 2024-10-08 DIAGNOSIS — T81.89XD NONHEALING SURGICAL WOUND, SUBSEQUENT ENCOUNTER: Primary | ICD-10-CM

## 2024-10-08 PROCEDURE — 11043 DBRDMT MUSC&/FSCA 1ST 20/<: CPT | Performed by: SURGERY

## 2024-10-08 PROCEDURE — 11043 DBRDMT MUSC&/FSCA 1ST 20/<: CPT

## 2024-10-08 RX ORDER — LIDOCAINE HYDROCHLORIDE 20 MG/ML
JELLY TOPICAL ONCE
OUTPATIENT
Start: 2024-10-08 | End: 2024-10-08

## 2024-10-08 RX ORDER — LIDOCAINE HYDROCHLORIDE 40 MG/ML
SOLUTION TOPICAL ONCE
OUTPATIENT
Start: 2024-10-08 | End: 2024-10-08

## 2024-10-08 RX ORDER — LIDOCAINE HYDROCHLORIDE 20 MG/ML
JELLY TOPICAL ONCE
Status: COMPLETED | OUTPATIENT
Start: 2024-10-08 | End: 2024-10-08

## 2024-10-08 RX ADMIN — LIDOCAINE HYDROCHLORIDE 6 ML: 20 JELLY TOPICAL at 08:55

## 2024-10-08 NOTE — PROGRESS NOTES
of this patient's wound(s)/ulcer(s) today, debridement is required to promote healing and evaluate the wound base.    Performed by: Arthur Delos Reyes, MD    Consent obtained:  Yes    Time out taken:  Yes    Pain Control: Anesthetic  Anesthetic: 2% Lidocaine Gel Topical       Debridement:Excisional Debridement    Using scissors and forceps the wound(s)/ulcer(s) was/were sharply debrided down through and including the removal of muscle/fascia.        Devitalized Tissue Debrided:  slough    Pre Debridement Measurements:  Are located in the Wound/Ulcer Documentation Flow Sheet    Wound/Ulcer #: 1    Post Debridement Measurements:  Wound/Ulcer Descriptions are Pre Debridement except measurements:        Wound 09/20/24 Pretibial Left Wound #1 (Active)   Wound Image   09/20/24 0827   Wound Etiology Non-Healing Surgical 10/01/24 0924   Dressing Status New drainage noted;Old drainage noted 10/01/24 0924   Wound Cleansed Cleansed with saline 10/01/24 0924   Dressing/Treatment Other (comment) 09/24/24 0914   Wound Length (cm) 1.2 cm 10/08/24 0852   Wound Width (cm) 1.8 cm 10/08/24 0852   Wound Depth (cm) 0.5 cm 10/08/24 0852   Wound Surface Area (cm^2) 2.16 cm^2 10/08/24 0852   Change in Wound Size % (l*w) 55.56 10/08/24 0852   Wound Volume (cm^3) 1.08 cm^3 10/08/24 0852   Wound Healing % -11 10/08/24 0852   Post-Procedure Length (cm) 1.6 cm 10/01/24 0924   Post-Procedure Width (cm) 2.2 cm 10/01/24 0924   Post-Procedure Depth (cm) 0.5 cm 10/01/24 0924   Post-Procedure Surface Area (cm^2) 3.52 cm^2 10/01/24 0924   Post-Procedure Volume (cm^3) 1.76 cm^3 10/01/24 0924   Wound Assessment Slough 10/08/24 0852   Drainage Amount Moderate (25-50%) 10/08/24 0852   Drainage Description Yellow 10/08/24 0852   Odor None 10/08/24 0852   Dione-wound Assessment Hypopigmented 10/08/24 0852   Margins Defined edges 10/08/24 0852   Wound Thickness Description not for Pressure Injury Full thickness 10/08/24 0852   Number of days: 18       Wound

## 2024-10-08 NOTE — DISCHARGE INSTRUCTIONS
[facility] -Phone: [phone] Fax: [phone]    Visit  Discharge Instructions / Physician Orders     DATE: 10/1/2024     [facility]     SUPPLIES ORDERED THRU: Facility     Wound Location: Left Amputation Site     Cleanse with: Liquid antibacterial soap and water, rinse well      Dressing Orders: Collagen moistened with saline, Dry Dressing, ACE Wrap,      Frequency: Daily     Additional Orders: Increase protein to diet (meat, cheese, eggs, fish, peanut butter, nuts and beans)  ELEVATE LEGS AS MUCH AS POSSIBLE     Your next appointment with Wound Care Center is in 1 week with Dr. Delos Reyes     (Please note your next appointment above and if you are unable to keep, kindly give a 24 hour notice. Thank you.)  If more than 15 min late we cannot guarantee you will be seen due to clinician schedule  Per Policy, Excessive cancellation will call for dismissal from program.     If you experience any of the following, please call the Wound Care Center during business hours:  [phone]     * Increase in Pain  * Temperature over 101  * Increase in drainage from your wound  * Drainage with a foul odor  * Bleeding  * Increase in swelling  * Need for compression bandage changes due to slippage, breakthrough drainage.     If you need medical attention outside of the business hours of the Wound Care Centers please contact your PCP or go to the an urgent care or emergency department     The information contained in the After Visit Summary has been reviewed with me, the patient and/or responsible adult, by my health care provider(s). I had the opportunity to ask questions regarding this information. I have elected to receive;      []After Visit Summary  [x]Comprehensive Discharge Instruction        Patient signature______________________________________Date:________  Electronically signed by Mónica Albrecht RN on 10/8/2024 at 9:08 AM   Electronically signed by Bruce Sorto

## 2024-10-09 NOTE — DISCHARGE INSTRUCTIONS
[facility] -Phone: [phone] Fax: [phone]    Visit  Discharge Instructions / Physician Orders     DATE: 10/14/2024     Facility: [facility]-NEW ORDERS     SUPPLIES ORDERED THRU: Facility     Wound Location: Left Amputation Site     Cleanse with: Liquid antibacterial soap and water, rinse well      Dressing Orders: Collagen moistened with saline, Dry Dressing, ACE Wrap,      Frequency: Daily     Additional Orders: Increase protein to diet (meat, cheese, eggs, fish, peanut butter, nuts and beans)  ELEVATE LEGS AS MUCH AS POSSIBLE     Your next appointment with Wound Care Center is in 1 week with Dr. Delos Reyes     (Please note your next appointment above and if you are unable to keep, kindly give a 24 hour notice. Thank you.)  If more than 15 min late we cannot guarantee you will be seen due to clinician schedule  Per Policy, Excessive cancellation will call for dismissal from program.     If you experience any of the following, please call the Wound Care Center during business hours:  [phone]     * Increase in Pain  * Temperature over 101  * Increase in drainage from your wound  * Drainage with a foul odor  * Bleeding  * Increase in swelling  * Need for compression bandage changes due to slippage, breakthrough drainage.     If you need medical attention outside of the business hours of the Wound Care Centers please contact your PCP or go to the an urgent care or emergency department     The information contained in the After Visit Summary has been reviewed with me, the patient and/or responsible adult, by my health care provider(s). I had the opportunity to ask questions regarding this information. I have elected to receive;      []After Visit Summary  [x]Comprehensive Discharge Instruction        Patient signature______________________________________Date:________

## 2024-10-15 ENCOUNTER — HOSPITAL ENCOUNTER (OUTPATIENT)
Dept: WOUND CARE | Age: 69
Discharge: HOME | End: 2024-10-15

## 2024-10-22 ENCOUNTER — HOSPITAL ENCOUNTER (OUTPATIENT)
Dept: WOUND CARE | Age: 69
Discharge: HOME | End: 2024-10-22
Payer: MEDICARE

## 2024-10-22 VITALS
SYSTOLIC BLOOD PRESSURE: 94 MMHG | TEMPERATURE: 97.2 F | RESPIRATION RATE: 16 BRPM | DIASTOLIC BLOOD PRESSURE: 58 MMHG | HEART RATE: 51 BPM

## 2024-10-22 DIAGNOSIS — L97.922 ULCER OF LEFT LOWER LEG, WITH FAT LAYER EXPOSED (HCC): ICD-10-CM

## 2024-10-22 DIAGNOSIS — Z89.512 S/P BELOW KNEE AMPUTATION, LEFT (HCC): ICD-10-CM

## 2024-10-22 DIAGNOSIS — T81.89XD NONHEALING SURGICAL WOUND, SUBSEQUENT ENCOUNTER: Primary | ICD-10-CM

## 2024-10-22 PROCEDURE — 11042 DBRDMT SUBQ TIS 1ST 20SQCM/<: CPT | Performed by: SURGERY

## 2024-10-22 PROCEDURE — 11042 DBRDMT SUBQ TIS 1ST 20SQCM/<: CPT

## 2024-10-22 RX ORDER — LIDOCAINE HYDROCHLORIDE 20 MG/ML
JELLY TOPICAL ONCE
OUTPATIENT
Start: 2024-10-22 | End: 2024-10-22

## 2024-10-22 RX ORDER — LIDOCAINE HYDROCHLORIDE 20 MG/ML
JELLY TOPICAL ONCE
Status: DISCONTINUED | OUTPATIENT
Start: 2024-10-22 | End: 2024-10-23 | Stop reason: HOSPADM

## 2024-10-22 RX ORDER — LIDOCAINE HYDROCHLORIDE 40 MG/ML
SOLUTION TOPICAL ONCE
OUTPATIENT
Start: 2024-10-22 | End: 2024-10-22

## 2024-10-22 NOTE — PROGRESS NOTES
Protocol        Procedure Note  Indications:  Based on my examination of this patient's wound(s)/ulcer(s) today, debridement is required to promote healing and evaluate the wound base.    Performed by: Arthur Delos Reyes, MD    Consent obtained:  Yes    Time out taken:  Yes    Pain Control: Anesthetic  Anesthetic: 2% Lidocaine Gel Topical       Debridement:Excisional Debridement    Using scissors and forceps the wound(s)/ulcer(s) was/were sharply debrided down through and including the removal of subcutaneous tissue.    Devitalized Tissue Debrided:  slough    Pre Debridement Measurements:  Are located in the Wound/Ulcer Documentation Flow Sheet    Wound/Ulcer #: 1    Post Debridement Measurements:  Wound/Ulcer Descriptions are Pre Debridement except measurements:        Wound 09/20/24 Pretibial Left Wound #1 (Active)   Wound Image   09/20/24 0827   Wound Etiology Non-Healing Surgical 10/22/24 0842   Dressing Status Old drainage noted;New drainage noted 10/22/24 0842   Wound Cleansed Cleansed with saline 10/22/24 0842   Dressing/Treatment Other (comment) 09/24/24 0914   Wound Length (cm) 1 cm 10/22/24 0842   Wound Width (cm) 1.5 cm 10/22/24 0842   Wound Depth (cm) 0.2 cm 10/22/24 0842   Wound Surface Area (cm^2) 1.5 cm^2 10/22/24 0842   Change in Wound Size % (l*w) 69.14 10/22/24 0842   Wound Volume (cm^3) 0.3 cm^3 10/22/24 0842   Wound Healing % 69 10/22/24 0842   Post-Procedure Length (cm) 1 cm 10/22/24 0842   Post-Procedure Width (cm) 1.5 cm 10/22/24 0842   Post-Procedure Depth (cm) 0.2 cm 10/22/24 0842   Post-Procedure Surface Area (cm^2) 1.5 cm^2 10/22/24 0842   Post-Procedure Volume (cm^3) 0.3 cm^3 10/22/24 0842   Wound Assessment Pink/red;Slough 10/22/24 0842   Drainage Amount Moderate (25-50%) 10/22/24 0842   Drainage Description Serosanguinous 10/22/24 0842   Odor None 10/22/24 0842   Dione-wound Assessment Blanchable erythema 10/22/24 0842   Margins Defined edges 10/22/24 0842   Wound Thickness Description

## 2024-10-31 NOTE — DISCHARGE INSTRUCTIONS
[facility] -Phone: [phone] Fax: [phone]    Visit  Discharge Instructions / Physician Orders     DATE: 11/5/2024     Facility: [facility]     SUPPLIES ORDERED THRU: Facility     Wound Location: Left Amputation Site     Cleanse with: Liquid antibacterial soap and water, rinse well      Dressing Orders: Collagen moistened with saline, Dry Dressing, ACE Wrap,      Frequency: Daily     Additional Orders: Increase protein to diet (meat, cheese, eggs, fish, peanut butter, nuts and beans)  ELEVATE LEGS AS MUCH AS POSSIBLE     Your next appointment with Wound Care Center is in 2 weeks     (Please note your next appointment above and if you are unable to keep, kindly give a 24 hour notice. Thank you.)  If more than 15 min late we cannot guarantee you will be seen due to clinician schedule  Per Policy, Excessive cancellation will call for dismissal from program.     If you experience any of the following, please call the Wound Care Center during business hours:  [phone]     * Increase in Pain  * Temperature over 101  * Increase in drainage from your wound  * Drainage with a foul odor  * Bleeding  * Increase in swelling  * Need for compression bandage changes due to slippage, breakthrough drainage.     If you need medical attention outside of the business hours of the Wound Care Centers please contact your PCP or go to the an urgent care or emergency department     The information contained in the After Visit Summary has been reviewed with me, the patient and/or responsible adult, by my health care provider(s). I had the opportunity to ask questions regarding this information. I have elected to receive;      []After Visit Summary  [x]Comprehensive Discharge Instruction        Patient signature______________________________________Date:________  Electronically signed by Arthur Delos Reyes, MD on 11/5/2024 at 8:25 AM  Electronically signed by Mónica Albrecht RN on 11/5/2024 at 8:55

## 2024-11-05 ENCOUNTER — HOSPITAL ENCOUNTER (OUTPATIENT)
Dept: WOUND CARE | Age: 69
Discharge: HOME | End: 2024-11-05
Payer: MEDICARE

## 2024-11-05 VITALS
HEART RATE: 79 BPM | RESPIRATION RATE: 16 BRPM | DIASTOLIC BLOOD PRESSURE: 77 MMHG | SYSTOLIC BLOOD PRESSURE: 178 MMHG | TEMPERATURE: 97.1 F

## 2024-11-05 DIAGNOSIS — T81.89XD NONHEALING SURGICAL WOUND, SUBSEQUENT ENCOUNTER: Primary | ICD-10-CM

## 2024-11-05 DIAGNOSIS — Z89.512 S/P BELOW KNEE AMPUTATION, LEFT (HCC): ICD-10-CM

## 2024-11-05 DIAGNOSIS — L97.922 ULCER OF LEFT LOWER LEG, WITH FAT LAYER EXPOSED (HCC): ICD-10-CM

## 2024-11-05 PROCEDURE — 11042 DBRDMT SUBQ TIS 1ST 20SQCM/<: CPT

## 2024-11-05 PROCEDURE — 11042 DBRDMT SUBQ TIS 1ST 20SQCM/<: CPT | Performed by: SURGERY

## 2024-11-05 RX ORDER — LIDOCAINE HYDROCHLORIDE 20 MG/ML
JELLY TOPICAL ONCE
OUTPATIENT
Start: 2024-11-05 | End: 2024-11-05

## 2024-11-05 RX ORDER — LIDOCAINE HYDROCHLORIDE 40 MG/ML
SOLUTION TOPICAL ONCE
OUTPATIENT
Start: 2024-11-05 | End: 2024-11-05

## 2024-11-05 RX ORDER — LIDOCAINE HYDROCHLORIDE 20 MG/ML
JELLY TOPICAL ONCE
Status: COMPLETED | OUTPATIENT
Start: 2024-11-05 | End: 2024-11-05

## 2024-11-05 RX ADMIN — LIDOCAINE HYDROCHLORIDE 6 ML: 20 JELLY TOPICAL at 08:32

## 2024-11-05 NOTE — PROGRESS NOTES
[facility]   Progress Note and Procedure Note      Silas Shrestha  MEDICAL RECORD NUMBER:  [id number]  AGE: 69 y.o.   GENDER: male  : 1955  EPISODE DATE:  2024    Subjective:     Chief Complaint   Patient presents with    Wound Check     LLE         HISTORY of PRESENT ILLNESS HPI     Silas Shrestha is a 69 y.o. male who presents today for wound/ulcer evaluation.  He had a left below-knee amputation and has a small opening where there is exposed fascia.  The area is now granulated and the area has gone from 1.3 cm to 0.9 cm.  Ok for 2 week follow up.    PAST MEDICAL HISTORY        Diagnosis Date    Alcoholism (HCC)     Cirrhosis of liver (HCC) 2015    Claudication (HCC) 2015    left leg    COPD (chronic obstructive pulmonary disease) (HCC) 2022    COVID-19 vaccine administered     Depression     Finger fracture, left     LONG FINGER    Foot pain, left     Gangrene (HCC)     left great toe after clipping toenails    Hx of blood clots     LEFT LOWER LEG UNSURE OF THIS AT THIS TIME, IS TO BE SEEING VASCULAR DR FOR THIS.     Hx of hepatitis C     Hx of pancreatitis     2013    Hx of tuberculosis     Hyperlipidemia     Hypertension     Impaired mobility     uses cane and walker    Intermittent claudication (HCC)     Liver disease     PAD (peripheral artery disease) (HCC)     PVD (peripheral vascular disease) (HCC)     Swelling     tadeo feet    Ulcer of foot (HCC)     left great toe    Under care of service provider 2024    [id number] visit 2024    Wears dentures     FULL UPPER AND LOWER    Wears dentures     Wears glasses     Wears glasses        PAST SURGICAL HISTORY    Past Surgical History:   Procedure Laterality Date    COLONOSCOPY      around     FEMORAL BYPASS  2024    FEMORAL BYPASS Left 2024    LOWER EXTREMITY FEMORAL TO TIBIAL BYPASS WITH PROPATIN GRAFT, IPSILATERAL SUPERIFICAL SAPHENOUS VEIN HARVEST,

## 2024-11-14 NOTE — DISCHARGE INSTRUCTIONS
[facility] -Phone: [phone] Fax: [phone]    Visit  Discharge Instructions / Physician Orders     DATE: 11/19/2024     Facility: [facility]     SUPPLIES ORDERED THRU: Facility     Wound Location: Left Amputation Site     Cleanse as normal     Dressing Orders: cover to protect area as needed, ACE wrap     Frequency: Daily     Additional Orders: Increase protein to diet (meat, cheese, eggs, fish, peanut butter, nuts and beans)  ELEVATE LEGS AS MUCH AS POSSIBLE     Your next appointment with Wound Care Center is as needed     (Please note your next appointment above and if you are unable to keep, kindly give a 24 hour notice. Thank you.)  If more than 15 min late we cannot guarantee you will be seen due to clinician schedule  Per Policy, Excessive cancellation will call for dismissal from program.     If you experience any of the following, please call the Wound Care Center during business hours:  [phone]     * Increase in Pain  * Temperature over 101  * Increase in drainage from your wound  * Drainage with a foul odor  * Bleeding  * Increase in swelling  * Need for compression bandage changes due to slippage, breakthrough drainage.     If you need medical attention outside of the business hours of the Wound Care Centers please contact your PCP or go to the an urgent care or emergency department     The information contained in the After Visit Summary has been reviewed with me, the patient and/or responsible adult, by my health care provider(s). I had the opportunity to ask questions regarding this information. I have elected to receive;      []After Visit Summary  [x]Comprehensive Discharge Instruction        Patient signature______________________________________Date:________  Electronically signed by Mónica Albrecht RN on 11/19/2024 at 8:42 AM   Electronically signed by Arthur Delos Reyes, MD on 11/19/2024 at 8:48 AM

## 2024-11-19 ENCOUNTER — HOSPITAL ENCOUNTER (OUTPATIENT)
Dept: WOUND CARE | Age: 69
Discharge: HOME | End: 2024-11-19
Payer: MEDICARE

## 2024-11-19 VITALS
HEART RATE: 85 BPM | DIASTOLIC BLOOD PRESSURE: 92 MMHG | RESPIRATION RATE: 18 BRPM | TEMPERATURE: 97.3 F | SYSTOLIC BLOOD PRESSURE: 173 MMHG

## 2024-11-19 DIAGNOSIS — L97.922 ULCER OF LEFT LOWER LEG, WITH FAT LAYER EXPOSED (HCC): ICD-10-CM

## 2024-11-19 DIAGNOSIS — T81.89XD NONHEALING SURGICAL WOUND, SUBSEQUENT ENCOUNTER: Primary | ICD-10-CM

## 2024-11-19 DIAGNOSIS — Z89.512 S/P BELOW KNEE AMPUTATION, LEFT (HCC): ICD-10-CM

## 2024-11-19 PROCEDURE — 99212 OFFICE O/P EST SF 10 MIN: CPT

## 2024-11-19 PROCEDURE — 99212 OFFICE O/P EST SF 10 MIN: CPT | Performed by: SURGERY

## 2024-11-19 RX ORDER — LIDOCAINE HYDROCHLORIDE 40 MG/ML
SOLUTION TOPICAL ONCE
OUTPATIENT
Start: 2024-11-19 | End: 2024-11-19

## 2024-11-19 RX ORDER — LIDOCAINE HYDROCHLORIDE 20 MG/ML
JELLY TOPICAL ONCE
OUTPATIENT
Start: 2024-11-19 | End: 2024-11-19

## 2024-11-19 RX ORDER — LIDOCAINE HYDROCHLORIDE 20 MG/ML
JELLY TOPICAL ONCE
Status: COMPLETED | OUTPATIENT
Start: 2024-11-19 | End: 2024-11-19

## 2024-11-19 RX ADMIN — LIDOCAINE HYDROCHLORIDE 6 ML: 20 JELLY TOPICAL at 08:58

## 2024-11-19 NOTE — PROGRESS NOTES
atraumatic  Eyes: pupils equal, round, and reactive to light, extraocular eye movements intact, conjunctivae normal  ENT: tympanic membrane, external ear and ear canal normal bilaterally, nose without deformity, nasal mucosa and turbinates normal without polyps  Neck: supple and non-tender without mass, no thyromegaly or thyroid nodules, no cervical lymphadenopathy  Pulmonary/Chest: clear to auscultation bilaterally- no wheezes, rales or rhonchi, normal air movement, no respiratory distress  Cardiovascular: normal rate, regular rhythm, normal S1 and S2, no murmurs, rubs, clicks, or gallops, distal pulses intact, no carotid bruits  Abdomen: soft, non-tender, non-distended, normal bowel sounds, no masses or organomegaly  Extremities: Left below-knee amputation stump.  Musculoskeletal: normal range of motion, no joint swelling, deformity or tenderness  Neurologic: reflexes normal and symmetric, no cranial nerve deficit, gait, coordination and speech normal      Assessment:     Problem List Items Addressed This Visit       Ulcer of left lower leg, with fat layer exposed (HCC)    Relevant Orders    Initiate Outpatient Wound Care Protocol    Nonhealing surgical wound, subsequent encounter - Primary    Relevant Orders    Initiate Outpatient Wound Care Protocol    S/P below knee amputation, left (HCC)    Relevant Orders    Initiate Outpatient Wound Care Protocol           Wound 09/20/24 Pretibial Left Wound #1 (Active)   Wound Image   11/19/24 0852   Wound Etiology Non-Healing Surgical 11/19/24 0852   Dressing Status Old drainage noted;New drainage noted 11/05/24 0830   Wound Cleansed Cleansed with saline 11/05/24 0830   Dressing/Treatment Other (comment) 10/22/24 0917   Wound Length (cm) 0.3 cm 11/19/24 0852   Wound Width (cm) 1 cm 11/19/24 0852   Wound Depth (cm) 0.1 cm 11/19/24 0852   Wound Surface Area (cm^2) 0.3 cm^2 11/19/24 0852   Change in Wound Size % (l*w) 93.83 11/19/24 0852   Wound Volume (cm^3) 0.03 cm^3

## 2025-03-31 ENCOUNTER — TELEPHONE (OUTPATIENT)
Dept: INTERNAL MEDICINE | Age: 70
End: 2025-03-31

## 2025-03-31 NOTE — TELEPHONE ENCOUNTER
Request from  Administration office received, form [id number] for patient to be able to take over being his own payee for benefits. Due date for form is 4/4/25, letter received 3/31/25 - pt's PCP is out of town. Form scanned into media.    Pt has not been seen since June 2024. Per Beatriz SAMPSON, pt has not shown to several appts and does not return calls to r/s.    PC to pt to schedule appt to discuss payee form.

## 2025-04-09 ENCOUNTER — OFFICE VISIT (OUTPATIENT)
Dept: INTERNAL MEDICINE | Age: 70
End: 2025-04-09
Payer: MEDICARE

## 2025-04-09 VITALS
HEART RATE: 66 BPM | SYSTOLIC BLOOD PRESSURE: 136 MMHG | OXYGEN SATURATION: 98 % | HEIGHT: 68 IN | WEIGHT: 153.2 LBS | DIASTOLIC BLOOD PRESSURE: 82 MMHG | BODY MASS INDEX: 23.22 KG/M2 | TEMPERATURE: 99.9 F

## 2025-04-09 DIAGNOSIS — Z89.512 S/P BELOW KNEE AMPUTATION, LEFT (HCC): Primary | ICD-10-CM

## 2025-04-09 DIAGNOSIS — F32.1 CURRENT MODERATE EPISODE OF MAJOR DEPRESSIVE DISORDER WITHOUT PRIOR EPISODE (HCC): ICD-10-CM

## 2025-04-09 DIAGNOSIS — I70.222 ATHEROSCLEROSIS OF NATIVE ARTERIES OF EXTREMITIES WITH REST PAIN, LEFT LEG: ICD-10-CM

## 2025-04-09 DIAGNOSIS — L97.823 NON-PRESSURE CHRONIC ULCER OF OTHER PART OF LEFT LOWER LEG WITH NECROSIS OF MUSCLE: ICD-10-CM

## 2025-04-09 DIAGNOSIS — K70.30 ALCOHOLIC CIRRHOSIS, UNSPECIFIED WHETHER ASCITES PRESENT (HCC): ICD-10-CM

## 2025-04-09 DIAGNOSIS — J44.9 CHRONIC OBSTRUCTIVE PULMONARY DISEASE, UNSPECIFIED COPD TYPE (HCC): ICD-10-CM

## 2025-04-09 PROCEDURE — 3017F COLORECTAL CA SCREEN DOC REV: CPT | Performed by: INTERNAL MEDICINE

## 2025-04-09 PROCEDURE — G8420 CALC BMI NORM PARAMETERS: HCPCS | Performed by: INTERNAL MEDICINE

## 2025-04-09 PROCEDURE — 1160F RVW MEDS BY RX/DR IN RCRD: CPT | Performed by: INTERNAL MEDICINE

## 2025-04-09 PROCEDURE — 99213 OFFICE O/P EST LOW 20 MIN: CPT | Performed by: INTERNAL MEDICINE

## 2025-04-09 PROCEDURE — 1036F TOBACCO NON-USER: CPT | Performed by: INTERNAL MEDICINE

## 2025-04-09 PROCEDURE — 3023F SPIROM DOC REV: CPT | Performed by: INTERNAL MEDICINE

## 2025-04-09 PROCEDURE — G8427 DOCREV CUR MEDS BY ELIG CLIN: HCPCS | Performed by: INTERNAL MEDICINE

## 2025-04-09 PROCEDURE — 1159F MED LIST DOCD IN RCRD: CPT | Performed by: INTERNAL MEDICINE

## 2025-04-09 PROCEDURE — 1124F ACP DISCUSS-NO DSCNMKR DOCD: CPT | Performed by: INTERNAL MEDICINE

## 2025-04-09 PROCEDURE — 1126F AMNT PAIN NOTED NONE PRSNT: CPT | Performed by: INTERNAL MEDICINE

## 2025-04-09 PROCEDURE — 3075F SYST BP GE 130 - 139MM HG: CPT | Performed by: INTERNAL MEDICINE

## 2025-04-09 PROCEDURE — 3079F DIAST BP 80-89 MM HG: CPT | Performed by: INTERNAL MEDICINE

## 2025-04-09 ASSESSMENT — PATIENT HEALTH QUESTIONNAIRE - PHQ9
6. FEELING BAD ABOUT YOURSELF - OR THAT YOU ARE A FAILURE OR HAVE LET YOURSELF OR YOUR FAMILY DOWN: NOT AT ALL
SUM OF ALL RESPONSES TO PHQ QUESTIONS 1-9: 0
9. THOUGHTS THAT YOU WOULD BE BETTER OFF DEAD, OR OF HURTING YOURSELF: NOT AT ALL
8. MOVING OR SPEAKING SO SLOWLY THAT OTHER PEOPLE COULD HAVE NOTICED. OR THE OPPOSITE, BEING SO FIGETY OR RESTLESS THAT YOU HAVE BEEN MOVING AROUND A LOT MORE THAN USUAL: NOT AT ALL
5. POOR APPETITE OR OVEREATING: NOT AT ALL
SUM OF ALL RESPONSES TO PHQ QUESTIONS 1-9: 0
2. FEELING DOWN, DEPRESSED OR HOPELESS: NOT AT ALL
4. FEELING TIRED OR HAVING LITTLE ENERGY: NOT AT ALL
7. TROUBLE CONCENTRATING ON THINGS, SUCH AS READING THE NEWSPAPER OR WATCHING TELEVISION: NOT AT ALL
10. IF YOU CHECKED OFF ANY PROBLEMS, HOW DIFFICULT HAVE THESE PROBLEMS MADE IT FOR YOU TO DO YOUR WORK, TAKE CARE OF THINGS AT HOME, OR GET ALONG WITH OTHER PEOPLE: NOT DIFFICULT AT ALL
3. TROUBLE FALLING OR STAYING ASLEEP: NOT AT ALL
1. LITTLE INTEREST OR PLEASURE IN DOING THINGS: NOT AT ALL

## 2025-04-09 ASSESSMENT — ENCOUNTER SYMPTOMS
RESPIRATORY NEGATIVE: 1
GASTROINTESTINAL NEGATIVE: 1
EYES NEGATIVE: 1
ALLERGIC/IMMUNOLOGIC NEGATIVE: 1

## 2025-04-09 NOTE — PROGRESS NOTES
after clipping toenails    Hx of blood clots     LEFT LOWER LEG UNSURE OF THIS AT THIS TIME, IS TO BE SEEING VASCULAR DR FOR THIS.     Hx of hepatitis C     Hx of pancreatitis     6/2013    Hx of tuberculosis     Hyperlipidemia     Hypertension     Impaired mobility     uses cane and walker    Intermittent claudication     Liver disease     PAD (peripheral artery disease)     PVD (peripheral vascular disease)     Swelling     tadeo feet    Ulcer of foot (HCC)     left great toe    [facility] care of service provider 03/29/2024    [id number]    Wears dentures     FULL UPPER AND LOWER    Wears dentures     Wears glasses     Wears glasses      Past Surgical History:   Procedure Laterality Date    COLONOSCOPY      around 2014    FEMORAL BYPASS  04/08/2024    FEMORAL BYPASS Left 04/08/2024    LOWER EXTREMITY FEMORAL TO TIBIAL BYPASS WITH PROPATIN GRAFT, IPSILATERAL SUPERIFICAL SAPHENOUS VEIN HARVEST, LOWER EXTREMITY ANGIOGRAM COMPLETION performed by Delos Reyes, Arthur, MD at [facility]    FINGER AMPUTATION Left 11/28/2016    revision long finger    LEG AMPUTATION BELOW KNEE Left 8/16/2024    LEFT LEG AMPUTATION BELOW KNEE / NERVE BLOCK PER ANESTHESIA performed by Delos Reyes, Arthur, MD at [facility]    LIVER BIOPSY      OTHER SURGICAL HISTORY  02/14/2017    Abdomen with run off with Dr. Smith - could not pass left left pop; # 7 FR manual pull right groin    OTHER SURGICAL HISTORY  04/21/2023    LE Angiogram    TOE AMPUTATION Left 6/21/2024    LEFT GREAT TOE AMPUTATION performed by Delos Reyes, Arthur, MD at [facility]    TOE AMPUTATION Left 7/31/2024    RAY AMPUTATION OF HALLUX, APPLICATION OF WOUND VAC performed by Delos Reyes, Arthur, MD at [facility]    UPPER GASTROINTESTINAL ENDOSCOPY      VASCULAR SURGERY      abdominal aortogram    VASCULAR SURGERY Left 04/21/2023    LEFT LOWER EXTREMITY ANGIOGRAM WITH LEFT DISTAL SFA  AND ABOVE KNEE POPLITEAL ARTERY KAYLYN  STENT 6MM X 120MM X 130CM

## 2025-07-21 ENCOUNTER — TELEPHONE (OUTPATIENT)
Age: 70
End: 2025-07-21

## 2025-07-21 NOTE — TELEPHONE ENCOUNTER
PC to pt to schedule AWV and 6 month f/u appt w/PCP, pt's phone is disconnected.    PC to pt's EC brother Rey's phone number, this number is also disconnected.    Addendum:

## 2025-08-26 ENCOUNTER — OFFICE VISIT (OUTPATIENT)
Age: 70
End: 2025-08-26
Payer: MEDICARE

## 2025-08-26 VITALS
OXYGEN SATURATION: 98 % | HEART RATE: 110 BPM | WEIGHT: 145.8 LBS | SYSTOLIC BLOOD PRESSURE: 116 MMHG | DIASTOLIC BLOOD PRESSURE: 62 MMHG | HEIGHT: 68 IN | BODY MASS INDEX: 22.1 KG/M2

## 2025-08-26 DIAGNOSIS — Z12.11 COLON CANCER SCREENING: ICD-10-CM

## 2025-08-26 DIAGNOSIS — D53.9 MACROCYTIC ANEMIA: ICD-10-CM

## 2025-08-26 DIAGNOSIS — E78.1 HYPERTRIGLYCERIDEMIA: ICD-10-CM

## 2025-08-26 DIAGNOSIS — F12.90 MARIJUANA USE: ICD-10-CM

## 2025-08-26 DIAGNOSIS — K21.9 GASTROESOPHAGEAL REFLUX DISEASE WITHOUT ESOPHAGITIS: ICD-10-CM

## 2025-08-26 DIAGNOSIS — I10 PRIMARY HYPERTENSION: Chronic | ICD-10-CM

## 2025-08-26 DIAGNOSIS — Z00.00 MEDICARE ANNUAL WELLNESS VISIT, SUBSEQUENT: Primary | ICD-10-CM

## 2025-08-26 PROBLEM — I96 GANGRENE OF LEFT FOOT (HCC): Status: RESOLVED | Noted: 2024-08-14 | Resolved: 2025-08-26

## 2025-08-26 PROCEDURE — 99213 OFFICE O/P EST LOW 20 MIN: CPT | Performed by: INTERNAL MEDICINE

## 2025-08-26 PROCEDURE — G8427 DOCREV CUR MEDS BY ELIG CLIN: HCPCS | Performed by: INTERNAL MEDICINE

## 2025-08-26 PROCEDURE — 1159F MED LIST DOCD IN RCRD: CPT | Performed by: INTERNAL MEDICINE

## 2025-08-26 PROCEDURE — G8420 CALC BMI NORM PARAMETERS: HCPCS | Performed by: INTERNAL MEDICINE

## 2025-08-26 PROCEDURE — G0439 PPPS, SUBSEQ VISIT: HCPCS | Performed by: INTERNAL MEDICINE

## 2025-08-26 PROCEDURE — 3078F DIAST BP <80 MM HG: CPT | Performed by: INTERNAL MEDICINE

## 2025-08-26 PROCEDURE — 3017F COLORECTAL CA SCREEN DOC REV: CPT | Performed by: INTERNAL MEDICINE

## 2025-08-26 PROCEDURE — 1124F ACP DISCUSS-NO DSCNMKR DOCD: CPT | Performed by: INTERNAL MEDICINE

## 2025-08-26 PROCEDURE — 3074F SYST BP LT 130 MM HG: CPT | Performed by: INTERNAL MEDICINE

## 2025-08-26 PROCEDURE — 1036F TOBACCO NON-USER: CPT | Performed by: INTERNAL MEDICINE

## 2025-08-26 RX ORDER — OMEPRAZOLE 20 MG/1
20 CAPSULE, DELAYED RELEASE ORAL
Qty: 90 CAPSULE | Refills: 1 | Status: SHIPPED | OUTPATIENT
Start: 2025-08-26

## 2025-08-26 SDOH — ECONOMIC STABILITY: FOOD INSECURITY: WITHIN THE PAST 12 MONTHS, YOU WORRIED THAT YOUR FOOD WOULD RUN OUT BEFORE YOU GOT MONEY TO BUY MORE.: NEVER TRUE

## 2025-08-26 SDOH — ECONOMIC STABILITY: FOOD INSECURITY: WITHIN THE PAST 12 MONTHS, THE FOOD YOU BOUGHT JUST DIDN'T LAST AND YOU DIDN'T HAVE MONEY TO GET MORE.: NEVER TRUE

## 2025-08-26 ASSESSMENT — PATIENT HEALTH QUESTIONNAIRE - PHQ9
8. MOVING OR SPEAKING SO SLOWLY THAT OTHER PEOPLE COULD HAVE NOTICED. OR THE OPPOSITE, BEING SO FIGETY OR RESTLESS THAT YOU HAVE BEEN MOVING AROUND A LOT MORE THAN USUAL: NOT AT ALL
9. THOUGHTS THAT YOU WOULD BE BETTER OFF DEAD, OR OF HURTING YOURSELF: NOT AT ALL
5. POOR APPETITE OR OVEREATING: NOT AT ALL
1. LITTLE INTEREST OR PLEASURE IN DOING THINGS: NOT AT ALL
SUM OF ALL RESPONSES TO PHQ QUESTIONS 1-9: 4
7. TROUBLE CONCENTRATING ON THINGS, SUCH AS READING THE NEWSPAPER OR WATCHING TELEVISION: NOT AT ALL
2. FEELING DOWN, DEPRESSED OR HOPELESS: SEVERAL DAYS
10. IF YOU CHECKED OFF ANY PROBLEMS, HOW DIFFICULT HAVE THESE PROBLEMS MADE IT FOR YOU TO DO YOUR WORK, TAKE CARE OF THINGS AT HOME, OR GET ALONG WITH OTHER PEOPLE: SOMEWHAT DIFFICULT
4. FEELING TIRED OR HAVING LITTLE ENERGY: NOT AT ALL
SUM OF ALL RESPONSES TO PHQ QUESTIONS 1-9: 4
6. FEELING BAD ABOUT YOURSELF - OR THAT YOU ARE A FAILURE OR HAVE LET YOURSELF OR YOUR FAMILY DOWN: NOT AT ALL
3. TROUBLE FALLING OR STAYING ASLEEP: NEARLY EVERY DAY
SUM OF ALL RESPONSES TO PHQ QUESTIONS 1-9: 4
SUM OF ALL RESPONSES TO PHQ QUESTIONS 1-9: 4

## 2025-09-02 ENCOUNTER — HOSPITAL ENCOUNTER (OUTPATIENT)
Dept: LAB | Age: 70
Discharge: HOME | End: 2025-09-02
Payer: MEDICARE

## 2025-09-02 DIAGNOSIS — D53.9 MACROCYTIC ANEMIA: ICD-10-CM

## 2025-09-02 DIAGNOSIS — I10 PRIMARY HYPERTENSION: Chronic | ICD-10-CM

## 2025-09-02 DIAGNOSIS — E78.1 HYPERTRIGLYCERIDEMIA: ICD-10-CM

## 2025-09-02 LAB
ALBUMIN SERPL-MCNC: 3.3 G/DL (ref 3.5–5.2)
ALBUMIN/GLOB SERPL: 0.9 {RATIO} (ref 1–2.5)
ALP SERPL-CCNC: 135 U/L (ref 40–129)
ALT SERPL-CCNC: 39 U/L (ref 10–50)
ANION GAP SERPL CALCULATED.3IONS-SCNC: 11 MMOL/L (ref 9–16)
AST SERPL-CCNC: 54 U/L (ref 10–50)
BASOPHILS # BLD: 0.04 K/UL (ref 0–0.2)
BASOPHILS NFR BLD: 1 % (ref 0–2)
BILIRUB SERPL-MCNC: 1.4 MG/DL (ref 0–1.2)
BUN SERPL-MCNC: 6 MG/DL (ref 8–23)
CALCIUM SERPL-MCNC: 9.1 MG/DL (ref 8.6–10.4)
CHLORIDE SERPL-SCNC: 110 MMOL/L (ref 98–107)
CHOLEST SERPL-MCNC: 136 MG/DL (ref 0–199)
CHOLESTEROL/HDL RATIO: 2.9
CO2 SERPL-SCNC: 22 MMOL/L (ref 20–31)
CREAT SERPL-MCNC: 0.8 MG/DL (ref 0.7–1.2)
EOSINOPHIL # BLD: 0.17 K/UL (ref 0–0.44)
EOSINOPHILS RELATIVE PERCENT: 4 % (ref 1–4)
ERYTHROCYTE [DISTWIDTH] IN BLOOD BY AUTOMATED COUNT: 12.9 % (ref 11.8–14.4)
GFR, ESTIMATED: >90 ML/MIN/1.73M2
GLUCOSE SERPL-MCNC: 90 MG/DL (ref 74–99)
HCT VFR BLD AUTO: 36.7 % (ref 40.7–50.3)
HDLC SERPL-MCNC: 47 MG/DL
HGB BLD-MCNC: 11.9 G/DL (ref 13–17)
IMM GRANULOCYTES # BLD AUTO: <0.03 K/UL (ref 0–0.3)
IMM GRANULOCYTES NFR BLD: 0 %
LDLC SERPL CALC-MCNC: 68 MG/DL (ref 0–100)
LYMPHOCYTES NFR BLD: 1.7 K/UL (ref 1.1–3.7)
LYMPHOCYTES RELATIVE PERCENT: 35 % (ref 24–43)
MCH RBC QN AUTO: 34.2 PG (ref 25.2–33.5)
MCHC RBC AUTO-ENTMCNC: 32.4 G/DL (ref 28.4–34.8)
MCV RBC AUTO: 105.5 FL (ref 82.6–102.9)
MONOCYTES NFR BLD: 0.36 K/UL (ref 0.1–1.2)
MONOCYTES NFR BLD: 7 % (ref 3–12)
NEUTROPHILS NFR BLD: 53 % (ref 36–65)
NEUTS SEG NFR BLD: 2.57 K/UL (ref 1.5–8.1)
NRBC BLD-RTO: 0 PER 100 WBC
PLATELET # BLD AUTO: 146 K/UL (ref 138–453)
PMV BLD AUTO: 10.4 FL (ref 8.1–13.5)
POTASSIUM SERPL-SCNC: 4.1 MMOL/L (ref 3.7–5.3)
PROT SERPL-MCNC: 6.8 G/DL (ref 6.6–8.7)
RBC # BLD AUTO: 3.48 M/UL (ref 4.21–5.77)
RBC # BLD: ABNORMAL 10*6/UL
SODIUM SERPL-SCNC: 143 MMOL/L (ref 136–145)
TRIGL SERPL-MCNC: 106 MG/DL (ref 0–149)
VLDLC SERPL CALC-MCNC: 21 MG/DL (ref 1–30)
WBC OTHER # BLD: 4.9 K/UL (ref 3.5–11.3)

## 2025-09-02 PROCEDURE — 36415 COLL VENOUS BLD VENIPUNCTURE: CPT

## 2025-09-02 PROCEDURE — 80053 COMPREHEN METABOLIC PANEL: CPT

## 2025-09-02 PROCEDURE — 80061 LIPID PANEL: CPT

## 2025-09-02 PROCEDURE — 85025 COMPLETE CBC W/AUTO DIFF WBC: CPT

## 2025-09-03 ENCOUNTER — TELEPHONE (OUTPATIENT)
Age: 70
End: 2025-09-03

## (undated) DEVICE — SYRINGE, LUER LOCK, 10ML: Brand: MEDLINE

## (undated) DEVICE — GLOVE SURG SZ 65 CRM LTX FREE POLYISOPRENE POLYMER BEAD ANTI

## (undated) DEVICE — SUTURE MONOCRYL SZ 4-0 L18IN ABSRB UD L19MM PS-2 3/8 CIR PRIM Y496G

## (undated) DEVICE — APPLICATOR MEDICATED 10.5 CC SOLUTION HI LT ORNG CHLORAPREP

## (undated) DEVICE — SUTURE PROL SZ 6-0 L24IN NONABSORBABLE BLU L9.3MM BV-1 3/8 8805H

## (undated) DEVICE — COVER,LIGHT HANDLE,FLX,2/PK: Brand: MEDLINE INDUSTRIES, INC.

## (undated) DEVICE — GLOVE SURG SZ 75 L12IN FNGR THK79MIL GRN LTX FREE

## (undated) DEVICE — CATH JETSTREAM 2.4/3.4

## (undated) DEVICE — SUTURE VICRYL + SZ 4-0 L27IN ABSRB UD L26MM SH 1/2 CIR VCP415H

## (undated) DEVICE — GAUZE,SPONGE,4"X4",16PLY,XRAY,STRL,LF: Brand: MEDLINE

## (undated) DEVICE — PAD,ABDOMINAL,5"X9",ST,LF,25/BX: Brand: MEDLINE INDUSTRIES, INC.

## (undated) DEVICE — WRAP,STERILIZATION,CSR,GEMINI,24X24,L WT: Brand: MEDLINE

## (undated) DEVICE — BNDG,ELSTC,MATRIX,STRL,4"X5YD,LF,HOOK&LP: Brand: MEDLINE

## (undated) DEVICE — GLOVE SURG SZ 75 CRM LTX FREE POLYISOPRENE POLYMER BEAD ANTI

## (undated) DEVICE — INTRODUCER SHTH 7FR CANN L11CM 0.038IN STD ORNG W/ MINI

## (undated) DEVICE — PAD GEN USE BORDERED ADH 14IN 2IN AND 12IN 4IN GZ UNIV ST

## (undated) DEVICE — BNDG,ELSTC,MATRIX,STRL,6"X5YD,LF,HOOK&LP: Brand: MEDLINE

## (undated) DEVICE — PLEDGET SURG W3.5XL7MM THK1.5MM WHT PTFE RECT FIRM TFE

## (undated) DEVICE — SUTURE N ABSRB L 36 IN SZ 5-0 NDL L 13 MM POLYPRO OR PPL BLU

## (undated) DEVICE — SUTURE PERMAHAND SZ 2-0 L12X18IN NONABSORBABLE BLK SILK A185H

## (undated) DEVICE — SYRINGE MED 10ML LUERLOCK TIP W/O SFTY DISP

## (undated) DEVICE — GLOVE SURG SZ 8 L12IN FNGR THK79MIL GRN LTX FREE

## (undated) DEVICE — TOWEL,OR,DSP,ST,NATURAL,DLX,4/PK,20PK/CS: Brand: MEDLINE

## (undated) DEVICE — TAPE MED W1/8XL30IN WHT POLY

## (undated) DEVICE — GLOVE SURG SZ 6 L12IN FNGR THK79MIL GRN LTX FREE

## (undated) DEVICE — STRAP ARMBRD W1.5XL32IN FOAM STR YET SFT W/ HK AND LOOP

## (undated) DEVICE — CONTAINER,SPECIMEN,4OZ,OR STRL: Brand: MEDLINE

## (undated) DEVICE — PROTECTOR ULN NRV PUR FOAM HK LOOP STRP ANATOMICALLY

## (undated) DEVICE — SPONGE LAP W18XL18IN WHT COT 4 PLY FLD STRUNG RADPQ DISP ST 2 PER PACK

## (undated) DEVICE — DRESSING NEG PRSS M 18X12.5X3.3CM POLYUR FOR WND THER VAC

## (undated) DEVICE — CATHETER SUPP L90CM 50MM MRK BND SPC GWIRE 0.035IN

## (undated) DEVICE — LOOP VES W13MM THK09MM MINI RED SIL FLD REPELLENT

## (undated) DEVICE — KIT MICRO INTRO 4FR STIFF 40CM NIGHTENALL TUNGSTEN 7SMT

## (undated) DEVICE — AGENT HEMSTAT W6XL9IN OXIDIZED REGENERATED CELOS ABSRB FOR

## (undated) DEVICE — SVMMC AMPUTATION PK

## (undated) DEVICE — DECANTER BAG 9": Brand: MEDLINE INDUSTRIES, INC.

## (undated) DEVICE — DEVICE CTRL FLOWSWITCH 24 PER BX

## (undated) DEVICE — SUTURE NONABSORBABLE MONOFILAMENT 6-0 C-1 1X30 IN PROLENE 8706H

## (undated) DEVICE — SURGICAL PROCEDURE TRAY CRD CATH SVMMC

## (undated) DEVICE — BLADE SAW L510MM S STL GIGLI FOR BNE CUT

## (undated) DEVICE — SUTURE VICRYL + SZ 3-0 L27IN ABSRB UD L26MM SH 1/2 CIR VCP416H

## (undated) DEVICE — GLOVE SURG SZ 65 L12IN FNGR THK79MIL GRN LTX FREE

## (undated) DEVICE — SCALPEL BLADES, FIG. 15, CARBON STEEL, STERILE, DISPOSABLE, DISPENSER PACKAGING, PACKAGE OF 100 PIECES: Brand: AESCULAP

## (undated) DEVICE — SUTURE VICRYL + SZ 2-0 L27IN ABSRB CLR CT-1 1/2 CIR TAPERCUT VCP259H

## (undated) DEVICE — FLEXOR, CHECK-FLO, INTRODUCER ANSEL MODIFICATION: Brand: FLEXOR

## (undated) DEVICE — GOWN,SIRUS,NONRNF,SETINSLV,XL,20/CS: Brand: MEDLINE

## (undated) DEVICE — Device

## (undated) DEVICE — CATHETER GUID OTW 0.035 IN 6 FRX135 CM 5 FR TRAILBLAZER

## (undated) DEVICE — 1LYRTR 16FR10ML100%SIL UMS SNP: Brand: MEDLINE INDUSTRIES, INC.

## (undated) DEVICE — DEVICE SURGICAL SWITCH FLOW HIGH PRESSURE M/F LL

## (undated) DEVICE — GLOVE SURG SZ 7 L12IN FNGR THK79MIL GRN LTX FREE

## (undated) DEVICE — SNAP KAP: Brand: UNBRANDED

## (undated) DEVICE — GOWN,AURORA,NONREINFORCED,LARGE: Brand: MEDLINE

## (undated) DEVICE — PROVE COVER: Brand: UNBRANDED

## (undated) DEVICE — SUTURE PERMAHAND SZ 4-0 L18IN NONABSORBABLE BLK SILK BRAID A183H

## (undated) DEVICE — MASTISOL ADHESIVE AMPULE

## (undated) DEVICE — SET PEN AND LBL AND L BWL LBL PAL PEN AND LBLS PAL700]

## (undated) DEVICE — SUTURE PERMA-HAND SZ 2-0 L30IN NONABSORBABLE BLK L26MM SH K833H

## (undated) DEVICE — SMALL (GREEN) FOR GRAFTS UP TO 8 MM, 20 1/2" / 52 CM (1/PKG): Brand: SCANLAN® VASCULAR TUNNELER SHEATHS AND BULLET TIPS

## (undated) DEVICE — BANDAGE COMPR W6INXL12FT SMOOTH FOR LIMB EXSANG ESMARCH

## (undated) DEVICE — 1.5MM HYDRO LEMAITRE VALVULOTOME (98 CM): Brand: HYDRO LEMAITRE VALVULOTOME

## (undated) DEVICE — STRAP,POSITIONING,KNEE/BODY,FOAM,4X60": Brand: MEDLINE

## (undated) DEVICE — TAPE UMB 72X7/32 IN

## (undated) DEVICE — STRIP,CLOSURE,WOUND,MEDI-STRIP,1/2X4: Brand: MEDLINE

## (undated) DEVICE — SUTURE PERMAHAND SZ 3-0 L18IN NONABSORBABLE BLK SILK BRAID A184H

## (undated) DEVICE — 3M™ STERI-STRIP™ COMPOUND BENZOIN TINCTURE 40 BAGS/CARTON 4 CARTONS/CASE C1544: Brand: 3M™ STERI-STRIP™

## (undated) DEVICE — EVERGRIP INSERT SET: Brand: FOGARTY EVERGRIP

## (undated) DEVICE — GUIDEWIRE VASC L300CM DIA0.014IN STR LNG TAPR TIP SIL S STL

## (undated) DEVICE — C-ARM: Brand: UNBRANDED

## (undated) DEVICE — 4F 40 CM LEMAITRE EMBOLECTOMY CATHETER, EIFU: Brand: LEMAITRE EMBOLECTOMY CATHETER

## (undated) DEVICE — BANDAGE,GAUZE,BULKEE II,4.5"X4.1YD,STRL: Brand: MEDLINE

## (undated) DEVICE — SYRINGE ONLY,20ML LUER LOCK: Brand: MEDLINE INDUSTRIES, INC.

## (undated) DEVICE — THE STERILE LIGHT HANDLE COVER IS USED WITH STERIS SURGICAL LIGHTING AND VISUALIZATION SYSTEMS.

## (undated) DEVICE — GAUZE,SPONGE,FLUFF,6"X6.75",STRL,5/TRAY: Brand: MEDLINE

## (undated) DEVICE — PINNACLE INTRODUCER SHEATH: Brand: PINNACLE

## (undated) DEVICE — SUTURE PROL SZ 6-0 L18IN NONABSORBABLE BLU L9.3MM BV-1 3/8 8806H

## (undated) DEVICE — SURGICAL SUCTION CONNECTING TUBE WITH MALE CONNECTOR AND SUCTION CLAMP, 2 FT. LONG (.6 M), 5 MM I.D.: Brand: CONMED

## (undated) DEVICE — SHEET, T, LAPAROTOMY, STERILE: Brand: MEDLINE

## (undated) DEVICE — CATHETER ANGIOPLSTY OTW 035 5 FRX135 CM 6X120 MM EVERCROSS

## (undated) DEVICE — RADIFOCUS GLIDEWIRE ADVANTAGE GUIDEWIRE: Brand: GLIDEWIRE ADVANTAGE

## (undated) DEVICE — SUTURE MONOCRYL + SZ 4 0 L18IN ABSRB UD PC 3 L16MM 3 8 CIR PRIM MCP845G

## (undated) DEVICE — PTA BALLOON DILATATION CATHETER: Brand: COYOTE™

## (undated) DEVICE — CANISTER NEG PRSS 500ML WND THER W/ TBNG NO PRSS RANG W/

## (undated) DEVICE — DEVICE INFL 20ML 30ATM DGT FLD DISPNS SYR W ACCESSPLUS BLU

## (undated) DEVICE — DRESSING TRNSPAR W5XL4.5IN FLM SHT SEMIPERMEABLE WIND

## (undated) DEVICE — SUTURE NONABSORBABLE MONOFILAMENT 7-0 BV-1 1X24 IN PROLENE 8702H

## (undated) DEVICE — SET EXTN IV L30IN TBNG DIA0.1IN PRIMING 4ML MACBOR FEM ADPT

## (undated) DEVICE — CATHETER ANGIO 4FR L65CM 0.035IN VIS OMNI FLUSH-0 SFT TIP

## (undated) DEVICE — TOWEL,OR,DSP,ST,BLUE,DLX,XR,4/PK,20PK/CS: Brand: MEDLINE

## (undated) DEVICE — SYRINGE MEDICAL 3ML CLEAR PLASTIC STANDARD NON CONTROL LUERLOCK TIP DISPOSABLE

## (undated) DEVICE — SHEET, ORTHO, SPLIT, STERILE: Brand: MEDLINE

## (undated) DEVICE — LARGE (BLUE) FOR GRAFTS UP TO 10 MM, 20 1/2" / 52 CM (1/PKG): Brand: SCANLAN® VASCULAR TUNNELER SHEATHS AND BULLET TIPS

## (undated) DEVICE — NEEDLE HYPO 25GA L1.5IN BLU POLYPR HUB S STL REG BVL STR

## (undated) DEVICE — BLADE,STAINLESS-STEEL,15,STRL,DISPOSABLE: Brand: MEDLINE

## (undated) DEVICE — SYRINGE 20ML LL S/C 50

## (undated) DEVICE — APPLICATOR MEDICATED 26 CC SOLUTION HI LT ORNG CHLORAPREP

## (undated) DEVICE — MASTISOL ADHESIVE LIQ 2/3ML

## (undated) DEVICE — GLOVE SURG SZ 6 CRM LTX FREE POLYISOPRENE POLYMER BEAD ANTI

## (undated) DEVICE — TUBING, SUCTION, 9/32" X 20', STRAIGHT: Brand: MEDLINE INDUSTRIES, INC.

## (undated) DEVICE — DRAPE,REIN 53X77,STERILE: Brand: MEDLINE

## (undated) DEVICE — GLOVE SURG SZ 7 CRM LTX FREE POLYISOPRENE POLYMER BEAD ANTI

## (undated) DEVICE — SVMMC VASC MAJ PK

## (undated) DEVICE — LIQUIBAND RAPID ADHESIVE 36/CS 0.8ML: Brand: MEDLINE

## (undated) DEVICE — YANKAUER,FLEXIBLE HANDLE,REGLR CAPACITY: Brand: MEDLINE INDUSTRIES, INC.

## (undated) DEVICE — GLOVE SURG SZ 8 CRM LTX FREE POLYISOPRENE POLYMER BEAD ANTI